# Patient Record
Sex: MALE | Race: WHITE | Employment: OTHER
[De-identification: names, ages, dates, MRNs, and addresses within clinical notes are randomized per-mention and may not be internally consistent; named-entity substitution may affect disease eponyms.]

---

## 2017-01-16 PROBLEM — N40.1 BPH (BENIGN PROSTATIC HYPERTROPHY) WITH URINARY OBSTRUCTION: Status: ACTIVE | Noted: 2017-01-16

## 2017-01-16 PROBLEM — N13.8 BPH (BENIGN PROSTATIC HYPERTROPHY) WITH URINARY OBSTRUCTION: Status: ACTIVE | Noted: 2017-01-16

## 2017-01-25 ENCOUNTER — OFFICE VISIT (OUTPATIENT)
Dept: FAMILY MEDICINE CLINIC | Facility: CLINIC | Age: 67
End: 2017-01-25

## 2017-01-25 VITALS
HEART RATE: 82 BPM | WEIGHT: 217.2 LBS | HEIGHT: 74 IN | SYSTOLIC BLOOD PRESSURE: 136 MMHG | TEMPERATURE: 97.7 F | RESPIRATION RATE: 18 BRPM | BODY MASS INDEX: 27.87 KG/M2 | DIASTOLIC BLOOD PRESSURE: 70 MMHG

## 2017-01-25 DIAGNOSIS — M15.9 OSTEOARTHRITIS OF MULTIPLE JOINTS, UNSPECIFIED OSTEOARTHRITIS TYPE: ICD-10-CM

## 2017-01-25 DIAGNOSIS — E55.9 VITAMIN D DEFICIENCY: Chronic | ICD-10-CM

## 2017-01-25 DIAGNOSIS — G47.9 DIFFICULTY SLEEPING: ICD-10-CM

## 2017-01-25 DIAGNOSIS — M25.551 BILATERAL HIP PAIN: ICD-10-CM

## 2017-01-25 DIAGNOSIS — I10 ESSENTIAL HYPERTENSION: Chronic | ICD-10-CM

## 2017-01-25 DIAGNOSIS — M53.3 SACRO-ILIAC PAIN: ICD-10-CM

## 2017-01-25 DIAGNOSIS — M25.552 BILATERAL HIP PAIN: ICD-10-CM

## 2017-01-25 PROCEDURE — G8484 FLU IMMUNIZE NO ADMIN: HCPCS | Performed by: NURSE PRACTITIONER

## 2017-01-25 PROCEDURE — 4040F PNEUMOC VAC/ADMIN/RCVD: CPT | Performed by: NURSE PRACTITIONER

## 2017-01-25 PROCEDURE — 1123F ACP DISCUSS/DSCN MKR DOCD: CPT | Performed by: NURSE PRACTITIONER

## 2017-01-25 PROCEDURE — 3045F PR MOST RECENT HEMOGLOBIN A1C LEVEL 7.0-9.0%: CPT | Performed by: NURSE PRACTITIONER

## 2017-01-25 PROCEDURE — 4004F PT TOBACCO SCREEN RCVD TLK: CPT | Performed by: NURSE PRACTITIONER

## 2017-01-25 PROCEDURE — G8420 CALC BMI NORM PARAMETERS: HCPCS | Performed by: NURSE PRACTITIONER

## 2017-01-25 PROCEDURE — G8427 DOCREV CUR MEDS BY ELIG CLIN: HCPCS | Performed by: NURSE PRACTITIONER

## 2017-01-25 PROCEDURE — 99214 OFFICE O/P EST MOD 30 MIN: CPT | Performed by: NURSE PRACTITIONER

## 2017-01-25 PROCEDURE — 3017F COLORECTAL CA SCREEN DOC REV: CPT | Performed by: NURSE PRACTITIONER

## 2017-01-25 RX ORDER — ECHINACEA 400 MG
1000 CAPSULE ORAL
COMMUNITY
End: 2017-08-23

## 2017-01-25 ASSESSMENT — ENCOUNTER SYMPTOMS
RHINORRHEA: 1
EYE DISCHARGE: 0
SHORTNESS OF BREATH: 0
WHEEZING: 0
EYE PAIN: 0
SORE THROAT: 0
BACK PAIN: 1
DIARRHEA: 0
ABDOMINAL PAIN: 0
CONSTIPATION: 0
NAUSEA: 0
VOMITING: 0
COUGH: 1
TROUBLE SWALLOWING: 0

## 2017-01-30 ENCOUNTER — TELEPHONE (OUTPATIENT)
Dept: FAMILY MEDICINE CLINIC | Facility: CLINIC | Age: 67
End: 2017-01-30

## 2017-02-24 ENCOUNTER — TELEPHONE (OUTPATIENT)
Dept: FAMILY MEDICINE CLINIC | Facility: CLINIC | Age: 67
End: 2017-02-24

## 2017-04-12 ENCOUNTER — HOSPITAL ENCOUNTER (OUTPATIENT)
Age: 67
Setting detail: SPECIMEN
Discharge: HOME | End: 2017-04-12
Payer: MEDICARE

## 2017-04-12 DIAGNOSIS — I10 ESSENTIAL HYPERTENSION: Chronic | ICD-10-CM

## 2017-04-12 LAB
ALBUMIN SERPL-MCNC: 4 G/DL (ref 3.5–5.2)
ALBUMIN/GLOBULIN RATIO: 1.8 (ref 1–2.5)
ALP BLD-CCNC: 44 U/L (ref 40–129)
ALT SERPL-CCNC: 45 U/L (ref 5–41)
ANION GAP SERPL CALCULATED.3IONS-SCNC: 12 MMOL/L (ref 9–17)
AST SERPL-CCNC: 30 U/L
BILIRUB SERPL-MCNC: 0.31 MG/DL (ref 0.3–1.2)
BUN BLDV-MCNC: 19 MG/DL (ref 8–23)
BUN/CREAT BLD: ABNORMAL (ref 9–20)
CALCIUM SERPL-MCNC: 8.6 MG/DL (ref 8.6–10.4)
CHLORIDE BLD-SCNC: 101 MMOL/L (ref 98–107)
CHOLESTEROL/HDL RATIO: 4.3
CHOLESTEROL: 165 MG/DL
CO2: 28 MMOL/L (ref 20–31)
CREAT SERPL-MCNC: 0.8 MG/DL (ref 0.7–1.2)
CREATININE URINE: 130.7 MG/DL (ref 39–259)
ESTIMATED AVERAGE GLUCOSE: 174 MG/DL
GFR AFRICAN AMERICAN: >60 ML/MIN
GFR NON-AFRICAN AMERICAN: >60 ML/MIN
GFR SERPL CREATININE-BSD FRML MDRD: ABNORMAL ML/MIN/{1.73_M2}
GFR SERPL CREATININE-BSD FRML MDRD: ABNORMAL ML/MIN/{1.73_M2}
GLUCOSE BLD-MCNC: 141 MG/DL (ref 70–99)
HBA1C MFR BLD: 7.7 % (ref 4–6)
HDLC SERPL-MCNC: 38 MG/DL
LDL CHOLESTEROL: 108 MG/DL (ref 0–130)
MICROALBUMIN/CREAT 24H UR: <12 MG/L
MICROALBUMIN/CREAT UR-RTO: 9 MCG/MG CREAT
POTASSIUM SERPL-SCNC: 3.9 MMOL/L (ref 3.7–5.3)
SODIUM BLD-SCNC: 141 MMOL/L (ref 135–144)
TOTAL PROTEIN: 6.2 G/DL (ref 6.4–8.3)
TRIGL SERPL-MCNC: 94 MG/DL
VLDLC SERPL CALC-MCNC: ABNORMAL MG/DL (ref 1–30)

## 2017-05-30 DIAGNOSIS — I10 ESSENTIAL HYPERTENSION: Chronic | ICD-10-CM

## 2017-05-31 RX ORDER — GLIPIZIDE 10 MG/1
10 TABLET ORAL
Qty: 180 TABLET | Refills: 1 | Status: SHIPPED | OUTPATIENT
Start: 2017-05-31 | End: 2017-10-27 | Stop reason: SDUPTHER

## 2017-05-31 RX ORDER — CHLORTHALIDONE 25 MG/1
25 TABLET ORAL DAILY
Qty: 90 TABLET | Refills: 1 | Status: SHIPPED | OUTPATIENT
Start: 2017-05-31 | End: 2018-03-25 | Stop reason: SDUPTHER

## 2017-05-31 RX ORDER — LISINOPRIL 10 MG/1
10 TABLET ORAL DAILY
Qty: 90 TABLET | Refills: 1 | Status: SHIPPED | OUTPATIENT
Start: 2017-05-31 | End: 2017-10-27 | Stop reason: SDUPTHER

## 2017-08-23 ENCOUNTER — HOSPITAL ENCOUNTER (OUTPATIENT)
Age: 67
Setting detail: SPECIMEN
Discharge: HOME | End: 2017-08-23
Payer: MEDICARE

## 2017-08-23 ENCOUNTER — OFFICE VISIT (OUTPATIENT)
Dept: FAMILY MEDICINE CLINIC | Age: 67
End: 2017-08-23
Payer: MEDICARE

## 2017-08-23 VITALS
OXYGEN SATURATION: 96 % | TEMPERATURE: 97.3 F | WEIGHT: 213 LBS | BODY MASS INDEX: 27.34 KG/M2 | SYSTOLIC BLOOD PRESSURE: 121 MMHG | DIASTOLIC BLOOD PRESSURE: 72 MMHG | RESPIRATION RATE: 20 BRPM | HEART RATE: 83 BPM | HEIGHT: 74 IN

## 2017-08-23 DIAGNOSIS — Z11.59 NEED FOR HEPATITIS C SCREENING TEST: ICD-10-CM

## 2017-08-23 DIAGNOSIS — I10 ESSENTIAL HYPERTENSION: Primary | Chronic | ICD-10-CM

## 2017-08-23 DIAGNOSIS — I10 ESSENTIAL HYPERTENSION: Chronic | ICD-10-CM

## 2017-08-23 DIAGNOSIS — G62.9 NEUROPATHY: ICD-10-CM

## 2017-08-23 DIAGNOSIS — M54.41 CHRONIC BILATERAL LOW BACK PAIN WITH BILATERAL SCIATICA: ICD-10-CM

## 2017-08-23 DIAGNOSIS — F17.210 CIGARETTE NICOTINE DEPENDENCE WITHOUT COMPLICATION: ICD-10-CM

## 2017-08-23 DIAGNOSIS — M54.42 CHRONIC BILATERAL LOW BACK PAIN WITH BILATERAL SCIATICA: ICD-10-CM

## 2017-08-23 DIAGNOSIS — E55.9 VITAMIN D DEFICIENCY: Chronic | ICD-10-CM

## 2017-08-23 DIAGNOSIS — G89.29 CHRONIC BILATERAL LOW BACK PAIN WITH BILATERAL SCIATICA: ICD-10-CM

## 2017-08-23 LAB
ALBUMIN SERPL-MCNC: 4.2 G/DL (ref 3.5–5.2)
ALBUMIN/GLOBULIN RATIO: 1.4 (ref 1–2.5)
ALP BLD-CCNC: 46 U/L (ref 40–129)
ALT SERPL-CCNC: 26 U/L (ref 5–41)
ANION GAP SERPL CALCULATED.3IONS-SCNC: 13 MMOL/L (ref 9–17)
AST SERPL-CCNC: 22 U/L
BILIRUB SERPL-MCNC: 0.3 MG/DL (ref 0.3–1.2)
BUN BLDV-MCNC: 20 MG/DL (ref 8–23)
BUN/CREAT BLD: ABNORMAL (ref 9–20)
CALCIUM SERPL-MCNC: 9.1 MG/DL (ref 8.6–10.4)
CHLORIDE BLD-SCNC: 100 MMOL/L (ref 98–107)
CO2: 27 MMOL/L (ref 20–31)
CREAT SERPL-MCNC: 0.83 MG/DL (ref 0.7–1.2)
CREATININE URINE: 106.9 MG/DL (ref 39–259)
GFR AFRICAN AMERICAN: >60 ML/MIN
GFR NON-AFRICAN AMERICAN: >60 ML/MIN
GFR SERPL CREATININE-BSD FRML MDRD: ABNORMAL ML/MIN/{1.73_M2}
GFR SERPL CREATININE-BSD FRML MDRD: ABNORMAL ML/MIN/{1.73_M2}
GLUCOSE FASTING: 140 MG/DL (ref 70–99)
HEPATITIS C ANTIBODY: NONREACTIVE
MICROALBUMIN/CREAT 24H UR: <12 MG/L
MICROALBUMIN/CREAT UR-RTO: 11 MCG/MG CREAT
POTASSIUM SERPL-SCNC: 3.7 MMOL/L (ref 3.7–5.3)
SODIUM BLD-SCNC: 140 MMOL/L (ref 135–144)
TOTAL PROTEIN: 7.1 G/DL (ref 6.4–8.3)

## 2017-08-23 PROCEDURE — 99214 OFFICE O/P EST MOD 30 MIN: CPT | Performed by: NURSE PRACTITIONER

## 2017-08-23 PROCEDURE — 4004F PT TOBACCO SCREEN RCVD TLK: CPT | Performed by: NURSE PRACTITIONER

## 2017-08-23 PROCEDURE — 1123F ACP DISCUSS/DSCN MKR DOCD: CPT | Performed by: NURSE PRACTITIONER

## 2017-08-23 PROCEDURE — 3046F HEMOGLOBIN A1C LEVEL >9.0%: CPT | Performed by: NURSE PRACTITIONER

## 2017-08-23 PROCEDURE — 4040F PNEUMOC VAC/ADMIN/RCVD: CPT | Performed by: NURSE PRACTITIONER

## 2017-08-23 PROCEDURE — G8419 CALC BMI OUT NRM PARAM NOF/U: HCPCS | Performed by: NURSE PRACTITIONER

## 2017-08-23 PROCEDURE — 3017F COLORECTAL CA SCREEN DOC REV: CPT | Performed by: NURSE PRACTITIONER

## 2017-08-23 PROCEDURE — G8427 DOCREV CUR MEDS BY ELIG CLIN: HCPCS | Performed by: NURSE PRACTITIONER

## 2017-08-23 RX ORDER — GABAPENTIN 600 MG/1
1 TABLET ORAL 3 TIMES DAILY
COMMUNITY
Start: 2017-06-21 | End: 2020-05-27

## 2017-08-23 ASSESSMENT — ENCOUNTER SYMPTOMS
CONSTIPATION: 0
EYE PAIN: 0
WHEEZING: 0
RHINORRHEA: 1
NAUSEA: 0
SORE THROAT: 0
VOMITING: 0
COUGH: 0
EYE ITCHING: 0
ABDOMINAL PAIN: 0
BACK PAIN: 1
TROUBLE SWALLOWING: 0
SHORTNESS OF BREATH: 0
EYE DISCHARGE: 0
BLURRED VISION: 1
DIARRHEA: 0

## 2017-08-23 ASSESSMENT — PATIENT HEALTH QUESTIONNAIRE - PHQ9
2. FEELING DOWN, DEPRESSED OR HOPELESS: 1
SUM OF ALL RESPONSES TO PHQ QUESTIONS 1-9: 2
SUM OF ALL RESPONSES TO PHQ9 QUESTIONS 1 & 2: 2
1. LITTLE INTEREST OR PLEASURE IN DOING THINGS: 1

## 2017-08-24 LAB
ESTIMATED AVERAGE GLUCOSE: 140 MG/DL
HBA1C MFR BLD: 6.5 % (ref 4–6)

## 2017-09-06 ENCOUNTER — HOSPITAL ENCOUNTER (OUTPATIENT)
Age: 67
Setting detail: SPECIMEN
Discharge: HOME | End: 2017-09-06
Payer: MEDICARE

## 2017-09-06 DIAGNOSIS — N40.1 BPH (BENIGN PROSTATIC HYPERTROPHY) WITH URINARY OBSTRUCTION: ICD-10-CM

## 2017-09-06 DIAGNOSIS — N13.8 BPH (BENIGN PROSTATIC HYPERTROPHY) WITH URINARY OBSTRUCTION: ICD-10-CM

## 2017-09-06 LAB — PROSTATE SPECIFIC ANTIGEN: 0.82 UG/L

## 2017-09-13 PROBLEM — N20.0 KIDNEY STONE ON LEFT SIDE: Status: ACTIVE | Noted: 2017-09-13

## 2017-09-21 ENCOUNTER — HOSPITAL ENCOUNTER (OUTPATIENT)
Age: 67
Discharge: HOME | End: 2017-09-21
Payer: MEDICARE

## 2017-10-23 NOTE — TELEPHONE ENCOUNTER
From: Amisha Holden  Sent: 10/23/2017 10:34 AM EDT  Subject: Medication Renewal Request    Miguel Barreto would like a refill of the following medications:  glucose blood VI test strips (ONETOUCH VERIO) strip [ALFREDO ROMEO CNP]  saxagliptin (ONGLYZA) 5 MG TABS tablet Dileep Castro CNP]    Preferred pharmacy: [address] [place], [place] [phone] -  [phone]    Comment:  One touch Verio strip: would you please write the r/x , test 3 or 4 times per day. ( Im having trouble regulating my sugar level and have been testing more often) All above requests: would you please write prescriptions with three (3) refills. It makes my life easier. Anette Ken. ISABELA Buckner.  R/X    Medication renewals requested in this message routed separately:  insulin glargine (BASAGLAR KWIKPEN) 100 UNIT/ML injection pen Scotty Pelaez CNP]

## 2017-10-27 ENCOUNTER — TELEPHONE (OUTPATIENT)
Dept: FAMILY MEDICINE CLINIC | Age: 67
End: 2017-10-27

## 2017-10-27 DIAGNOSIS — G62.9 NEUROPATHY: ICD-10-CM

## 2017-10-27 DIAGNOSIS — M15.9 OSTEOARTHRITIS OF MULTIPLE JOINTS, UNSPECIFIED OSTEOARTHRITIS TYPE: ICD-10-CM

## 2017-10-27 DIAGNOSIS — I10 ESSENTIAL HYPERTENSION: Chronic | ICD-10-CM

## 2017-10-27 NOTE — TELEPHONE ENCOUNTER
LOV 8-23-17  LR on meds 5-31-17    Health Maintenance   Topic Date Due    DTaP/Tdap/Td vaccine (1 - Tdap) 09/07/2016    Diabetic retinal exam  07/28/2017    Flu vaccine (1) 09/01/2017    Colon Cancer Screen FIT/FOBT  09/07/2017    AAA screen  08/03/2018 (Originally 1950)    Diabetic foot exam  01/25/2018    Lipid screen  04/12/2018    Diabetic hemoglobin A1C test  08/23/2018    Diabetic microalbuminuria test  08/23/2018    Zostavax vaccine  Completed    Pneumococcal low/med risk  Completed    Hepatitis C screen  Completed             (applicable per patient's age: Cancer Screenings, Depression Screening, Fall Risk Screening, Immunizations)    Hemoglobin A1C (%)   Date Value   08/23/2017 6.5 (H)   04/12/2017 7.7 (H)   10/07/2016 8.0 (H)     Microalb/Crt.  Ratio (mcg/mg creat)   Date Value   08/23/2017 11     LDL Cholesterol (mg/dL)   Date Value   04/12/2017 108     AST (U/L)   Date Value   08/23/2017 22     ALT (U/L)   Date Value   08/23/2017 26     BUN (mg/dL)   Date Value   08/23/2017 20      (goal A1C is < 7)   (goal LDL is <100) need 30-50% reduction from baseline     BP Readings from Last 3 Encounters:   08/23/17 121/72   01/25/17 136/70   01/16/17 136/77    (goal /80)      All Future Testing planned in [facility]:  Lab Frequency Next Occurrence   XR ABDOMEN (KUB) (SINGLE AP VIEW) Once 11/11/2017       Next Visit Date:  Future Appointments  Date Time Provider Bari Cervantes   11/15/2017 1:50 PM Jett Michaels MD [facility]   11/20/2017 10:40 AM Eran Ascencio[facility]            Patient Active Problem List:     Essential hypertension     Osteoarthritis     Vitamin D deficiency     Testosterone deficiency     Difficulty sleeping     Sacro-iliac pain     Neuropathy ([address]     Acute cystitis without hematuria     Nocturia     Benign prostatic hyperplasia with urinary obstruction     Uncontrolled type 2 diabetes mellitus with diabetic polyneuropathy, with long-term current use of insulin ([facility] [address]     Kidney stone on left side

## 2017-10-30 RX ORDER — LISINOPRIL 10 MG/1
10 TABLET ORAL DAILY
Qty: 90 TABLET | Refills: 1 | Status: SHIPPED | OUTPATIENT
Start: 2017-10-30 | End: 2018-03-25 | Stop reason: SDUPTHER

## 2017-10-30 RX ORDER — GLIPIZIDE 10 MG/1
10 TABLET ORAL
Qty: 180 TABLET | Refills: 1 | Status: SHIPPED | OUTPATIENT
Start: 2017-10-30 | End: 2017-11-21 | Stop reason: DRUGHIGH

## 2017-10-30 RX ORDER — CHLORTHALIDONE 25 MG/1
25 TABLET ORAL DAILY
Qty: 90 TABLET | Refills: 1 | Status: SHIPPED | OUTPATIENT
Start: 2017-10-30 | End: 2017-11-15 | Stop reason: SDUPTHER

## 2017-10-31 ENCOUNTER — HOSPITAL ENCOUNTER (OUTPATIENT)
Age: 67
Setting detail: SPECIMEN
Discharge: HOME | End: 2017-10-31
Payer: MEDICARE

## 2017-10-31 LAB — CALCIUM SERPL-MCNC: 9.3 MG/DL (ref 8.6–10.4)

## 2017-11-01 LAB — PTH INTACT: 21.84 PG/ML (ref 15–65)

## 2017-11-20 ENCOUNTER — OFFICE VISIT (OUTPATIENT)
Dept: FAMILY MEDICINE CLINIC | Age: 67
End: 2017-11-20
Payer: MEDICARE

## 2017-11-20 ENCOUNTER — HOSPITAL ENCOUNTER (OUTPATIENT)
Age: 67
Setting detail: SPECIMEN
Discharge: HOME | End: 2017-11-20
Payer: MEDICARE

## 2017-11-20 VITALS
RESPIRATION RATE: 20 BRPM | HEART RATE: 94 BPM | OXYGEN SATURATION: 97 % | TEMPERATURE: 97.3 F | BODY MASS INDEX: 27.63 KG/M2 | DIASTOLIC BLOOD PRESSURE: 79 MMHG | WEIGHT: 215.2 LBS | SYSTOLIC BLOOD PRESSURE: 133 MMHG

## 2017-11-20 DIAGNOSIS — I10 ESSENTIAL HYPERTENSION: Chronic | ICD-10-CM

## 2017-11-20 DIAGNOSIS — Z79.4 CONTROLLED TYPE 2 DIABETES MELLITUS WITH DIABETIC POLYNEUROPATHY, WITH LONG-TERM CURRENT USE OF INSULIN (HCC): ICD-10-CM

## 2017-11-20 DIAGNOSIS — E11.42 CONTROLLED TYPE 2 DIABETES MELLITUS WITH DIABETIC POLYNEUROPATHY, WITH LONG-TERM CURRENT USE OF INSULIN (HCC): ICD-10-CM

## 2017-11-20 DIAGNOSIS — M15.9 OSTEOARTHRITIS OF MULTIPLE JOINTS, UNSPECIFIED OSTEOARTHRITIS TYPE: ICD-10-CM

## 2017-11-20 DIAGNOSIS — E11.42 CONTROLLED TYPE 2 DIABETES MELLITUS WITH DIABETIC POLYNEUROPATHY, WITH LONG-TERM CURRENT USE OF INSULIN (HCC): Primary | ICD-10-CM

## 2017-11-20 DIAGNOSIS — N40.1 BENIGN PROSTATIC HYPERPLASIA WITH URINARY OBSTRUCTION: ICD-10-CM

## 2017-11-20 DIAGNOSIS — Z23 NEED FOR INFLUENZA VACCINATION: ICD-10-CM

## 2017-11-20 DIAGNOSIS — Z79.4 CONTROLLED TYPE 2 DIABETES MELLITUS WITH DIABETIC POLYNEUROPATHY, WITH LONG-TERM CURRENT USE OF INSULIN (HCC): Primary | ICD-10-CM

## 2017-11-20 DIAGNOSIS — N13.8 BENIGN PROSTATIC HYPERPLASIA WITH URINARY OBSTRUCTION: ICD-10-CM

## 2017-11-20 DIAGNOSIS — G62.9 NEUROPATHY: ICD-10-CM

## 2017-11-20 LAB
ALBUMIN SERPL-MCNC: 4.4 G/DL (ref 3.5–5.2)
ALBUMIN/GLOBULIN RATIO: 1.8 (ref 1–2.5)
ALP BLD-CCNC: 48 U/L (ref 40–129)
ALT SERPL-CCNC: 27 U/L (ref 5–41)
ANION GAP SERPL CALCULATED.3IONS-SCNC: 13 MMOL/L (ref 9–17)
AST SERPL-CCNC: 21 U/L
BILIRUB SERPL-MCNC: 0.4 MG/DL (ref 0.3–1.2)
BUN BLDV-MCNC: 25 MG/DL (ref 8–23)
BUN/CREAT BLD: ABNORMAL (ref 9–20)
CALCIUM SERPL-MCNC: 10 MG/DL (ref 8.6–10.4)
CHLORIDE BLD-SCNC: 100 MMOL/L (ref 98–107)
CO2: 29 MMOL/L (ref 20–31)
CREAT SERPL-MCNC: 1 MG/DL (ref 0.7–1.2)
ESTIMATED AVERAGE GLUCOSE: 128 MG/DL
GFR AFRICAN AMERICAN: >60 ML/MIN
GFR NON-AFRICAN AMERICAN: >60 ML/MIN
GFR SERPL CREATININE-BSD FRML MDRD: ABNORMAL ML/MIN/{1.73_M2}
GFR SERPL CREATININE-BSD FRML MDRD: ABNORMAL ML/MIN/{1.73_M2}
GLUCOSE BLD-MCNC: 148 MG/DL (ref 70–99)
HBA1C MFR BLD: 6.1 % (ref 4–6)
POTASSIUM SERPL-SCNC: 4.3 MMOL/L (ref 3.7–5.3)
SODIUM BLD-SCNC: 142 MMOL/L (ref 135–144)
TOTAL PROTEIN: 6.9 G/DL (ref 6.4–8.3)

## 2017-11-20 PROCEDURE — 90688 IIV4 VACCINE SPLT 0.5 ML IM: CPT | Performed by: NURSE PRACTITIONER

## 2017-11-20 PROCEDURE — G8484 FLU IMMUNIZE NO ADMIN: HCPCS | Performed by: NURSE PRACTITIONER

## 2017-11-20 PROCEDURE — 3017F COLORECTAL CA SCREEN DOC REV: CPT | Performed by: NURSE PRACTITIONER

## 2017-11-20 PROCEDURE — G8427 DOCREV CUR MEDS BY ELIG CLIN: HCPCS | Performed by: NURSE PRACTITIONER

## 2017-11-20 PROCEDURE — G0008 ADMIN INFLUENZA VIRUS VAC: HCPCS | Performed by: NURSE PRACTITIONER

## 2017-11-20 PROCEDURE — G8419 CALC BMI OUT NRM PARAM NOF/U: HCPCS | Performed by: NURSE PRACTITIONER

## 2017-11-20 PROCEDURE — 99214 OFFICE O/P EST MOD 30 MIN: CPT | Performed by: NURSE PRACTITIONER

## 2017-11-20 PROCEDURE — 4040F PNEUMOC VAC/ADMIN/RCVD: CPT | Performed by: NURSE PRACTITIONER

## 2017-11-20 PROCEDURE — 3044F HG A1C LEVEL LT 7.0%: CPT | Performed by: NURSE PRACTITIONER

## 2017-11-20 PROCEDURE — 4004F PT TOBACCO SCREEN RCVD TLK: CPT | Performed by: NURSE PRACTITIONER

## 2017-11-20 PROCEDURE — 1123F ACP DISCUSS/DSCN MKR DOCD: CPT | Performed by: NURSE PRACTITIONER

## 2017-11-20 ASSESSMENT — ENCOUNTER SYMPTOMS
COUGH: 0
EYE PAIN: 0
SHORTNESS OF BREATH: 0
EYE DISCHARGE: 0
VOMITING: 0
BACK PAIN: 1
SORE THROAT: 0
NAUSEA: 0
CONSTIPATION: 0
TROUBLE SWALLOWING: 0
RHINORRHEA: 0
ABDOMINAL PAIN: 0
DIARRHEA: 0
WHEEZING: 0

## 2017-11-20 NOTE — PROGRESS NOTES
Subjective:      Patient ID: Tutu Nunez is a 79 y.o. male. Visit Information    Have you changed or started any medications since your last visit including any over-the-counter medicines, vitamins, or herbal medicines? no   Have you stopped taking any of your medications? Is so, why? -  no  Are you having any side effects from any of your medications? - no    Have you seen any other physician or provider since your last visit?  no   Have you had any other diagnostic tests since your last visit?  no   Have you been seen in the emergency room and/or had an admission in a hospital since we last saw you?  no   Have you had your routine dental cleaning in the past 6 months?  yes - routine     Do you have an active MyChart account? If no, what is the barrier? Yes    Patient Care Team:  Christin Wolf CNP as PCP - [address] [place], [place] [address], CNP as PCP - [facility] Attributed Provider    Medical History Review  Past Medical, Family, and Social History reviewed and does contribute to the patient presenting condition    Health Maintenance   Topic Date Due    AAA screen  08/03/2018 (Originally 1950)    Colon Cancer Screen FIT/FOBT  11/14/2018 (Originally 9/7/2017)    Diabetic retinal exam  11/15/2018 (Originally 7/28/2017)    DTaP/Tdap/Td vaccine (1 - Tdap) 11/23/2018 (Originally 9/7/2016)    Diabetic foot exam  01/25/2018    Lipid screen  04/12/2018    Diabetic hemoglobin A1C test  08/23/2018    Diabetic microalbuminuria test  08/23/2018    Zostavax vaccine  Completed    Flu vaccine  Completed    Pneumococcal low/med risk  Completed    Hepatitis C screen  Completed         Patient presents today for follow up on chronic conditions including: Hypertension, Diabetes, Arthritis, Neuropathy and BPH. Blood pressure has been stable lately with a SBP around 135. Taking medications as prescribed. Blood glucose readying have been up and down between 45 - 160. Most readings between .  Taking all medications as ACE inhibitor/angiotensin II receptor blocker is being taken. He does not see a podiatrist.Eye exam is not current. See Health Maintenance for diabetic screening measure due dates. Weight has been stable. Wt Readings from Last 3 Encounters:   11/20/17 215 lb 3.2 oz (97.6 kg)   11/15/17 218 lb (98.9 kg)   09/13/17 218 lb (98.9 kg)       There are no preventive care reminders to display for this patient. Hemoglobin A1C (%)   Date Value   08/23/2017 6.5 (H)   04/12/2017 7.7 (H)   10/07/2016 8.0 (H)           Lab Results   Component Value Date    CREATININE 0.83 08/23/2017    CREATININE 0.80 04/12/2017    CREATININE 0.82 10/07/2016          Review of Systems   Constitutional: Negative for activity change, appetite change and fever. HENT: Negative for congestion, ear pain, rhinorrhea, sore throat and trouble swallowing. Eyes: Positive for visual disturbance (chronic, gray spot in left eye). Negative for pain and discharge. Respiratory: Negative for cough, shortness of breath and wheezing. Smoking 0.5 ppd. Also vaping with 6 mg- uses about 5 minutes per day   Cardiovascular: Negative for chest pain. Gastrointestinal: Negative for abdominal pain, constipation, diarrhea, nausea and vomiting. Genitourinary: Negative for dysuria. Musculoskeletal: Positive for arthralgias and back pain. Negative for gait problem and joint swelling. Pain management. Chronic back and hip pain. Constant 4-5 out of 10 on pain scale. Skin: Negative for rash. Neurological: Negative for dizziness, light-headedness and headaches. Psychiatric/Behavioral: Positive for dysphoric mood (tired of chronic pain. ). Negative for self-injury, sleep disturbance and suicidal ideas. Objective:   Physical Exam   Constitutional: He is oriented to person, place, and time. He appears well-developed and well-nourished. No distress. HENT:   Head: Normocephalic and atraumatic.    Right Ear: Tympanic membrane and Continue all medications as prescribed. Encouraged to start medication from Urology as soon as he can. Continue to follow up with pain management for chronic pain. Continue to follow up with Urology for recurrent kidney stones and BPH. Flu shot received in office today. Well tolerated. Encouraged healthy diet and exercise. Call office with any new or worsening symptoms or concerns. Akash Davis NP student at bedside for examination. 1.  Gene received counseling on the following healthy behaviors: nutrition, exercise and medication adherence, tobacco abuse. 2.  Reviewed prior labs and health maintenance  3. Continue current medications, diet and exercise. 4.  Discussed use, benefit, and side effects of prescribed medications. Barriers to medication compliance addressed. All patient questions answered. Pt voiced understanding. 5.  Patient given educational materials - see patient instructions  6. Was a self-tracking handout given in paper form or via [employer]? No  If yes, see orders or list here.     PHQ Scores 8/23/2017 5/31/2016 6/16/2015   PHQ2 Score 2 2 6   PHQ9 Score 2 2 6     Interpretation of Total Score Depression Severity: 1-4 = Minimal depression, 5-9 = Mild depression, 10-14 = Moderate depression, 15-19 = Moderately severe depression, 20-27 = Severe depression  Normal    Completed Refills   Requested Prescriptions     Signed Prescriptions Disp Refills    insulin glargine (BASAGLAR KWIKPEN) 100 UNIT/ML injection pen 60 mL 1     Sig: Inject 66 Units into the skin nightly

## 2017-11-21 RX ORDER — GLIPIZIDE 10 MG/1
10 TABLET ORAL DAILY
Qty: 180 TABLET | Refills: 1 | Status: SHIPPED
Start: 2017-11-21 | End: 2018-03-25 | Stop reason: SDUPTHER

## 2017-12-28 ENCOUNTER — HOSPITAL ENCOUNTER (OUTPATIENT)
Age: 67
Setting detail: SPECIMEN
Discharge: HOME | End: 2017-12-28
Payer: MEDICARE

## 2017-12-28 DIAGNOSIS — R82.994 HYPERCALCIURIA: ICD-10-CM

## 2017-12-28 DIAGNOSIS — Z87.442 PERSONAL HISTORY OF KIDNEY STONES: ICD-10-CM

## 2017-12-28 LAB — POTASSIUM SERPL-SCNC: 4.2 MMOL/L (ref 3.7–5.3)

## 2018-01-15 ENCOUNTER — TELEPHONE (OUTPATIENT)
Dept: FAMILY MEDICINE CLINIC | Age: 68
End: 2018-01-15

## 2018-01-15 DIAGNOSIS — R05.9 COUGH: Primary | ICD-10-CM

## 2018-01-15 RX ORDER — DEXTROMETHORPHAN POLISTIREX 30 MG/5ML
60 SUSPENSION ORAL 2 TIMES DAILY PRN
Qty: 1 BOTTLE | Refills: 0 | Status: SHIPPED | OUTPATIENT
Start: 2018-01-15 | End: 2018-01-25

## 2018-01-15 NOTE — TELEPHONE ENCOUNTER
Patients wife contacted office and states that the patient has a harsh cough and congestion. States she is not able to get out of the house as she is doing a bowel prep for a colonoscopy. She states the pharmacy will deliver a prescription but not OTC. She is requesting an Rx for Robitussin be sent to [facility] [place] in [place] so they can get delivered.

## 2018-01-16 ENCOUNTER — OFFICE VISIT (OUTPATIENT)
Dept: FAMILY MEDICINE CLINIC | Age: 68
End: 2018-01-16
Payer: MEDICARE

## 2018-01-16 VITALS
SYSTOLIC BLOOD PRESSURE: 127 MMHG | WEIGHT: 214 LBS | BODY MASS INDEX: 27.48 KG/M2 | DIASTOLIC BLOOD PRESSURE: 80 MMHG | RESPIRATION RATE: 19 BRPM | TEMPERATURE: 98 F | HEART RATE: 100 BPM

## 2018-01-16 DIAGNOSIS — J06.9 VIRAL URI: Primary | ICD-10-CM

## 2018-01-16 PROCEDURE — G8419 CALC BMI OUT NRM PARAM NOF/U: HCPCS | Performed by: NURSE PRACTITIONER

## 2018-01-16 PROCEDURE — 1123F ACP DISCUSS/DSCN MKR DOCD: CPT | Performed by: NURSE PRACTITIONER

## 2018-01-16 PROCEDURE — 4004F PT TOBACCO SCREEN RCVD TLK: CPT | Performed by: NURSE PRACTITIONER

## 2018-01-16 PROCEDURE — G8427 DOCREV CUR MEDS BY ELIG CLIN: HCPCS | Performed by: NURSE PRACTITIONER

## 2018-01-16 PROCEDURE — G8484 FLU IMMUNIZE NO ADMIN: HCPCS | Performed by: NURSE PRACTITIONER

## 2018-01-16 PROCEDURE — 99213 OFFICE O/P EST LOW 20 MIN: CPT | Performed by: NURSE PRACTITIONER

## 2018-01-16 PROCEDURE — 3017F COLORECTAL CA SCREEN DOC REV: CPT | Performed by: NURSE PRACTITIONER

## 2018-01-16 PROCEDURE — 4040F PNEUMOC VAC/ADMIN/RCVD: CPT | Performed by: NURSE PRACTITIONER

## 2018-01-16 ASSESSMENT — ENCOUNTER SYMPTOMS
DIARRHEA: 1
TROUBLE SWALLOWING: 0
SINUS PAIN: 1
SORE THROAT: 1
VOMITING: 0
WHEEZING: 0
BACK PAIN: 1
CHEST TIGHTNESS: 1
NAUSEA: 0
RHINORRHEA: 1
ABDOMINAL PAIN: 0
EYES NEGATIVE: 1
COUGH: 1

## 2018-03-25 DIAGNOSIS — I10 ESSENTIAL HYPERTENSION: Chronic | ICD-10-CM

## 2018-03-26 NOTE — TELEPHONE ENCOUNTER
LOV 11/20/17  LRF Metformin, Lisinopril 10/30/17; Hygrotin 5/31/17, Glipizide 11/21/17  RTO 4 months, My chart message sent in previous Refill for patient to schedule. Health Maintenance   Topic Date Due    Shingles Vaccine (1 of 2 - 2 Dose Series) 03/01/2000    Diabetic foot exam  01/25/2018    AAA screen  08/03/2018 (Originally 1950)    Colon Cancer Screen FIT/FOBT  11/14/2018 (Originally 9/7/2017)    Diabetic retinal exam  11/15/2018 (Originally 7/28/2017)    DTaP/Tdap/Td vaccine (1 - Tdap) 11/23/2018 (Originally 9/7/2016)    Lipid screen  04/12/2018    Diabetic microalbuminuria test  08/23/2018    A1C test (Diabetic or Prediabetic)  11/20/2018    Creatinine monitoring  11/20/2018    Potassium monitoring  12/28/2018    Flu vaccine  Completed    Pneumococcal low/med risk  Completed    Hepatitis C screen  Completed             (applicable per patient's age: Cancer Screenings, Depression Screening, Fall Risk Screening, Immunizations)    Hemoglobin A1C (%)   Date Value   11/20/2017 6.1 (H)   08/23/2017 6.5 (H)   04/12/2017 7.7 (H)     Microalb/Crt. Ratio (mcg/mg creat)   Date Value   08/23/2017 11     LDL Cholesterol (mg/dL)   Date Value   04/12/2017 108     AST (U/L)   Date Value   11/20/2017 21     ALT (U/L)   Date Value   11/20/2017 27     BUN (mg/dL)   Date Value   11/20/2017 25 (H)      (goal A1C is < 7)   (goal LDL is <100) need 30-50% reduction from baseline     BP Readings from Last 3 Encounters:   01/16/18 127/80   11/20/17 133/79   08/23/17 121/72    (goal /80)      All Future Testing planned in [facility]:  Lab Frequency Next Occurrence   XR Abdomen Kub 1 VW Once 05/14/2018       Next Visit Date:  No future appointments.          Patient Active Problem List:     Essential hypertension     Osteoarthritis     Vitamin D deficiency     Testosterone deficiency     Difficulty sleeping     Sacro-iliac pain     Neuropathy ([address]     Acute cystitis without hematuria     Nocturia     Benign

## 2018-03-28 RX ORDER — LISINOPRIL 10 MG/1
10 TABLET ORAL DAILY
Qty: 90 TABLET | Refills: 1 | Status: SHIPPED | OUTPATIENT
Start: 2018-03-28 | End: 2018-08-18 | Stop reason: SDUPTHER

## 2018-03-28 RX ORDER — CHLORTHALIDONE 25 MG/1
25 TABLET ORAL DAILY
Qty: 90 TABLET | Refills: 1 | Status: SHIPPED | OUTPATIENT
Start: 2018-03-28 | End: 2018-08-18 | Stop reason: SDUPTHER

## 2018-03-28 RX ORDER — GLIPIZIDE 10 MG/1
10 TABLET ORAL
Qty: 180 TABLET | Refills: 1 | Status: SHIPPED | OUTPATIENT
Start: 2018-03-28 | End: 2018-08-18 | Stop reason: SDUPTHER

## 2018-04-09 ENCOUNTER — HOSPITAL ENCOUNTER (OUTPATIENT)
Age: 68
Setting detail: SPECIMEN
Discharge: HOME | End: 2018-04-09
Payer: MEDICARE

## 2018-04-09 ENCOUNTER — OFFICE VISIT (OUTPATIENT)
Dept: FAMILY MEDICINE CLINIC | Age: 68
End: 2018-04-09
Payer: MEDICARE

## 2018-04-09 VITALS
BODY MASS INDEX: 26.83 KG/M2 | DIASTOLIC BLOOD PRESSURE: 74 MMHG | SYSTOLIC BLOOD PRESSURE: 104 MMHG | WEIGHT: 209 LBS | TEMPERATURE: 95.6 F | RESPIRATION RATE: 18 BRPM | HEART RATE: 96 BPM

## 2018-04-09 DIAGNOSIS — Z87.898 HISTORY OF ELEVATED PSA: ICD-10-CM

## 2018-04-09 DIAGNOSIS — Z12.11 ENCOUNTER FOR FIT (FECAL IMMUNOCHEMICAL TEST) SCREENING: ICD-10-CM

## 2018-04-09 DIAGNOSIS — E11.42 TYPE 2 DIABETES MELLITUS WITH DIABETIC POLYNEUROPATHY, WITH LONG-TERM CURRENT USE OF INSULIN (HCC): ICD-10-CM

## 2018-04-09 DIAGNOSIS — M53.3 SACRO-ILIAC PAIN: ICD-10-CM

## 2018-04-09 DIAGNOSIS — G62.9 NEUROPATHY: ICD-10-CM

## 2018-04-09 DIAGNOSIS — G62.9 ACQUIRED POLYNEUROPATHY: ICD-10-CM

## 2018-04-09 DIAGNOSIS — Z79.4 CONTROLLED TYPE 2 DIABETES MELLITUS WITH DIABETIC POLYNEUROPATHY, WITH LONG-TERM CURRENT USE OF INSULIN (HCC): ICD-10-CM

## 2018-04-09 DIAGNOSIS — I10 ESSENTIAL HYPERTENSION: Primary | Chronic | ICD-10-CM

## 2018-04-09 DIAGNOSIS — Z79.4 TYPE 2 DIABETES MELLITUS WITH DIABETIC POLYNEUROPATHY, WITH LONG-TERM CURRENT USE OF INSULIN (HCC): ICD-10-CM

## 2018-04-09 DIAGNOSIS — E11.42 CONTROLLED TYPE 2 DIABETES MELLITUS WITH DIABETIC POLYNEUROPATHY, WITH LONG-TERM CURRENT USE OF INSULIN (HCC): ICD-10-CM

## 2018-04-09 DIAGNOSIS — M15.9 OSTEOARTHRITIS OF MULTIPLE JOINTS, UNSPECIFIED OSTEOARTHRITIS TYPE: ICD-10-CM

## 2018-04-09 DIAGNOSIS — I10 ESSENTIAL HYPERTENSION: Chronic | ICD-10-CM

## 2018-04-09 LAB
ALBUMIN SERPL-MCNC: 4.1 G/DL (ref 3.5–5.2)
ALBUMIN/GLOBULIN RATIO: 1.3 (ref 1–2.5)
ALP BLD-CCNC: 45 U/L (ref 40–129)
ALT SERPL-CCNC: 32 U/L (ref 5–41)
ANION GAP SERPL CALCULATED.3IONS-SCNC: 14 MMOL/L (ref 9–17)
AST SERPL-CCNC: 21 U/L
BILIRUB SERPL-MCNC: 0.37 MG/DL (ref 0.3–1.2)
BUN BLDV-MCNC: 15 MG/DL (ref 8–23)
BUN/CREAT BLD: ABNORMAL (ref 9–20)
CALCIUM SERPL-MCNC: 9.3 MG/DL (ref 8.6–10.4)
CHLORIDE BLD-SCNC: 98 MMOL/L (ref 98–107)
CHOLESTEROL/HDL RATIO: 3.5
CHOLESTEROL: 162 MG/DL
CO2: 28 MMOL/L (ref 20–31)
CREAT SERPL-MCNC: 0.84 MG/DL (ref 0.7–1.2)
GFR AFRICAN AMERICAN: >60 ML/MIN
GFR NON-AFRICAN AMERICAN: >60 ML/MIN
GFR SERPL CREATININE-BSD FRML MDRD: ABNORMAL ML/MIN/{1.73_M2}
GFR SERPL CREATININE-BSD FRML MDRD: ABNORMAL ML/MIN/{1.73_M2}
GLUCOSE BLD-MCNC: 136 MG/DL (ref 70–99)
HCT VFR BLD CALC: 51.4 % (ref 40.7–50.3)
HDLC SERPL-MCNC: 46 MG/DL
HEMOGLOBIN: 16.1 G/DL (ref 13–17)
LDL CHOLESTEROL: 95 MG/DL (ref 0–130)
MCH RBC QN AUTO: 30.1 PG (ref 25.2–33.5)
MCHC RBC AUTO-ENTMCNC: 31.3 G/DL (ref 28.4–34.8)
MCV RBC AUTO: 96.1 FL (ref 82.6–102.9)
NRBC AUTOMATED: 0 PER 100 WBC
PDW BLD-RTO: 13.4 % (ref 11.8–14.4)
PLATELET # BLD: 193 K/UL (ref 138–453)
PMV BLD AUTO: 11.5 FL (ref 8.1–13.5)
POTASSIUM SERPL-SCNC: 4.3 MMOL/L (ref 3.7–5.3)
PROSTATE SPECIFIC ANTIGEN: 0.61 UG/L
RBC # BLD: 5.35 M/UL (ref 4.21–5.77)
SODIUM BLD-SCNC: 140 MMOL/L (ref 135–144)
TOTAL PROTEIN: 7.3 G/DL (ref 6.4–8.3)
TRIGL SERPL-MCNC: 103 MG/DL
VLDLC SERPL CALC-MCNC: NORMAL MG/DL (ref 1–30)
WBC # BLD: 10.5 K/UL (ref 3.5–11.3)

## 2018-04-09 PROCEDURE — 99214 OFFICE O/P EST MOD 30 MIN: CPT | Performed by: NURSE PRACTITIONER

## 2018-04-09 PROCEDURE — 3017F COLORECTAL CA SCREEN DOC REV: CPT | Performed by: NURSE PRACTITIONER

## 2018-04-09 PROCEDURE — 4004F PT TOBACCO SCREEN RCVD TLK: CPT | Performed by: NURSE PRACTITIONER

## 2018-04-09 PROCEDURE — G8427 DOCREV CUR MEDS BY ELIG CLIN: HCPCS | Performed by: NURSE PRACTITIONER

## 2018-04-09 PROCEDURE — 3046F HEMOGLOBIN A1C LEVEL >9.0%: CPT | Performed by: NURSE PRACTITIONER

## 2018-04-09 PROCEDURE — 4040F PNEUMOC VAC/ADMIN/RCVD: CPT | Performed by: NURSE PRACTITIONER

## 2018-04-09 PROCEDURE — G8419 CALC BMI OUT NRM PARAM NOF/U: HCPCS | Performed by: NURSE PRACTITIONER

## 2018-04-09 PROCEDURE — 1123F ACP DISCUSS/DSCN MKR DOCD: CPT | Performed by: NURSE PRACTITIONER

## 2018-04-09 ASSESSMENT — ENCOUNTER SYMPTOMS
EYE PAIN: 0
WHEEZING: 0
SORE THROAT: 0
VOMITING: 0
ABDOMINAL PAIN: 0
TROUBLE SWALLOWING: 0
BACK PAIN: 1
NAUSEA: 1
CONSTIPATION: 1
EYE ITCHING: 1
SHORTNESS OF BREATH: 1
COUGH: 1
SINUS PRESSURE: 1
RHINORRHEA: 1
EYE DISCHARGE: 0
DIARRHEA: 1

## 2018-04-10 ENCOUNTER — TELEPHONE (OUTPATIENT)
Dept: FAMILY MEDICINE CLINIC | Age: 68
End: 2018-04-10

## 2018-04-10 LAB
ESTIMATED AVERAGE GLUCOSE: 143 MG/DL
HBA1C MFR BLD: 6.6 % (ref 4–6)

## 2018-05-30 PROBLEM — R82.994 HYPERCALCIURIA: Status: ACTIVE | Noted: 2018-05-30

## 2018-05-30 PROBLEM — Z87.442 PERSONAL HISTORY OF KIDNEY STONES: Status: ACTIVE | Noted: 2018-05-30

## 2018-05-31 DIAGNOSIS — Z87.442 PERSONAL HISTORY OF KIDNEY STONES: ICD-10-CM

## 2018-05-31 DIAGNOSIS — R82.994 HYPERCALCIURIA: ICD-10-CM

## 2018-07-13 ENCOUNTER — TELEPHONE (OUTPATIENT)
Dept: FAMILY MEDICINE CLINIC | Age: 68
End: 2018-07-13

## 2018-07-13 DIAGNOSIS — H91.93 HEARING DECREASED, BILATERAL: Primary | ICD-10-CM

## 2018-08-18 DIAGNOSIS — I10 ESSENTIAL HYPERTENSION: Chronic | ICD-10-CM

## 2018-08-20 RX ORDER — LISINOPRIL 10 MG/1
10 TABLET ORAL DAILY
Qty: 90 TABLET | Refills: 1 | Status: SHIPPED | OUTPATIENT
Start: 2018-08-20 | End: 2019-05-15 | Stop reason: SDUPTHER

## 2018-08-20 RX ORDER — CHLORTHALIDONE 25 MG/1
25 TABLET ORAL DAILY
Qty: 90 TABLET | Refills: 1 | Status: SHIPPED | OUTPATIENT
Start: 2018-08-20 | End: 2019-05-15 | Stop reason: SDUPTHER

## 2018-08-20 RX ORDER — GLIPIZIDE 10 MG/1
10 TABLET ORAL
Qty: 180 TABLET | Refills: 1 | Status: SHIPPED | OUTPATIENT
Start: 2018-08-20 | End: 2019-05-15 | Stop reason: SDUPTHER

## 2018-08-20 NOTE — TELEPHONE ENCOUNTER
LOV:4-9-18  ROV:6 months  LRF:3-28-18  Health Maintenance   Topic Date Due    AAA screen  1950    Shingles Vaccine (1 of 2 - 2 Dose Series) 03/01/2000    Diabetic microalbuminuria test  08/23/2018    Low dose CT lung screening  09/21/2018 (Originally 3/1/2005)    Colon Cancer Screen FIT/FOBT  11/14/2018 (Originally 9/7/2017)    DTaP/Tdap/Td vaccine (1 - Tdap) 11/23/2018 (Originally 9/7/2016)    Flu vaccine (1) 09/01/2018    Diabetic retinal exam  03/22/2019    Diabetic foot exam  04/09/2019    A1C test (Diabetic or Prediabetic)  04/09/2019    Lipid screen  04/09/2019    Potassium monitoring  04/09/2019    Creatinine monitoring  04/09/2019    Pneumococcal low/med risk  Completed    Hepatitis C screen  Completed             (applicable per patient's age: Cancer Screenings, Depression Screening, Fall Risk Screening, Immunizations)    Hemoglobin A1C (%)   Date Value   04/09/2018 6.6 (H)   11/20/2017 6.1 (H)   08/23/2017 6.5 (H)     Microalb/Crt. Ratio (mcg/mg creat)   Date Value   08/23/2017 11     LDL Cholesterol (mg/dL)   Date Value   04/09/2018 95     AST (U/L)   Date Value   04/09/2018 21     ALT (U/L)   Date Value   04/09/2018 32     BUN (mg/dL)   Date Value   04/09/2018 15      (goal A1C is < 7)   (goal LDL is <100) need 30-50% reduction from baseline     BP Readings from Last 3 Encounters:   05/30/18 109/67   04/09/18 104/74   01/16/18 127/80    (goal /80)      All Future Testing planned in [facility]:  Lab Frequency Next Occurrence   XR Abdomen Kub 1 VW Once 11/15/2018   POCT Fecal Immunochemical Test (FIT) Once 04/30/2018       Next Visit Date:  No future appointments.          Patient Active Problem List:     Essential hypertension     Osteoarthritis     Vitamin D deficiency     Testosterone deficiency     Difficulty sleeping     Sacro-iliac pain     Neuropathy     Acute cystitis without hematuria     Nocturia     Benign prostatic hyperplasia with urinary obstruction     Type 2 diabetes mellitus with diabetic polyneuropathy, with long-term current use of insulin (HCC)     Kidney stone on left side     Hypercalciuria     Personal history of kidney stones

## 2018-10-29 ENCOUNTER — NURSE ONLY (OUTPATIENT)
Dept: FAMILY MEDICINE CLINIC | Age: 68
End: 2018-10-29
Payer: MEDICARE

## 2018-10-29 DIAGNOSIS — Z23 NEEDS FLU SHOT: Primary | ICD-10-CM

## 2018-10-29 PROCEDURE — G0008 ADMIN INFLUENZA VIRUS VAC: HCPCS | Performed by: NURSE PRACTITIONER

## 2018-10-29 PROCEDURE — 90688 IIV4 VACCINE SPLT 0.5 ML IM: CPT | Performed by: NURSE PRACTITIONER

## 2018-11-05 ENCOUNTER — PATIENT MESSAGE (OUTPATIENT)
Dept: FAMILY MEDICINE CLINIC | Age: 68
End: 2018-11-05

## 2018-11-06 DIAGNOSIS — Z79.4 TYPE 2 DIABETES MELLITUS WITH DIABETIC POLYNEUROPATHY, WITH LONG-TERM CURRENT USE OF INSULIN (HCC): Primary | ICD-10-CM

## 2018-11-06 DIAGNOSIS — E11.42 TYPE 2 DIABETES MELLITUS WITH DIABETIC POLYNEUROPATHY, WITH LONG-TERM CURRENT USE OF INSULIN (HCC): Primary | ICD-10-CM

## 2018-11-20 ENCOUNTER — OFFICE VISIT (OUTPATIENT)
Dept: FAMILY MEDICINE CLINIC | Age: 68
End: 2018-11-20
Payer: MEDICARE

## 2018-11-20 ENCOUNTER — HOSPITAL ENCOUNTER (OUTPATIENT)
Age: 68
Setting detail: SPECIMEN
Discharge: HOME | End: 2018-11-20
Payer: MEDICARE

## 2018-11-20 VITALS
TEMPERATURE: 97.6 F | HEART RATE: 96 BPM | DIASTOLIC BLOOD PRESSURE: 76 MMHG | BODY MASS INDEX: 27.12 KG/M2 | WEIGHT: 211.2 LBS | SYSTOLIC BLOOD PRESSURE: 116 MMHG

## 2018-11-20 DIAGNOSIS — Z79.4 TYPE 2 DIABETES MELLITUS WITH DIABETIC POLYNEUROPATHY, WITH LONG-TERM CURRENT USE OF INSULIN (HCC): Primary | ICD-10-CM

## 2018-11-20 DIAGNOSIS — M53.3 SACRO-ILIAC PAIN: ICD-10-CM

## 2018-11-20 DIAGNOSIS — E11.42 TYPE 2 DIABETES MELLITUS WITH DIABETIC POLYNEUROPATHY, WITH LONG-TERM CURRENT USE OF INSULIN (HCC): ICD-10-CM

## 2018-11-20 DIAGNOSIS — I10 ESSENTIAL HYPERTENSION: Chronic | ICD-10-CM

## 2018-11-20 DIAGNOSIS — Z91.81 AT HIGH RISK FOR FALLS: ICD-10-CM

## 2018-11-20 DIAGNOSIS — M15.9 OSTEOARTHRITIS OF MULTIPLE JOINTS, UNSPECIFIED OSTEOARTHRITIS TYPE: ICD-10-CM

## 2018-11-20 DIAGNOSIS — E11.42 TYPE 2 DIABETES MELLITUS WITH DIABETIC POLYNEUROPATHY, WITH LONG-TERM CURRENT USE OF INSULIN (HCC): Primary | ICD-10-CM

## 2018-11-20 DIAGNOSIS — Z79.4 TYPE 2 DIABETES MELLITUS WITH DIABETIC POLYNEUROPATHY, WITH LONG-TERM CURRENT USE OF INSULIN (HCC): ICD-10-CM

## 2018-11-20 DIAGNOSIS — Z12.11 SCREENING FOR COLORECTAL CANCER: ICD-10-CM

## 2018-11-20 DIAGNOSIS — Z12.12 SCREENING FOR COLORECTAL CANCER: ICD-10-CM

## 2018-11-20 PROCEDURE — 1100F PTFALLS ASSESS-DOCD GE2>/YR: CPT | Performed by: NURSE PRACTITIONER

## 2018-11-20 PROCEDURE — G8419 CALC BMI OUT NRM PARAM NOF/U: HCPCS | Performed by: NURSE PRACTITIONER

## 2018-11-20 PROCEDURE — G8482 FLU IMMUNIZE ORDER/ADMIN: HCPCS | Performed by: NURSE PRACTITIONER

## 2018-11-20 PROCEDURE — 1123F ACP DISCUSS/DSCN MKR DOCD: CPT | Performed by: NURSE PRACTITIONER

## 2018-11-20 PROCEDURE — 99214 OFFICE O/P EST MOD 30 MIN: CPT | Performed by: NURSE PRACTITIONER

## 2018-11-20 PROCEDURE — G8427 DOCREV CUR MEDS BY ELIG CLIN: HCPCS | Performed by: NURSE PRACTITIONER

## 2018-11-20 PROCEDURE — 4040F PNEUMOC VAC/ADMIN/RCVD: CPT | Performed by: NURSE PRACTITIONER

## 2018-11-20 PROCEDURE — 4004F PT TOBACCO SCREEN RCVD TLK: CPT | Performed by: NURSE PRACTITIONER

## 2018-11-20 PROCEDURE — 3288F FALL RISK ASSESSMENT DOCD: CPT | Performed by: NURSE PRACTITIONER

## 2018-11-20 PROCEDURE — 83036 HEMOGLOBIN GLYCOSYLATED A1C: CPT | Performed by: NURSE PRACTITIONER

## 2018-11-20 PROCEDURE — 2022F DILAT RTA XM EVC RTNOPTHY: CPT | Performed by: NURSE PRACTITIONER

## 2018-11-20 PROCEDURE — 3044F HG A1C LEVEL LT 7.0%: CPT | Performed by: NURSE PRACTITIONER

## 2018-11-20 PROCEDURE — 3017F COLORECTAL CA SCREEN DOC REV: CPT | Performed by: NURSE PRACTITIONER

## 2018-11-20 ASSESSMENT — ENCOUNTER SYMPTOMS
DIARRHEA: 1
VOMITING: 0
CONSTIPATION: 0
RHINORRHEA: 1
EYE DISCHARGE: 0
COUGH: 1
ABDOMINAL PAIN: 0
BACK PAIN: 1
EYE REDNESS: 0
EYE ITCHING: 0
WHEEZING: 1
SHORTNESS OF BREATH: 1
TROUBLE SWALLOWING: 0
SORE THROAT: 0
EYE PAIN: 0
NAUSEA: 0

## 2018-11-20 ASSESSMENT — PATIENT HEALTH QUESTIONNAIRE - PHQ9
SUM OF ALL RESPONSES TO PHQ QUESTIONS 1-9: 2
SUM OF ALL RESPONSES TO PHQ9 QUESTIONS 1 & 2: 2
SUM OF ALL RESPONSES TO PHQ QUESTIONS 1-9: 2
1. LITTLE INTEREST OR PLEASURE IN DOING THINGS: 1
2. FEELING DOWN, DEPRESSED OR HOPELESS: 1

## 2018-11-20 NOTE — PROGRESS NOTES
oropharyngeal erythema. Eyes: Right eye exhibits no discharge. Left eye exhibits no discharge. Neck: Neck supple. Cardiovascular: Normal rate, regular rhythm and normal heart sounds. Pulses:       Radial pulses are 2+ on the right side, and 2+ on the left side. Posterior tibial pulses are 2+ on the right side, and 2+ on the left side. Pulmonary/Chest: Effort normal and breath sounds normal. No respiratory distress. He has no wheezes. He has no rales. Abdominal: Soft. Bowel sounds are normal. He exhibits no distension. There is no tenderness. Musculoskeletal: He exhibits no edema. No red or swollen joints. Gait steady with cane. Neurological: He is alert and oriented to person, place, and time. Skin: Skin is warm and dry. No rash noted. Psychiatric: He has a normal mood and affect. His behavior is normal.   Nursing note and vitals reviewed. Assessment:      Diagnosis Orders   1. Type 2 diabetes mellitus with diabetic polyneuropathy, with long-term current use of insulin (HCC)  POCT glycosylated hemoglobin (Hb A1C)    Microalbumin, Ur   2. Essential hypertension     3. Osteoarthritis of multiple joints, unspecified osteoarthritis type     4. Sacro-iliac pain     5. Screening for colorectal cancer  POCT Fecal Immunochemical Test (FIT)   6. At high risk for falls         Plan:     Continue medications as prescribed. Wrote down alternatives to ask insurance about regarding his [address] as both medications not covered after January 1.    Tried to obtain HgbA1C POCT but machine malfunction so will complete at lab  Obtain urine for microalbumin  Recommend healthy diet and daily exercise   Good sleep hygiene recommended  Subspecialty follow up as scheduled  Complete screening Fit test for colon cancer screening  Discussed AAA screening and CT lung screening and he prefers to wait until next year to complete  Call with concerns          Gene received counseling on the following

## 2018-11-21 LAB
ESTIMATED AVERAGE GLUCOSE: 134 MG/DL
HBA1C MFR BLD: 6.3 % (ref 4–6)

## 2018-12-04 ENCOUNTER — HOSPITAL ENCOUNTER (OUTPATIENT)
Age: 68
Setting detail: SPECIMEN
Discharge: HOME | End: 2018-12-04
Payer: MEDICARE

## 2018-12-04 DIAGNOSIS — E11.42 TYPE 2 DIABETES MELLITUS WITH DIABETIC POLYNEUROPATHY, WITH LONG-TERM CURRENT USE OF INSULIN (HCC): ICD-10-CM

## 2018-12-04 DIAGNOSIS — Z79.4 TYPE 2 DIABETES MELLITUS WITH DIABETIC POLYNEUROPATHY, WITH LONG-TERM CURRENT USE OF INSULIN (HCC): ICD-10-CM

## 2018-12-04 LAB
CREATININE URINE: 412.4 MG/DL (ref 39–259)
MICROALBUMIN/CREAT 24H UR: 28 MG/L
MICROALBUMIN/CREAT UR-RTO: 7 MCG/MG CREAT

## 2018-12-05 DIAGNOSIS — Z12.12 SCREENING FOR COLORECTAL CANCER: ICD-10-CM

## 2018-12-05 DIAGNOSIS — Z12.11 SCREENING FOR COLORECTAL CANCER: Primary | ICD-10-CM

## 2018-12-05 DIAGNOSIS — Z12.12 SCREENING FOR COLORECTAL CANCER: Primary | ICD-10-CM

## 2018-12-05 DIAGNOSIS — Z12.11 SCREENING FOR COLORECTAL CANCER: ICD-10-CM

## 2018-12-05 LAB
CONTROL: PRESENT
HEMOCCULT STL QL: NEGATIVE

## 2018-12-05 PROCEDURE — 82274 ASSAY TEST FOR BLOOD FECAL: CPT | Performed by: NURSE PRACTITIONER

## 2018-12-12 DIAGNOSIS — E11.42 TYPE 2 DIABETES MELLITUS WITH DIABETIC POLYNEUROPATHY, WITH LONG-TERM CURRENT USE OF INSULIN (HCC): Primary | ICD-10-CM

## 2018-12-12 DIAGNOSIS — Z79.4 TYPE 2 DIABETES MELLITUS WITH DIABETIC POLYNEUROPATHY, WITH LONG-TERM CURRENT USE OF INSULIN (HCC): Primary | ICD-10-CM

## 2018-12-12 NOTE — TELEPHONE ENCOUNTER
LRF 3/27/2018 #10 RF5  LOV 11/20/2018  RTO 3 Months    Health Maintenance   Topic Date Due    Low dose CT lung screening  03/01/2005    Shingles Vaccine (1 of 2 - 2 Dose Series) 04/10/2014    DTaP/Tdap/Td vaccine (1 - Tdap) 09/07/2016    AAA screen  01/12/2019 (Originally 1950)    Diabetic retinal exam  03/22/2019    Diabetic foot exam  04/09/2019    Lipid screen  04/09/2019    Potassium monitoring  04/09/2019    Creatinine monitoring  04/09/2019    A1C test (Diabetic or Prediabetic)  11/20/2019    Diabetic microalbuminuria test  12/04/2019    Colon Cancer Screen FIT/FOBT  12/04/2019    Flu vaccine  Completed    Pneumococcal low/med risk  Completed    Hepatitis C screen  Completed             (applicable per patient's age: Cancer Screenings, Depression Screening, Fall Risk Screening, Immunizations)    Hemoglobin A1C (%)   Date Value   11/20/2018 6.3 (H)   04/09/2018 6.6 (H)   11/20/2017 6.1 (H)     Microalb/Crt. Ratio (mcg/mg creat)   Date Value   12/04/2018 7     LDL Cholesterol (mg/dL)   Date Value   04/09/2018 95     AST (U/L)   Date Value   04/09/2018 21     ALT (U/L)   Date Value   04/09/2018 32     BUN (mg/dL)   Date Value   04/09/2018 15      (goal A1C is < 7)   (goal LDL is <100) need 30-50% reduction from baseline     BP Readings from Last 3 Encounters:   11/20/18 116/76   05/30/18 109/67   04/09/18 104/74    (goal /80)      All Future Testing planned in [facility]:      Next Visit Date:  No future appointments.          Patient Active Problem List:     Essential hypertension     Osteoarthritis     Vitamin D deficiency     Testosterone deficiency     Difficulty sleeping     Sacro-iliac pain     Neuropathy     Acute cystitis without hematuria     Nocturia     Benign prostatic hyperplasia with urinary obstruction     Type 2 diabetes mellitus with diabetic polyneuropathy, with long-term current use of insulin (HCC)     Kidney stone on left side     Hypercalciuria     Personal history of

## 2019-01-25 ENCOUNTER — TELEPHONE (OUTPATIENT)
Dept: FAMILY MEDICINE CLINIC | Age: 69
End: 2019-01-25

## 2019-02-14 ENCOUNTER — TELEPHONE (OUTPATIENT)
Dept: FAMILY MEDICINE CLINIC | Age: 69
End: 2019-02-14

## 2019-02-14 ENCOUNTER — OFFICE VISIT (OUTPATIENT)
Dept: FAMILY MEDICINE CLINIC | Age: 69
End: 2019-02-14
Payer: MEDICARE

## 2019-02-14 VITALS
TEMPERATURE: 98 F | DIASTOLIC BLOOD PRESSURE: 84 MMHG | BODY MASS INDEX: 26.73 KG/M2 | HEART RATE: 96 BPM | SYSTOLIC BLOOD PRESSURE: 120 MMHG | WEIGHT: 208.2 LBS | RESPIRATION RATE: 20 BRPM

## 2019-02-14 DIAGNOSIS — R35.1 NOCTURIA: ICD-10-CM

## 2019-02-14 DIAGNOSIS — G47.9 DIFFICULTY SLEEPING: ICD-10-CM

## 2019-02-14 DIAGNOSIS — Z12.5 SCREENING FOR PROSTATE CANCER: ICD-10-CM

## 2019-02-14 DIAGNOSIS — Z79.4 TYPE 2 DIABETES MELLITUS WITH DIABETIC POLYNEUROPATHY, WITH LONG-TERM CURRENT USE OF INSULIN (HCC): Primary | ICD-10-CM

## 2019-02-14 DIAGNOSIS — Z87.442 PERSONAL HISTORY OF KIDNEY STONES: ICD-10-CM

## 2019-02-14 DIAGNOSIS — E34.9 TESTOSTERONE DEFICIENCY: Chronic | ICD-10-CM

## 2019-02-14 DIAGNOSIS — R82.994 HYPERCALCIURIA: ICD-10-CM

## 2019-02-14 DIAGNOSIS — G62.9 NEUROPATHY: ICD-10-CM

## 2019-02-14 DIAGNOSIS — I10 ESSENTIAL HYPERTENSION: Primary | Chronic | ICD-10-CM

## 2019-02-14 DIAGNOSIS — E11.42 TYPE 2 DIABETES MELLITUS WITH DIABETIC POLYNEUROPATHY, WITH LONG-TERM CURRENT USE OF INSULIN (HCC): Primary | ICD-10-CM

## 2019-02-14 DIAGNOSIS — E11.42 TYPE 2 DIABETES MELLITUS WITH DIABETIC POLYNEUROPATHY, WITH LONG-TERM CURRENT USE OF INSULIN (HCC): ICD-10-CM

## 2019-02-14 DIAGNOSIS — E55.9 VITAMIN D DEFICIENCY: Chronic | ICD-10-CM

## 2019-02-14 DIAGNOSIS — Z87.891 PERSONAL HISTORY OF TOBACCO USE: ICD-10-CM

## 2019-02-14 DIAGNOSIS — Z79.4 TYPE 2 DIABETES MELLITUS WITH DIABETIC POLYNEUROPATHY, WITH LONG-TERM CURRENT USE OF INSULIN (HCC): ICD-10-CM

## 2019-02-14 DIAGNOSIS — F17.210 CIGARETTE NICOTINE DEPENDENCE WITHOUT COMPLICATION: ICD-10-CM

## 2019-02-14 DIAGNOSIS — M15.9 OSTEOARTHRITIS OF MULTIPLE JOINTS, UNSPECIFIED OSTEOARTHRITIS TYPE: ICD-10-CM

## 2019-02-14 DIAGNOSIS — Z13.31 POSITIVE DEPRESSION SCREENING: ICD-10-CM

## 2019-02-14 DIAGNOSIS — M53.3 SACRO-ILIAC PAIN: ICD-10-CM

## 2019-02-14 PROBLEM — N20.0 KIDNEY STONE ON LEFT SIDE: Status: RESOLVED | Noted: 2017-09-13 | Resolved: 2019-02-14

## 2019-02-14 PROCEDURE — 2022F DILAT RTA XM EVC RTNOPTHY: CPT | Performed by: NURSE PRACTITIONER

## 2019-02-14 PROCEDURE — G8427 DOCREV CUR MEDS BY ELIG CLIN: HCPCS | Performed by: NURSE PRACTITIONER

## 2019-02-14 PROCEDURE — 4040F PNEUMOC VAC/ADMIN/RCVD: CPT | Performed by: NURSE PRACTITIONER

## 2019-02-14 PROCEDURE — G8482 FLU IMMUNIZE ORDER/ADMIN: HCPCS | Performed by: NURSE PRACTITIONER

## 2019-02-14 PROCEDURE — 4004F PT TOBACCO SCREEN RCVD TLK: CPT | Performed by: NURSE PRACTITIONER

## 2019-02-14 PROCEDURE — 1123F ACP DISCUSS/DSCN MKR DOCD: CPT | Performed by: NURSE PRACTITIONER

## 2019-02-14 PROCEDURE — 1101F PT FALLS ASSESS-DOCD LE1/YR: CPT | Performed by: NURSE PRACTITIONER

## 2019-02-14 PROCEDURE — G0296 VISIT TO DETERM LDCT ELIG: HCPCS | Performed by: NURSE PRACTITIONER

## 2019-02-14 PROCEDURE — 3046F HEMOGLOBIN A1C LEVEL >9.0%: CPT | Performed by: NURSE PRACTITIONER

## 2019-02-14 PROCEDURE — 3017F COLORECTAL CA SCREEN DOC REV: CPT | Performed by: NURSE PRACTITIONER

## 2019-02-14 PROCEDURE — G8431 POS CLIN DEPRES SCRN F/U DOC: HCPCS | Performed by: NURSE PRACTITIONER

## 2019-02-14 PROCEDURE — 99406 BEHAV CHNG SMOKING 3-10 MIN: CPT | Performed by: NURSE PRACTITIONER

## 2019-02-14 PROCEDURE — G8419 CALC BMI OUT NRM PARAM NOF/U: HCPCS | Performed by: NURSE PRACTITIONER

## 2019-02-14 PROCEDURE — 99214 OFFICE O/P EST MOD 30 MIN: CPT | Performed by: NURSE PRACTITIONER

## 2019-02-14 RX ORDER — CELECOXIB 100 MG/1
100 CAPSULE ORAL 2 TIMES DAILY
Qty: 60 CAPSULE | Refills: 0 | Status: SHIPPED | OUTPATIENT
Start: 2019-02-14 | End: 2019-03-16 | Stop reason: SDUPTHER

## 2019-02-14 ASSESSMENT — ENCOUNTER SYMPTOMS
EYE DISCHARGE: 0
RHINORRHEA: 0
SORE THROAT: 0
CONSTIPATION: 0
EYE ITCHING: 0
DIARRHEA: 0
EYE REDNESS: 0
ABDOMINAL PAIN: 0
SHORTNESS OF BREATH: 0
NAUSEA: 0
COUGH: 0

## 2019-02-14 ASSESSMENT — PATIENT HEALTH QUESTIONNAIRE - PHQ9
4. FEELING TIRED OR HAVING LITTLE ENERGY: 3
6. FEELING BAD ABOUT YOURSELF - OR THAT YOU ARE A FAILURE OR HAVE LET YOURSELF OR YOUR FAMILY DOWN: 1
5. POOR APPETITE OR OVEREATING: 3
1. LITTLE INTEREST OR PLEASURE IN DOING THINGS: 1
SUM OF ALL RESPONSES TO PHQ9 QUESTIONS 1 & 2: 3
8. MOVING OR SPEAKING SO SLOWLY THAT OTHER PEOPLE COULD HAVE NOTICED. OR THE OPPOSITE, BEING SO FIGETY OR RESTLESS THAT YOU HAVE BEEN MOVING AROUND A LOT MORE THAN USUAL: 0
7. TROUBLE CONCENTRATING ON THINGS, SUCH AS READING THE NEWSPAPER OR WATCHING TELEVISION: 1
SUM OF ALL RESPONSES TO PHQ QUESTIONS 1-9: 14
10. IF YOU CHECKED OFF ANY PROBLEMS, HOW DIFFICULT HAVE THESE PROBLEMS MADE IT FOR YOU TO DO YOUR WORK, TAKE CARE OF THINGS AT HOME, OR GET ALONG WITH OTHER PEOPLE: 0
3. TROUBLE FALLING OR STAYING ASLEEP: 3
SUM OF ALL RESPONSES TO PHQ QUESTIONS 1-9: 14
2. FEELING DOWN, DEPRESSED OR HOPELESS: 2

## 2019-02-19 ENCOUNTER — HOSPITAL ENCOUNTER (OUTPATIENT)
Age: 69
Setting detail: SPECIMEN
Discharge: HOME | End: 2019-02-19
Payer: MEDICARE

## 2019-02-19 DIAGNOSIS — Z79.4 TYPE 2 DIABETES MELLITUS WITH DIABETIC POLYNEUROPATHY, WITH LONG-TERM CURRENT USE OF INSULIN (HCC): ICD-10-CM

## 2019-02-19 DIAGNOSIS — Z12.5 SCREENING FOR PROSTATE CANCER: ICD-10-CM

## 2019-02-19 DIAGNOSIS — E11.42 TYPE 2 DIABETES MELLITUS WITH DIABETIC POLYNEUROPATHY, WITH LONG-TERM CURRENT USE OF INSULIN (HCC): ICD-10-CM

## 2019-02-19 DIAGNOSIS — I10 ESSENTIAL HYPERTENSION: Chronic | ICD-10-CM

## 2019-02-19 LAB
ALBUMIN SERPL-MCNC: 4.3 G/DL (ref 3.5–5.2)
ALBUMIN/GLOBULIN RATIO: 1.7 (ref 1–2.5)
ALP BLD-CCNC: 47 U/L (ref 40–129)
ALT SERPL-CCNC: 27 U/L (ref 5–41)
ANION GAP SERPL CALCULATED.3IONS-SCNC: 14 MMOL/L (ref 9–17)
AST SERPL-CCNC: 20 U/L
BILIRUB SERPL-MCNC: 0.55 MG/DL (ref 0.3–1.2)
BUN BLDV-MCNC: 19 MG/DL (ref 8–23)
BUN/CREAT BLD: ABNORMAL (ref 9–20)
CALCIUM SERPL-MCNC: 9.2 MG/DL (ref 8.6–10.4)
CHLORIDE BLD-SCNC: 102 MMOL/L (ref 98–107)
CHOLESTEROL/HDL RATIO: 4.4
CHOLESTEROL: 170 MG/DL
CO2: 29 MMOL/L (ref 20–31)
CREAT SERPL-MCNC: 0.86 MG/DL (ref 0.7–1.2)
CREATININE URINE: 172.2 MG/DL (ref 39–259)
ESTIMATED AVERAGE GLUCOSE: 146 MG/DL
GFR AFRICAN AMERICAN: >60 ML/MIN
GFR NON-AFRICAN AMERICAN: >60 ML/MIN
GFR SERPL CREATININE-BSD FRML MDRD: ABNORMAL ML/MIN/{1.73_M2}
GFR SERPL CREATININE-BSD FRML MDRD: ABNORMAL ML/MIN/{1.73_M2}
GLUCOSE BLD-MCNC: 73 MG/DL (ref 70–99)
HBA1C MFR BLD: 6.7 % (ref 4–6)
HCT VFR BLD CALC: 50.1 % (ref 40.7–50.3)
HDLC SERPL-MCNC: 39 MG/DL
HEMOGLOBIN: 16.8 G/DL (ref 13–17)
LDL CHOLESTEROL: 106 MG/DL (ref 0–130)
MCH RBC QN AUTO: 32.3 PG (ref 25.2–33.5)
MCHC RBC AUTO-ENTMCNC: 33.5 G/DL (ref 28.4–34.8)
MCV RBC AUTO: 96.3 FL (ref 82.6–102.9)
MICROALBUMIN/CREAT 24H UR: <12 MG/L
MICROALBUMIN/CREAT UR-RTO: NORMAL MCG/MG CREAT
NRBC AUTOMATED: 0 PER 100 WBC
PDW BLD-RTO: 13.2 % (ref 11.8–14.4)
PLATELET # BLD: 199 K/UL (ref 138–453)
PMV BLD AUTO: 10.7 FL (ref 8.1–13.5)
POTASSIUM SERPL-SCNC: 4.1 MMOL/L (ref 3.7–5.3)
PROSTATE SPECIFIC ANTIGEN: 0.5 UG/L
RBC # BLD: 5.2 M/UL (ref 4.21–5.77)
SODIUM BLD-SCNC: 145 MMOL/L (ref 135–144)
TOTAL PROTEIN: 6.9 G/DL (ref 6.4–8.3)
TRIGL SERPL-MCNC: 123 MG/DL
TSH SERPL DL<=0.05 MIU/L-ACNC: 1.66 MIU/L (ref 0.3–5)
VLDLC SERPL CALC-MCNC: ABNORMAL MG/DL (ref 1–30)
WBC # BLD: 10.9 K/UL (ref 3.5–11.3)

## 2019-02-26 DIAGNOSIS — E11.42 TYPE 2 DIABETES MELLITUS WITH DIABETIC POLYNEUROPATHY, WITH LONG-TERM CURRENT USE OF INSULIN (HCC): Primary | ICD-10-CM

## 2019-02-26 DIAGNOSIS — I10 ESSENTIAL HYPERTENSION: Chronic | ICD-10-CM

## 2019-02-26 DIAGNOSIS — Z79.4 TYPE 2 DIABETES MELLITUS WITH DIABETIC POLYNEUROPATHY, WITH LONG-TERM CURRENT USE OF INSULIN (HCC): Primary | ICD-10-CM

## 2019-02-26 RX ORDER — PIOGLITAZONEHYDROCHLORIDE 30 MG/1
30 TABLET ORAL DAILY
Qty: 30 TABLET | Refills: 3 | Status: SHIPPED | OUTPATIENT
Start: 2019-02-26 | End: 2019-04-01 | Stop reason: SDUPTHER

## 2019-02-26 RX ORDER — PIOGLITAZONEHYDROCHLORIDE 30 MG/1
30 TABLET ORAL DAILY
Qty: 90 TABLET | Refills: 0 | Status: SHIPPED | OUTPATIENT
Start: 2019-02-26 | End: 2019-05-15 | Stop reason: SDUPTHER

## 2019-03-14 ENCOUNTER — OFFICE VISIT (OUTPATIENT)
Dept: FAMILY MEDICINE CLINIC | Age: 69
End: 2019-03-14
Payer: MEDICARE

## 2019-03-14 VITALS
SYSTOLIC BLOOD PRESSURE: 120 MMHG | DIASTOLIC BLOOD PRESSURE: 68 MMHG | RESPIRATION RATE: 16 BRPM | TEMPERATURE: 98.1 F | BODY MASS INDEX: 26.45 KG/M2 | WEIGHT: 206 LBS | HEART RATE: 66 BPM

## 2019-03-14 DIAGNOSIS — R52 BODY ACHES: ICD-10-CM

## 2019-03-14 DIAGNOSIS — J10.1 INFLUENZA A: Primary | ICD-10-CM

## 2019-03-14 DIAGNOSIS — J18.9 PNEUMONIA OF LEFT LUNG DUE TO INFECTIOUS ORGANISM, UNSPECIFIED PART OF LUNG: ICD-10-CM

## 2019-03-14 DIAGNOSIS — J02.9 SORE THROAT: ICD-10-CM

## 2019-03-14 DIAGNOSIS — R05.9 COUGH: ICD-10-CM

## 2019-03-14 LAB
INFLUENZA A ANTIBODY: NORMAL
INFLUENZA B ANTIBODY: NEGATIVE
S PYO AG THROAT QL: NORMAL

## 2019-03-14 PROCEDURE — 99214 OFFICE O/P EST MOD 30 MIN: CPT | Performed by: NURSE PRACTITIONER

## 2019-03-14 PROCEDURE — 3017F COLORECTAL CA SCREEN DOC REV: CPT | Performed by: NURSE PRACTITIONER

## 2019-03-14 PROCEDURE — 94640 AIRWAY INHALATION TREATMENT: CPT | Performed by: NURSE PRACTITIONER

## 2019-03-14 PROCEDURE — 4040F PNEUMOC VAC/ADMIN/RCVD: CPT | Performed by: NURSE PRACTITIONER

## 2019-03-14 PROCEDURE — G8419 CALC BMI OUT NRM PARAM NOF/U: HCPCS | Performed by: NURSE PRACTITIONER

## 2019-03-14 PROCEDURE — G8427 DOCREV CUR MEDS BY ELIG CLIN: HCPCS | Performed by: NURSE PRACTITIONER

## 2019-03-14 PROCEDURE — 1123F ACP DISCUSS/DSCN MKR DOCD: CPT | Performed by: NURSE PRACTITIONER

## 2019-03-14 PROCEDURE — 1101F PT FALLS ASSESS-DOCD LE1/YR: CPT | Performed by: NURSE PRACTITIONER

## 2019-03-14 PROCEDURE — 87804 INFLUENZA ASSAY W/OPTIC: CPT | Performed by: NURSE PRACTITIONER

## 2019-03-14 PROCEDURE — G8482 FLU IMMUNIZE ORDER/ADMIN: HCPCS | Performed by: NURSE PRACTITIONER

## 2019-03-14 PROCEDURE — 4004F PT TOBACCO SCREEN RCVD TLK: CPT | Performed by: NURSE PRACTITIONER

## 2019-03-14 PROCEDURE — 87880 STREP A ASSAY W/OPTIC: CPT | Performed by: NURSE PRACTITIONER

## 2019-03-14 RX ORDER — ALBUTEROL SULFATE 2.5 MG/3ML
2.5 SOLUTION RESPIRATORY (INHALATION) ONCE
Status: COMPLETED | OUTPATIENT
Start: 2019-03-14 | End: 2019-03-14

## 2019-03-14 RX ORDER — AMOXICILLIN AND CLAVULANATE POTASSIUM 875; 125 MG/1; MG/1
1 TABLET, FILM COATED ORAL 2 TIMES DAILY
Qty: 20 TABLET | Refills: 0 | Status: SHIPPED | OUTPATIENT
Start: 2019-03-14 | End: 2019-03-24

## 2019-03-14 RX ADMIN — Medication 0.5 MG: at 15:53

## 2019-03-14 RX ADMIN — ALBUTEROL SULFATE 2.5 MG: 2.5 SOLUTION RESPIRATORY (INHALATION) at 15:52

## 2019-03-14 ASSESSMENT — ENCOUNTER SYMPTOMS
EYES NEGATIVE: 1
ABDOMINAL PAIN: 0
TROUBLE SWALLOWING: 0
NAUSEA: 0
DIARRHEA: 0
RHINORRHEA: 1
COUGH: 1
SHORTNESS OF BREATH: 0
SORE THROAT: 1
WHEEZING: 0
SINUS PAIN: 0
VOMITING: 0
CHEST TIGHTNESS: 1

## 2019-03-15 DIAGNOSIS — J18.9 PNEUMONIA OF LEFT UPPER LOBE DUE TO INFECTIOUS ORGANISM: ICD-10-CM

## 2019-03-15 DIAGNOSIS — J10.1 INFLUENZA A: ICD-10-CM

## 2019-04-01 ENCOUNTER — OFFICE VISIT (OUTPATIENT)
Dept: FAMILY MEDICINE CLINIC | Age: 69
End: 2019-04-01
Payer: MEDICARE

## 2019-04-01 ENCOUNTER — HOSPITAL ENCOUNTER (OUTPATIENT)
Dept: GENERAL RADIOLOGY | Age: 69
Discharge: HOME | End: 2019-04-03
Payer: MEDICARE

## 2019-04-01 ENCOUNTER — HOSPITAL ENCOUNTER (OUTPATIENT)
Age: 69
Discharge: HOME | End: 2019-04-03
Payer: MEDICARE

## 2019-04-01 VITALS
RESPIRATION RATE: 20 BRPM | HEART RATE: 76 BPM | WEIGHT: 203 LBS | TEMPERATURE: 97.8 F | BODY MASS INDEX: 26.06 KG/M2 | DIASTOLIC BLOOD PRESSURE: 70 MMHG | SYSTOLIC BLOOD PRESSURE: 120 MMHG

## 2019-04-01 DIAGNOSIS — G89.29 CHRONIC BILATERAL LOW BACK PAIN WITHOUT SCIATICA: ICD-10-CM

## 2019-04-01 DIAGNOSIS — Z87.442 HISTORY OF KIDNEY STONES: ICD-10-CM

## 2019-04-01 DIAGNOSIS — R82.994 HYPERCALCIURIA: ICD-10-CM

## 2019-04-01 DIAGNOSIS — G89.29 CHRONIC RIGHT SHOULDER PAIN: ICD-10-CM

## 2019-04-01 DIAGNOSIS — M25.511 CHRONIC RIGHT SHOULDER PAIN: ICD-10-CM

## 2019-04-01 DIAGNOSIS — Z79.4 TYPE 2 DIABETES MELLITUS WITH DIABETIC POLYNEUROPATHY, WITH LONG-TERM CURRENT USE OF INSULIN (HCC): Primary | ICD-10-CM

## 2019-04-01 DIAGNOSIS — I10 ESSENTIAL HYPERTENSION: ICD-10-CM

## 2019-04-01 DIAGNOSIS — E11.42 TYPE 2 DIABETES MELLITUS WITH DIABETIC POLYNEUROPATHY, WITH LONG-TERM CURRENT USE OF INSULIN (HCC): Primary | ICD-10-CM

## 2019-04-01 DIAGNOSIS — M54.50 CHRONIC BILATERAL LOW BACK PAIN WITHOUT SCIATICA: ICD-10-CM

## 2019-04-01 PROCEDURE — G8427 DOCREV CUR MEDS BY ELIG CLIN: HCPCS | Performed by: PEDIATRICS

## 2019-04-01 PROCEDURE — 4004F PT TOBACCO SCREEN RCVD TLK: CPT | Performed by: PEDIATRICS

## 2019-04-01 PROCEDURE — 73030 X-RAY EXAM OF SHOULDER: CPT

## 2019-04-01 PROCEDURE — 3017F COLORECTAL CA SCREEN DOC REV: CPT | Performed by: PEDIATRICS

## 2019-04-01 PROCEDURE — 3288F FALL RISK ASSESSMENT DOCD: CPT | Performed by: PEDIATRICS

## 2019-04-01 PROCEDURE — 1123F ACP DISCUSS/DSCN MKR DOCD: CPT | Performed by: PEDIATRICS

## 2019-04-01 PROCEDURE — 0518F FALL PLAN OF CARE DOCD: CPT | Performed by: PEDIATRICS

## 2019-04-01 PROCEDURE — G8419 CALC BMI OUT NRM PARAM NOF/U: HCPCS | Performed by: PEDIATRICS

## 2019-04-01 PROCEDURE — 2022F DILAT RTA XM EVC RTNOPTHY: CPT | Performed by: PEDIATRICS

## 2019-04-01 PROCEDURE — 99214 OFFICE O/P EST MOD 30 MIN: CPT | Performed by: PEDIATRICS

## 2019-04-01 PROCEDURE — 4040F PNEUMOC VAC/ADMIN/RCVD: CPT | Performed by: PEDIATRICS

## 2019-04-01 PROCEDURE — 3044F HG A1C LEVEL LT 7.0%: CPT | Performed by: PEDIATRICS

## 2019-04-01 RX ORDER — INSULIN GLARGINE 100 [IU]/ML
66 INJECTION, SOLUTION SUBCUTANEOUS NIGHTLY
COMMUNITY
End: 2019-04-01

## 2019-04-01 ASSESSMENT — ENCOUNTER SYMPTOMS
BLURRED VISION: 0
ORTHOPNEA: 0
SHORTNESS OF BREATH: 0

## 2019-04-01 NOTE — PROGRESS NOTES
momSubjective:      Patient ID: Candelaria France is a 71 y.o. male. Visit Information    Have you changed or started any medications since your last visit including any over-the-counter medicines, vitamins, or herbal medicines? no   Are you having any side effects from any of your medications? -  no  Have you stopped taking any of your medications? Is so, why? -  no    Have you seen any other physician or provider since your last visit? No  Have you had any other diagnostic tests since your last visit? No  Have you been seen in the emergency room and/or had an admission to a hospital since we last saw you? No  Have you had your routine dental cleaning in the past 6 months? Have you activated your [employer] account? If not, what are your barriers?  Yes     Patient Care Team:  BREANNA Yeung CNP as PCP - [facility] [place], APRN - CNP as PCP - S Attributed Provider    Medical History Review  Past Medical, Family, and Social History reviewed and does contribute to the patient presenting condition    Health Maintenance   Topic Date Due    AAA screen  1950    Low dose CT lung screening  03/01/2005    Shingles Vaccine (2 of 3) 04/07/2014    DTaP/Tdap/Td vaccine (1 - Tdap) 04/01/2020 (Originally 9/7/2016)    Colon Cancer Screen FIT/FOBT  12/04/2019    Diabetic retinal exam  01/10/2020    A1C test (Diabetic or Prediabetic)  02/19/2020    Diabetic microalbuminuria test  02/19/2020    Lipid screen  02/19/2020    Potassium monitoring  02/19/2020    Creatinine monitoring  02/19/2020    Diabetic foot exam  02/27/2020    Flu vaccine  Completed    Pneumococcal 65+ years Vaccine  Completed    Hepatitis C screen  Completed       PHQ Scores 2/14/2019 11/20/2018 8/23/2017 5/31/2016 6/16/2015   PHQ2 Score 3 2 2 2 6   PHQ9 Score 14 2 2 2 6     Interpretation of Total Score DepressionSeverity: 1-4 = Minimal depression, 5-9 = Mild depression, 10-14 = Moderate depression, 15-19 = Moderately severe diabetes mellitus. No MedicAlert identification noted. There are no hypoglycemic associated symptoms. Pertinent negatives for hypoglycemia include no dizziness, headaches, nervousness/anxiousness or sweats. There are no diabetic associated symptoms. Pertinent negatives for diabetes include no blurred vision, no chest pain, no fatigue, no polydipsia, no polyphagia and no polyuria. There are no hypoglycemic complications. There are no diabetic complications. Risk factors for coronary artery disease include diabetes mellitus and hypertension. Current diabetic treatment includes diet, insulin injections and oral agent (monotherapy). He is compliant with treatment most of the time. His weight is stable. He is following a diabetic and generally healthy diet. When asked about meal planning, he reported none. He has not had a previous visit with a dietitian. He participates in exercise intermittently. His home blood glucose trend is decreasing steadily. He does not see a podiatrist.Eye exam is current. //    Review of Systems   Constitutional: Negative for appetite change, fatigue, fever and malaise/fatigue. HENT: Negative for congestion, mouth sores, nosebleeds, sinus pressure and sinus pain. Eyes: Negative for blurred vision, pain, discharge and redness. Respiratory: Positive for cough. Negative for shortness of breath, wheezing and stridor. Cardiovascular: Negative for chest pain, palpitations, orthopnea, leg swelling and PND. Gastrointestinal: Negative for abdominal pain, constipation and diarrhea. Endocrine: Negative for polydipsia, polyphagia and polyuria. Musculoskeletal: Positive for arthralgias and back pain. Negative for neck pain. Skin: Negative for color change and rash. Allergic/Immunologic: Negative for immunocompromised state. Neurological: Negative for dizziness, light-headedness and headaches. Hematological: Does not bruise/bleed easily.    Psychiatric/Behavioral: Negative for dysphoric mood and sleep disturbance. The patient is not nervous/anxious. Objective:     /70 (Site: Right Upper Arm, Position: Sitting, Cuff Size: Medium Adult)   Pulse 76   Temp 97.8 °F (36.6 °C) (Tympanic)   Resp 20   Wt 203 lb (92.1 kg)   BMI 26.06 kg/m²        Physical Exam   Constitutional: He is oriented to person, place, and time. He appears well-developed and well-nourished. No distress. HENT:   Head: Normocephalic. Right Ear: External ear normal.   Left Ear: External ear normal.   Nose: Nose normal.   Mouth/Throat: Oropharynx is clear and moist. No oropharyngeal exudate. Eyes: Pupils are equal, round, and reactive to light. Conjunctivae and EOM are normal. Right eye exhibits no discharge. Left eye exhibits no discharge. Neck: Normal range of motion. Neck supple. Cardiovascular: Normal rate, regular rhythm, normal heart sounds and intact distal pulses. Exam reveals no friction rub. No murmur heard. Pulmonary/Chest: Effort normal and breath sounds normal. No respiratory distress. He has no wheezes. He has no rales. Abdominal: Soft. Bowel sounds are normal. There is no tenderness. There is no rebound and no guarding. Musculoskeletal: He exhibits no edema. Right shoulder: He exhibits decreased range of motion, tenderness, crepitus, pain and decreased strength. He exhibits no spasm and normal pulse. Lumbar back: He exhibits decreased range of motion and tenderness. Back:    Lymphadenopathy:     He has no cervical adenopathy. Neurological: He is alert and oriented to person, place, and time. Coordination normal.   Skin: Skin is warm. No rash noted. He is not diaphoretic. No pallor. Psychiatric: He has a normal mood and affect. Nursing note and vitals reviewed. Assessment:      Diagnosis Orders   1.  Type 2 diabetes mellitus with diabetic polyneuropathy, with long-term current use of insulin (HCC)  insulin glargine (LANTUS SOLOSTAR) 100 UNIT/ML 2/14/2019 11/20/2018 8/23/2017 5/31/2016 6/16/2015   PHQ2 Score 3 2 2 2 6   PHQ9 Score 14 2 2 2 6     Interpretation of Total Score Depression Severity: 1-4 = Minimal depression, 5-9 = Mild depression, 10-14 = Moderate depression, 15-19 = Moderately severe depression, 20-27 = Severe depression        Electronically signed by João Keith MD on 4/6/2019 at 10:30 PM

## 2019-04-06 ASSESSMENT — ENCOUNTER SYMPTOMS
EYE DISCHARGE: 0
COUGH: 1
CONSTIPATION: 0
STRIDOR: 0
EYE PAIN: 0
DIARRHEA: 0
BACK PAIN: 1
SINUS PAIN: 0
SINUS PRESSURE: 0
EYE REDNESS: 0
WHEEZING: 0
ABDOMINAL PAIN: 0
COLOR CHANGE: 0

## 2019-04-08 ENCOUNTER — OFFICE VISIT (OUTPATIENT)
Dept: ORTHOPEDIC SURGERY | Age: 69
End: 2019-04-08
Payer: MEDICARE

## 2019-04-08 VITALS — BODY MASS INDEX: 28.23 KG/M2 | HEIGHT: 74 IN | WEIGHT: 220 LBS

## 2019-04-08 DIAGNOSIS — M75.121 COMPLETE TEAR OF RIGHT ROTATOR CUFF: Primary | ICD-10-CM

## 2019-04-08 PROCEDURE — 4040F PNEUMOC VAC/ADMIN/RCVD: CPT | Performed by: ORTHOPAEDIC SURGERY

## 2019-04-08 PROCEDURE — 99203 OFFICE O/P NEW LOW 30 MIN: CPT | Performed by: ORTHOPAEDIC SURGERY

## 2019-04-08 PROCEDURE — 0518F FALL PLAN OF CARE DOCD: CPT | Performed by: ORTHOPAEDIC SURGERY

## 2019-04-08 PROCEDURE — 3017F COLORECTAL CA SCREEN DOC REV: CPT | Performed by: ORTHOPAEDIC SURGERY

## 2019-04-08 PROCEDURE — 1123F ACP DISCUSS/DSCN MKR DOCD: CPT | Performed by: ORTHOPAEDIC SURGERY

## 2019-04-08 PROCEDURE — G8419 CALC BMI OUT NRM PARAM NOF/U: HCPCS | Performed by: ORTHOPAEDIC SURGERY

## 2019-04-08 PROCEDURE — G8427 DOCREV CUR MEDS BY ELIG CLIN: HCPCS | Performed by: ORTHOPAEDIC SURGERY

## 2019-04-08 PROCEDURE — 4004F PT TOBACCO SCREEN RCVD TLK: CPT | Performed by: ORTHOPAEDIC SURGERY

## 2019-04-08 PROCEDURE — 3288F FALL RISK ASSESSMENT DOCD: CPT | Performed by: ORTHOPAEDIC SURGERY

## 2019-04-08 NOTE — LETTER
4/8/2019    Lizett Sifuentes MD  [address] [place] [address], [address] [place] [address]    RE: [facility]    Dear Dr. Carola Hummel,    Thank you for allowing me to participate in the care of Mr. Abbie Ponce. I had the opportunity to evaluate the patient on 4/8/2019. Attached you will find my evaluation and recommendations. Thanks again for the confidence you have expressed in me by allowing my participation in the care of your patient. I will keep you apprised of further developments in the patients treatment course as it progresses. If I can be of further assistance in any fashion, please feel free to contact me at your convenience.     Sincerely,        Kathy Sahni  Shoulder and Elbow Surgery

## 2019-04-08 NOTE — PROGRESS NOTES
Orthopedic Shoulder Encounter Note     Chief complaint: Right shoulder pain    HPI: Manuel Arguelles is a 71 y.o.  right-hand dominant male who presents for evaluation of his right shoulder. He states that 4 years ago he slipped coming out of his shower and jammed his right elbow. Ever since he has developed pain in his right shoulder across the lateral and anterior aspect of the shoulder. He has pain lifting his arm up and away from his body as well as overhead and reports that he also has weakness associated with this. He is unable to lift anything of weight away from his body. He also has nighttime pain as well. Previous treatment:    NSAIDs: Celebrex    Physical Therapy:  No    Injections: None    Surgeries: None    Review of Systems:     Constitution: no fever or chills   Pain level: 8/10  Musculoskeletal: As noted in the HPI   Neurologic: no neurologic symptoms    Past Medical History  Luis A  has a past medical history of Bladder stone, Caffeine use, Cataracts, bilateral, Chronic back pain, COPD (chronic obstructive pulmonary disease) ([address] Hypertension, Irritable bowel syndrome, Osteoarthritis, Pancreatitis, and Type II or unspecified type diabetes mellitus without mention of complication, not stated as uncontrolled. Past Surgical History  Luis A  has a past surgical history that includes back surgery; Cholecystectomy; Vasectomy; Spine surgery (05/18/2010); Eye surgery (2015); other surgical history; eye surgery; Bladder surgery; Lithotripsy; and Ureteroscopy.     Current Medications  Current Outpatient Medications   Medication Sig Dispense Refill    insulin glargine (LANTUS SOLOSTAR) 100 UNIT/ML injection Inject 66 Units into the skin nightly      celecoxib (CELEBREX) 100 MG capsule Take 2 capsules by mouth daily 90 capsule 7    pioglitazone (ACTOS) 30 MG tablet Take 1 tablet by mouth daily 90 tablet 0    Insulin Pen Needle 32G X 4 MM MISC 1 each by Does not apply route daily 300 each 1    lisinopril (PRINIVIL;ZESTRIL) 10 MG tablet Take 1 tablet by mouth daily 90 tablet 1    chlorthalidone (HYGROTON) 25 MG tablet Take 1 tablet by mouth daily 90 tablet 1    metFORMIN (GLUCOPHAGE) 1000 MG tablet Take 1 tablet by mouth 2 times daily (with meals) 180 tablet 1    glipiZIDE (GLUCOTROL) 10 MG tablet Take 1 tablet by mouth 2 times daily (before meals) 180 tablet 1    glucose blood VI test strips (ONETOUCH VERIO) strip Test three times daily and as needed 300 strip 3    gabapentin (NEURONTIN) 600 MG tablet Take 1 tablet by mouth 3 times daily      Lancets MISC Test twice a day and as needed, Please send lancets for Verio brand meter 180 each 2    multivitamin (THERAGRAN) per tablet Take 1 tablet by mouth daily. No current facility-administered medications for this visit. Allergies  Allergies have been reviewed. Luis A has No Known Allergies. Social History  Luis A  reports that he has been smoking. He has a 40.00 pack-year smoking history. He has quit using smokeless tobacco. He reports that he drinks alcohol. He reports that he does not use drugs. Family History  Luis A's family history includes Arthritis in his mother; Cancer in his father.      Physical Exam:     Ht 6' 2.02\" (1.88 m)   Wt 220 lb (99.8 kg)   BMI 28.23 kg/m²    General Appearance: alert, well appearing, and in no distress  Mental Status: alert, oriented to person, place, and time  Gait: normal    Shoulder:    Skin: warm and dry, no rash or erythema; no swelling or obvious muscular atrophy  Vasculature: 2+ radial pulses bilaterally  Neuro: Sensation grossly intact to light touch diffusely  Tenderness: Nontender to palpation    ROM: (Degrees)    Right   A P   Left   A P    Elevation  130    Elevation  155 160  Abduction  150    Abduction  155  155  ER   40    ER   60 70  IR   T12    IR   T12   90 abd/ER      90 abd/ER     90 abd/IR      90 abd/IR     Crepitation  No    Crepitation No  Dyskenesia  No    Dyskenesia No      Muscle strength:    Right       Left    Deltoid   5    Deltoid   5  Supraspinatus  3    Supraspinatus  5  ER   3    ER   5  IR   5    IR   5    Special tests    Right   Rotator Cuff    Left    y   Painful arc    n   n   Pain with ER    n    y   Neer's     n    y   Hawkin's    n    n   Drop Arm    n  n   Lift off/Belly Press   n  n   ER Lag    n          TR[facility]AR [facility]  n   AC tenderness   n  n   Cross-chest adduction  n       Labrum/biceps    n   [place]'s    n   n   Biceps sheer    n      n   Speed's/Yergason's   n    n   Tenderness Biceps Groove  n    n   Umair's    n         Instability  n   Ant Apprehension   n    n   Post Apprehension   n    n   Ant Load shift    n    n   Post Load shift   n   n   Sulcus     n  n   Generalized Laxity   n  n   Relocation test   n  n   Crank test     n  n   Ander-superior escape  n       Imaging:  Xrays: 3 views of the right shoulder obtained on 4/1/19 were independently reviewed  Indications: Right shoulder pain  Findings: Clinical humeral joint space well preserved. Mild acromioclavicular joint space narrowing associated with some osteophytic change. No obvious fracture, dislocation, or subluxation. Impression: Right shoulder radiograph with mild acromioclavicular joint osteoarthritis    Impression/Plan:     Luis A Edwards is a 71 y.o. old male with chronic right shoulder pain and weakness. I had a discussion with the patient with regards to the fact that I believe he likely has a full-thickness rotator cuff tear. Again this has been ongoing for 4 years. We discussed treatment options at this time including nonoperative and operative intervention. At this point I would recommend attempting treating this conservatively with physical therapy. We also discussed the possibility of a cortisone injection which she has declined at this time. He is currently on Celebrex.   He was encouraged to continue on with this. He was provided a prescription for physical therapy. I'll see him back for reevaluation in 3 months but he was encouraged to return or call earlier with questions and/or concerns.         NA = Not assessed  RTC = Rotator cuff  RCT = Rotator cuff tear  ER = External rotation  IR = Internal rotation  AC = Acromioclavicular  GH = Glenohumeral  n = No  y = Yes

## 2019-04-09 ENCOUNTER — PATIENT MESSAGE (OUTPATIENT)
Dept: FAMILY MEDICINE CLINIC | Age: 69
End: 2019-04-09

## 2019-04-10 NOTE — TELEPHONE ENCOUNTER
It does appear as thought the nurse forgot to put up a scrip for approval.    This does not need to be routed to me as this is just a request for a medication. Please review the medication list to see if this was done and if not send the medication request to me as you would any other medication refill with the appropriate dosage that the patient stated.   Calculate the 90 day supply and send the med to me for approval.

## 2019-04-10 NOTE — TELEPHONE ENCOUNTER
From: Luis A Montero  To: Jayson Perez MD  Sent: 4/9/2019 6:00 PM EDT  Subject: Prescription Question    On 1April I had a follow up for the flu and some severe old injury shoulder pain. Also we talked about a need to change my RX for insulin from basaglar to Levemir (66 units per day for a 90day RX with 3 refills)  I have not heard anything from [facility] (mail order)   I called. They have nothing on file. I believe you or your nurse forgot to submit a mail order request.  My insulin is running a little bit low.   Would you send in a prescription request?  ThanksGene (call me [place]) Carolynn Varner

## 2019-04-11 NOTE — TELEPHONE ENCOUNTER
Order pended for prescription request change. Please review to ensure this is the correct dosing as the medication is different than what the patient has been using.

## 2019-04-12 ENCOUNTER — PATIENT MESSAGE (OUTPATIENT)
Dept: FAMILY MEDICINE CLINIC | Age: 69
End: 2019-04-12

## 2019-04-12 NOTE — TELEPHONE ENCOUNTER
From: Luis A Jackson  To: Christina Bone MD  Sent: 4/12/2019 5:04 PM EDT  Subject: Prescription Question    Thank you & make sure you and your staff have a relaxing and happy Easter. ----- Message -----  From: Christina Bone MD  Sent: 4/11/2019 6:12 PM EDT  To: Elpidio Kahn  Subject: RE: Prescription Question  Your prescription was faxed to the pharmacy.     ----- Message -----    From: Elpidio Kahn   Sent: 4/9/2019 6:00 PM EDT   To: Christina Bone MD  Subject: Prescription Question    On 1April I had a follow up for the flu and some severe old injury shoulder pain. Also we talked about a need to change my RX for insulin from basaglar to Levemir (66 units per day for a 90day RX with 3 refills)  I have not heard anything from [facility] (mail order)   I called. They have nothing on file. I believe you or your nurse forgot to submit a mail order request.  My insulin is running a little bit low.   Would you send in a prescription request?  ThanksGene (call me [place]) Yesenia Baker

## 2019-04-17 NOTE — TELEPHONE ENCOUNTER
Corrected supply. cm
Neuropathy     Nocturia     Benign prostatic hyperplasia with urinary obstruction     Type 2 diabetes mellitus with diabetic polyneuropathy, with long-term current use of insulin (HCC)     Hypercalciuria     Personal history of kidney stones

## 2019-05-15 ENCOUNTER — PATIENT MESSAGE (OUTPATIENT)
Dept: FAMILY MEDICINE CLINIC | Age: 69
End: 2019-05-15

## 2019-05-15 DIAGNOSIS — M15.9 OSTEOARTHRITIS OF MULTIPLE JOINTS, UNSPECIFIED OSTEOARTHRITIS TYPE: ICD-10-CM

## 2019-05-15 DIAGNOSIS — I10 ESSENTIAL HYPERTENSION: Chronic | ICD-10-CM

## 2019-05-15 NOTE — TELEPHONE ENCOUNTER
Verified with   patient he is okay with taking 200 mg daily since he is taking 100 mg twice d ay anyway. cm
deficiency     Testosterone deficiency     Difficulty sleeping     Sacro-iliac pain     Neuropathy     Nocturia     Benign prostatic hyperplasia with urinary obstruction     Type 2 diabetes mellitus with diabetic polyneuropathy, with long-term current use of insulin (HCC)     Hypercalciuria     Personal history of kidney stones

## 2019-05-15 NOTE — TELEPHONE ENCOUNTER
From: Stephanie Martínez  To: Dee Juan, APRN - CNP  Sent: 5/15/2019 9:33 AM EDT  Subject: Prescription Question    I requested a prescription refill through mail order at [facility]. When you submit those requests would you please make sure they have three refills on them. Im trying to get all my prescriptions straightened out so that I can refill them all at the same time when theyre due. I appreciate your time and consideration.     Yours truly, Gene ( call me Rosie Mckee ) Franc Batista

## 2019-05-16 RX ORDER — CELECOXIB 200 MG/1
200 CAPSULE ORAL DAILY
Qty: 90 CAPSULE | Refills: 3 | Status: SHIPPED | OUTPATIENT
Start: 2019-05-16 | End: 2020-03-21 | Stop reason: SDUPTHER

## 2019-05-16 RX ORDER — LISINOPRIL 10 MG/1
10 TABLET ORAL DAILY
Qty: 90 TABLET | Refills: 3 | Status: SHIPPED | OUTPATIENT
Start: 2019-05-16 | End: 2020-03-21 | Stop reason: SDUPTHER

## 2019-05-16 RX ORDER — GLIPIZIDE 10 MG/1
10 TABLET ORAL
Qty: 180 TABLET | Refills: 3 | Status: SHIPPED | OUTPATIENT
Start: 2019-05-16 | End: 2020-03-21 | Stop reason: SDUPTHER

## 2019-05-16 RX ORDER — CHLORTHALIDONE 25 MG/1
25 TABLET ORAL DAILY
Qty: 90 TABLET | Refills: 3 | Status: SHIPPED | OUTPATIENT
Start: 2019-05-16 | End: 2020-03-21 | Stop reason: SDUPTHER

## 2019-05-16 RX ORDER — PIOGLITAZONEHYDROCHLORIDE 30 MG/1
30 TABLET ORAL DAILY
Qty: 90 TABLET | Refills: 3 | Status: SHIPPED | OUTPATIENT
Start: 2019-05-16 | End: 2020-03-21 | Stop reason: SDUPTHER

## 2019-07-18 NOTE — TELEPHONE ENCOUNTER
LRF 10-24-17 # 300 RF 3  LOV 2-14-19  RTO 6 mo  Left message for patient to call for appt    Health Maintenance   Topic Date Due    AAA screen  1950    Low dose CT lung screening  03/01/2005    Annual Wellness Visit (AWV)  03/01/2013    Shingles Vaccine (2 of 3) 04/07/2014    DTaP/Tdap/Td vaccine (1 - Tdap) 04/01/2020 (Originally 9/7/2016)    Flu vaccine (1) 09/01/2019    Colon Cancer Screen FIT/FOBT  12/04/2019    Diabetic retinal exam  01/10/2020    A1C test (Diabetic or Prediabetic)  02/19/2020    Diabetic microalbuminuria test  02/19/2020    Lipid screen  02/19/2020    Potassium monitoring  02/19/2020    Creatinine monitoring  02/19/2020    Diabetic foot exam  02/27/2020    Pneumococcal 65+ years Vaccine  Completed    Hepatitis C screen  Completed             (applicable per patient's age: Cancer Screenings, Depression Screening, Fall Risk Screening, Immunizations)    Hemoglobin A1C (%)   Date Value   02/19/2019 6.7 (H)   11/20/2018 6.3 (H)   04/09/2018 6.6 (H)     Microalb/Crt. Ratio (mcg/mg creat)   Date Value   02/19/2019 CANNOT BE CALCULATED     LDL Cholesterol (mg/dL)   Date Value   02/19/2019 106     AST (U/L)   Date Value   02/19/2019 20     ALT (U/L)   Date Value   02/19/2019 27     BUN (mg/dL)   Date Value   02/19/2019 19      (goal A1C is < 7)   (goal LDL is <100) need 30-50% reduction from baseline     BP Readings from Last 3 Encounters:   05/08/19 121/75   04/01/19 120/70   03/14/19 120/68    (goal /80)      All Future Testing planned in [facility]:  Lab Frequency Next Occurrence   CT Lung Screening (Annual) Once 03/14/2019   Hemoglobin A1C Once 05/26/2019   AST Once 05/26/2019   ALT Once 05/26/2019       Next Visit Date:  No future appointments.          Patient Active Problem List:     Essential hypertension     Osteoarthritis     Vitamin D deficiency     Testosterone deficiency     Difficulty sleeping     Sacro-iliac pain     Neuropathy     Nocturia     Benign prostatic

## 2019-07-19 ENCOUNTER — TELEPHONE (OUTPATIENT)
Dept: FAMILY MEDICINE CLINIC | Age: 69
End: 2019-07-19

## 2019-08-19 ENCOUNTER — TELEPHONE (OUTPATIENT)
Dept: FAMILY MEDICINE CLINIC | Age: 69
End: 2019-08-19

## 2019-08-26 ENCOUNTER — TELEPHONE (OUTPATIENT)
Dept: FAMILY MEDICINE CLINIC | Age: 69
End: 2019-08-26

## 2019-09-04 ENCOUNTER — OFFICE VISIT (OUTPATIENT)
Dept: FAMILY MEDICINE CLINIC | Age: 69
End: 2019-09-04
Payer: MEDICARE

## 2019-09-04 VITALS
OXYGEN SATURATION: 97 % | RESPIRATION RATE: 18 BRPM | DIASTOLIC BLOOD PRESSURE: 74 MMHG | HEART RATE: 80 BPM | BODY MASS INDEX: 24.96 KG/M2 | SYSTOLIC BLOOD PRESSURE: 104 MMHG | WEIGHT: 194.4 LBS

## 2019-09-04 DIAGNOSIS — G62.9 NEUROPATHY: ICD-10-CM

## 2019-09-04 DIAGNOSIS — R82.994 HYPERCALCIURIA: ICD-10-CM

## 2019-09-04 DIAGNOSIS — I10 ESSENTIAL HYPERTENSION: Chronic | ICD-10-CM

## 2019-09-04 DIAGNOSIS — G47.9 DIFFICULTY SLEEPING: ICD-10-CM

## 2019-09-04 DIAGNOSIS — Z23 NEED FOR INFLUENZA VACCINATION: ICD-10-CM

## 2019-09-04 DIAGNOSIS — F33.1 MODERATE EPISODE OF RECURRENT MAJOR DEPRESSIVE DISORDER (HCC): ICD-10-CM

## 2019-09-04 DIAGNOSIS — N13.8 BENIGN PROSTATIC HYPERPLASIA WITH URINARY OBSTRUCTION: ICD-10-CM

## 2019-09-04 DIAGNOSIS — M15.9 OSTEOARTHRITIS OF MULTIPLE JOINTS, UNSPECIFIED OSTEOARTHRITIS TYPE: ICD-10-CM

## 2019-09-04 DIAGNOSIS — Z79.4 TYPE 2 DIABETES MELLITUS WITH DIABETIC POLYNEUROPATHY, WITH LONG-TERM CURRENT USE OF INSULIN (HCC): Primary | ICD-10-CM

## 2019-09-04 DIAGNOSIS — F17.210 CIGARETTE NICOTINE DEPENDENCE WITHOUT COMPLICATION: ICD-10-CM

## 2019-09-04 DIAGNOSIS — N40.1 BENIGN PROSTATIC HYPERPLASIA WITH URINARY OBSTRUCTION: ICD-10-CM

## 2019-09-04 DIAGNOSIS — E11.42 TYPE 2 DIABETES MELLITUS WITH DIABETIC POLYNEUROPATHY, WITH LONG-TERM CURRENT USE OF INSULIN (HCC): Primary | ICD-10-CM

## 2019-09-04 LAB — HBA1C MFR BLD: 5.4 %

## 2019-09-04 PROCEDURE — 1123F ACP DISCUSS/DSCN MKR DOCD: CPT | Performed by: NURSE PRACTITIONER

## 2019-09-04 PROCEDURE — 90686 IIV4 VACC NO PRSV 0.5 ML IM: CPT | Performed by: NURSE PRACTITIONER

## 2019-09-04 PROCEDURE — 4004F PT TOBACCO SCREEN RCVD TLK: CPT | Performed by: NURSE PRACTITIONER

## 2019-09-04 PROCEDURE — 3044F HG A1C LEVEL LT 7.0%: CPT | Performed by: NURSE PRACTITIONER

## 2019-09-04 PROCEDURE — 2022F DILAT RTA XM EVC RTNOPTHY: CPT | Performed by: NURSE PRACTITIONER

## 2019-09-04 PROCEDURE — 99406 BEHAV CHNG SMOKING 3-10 MIN: CPT | Performed by: NURSE PRACTITIONER

## 2019-09-04 PROCEDURE — 4040F PNEUMOC VAC/ADMIN/RCVD: CPT | Performed by: NURSE PRACTITIONER

## 2019-09-04 PROCEDURE — G0008 ADMIN INFLUENZA VIRUS VAC: HCPCS | Performed by: NURSE PRACTITIONER

## 2019-09-04 PROCEDURE — G8427 DOCREV CUR MEDS BY ELIG CLIN: HCPCS | Performed by: NURSE PRACTITIONER

## 2019-09-04 PROCEDURE — G8420 CALC BMI NORM PARAMETERS: HCPCS | Performed by: NURSE PRACTITIONER

## 2019-09-04 PROCEDURE — 83036 HEMOGLOBIN GLYCOSYLATED A1C: CPT | Performed by: NURSE PRACTITIONER

## 2019-09-04 PROCEDURE — 99214 OFFICE O/P EST MOD 30 MIN: CPT | Performed by: NURSE PRACTITIONER

## 2019-09-04 PROCEDURE — 3017F COLORECTAL CA SCREEN DOC REV: CPT | Performed by: NURSE PRACTITIONER

## 2019-09-04 ASSESSMENT — ENCOUNTER SYMPTOMS
ABDOMINAL PAIN: 0
SORE THROAT: 0
COUGH: 0
EYE ITCHING: 0
RHINORRHEA: 0
SHORTNESS OF BREATH: 0
CONSTIPATION: 0
NAUSEA: 0
DIARRHEA: 0
EYE DISCHARGE: 0
EYE REDNESS: 0

## 2019-09-04 NOTE — PROGRESS NOTES
time. Coordination normal.   Skin: Skin is warm. No rash noted. He is not diaphoretic. No pallor. Psychiatric: He exhibits a depressed mood. He expresses no suicidal ideation. He expresses no suicidal plans. Nursing note and vitals reviewed. Assessment:      Diagnosis Orders   1. Type 2 diabetes mellitus with diabetic polyneuropathy, with long-term current use of insulin (HCC)  POCT glycosylated hemoglobin (Hb A1C)   2. Essential hypertension  AR TOBACCO USE CESSATION INTERMEDIATE 3-10 MINUTES   3. Osteoarthritis of multiple joints, unspecified osteoarthritis type     4. Neuropathy     5. Benign prostatic hyperplasia with urinary obstruction     6. Hypercalciuria     7. Difficulty sleeping     8. Moderate episode of recurrent major depressive disorder [facility])  External Referral To Psychology   9. Need for influenza vaccination  INFLUENZA, QUADV, 3 YRS AND OLDER, IM PF, PREFILL SYR OR SDV, 0.5ML (AFLURIA QUADV, PF)   10. Cigarette nicotine dependence without complication  AR TOBACCO USE CESSATION INTERMEDIATE 3-10 MINUTES     Plan:     Return in about 6 months (around 3/4/2020), or if symptoms worsen or fail to improve. POCT Hgb A1C. 5.4  Continue current medication. Referral to psychologist. Potential medication in future. Flu today. Discussed side effects and dangers of tobacco on the human body. Discussed impact using tobacco has on loved ones. Discussed options for quitting tobacco and readiness to quit. Discussed plan for managing tobacco dependence. Patient verbalized understanding of what was discussed today in regards to tobacco dependence. Continue with regular follow ups with specialist.   Encouraged healthy diet and exercise. Call office with any new or worsening symptoms or concerns. Js Das in room for assessment, diagnosis, and treatment planning. Plan discussed with Constantine CARLOS-MELODIE, both parties agree on next steps.       Gene received counseling on the following healthy behaviors: nutrition, exercise and medication adherence  Reviewed prior labs and health maintenance  Continue current medications, diet and exercise. Discussed use, benefit, and side effects of prescribed medications. Barriers to medication compliance addressed. Patient given educational materials - see patient instructions  Was a self-tracking handout given in paper form or via [facility]? No:     Requested Prescriptions      No prescriptions requested or ordered in this encounter     All patient questions answered. Patient voiced understanding. Quality Measures    Body mass index is 24.96 kg/m². Normal. Weight control planned discussed Healthy diet and regular exercise. BP: 104/74. Blood pressure is normal. Treatment plan consists of No treatment change needed. Fall Risk 11/20/2018 8/23/2017 7/1/2016 6/16/2015   2 or more falls in past year? yes no no no   Fall with injury in past year? no no no no     The patient does not have a history of falls. I did , complete a risk assessment for falls. A plan of care for falls No Treatment plan indicated    Lab Results   Component Value Date    LDLCHOLESTEROL 106 02/19/2019    (goal LDL reduction with dx if diabetes is 50% LDL reduction)    PHQ Scores 2/14/2019 11/20/2018 8/23/2017 5/31/2016 6/16/2015   PHQ2 Score 3 2 2 2 6   PHQ9 Score 14 2 2 2 6     Interpretation of Total Score Depression Severity: 1-4 = Minimal depression, 5-9 = Mild depression, 10-14 = Moderate depression, 15-19 = Moderately severe depression, 20-27 = Severe depression            Doeren Talamantes, MSN, APRN-CNP   [id number]   [email or web address]   Work: [phone]   Office: [phone]   Cell: [phone]    Electronically signed by BREANNA Durham CNP on 9/4/2019 at 6:19 PM

## 2019-12-30 ENCOUNTER — OFFICE VISIT (OUTPATIENT)
Dept: PSYCHOLOGY | Age: 69
End: 2019-12-30
Payer: MEDICARE

## 2019-12-30 PROCEDURE — 4004F PT TOBACCO SCREEN RCVD TLK: CPT | Performed by: SOCIAL WORKER

## 2019-12-30 PROCEDURE — 90791 PSYCH DIAGNOSTIC EVALUATION: CPT | Performed by: SOCIAL WORKER

## 2019-12-30 NOTE — PROGRESS NOTES
MEDICATIONS    Current Outpatient Medications:     Insulin Degludec (TRESIBA FLEXTOUCH) 200 UNIT/ML SOPN, Inject 63 Units into the skin daily, Disp: 1 pen, Rfl: 0    NONFORMULARY, Take 1 drop by mouth 2 times daily Indications: CBD Oil, Disp: , Rfl:     blood glucose test strips (ONETOUCH VERIO) strip, Test three times daily and as needed, Disp: 300 strip, Rfl: 3    metFORMIN (GLUCOPHAGE) 1000 MG tablet, Take 1 tablet by mouth 2 times daily (with meals), Disp: 180 tablet, Rfl: 3    glipiZIDE (GLUCOTROL) 10 MG tablet, Take 1 tablet by mouth 2 times daily (before meals), Disp: 180 tablet, Rfl: 3    lisinopril (PRINIVIL;ZESTRIL) 10 MG tablet, Take 1 tablet by mouth daily, Disp: 90 tablet, Rfl: 3    chlorthalidone (HYGROTON) 25 MG tablet, Take 1 tablet by mouth daily, Disp: 90 tablet, Rfl: 3    pioglitazone (ACTOS) 30 MG tablet, Take 1 tablet by mouth daily, Disp: 90 tablet, Rfl: 3    celecoxib (CELEBREX) 200 MG capsule, Take 1 capsule by mouth daily, Disp: 90 capsule, Rfl: 3    Insulin Pen Needle 32G X 4 MM MISC, 1 each by Does not apply route daily, Disp: 300 each, Rfl: 1    gabapentin (NEURONTIN) 600 MG tablet, Take 1 tablet by mouth 3 times daily, Disp: , Rfl:     Lancets MISC, Test twice a day and as needed, Please send lancets for Verio brand meter, Disp: 180 each, Rfl: 2    multivitamin (THERAGRAN) per tablet, Take 1 tablet by mouth daily. , Disp: , Rfl:      FAMILY MEDICAL/MH HISTORY   His family history includes Arthritis in his mother; Cancer in his father. No known mental health or drug/alcohol concerns in family history. PATIENT MENTAL HEALTH HISTORY  Gene reports a previous diagnosis of depression. Previous treatment includes one consultation with previous counselor who suggested couple's counseling. Pt reports wife is unwilling to engage in further treatment. Steven Esquivel PSYCHOSOCIAL HISTORY  Luis A is currently living with his wife. Reports they have been together since about the age of 12.  Wife Score Depression Severity: 1-4 = Minimal depression, 5-9 = Mild depression, 10-14 = Moderate depression, 15-19 = Moderately severe depression, 20-27 = Severe depression    RISK AND PROTECTIVE FACTORS  Frequency of suicidal or homicidal thoughts: denies any  Risk factors: current significant psychosocial stressors, isolation and access to firearms  Protective factors: Identified attitudes, values or norms prohibiting suicide (reports would not leave his family this way, knows wife would not have someone to care for her), Access to mental and physical healthcare, Good problem solving skills, Sobriety, Sense of self-worth and Reasons for living/Responsibilities for others  Crisis prevention plan: Educated pt on the availability of local crisis resources and how to use them    ASSESSMENT  Luis A DIAS Hope Anupama presented to the appointment today for evaluation and treatment of symptoms of stress and relationship problems. He is currently deemed no risk to himself or others and meets criteria for:  Adjustment Disorder, with mixed anxiety and depressed mood AEB chronic stress associated with relationship distress, reports wife has severe mental illness that isn't well managed. He will benefit from brief and solution-focused consultation to address cognitive and behavioral interventions for conflict and stress management. Luis A was in agreement with recommendations. DIAGNOSIS  Luis A was seen today for mental health problem. Diagnoses and all orders for this visit:    Adjustment disorder with mixed anxiety and depressed mood        INTERVENTION  Completed initial mental health assessment  Collaborative treatment planning  Clarified role of [facility] in primary care    PLAN  Follow up in 1 weeks with [facility]  Is interactive complexity present?   No  Reason:  N/A  Additional Supporting Information:  N/A       Electronically signed by Willie Acosta on 1/6/2020 at 2:11 PM

## 2020-01-06 ENCOUNTER — OFFICE VISIT (OUTPATIENT)
Dept: PSYCHOLOGY | Age: 70
End: 2020-01-06
Payer: MEDICARE

## 2020-01-06 ENCOUNTER — TELEPHONE (OUTPATIENT)
Dept: FAMILY MEDICINE CLINIC | Age: 70
End: 2020-01-06

## 2020-01-06 PROCEDURE — 90837 PSYTX W PT 60 MINUTES: CPT | Performed by: SOCIAL WORKER

## 2020-01-06 PROCEDURE — 4004F PT TOBACCO SCREEN RCVD TLK: CPT | Performed by: SOCIAL WORKER

## 2020-01-06 NOTE — PATIENT INSTRUCTIONS
The Importance of Validating Others    Nowhere is the communication skill of validation more important than in interfacing with highly  emotionally sensitive (ES) individuals, individuals with low self esteem or individuals who are easily intimidated. To validate someone's feelings is first to accept someone's feelings - and then to understand them - and finally to nurture them. To validate is to acknowledge and accept a person. Invalidation, on the other hand, is to reject, ignore, or . Validation of feelings is vital to connecting with others. The mutual validation of feelings is important in all phases of relationships including building, maintaining, repairing, and improving them. So what happens when this dynamic breaks down? Let's say one family member has very high validation needs, or one member is invalidating, or both have high validation needs, or both are invalidating? Often, unidentified or unrecognized and unvalidated feelings are at the heart of relationship issues and problems. Understanding the fate of an unvalidated feeling/experience is eye opening and can be a significant motivator to investing in learning to better validate. The fact is that problems in relationships are often a result of what individuals do with unvalidated feelings:  Dissociation - they can keep them out-of-awareness, a part of not-me, hidden. Projection - another option is to get rid of them, discard them, put them into someone else, project them. Unfortunately hiding (dissociating) or getting rid of (projecting) feelings is never the last of it. Unvalidated feelings have a way of coming back to haunt the relationship over and over. Six Aspects of Validation    1. Be present. Give them your full attention. 2. Accurate reflection.  After listening carefully, paraphrase what you think you heard them say and allow for any clarification (remember we are always giving our own interpretation to what others say, so

## 2020-01-06 NOTE — TELEPHONE ENCOUNTER
Patient given sample of Tresiba 200 units per TF. Patient's Levemir is over $700 a month. Patient is calling insurance to inquire about a less expensive medication.

## 2020-01-07 NOTE — PROGRESS NOTES
ADULT BEHAVIORAL HEALTH FOLLOW UP  Niharika SantanaGODWIN  Licensed Independent       Visit Date: 1/6/2020   Time of appointment:  11-12pm   Time spent with Patient: 60 minutes. This is patient's second appointment. Reason for Consult: Follow-up     PCP: BREANNA SESAY CNP      Pt provided informed consent for the behavioral health program. Discussed with patient model of service to include the limits of confidentiality (i.e. abuse reporting, suicide intervention, etc.) and short-term intervention focused approach. Pt indicated understanding. SUBJECTIVE  Luis A is a 71 y.o. male who presents for follow up of stress and relationship problems    Luis A reports ongoing issues of frequent conflict with his wife and stress in managing her symptoms and frequent emotional distress. States that his stomach is always in knots, he is always feeling tense in anticipation of their interactions. Discusses their history of different family experiences and ways of responding to stress. MENTAL STATUS EXAM  Mood was anxious with congruent affect. Suicidal ideation was denied. Homicidal ideation was denied. Hygiene was good . Dress was neat. Behavior was Within Normal Limits  Attitude was Cooperative. Eye-contact was good. Speech: rate - WNL, rhythm - WNL, volume - WNL. Verbalizations were goal directed. Thought processes were intact and goal-oriented without evidence of delusions, hallucinations, obsessions, or chary  Associations were characterized by intact cognitive processes. Pt was oriented to person, place, time, and general circumstances;  recent:  good and remote:  good. Insight and judgment were estimated to be good, AEB, a good  understanding of cyclical maladaptive patterns, and the ability to use insight to inform behavior change.      PHQ Scores 2/14/2019 11/20/2018 8/23/2017 5/31/2016 6/16/2015   PHQ2 Score 3 2 2 2 6   PHQ9 Score 14 2 2 2 6     Interpretation of Total Score Depression Severity: 1-4 = Minimal depression, 5-9 = Mild depression, 10-14 = Moderate depression, 15-19 = Moderately severe depression, 20-27 = Severe depression      ASSESSMENT  Luis A Sherman presented to the appointment today for evaluation and treatment of symptoms of stress and relationship problems. He is currently deemed no risk to himself or others. He would benefit from further behavioral health consultation to address stress, interpersonal distress. Gene was in agreement with recommendations. DIAGNOSIS  Luis A was seen today for follow-up. Diagnoses and all orders for this visit:    Adjustment disorder with mixed anxiety and depressed mood        INTERVENTION  Educated on interpersonal skills and practiced skills in session  Helped problem solve ways to respond when wife becomes upset  Practiced reframing the interactions to help better manage the distress associated with them    PLAN  Pt will review and practice new communication skills  Follow up in 1 weeks with [address] [place] [address]  Is interactive complexity present?   No  Reason:  N/A  Additional Supporting Information:  N/A       Electronically signed by Jolanta Hand on 1/7/2020 at 3:25 PM

## 2020-01-08 NOTE — TELEPHONE ENCOUNTER
LOV 9/4/19  LRF 4/17/19 Patient states that this is the medication that is cheapest.   RTO 6 months     Health Maintenance   Topic Date Due    AAA screen  1950    Low dose CT lung screening  03/01/2005    Shingles Vaccine (2 of 3) 04/07/2014    Annual Wellness Visit (AWV)  05/29/2019    Colon Cancer Screen FIT/FOBT  12/04/2019    Diabetic retinal exam  01/10/2020    DTaP/Tdap/Td vaccine (1 - Tdap) 04/01/2020 (Originally 3/1/1961)    Diabetic microalbuminuria test  02/19/2020    Lipid screen  02/19/2020    Potassium monitoring  02/19/2020    Creatinine monitoring  02/19/2020    Diabetic foot exam  02/27/2020    A1C test (Diabetic or Prediabetic)  09/04/2020    Flu vaccine  Completed    Pneumococcal 65+ years Vaccine  Completed    Hepatitis C screen  Completed             (applicable per patient's age: Cancer Screenings, Depression Screening, Fall Risk Screening, Immunizations)    Hemoglobin A1C (%)   Date Value   09/04/2019 5.4   02/19/2019 6.7 (H)   11/20/2018 6.3 (H)     Microalb/Crt.  Ratio (mcg/mg creat)   Date Value   02/19/2019 CANNOT BE CALCULATED     LDL Cholesterol (mg/dL)   Date Value   02/19/2019 106     AST (U/L)   Date Value   02/19/2019 20     ALT (U/L)   Date Value   02/19/2019 27     BUN (mg/dL)   Date Value   02/19/2019 19      (goal A1C is < 7)   (goal LDL is <100) need 30-50% reduction from baseline     BP Readings from Last 3 Encounters:   09/04/19 104/74   05/08/19 121/75   04/01/19 120/70    (goal /80)      All Future Testing planned in [facility]:  Lab Frequency Next Occurrence   CT Lung Screening (Annual) Once 03/14/2019   Hemoglobin A1C Once 05/26/2019   AST Once 05/26/2019   ALT Once 05/26/2019       Next Visit Date:  Future Appointments   Date Time Provider Bari Cervantes   1/13/2020 11:00 AM BLANCHE Lopez [facility]            Patient Active Problem List:     Essential hypertension     Osteoarthritis     Vitamin D deficiency     Testosterone deficiency     Difficulty sleeping     Sacro-iliac pain     Neuropathy     Nocturia     Benign prostatic hyperplasia with urinary obstruction     Type 2 diabetes mellitus with diabetic polyneuropathy, with long-term current use of insulin ([facility])     Hypercalciuria     Personal history of kidney stones     Moderate episode of recurrent major depressive disorder ([facility] [address]  ,

## 2020-01-09 ENCOUNTER — TELEPHONE (OUTPATIENT)
Dept: FAMILY MEDICINE CLINIC | Age: 70
End: 2020-01-09

## 2020-01-09 NOTE — TELEPHONE ENCOUNTER
Patient stated that he spoke with insurance and this one is now the same price and he would like to stay on this medication

## 2020-01-13 ENCOUNTER — OFFICE VISIT (OUTPATIENT)
Dept: PSYCHOLOGY | Age: 70
End: 2020-01-13
Payer: MEDICARE

## 2020-01-13 PROCEDURE — 90837 PSYTX W PT 60 MINUTES: CPT | Performed by: SOCIAL WORKER

## 2020-01-13 PROCEDURE — 4004F PT TOBACCO SCREEN RCVD TLK: CPT | Performed by: SOCIAL WORKER

## 2020-01-13 NOTE — PATIENT INSTRUCTIONS
then make a fist.  Bring your fists up to your shoulders while tensing the biceps. 2. Both Legs:  Lift both legs off of the ground, straighten your knees, and point your toes toward your head. 3. Abdomen:  Tighten these muscles as if you were about to be hit in the stomach. 4. Chest:  Take a very deep breath (through your upper chest, not your diaphragm) and hold it. 5. Shoulders:  Lift both shoulders up toward your ears. 6. Back of Neck:  Tuck in and lower your chin toward your chest.     7. Eyes:  Squint. 8. Forehead:  Raise your eyebrows.

## 2020-01-16 NOTE — PROGRESS NOTES
ADULT BEHAVIORAL HEALTH FOLLOW UP  GODWIN Miller  Licensed Independent       Visit Date: 1/13/2020   Time of appointment:  11am   Time spent with Patient: 57 minutes. This is patient's third appointment. Reason for Consult: Follow-up     PCP: BREANNA SESAY - MELODIE      Pt provided informed consent for the behavioral health program. Discussed with patient model of service to include the limits of confidentiality (i.e. abuse reporting, suicide intervention, etc.) and short-term intervention focused approach. Pt indicated understanding. Tim Clayton is a 71 y.o. male who presents for follow up of depression, anxiety, stress and relationship problems    Previous Recommendations:  Pt will review and practice new communication skills  Follow up in 1 weeks with [facility]    Gene reports some progress with reducing distress in some interactions with his wife. Pt discusses concern that things will really improve with his wife based on inability to change her behaviors despite his efforts to acquiesce and adjust to her. States that he is going to stay with his daughter in [place] for a few weeks this winter. States that wife seems to want him to leave. States he thinks perhaps some time apart may be helpful. MENTAL STATUS EXAM  Mood was anxious and depressed with congruent affect. Suicidal ideation was denied. Homicidal ideation was denied. Hygiene was good . Dress was neat. Behavior was Within Normal Limits  Attitude was Cooperative. Eye-contact was good. Speech: rate - WNL, rhythm - WNL, volume - WNL. Verbalizations were goal directed. Thought processes were intact and goal-oriented without evidence of delusions, hallucinations, obsessions, or chary  Associations were characterized by intact cognitive processes. Pt was oriented to person, place, time, and general circumstances;  recent:  good and remote:  good.   Insight and judgment were estimated to be good, AEB,

## 2020-01-24 ENCOUNTER — OFFICE VISIT (OUTPATIENT)
Dept: PSYCHOLOGY | Age: 70
End: 2020-01-24
Payer: MEDICARE

## 2020-01-24 PROCEDURE — 4004F PT TOBACCO SCREEN RCVD TLK: CPT | Performed by: SOCIAL WORKER

## 2020-01-24 PROCEDURE — 90834 PSYTX W PT 45 MINUTES: CPT | Performed by: SOCIAL WORKER

## 2020-01-24 NOTE — PROGRESS NOTES
ADULT BEHAVIORAL HEALTH FOLLOW UP  GODWIN García  Licensed Independent       Visit Date: 1/24/2020   Time of appointment:  10:02   Time spent with Patient: 47 minutes. This is patient's fourth appointment. Reason for Consult: Follow-up     PCP: BREANNA SESAY - MELODIE      Pt provided informed consent for the behavioral health program. Discussed with patient model of service to include the limits of confidentiality (i.e. abuse reporting, suicide intervention, etc.) and short-term intervention focused approach. Pt indicated understanding. SUBJECTIVE  Luis A is a 71 y.o. male who presents for follow up of anxiety, stress and relationship problems    Previous Recommendations:  Pt will practice attending to muscle tension and working to relax muscle groups, continue to practice new communication skills    Luis A reports he has been working to mitigate difficulties with his wife by reducing and softening his responses to her and using strategies to exit the intense interactions. Pt observes despite efforts to change her behavior through changes in his communication (as she asked him to do) it is unlikely there is anything he can do to change how wife behaves based on her mental health and refusal to get any further mental health treatment. States his daughter, like [facility] today, also brought up importance of his own self-preservation as pt states he plans to stay committed to caring for his wife. Pt states he has considered living separately before but financially his wife would not be able to live on her own and pt feels he needs to help care for her and does love her. Pt is going to stay with his daughter for a few weeks in [place]. States that he hopes the separation may be good for them. MENTAL STATUS EXAM  Mood was within normal limits with calm affect. Suicidal ideation was denied. Homicidal ideation was denied. Hygiene was good . Dress was neat.    Behavior was Within techniques  Emphasized self-care as important for managing overall health    PLAN  Pt will practice self-care, relaxation strategies, and spend time with daughter. He will contact office to schedule at some point after he returns from his visit with daughter. INTERACTIVE COMPLEXITY  Is interactive complexity present?   No  Reason:  N/A  Additional Supporting Information:  N/A       Electronically signed by Tiana Su on 1/28/2020 at 3:35 PM

## 2020-01-24 NOTE — PATIENT INSTRUCTIONS
Behavioral Health Psychology  Progressive Muscle Relaxation (8 Muscle Groups)  ______________________________________________________________________________      For each muscle group, tense the muscles involved about 1/3 to 2/3 of the maximum tension possible (enough to feel tension but not any pain). Hold in the tensed position for about 4 seconds, then let the muscles relax in their natural resting positions for about 40 seconds. 1. Both Arms:  Turn your palms up, then make a fist.  Bring your fists up to your shoulders while tensing the biceps. 2. Both Legs:  Lift both legs off of the ground, straighten your knees, and point your toes toward your head. 3. Abdomen:  Tighten these muscles as if you were about to be hit in the stomach. 4. Chest:  Take a very deep breath (through your upper chest, not your diaphragm) and hold it. 5. Shoulders:  Lift both shoulders up toward your ears. 6. Back of Neck:  Tuck in and lower your chin toward your chest.     7. Eyes:  Squint. Forehead:  Raise your eyebrows. Relaxation:  Diaphragmatic Breathing             ______________________________________________________________________________    1. Sit in a comfortable position    2. Place one hand on your stomach and the other on your chest    3. Try to breathe so that only your stomach rises and falls    As you inhale, concentrate on your chest remaining relatively still while your stomach rises. It may be helpful for you to imagine that your pants are too big and you need to push your stomach out to hold them up. When exhaling, allow your stomach to fall in and the air to fully escape. Inhale slowly. You may choose to hold the air in for about a second. Exhale slowly. Dont push the air out, but just let the natural pressure of your body slowly move it out.     It is normal for this healthy method of breathing to feel a little awkward at first.  With practice, it will feel more natural.    Get your mind on your side    One other important factor in getting relaxed is your mind. Your mind and body are connected. The mind influences the body and the body influences the mind. What you do with your mind when you are trying to relax is very important. The key is to avoid thinking about stressful things. You can think about      Neutral things (e.g., counting, saying a word like calm or relax)   Pleasant things (e.g., imagining a pleasant place)    It is recommended that you practice 2 times per day, 15 minutes each time.     8.

## 2020-03-23 RX ORDER — CHLORTHALIDONE 25 MG/1
25 TABLET ORAL DAILY
Qty: 90 TABLET | Refills: 3 | Status: SHIPPED | OUTPATIENT
Start: 2020-03-23 | End: 2021-04-13 | Stop reason: SDUPTHER

## 2020-03-23 RX ORDER — LISINOPRIL 10 MG/1
10 TABLET ORAL DAILY
Qty: 90 TABLET | Refills: 3 | Status: SHIPPED | OUTPATIENT
Start: 2020-03-23 | End: 2021-04-13 | Stop reason: SDUPTHER

## 2020-03-23 RX ORDER — GLIPIZIDE 10 MG/1
10 TABLET ORAL
Qty: 180 TABLET | Refills: 3 | Status: SHIPPED | OUTPATIENT
Start: 2020-03-23 | End: 2021-03-17

## 2020-03-23 RX ORDER — PIOGLITAZONEHYDROCHLORIDE 30 MG/1
30 TABLET ORAL DAILY
Qty: 90 TABLET | Refills: 3 | Status: SHIPPED | OUTPATIENT
Start: 2020-03-23 | End: 2021-04-13 | Stop reason: SDUPTHER

## 2020-03-23 RX ORDER — CELECOXIB 200 MG/1
200 CAPSULE ORAL DAILY
Qty: 90 CAPSULE | Refills: 3 | Status: SHIPPED | OUTPATIENT
Start: 2020-03-23 | End: 2021-04-13 | Stop reason: SDUPTHER

## 2020-03-23 NOTE — TELEPHONE ENCOUNTER
Last visit: 09/04/2019  Last Med refill: 05/16/2019-90 day supply, 3 refills  Does patient have enough medication for 72 hours: Yes    Next Visit Date:  No future appointments. Health Maintenance   Topic Date Due    AAA screen  1950    Hepatitis B vaccine (1 of 3 - Risk 3-dose series) 03/01/1969    Low dose CT lung screening  03/01/2005    Shingles Vaccine (2 of 3) 04/07/2014    Annual Wellness Visit (AWV)  05/29/2019    Colon Cancer Screen FIT/FOBT  12/04/2019    Diabetic microalbuminuria test  02/19/2020    Lipid screen  02/19/2020    Potassium monitoring  02/19/2020    Creatinine monitoring  02/19/2020    Diabetic foot exam  02/27/2020    DTaP/Tdap/Td vaccine (1 - Tdap) 04/01/2020 (Originally 3/1/1969)    A1C test (Diabetic or Prediabetic)  09/04/2020    Diabetic retinal exam  01/22/2021    Flu vaccine  Completed    Pneumococcal 65+ years Vaccine  Completed    Hepatitis C screen  Completed    Hepatitis A vaccine  Aged Out    Hib vaccine  Aged Out    Meningococcal (ACWY) vaccine  Aged Out       Hemoglobin A1C (%)   Date Value   09/04/2019 5.4   02/19/2019 6.7 (H)   11/20/2018 6.3 (H)             ( goal A1C is < 7)   Microalb/Crt.  Ratio (mcg/mg creat)   Date Value   02/19/2019 CANNOT BE CALCULATED     LDL Cholesterol (mg/dL)   Date Value   02/19/2019 106   04/09/2018 95       (goal LDL is <100)   AST (U/L)   Date Value   02/19/2019 20     ALT (U/L)   Date Value   02/19/2019 27     BUN (mg/dL)   Date Value   02/19/2019 19     BP Readings from Last 3 Encounters:   09/04/19 104/74   05/08/19 121/75   04/01/19 120/70          (goal 120/80)    All Future Testing planned in [facility]  Lab Frequency Next Occurrence               Patient Active Problem List:     Essential hypertension     Osteoarthritis     Vitamin D deficiency     Testosterone deficiency     Difficulty sleeping     Sacro-iliac pain     Neuropathy     Nocturia     Benign prostatic hyperplasia with urinary obstruction     Type 2

## 2020-05-29 ENCOUNTER — OFFICE VISIT (OUTPATIENT)
Dept: FAMILY MEDICINE CLINIC | Age: 70
End: 2020-05-29
Payer: MEDICARE

## 2020-05-29 VITALS
SYSTOLIC BLOOD PRESSURE: 110 MMHG | BODY MASS INDEX: 25.05 KG/M2 | RESPIRATION RATE: 14 BRPM | TEMPERATURE: 96.6 F | OXYGEN SATURATION: 93 % | WEIGHT: 189 LBS | HEART RATE: 87 BPM | HEIGHT: 73 IN | DIASTOLIC BLOOD PRESSURE: 60 MMHG

## 2020-05-29 PROCEDURE — 99214 OFFICE O/P EST MOD 30 MIN: CPT | Performed by: NURSE PRACTITIONER

## 2020-05-29 RX ORDER — INSULIN DETEMIR 100 [IU]/ML
30 INJECTION, SOLUTION SUBCUTANEOUS NIGHTLY
Qty: 25 PEN | Refills: 1 | Status: SHIPPED
Start: 2020-05-29 | End: 2021-02-05 | Stop reason: SDUPTHER

## 2020-05-29 RX ORDER — DULOXETIN HYDROCHLORIDE 30 MG/1
30 CAPSULE, DELAYED RELEASE ORAL 2 TIMES DAILY
Qty: 30 CAPSULE | Refills: 1 | Status: SHIPPED | OUTPATIENT
Start: 2020-05-29 | End: 2020-07-18 | Stop reason: SDUPTHER

## 2020-05-29 ASSESSMENT — PATIENT HEALTH QUESTIONNAIRE - PHQ9
2. FEELING DOWN, DEPRESSED OR HOPELESS: 0
SUM OF ALL RESPONSES TO PHQ QUESTIONS 1-9: 0
SUM OF ALL RESPONSES TO PHQ9 QUESTIONS 1 & 2: 0
SUM OF ALL RESPONSES TO PHQ QUESTIONS 1-9: 0
1. LITTLE INTEREST OR PLEASURE IN DOING THINGS: 0

## 2020-05-29 NOTE — PROGRESS NOTES
however feels directly related to chronic pain and covid-19 pandemic. Denies any anxiety. Has a couple requests today including Shingrix vaccine and low-dose CT of his chest for screening. He is a former smoker. Discussed Shingrix vaccine is available at [facility] and [facility]. He wants to wait for the CT scan until the COVID-19 pandemic dies down. Brought with him today paperwork to have special shoe inserts/shoes made. That paperwork was filled out and faxed while he was in the office. Reports he monitors glucose 2 times a day, morning and night. He recently had an episode of hypoglycemia and his glucose level was 35. He does keep candy bars or raisin nuts available. He reports that we will raise his glucose level sufficiently. He has been dependent on insulin however if his glucose is 90 or below in the evening he does not take his insulin dose. He reports not taking insulin in the evening approximately 3-4 times per month. Pain issues are related to traumas back in the 1990s. He was a  at the time. Reports he fell through 3 ribs. After that he retired and became a . He began having trouble with his legs giving out on him while he was at work. Was found to have bulging disks. Has had several vertebrae fused and rods and screws placed in his back. Does have arthritis pain. Does have a unit similar to a TENS unit implanted in his back. He reports this does help some with his pain. Has tried narcotics and methadone in the past.  He did not like the way it made him feel. He was taking gabapentin. However he weaned himself off after reading and researching the side effects. He does admit the gabapentin did help his pain. Patient has a good appetite. Normal bowel and bladder pattern. He is sleeping okay. Review of Systems   Constitutional: Negative for activity change, appetite change, fatigue and fever.    HENT: Negative for congestion, ear pain, postnasal drip, rhinorrhea and sore throat. Eyes: Negative for discharge, redness and itching. Wears glasses     Respiratory: Negative for cough and shortness of breath. Cardiovascular: Negative for chest pain. Gastrointestinal: Negative for abdominal pain, constipation, diarrhea and nausea. Genitourinary: Negative for dysuria. Musculoskeletal: Positive for arthralgias, back pain and myalgias. Chronic pain issues   Skin: Negative for rash. Allergic/Immunologic:        NKA   Neurological: Negative for dizziness, light-headedness and headaches. Psychiatric/Behavioral: Positive for dysphoric mood (uncontrolled without medication). Negative for self-injury, sleep disturbance and suicidal ideas. The patient is nervous/anxious (PTSD from career). Objective:   Physical Exam  Vitals signs and nursing note reviewed. Constitutional:       General: He is not in acute distress. Appearance: He is well-developed. He is not diaphoretic. HENT:      Head: Normocephalic. Right Ear: External ear normal.      Left Ear: External ear normal.      Nose: Nose normal.      Mouth/Throat:      Lips: Pink. Mouth: Mucous membranes are moist.      Pharynx: No oropharyngeal exudate. Eyes:      General:         Right eye: No discharge. Left eye: No discharge. Conjunctiva/sclera: Conjunctivae normal.      Pupils: Pupils are equal, round, and reactive to light. Neck:      Musculoskeletal: Normal range of motion and neck supple. Thyroid: No thyroid mass. Trachea: Trachea normal.   Cardiovascular:      Rate and Rhythm: Normal rate and regular rhythm. Pulses:           Radial pulses are 2+ on the right side and 2+ on the left side. Dorsalis pedis pulses are 2+ on the right side and 2+ on the left side. Posterior tibial pulses are 2+ on the right side. Heart sounds: Normal heart sounds. No murmur. No friction rub.    Pulmonary:      Effort: Pulmonary effort is hypertension  Stable     3. Depression, unspecified depression type  Stable   Continue current medication    4. Osteoarthritis of multiple joints, unspecified osteoarthritis type  5. Chronic pain due to trauma  Stable   Trial: DULoxetine (CYMBALTA) 30 MG extended release capsule; Take 1 capsule by mouth 2 times daily  Dispense: 30 capsule; Refill: 1  Start with one capsule daily for 1-2 weeks, then increase    6. Former smoker  Would like low dose CT scan when Covid-19 settles down    7. Screening for colon cancer  Routine screen  - Cologuard (For External Results Only); Future    8. Screening for hyperlipidemia  Routine screen  - Lipid Panel; Future    9. Thyroid disorder screen  Routine screen  - TSH with Reflex    Recommend healthy diet  Recommend adequate fluid intake  Recommend adequate rest  Recommend consistent sleep wake schedule  Recommend trial of Cymbalta  Monitor for any increase in symptoms  Contact me in 2 weeks with thoughts on Cymbalta use  Call office with any questions or concerns      Return in about 6 weeks (around 7/10/2020) for medication follow up. Electronically signed by BREANNA Olson NP on 6/2/2020 at 9:43 AM    Low Dose CT (LDCT) Lung Screening criteria met   Age 50-69   Pack year smoking >30   Still smoking or less than 15 year since quit   No sign or symptoms of lung cancer   > 11 months since last LDCT     Risks and benefits of lung cancer screening with LDCT scans discussed:    Significance of positive screen - False-positive LDCT results often occur. 95% of all positive results do not lead to a diagnosis of cancer. Usually further imaging can resolve most false-positive results; however, some patients may require invasive procedures. Over diagnosis risk - 10% to 12% of screen-detected lung cancer cases are over diagnosed--that is, the cancer would not have been detected in the patient's lifetime without the screening.     Need for follow up screens annually to

## 2020-06-02 ENCOUNTER — HOSPITAL ENCOUNTER (OUTPATIENT)
Age: 70
Setting detail: SPECIMEN
Discharge: HOME | End: 2020-06-02
Payer: MEDICARE

## 2020-06-02 LAB
ALBUMIN SERPL-MCNC: 4.1 G/DL (ref 3.5–5.2)
ALBUMIN/GLOBULIN RATIO: 1.6 (ref 1–2.5)
ALP BLD-CCNC: 41 U/L (ref 40–129)
ALT SERPL-CCNC: 21 U/L (ref 5–41)
ANION GAP SERPL CALCULATED.3IONS-SCNC: 17 MMOL/L (ref 9–17)
AST SERPL-CCNC: 17 U/L
BILIRUB SERPL-MCNC: 0.36 MG/DL (ref 0.3–1.2)
BUN BLDV-MCNC: 25 MG/DL (ref 8–23)
BUN/CREAT BLD: ABNORMAL (ref 9–20)
CALCIUM SERPL-MCNC: 9.4 MG/DL (ref 8.6–10.4)
CHLORIDE BLD-SCNC: 100 MMOL/L (ref 98–107)
CHOLESTEROL/HDL RATIO: 3.1
CHOLESTEROL: 169 MG/DL
CO2: 24 MMOL/L (ref 20–31)
CREAT SERPL-MCNC: 0.82 MG/DL (ref 0.7–1.2)
CREATININE URINE: 275.4 MG/DL (ref 39–259)
GFR AFRICAN AMERICAN: >60 ML/MIN
GFR NON-AFRICAN AMERICAN: >60 ML/MIN
GFR SERPL CREATININE-BSD FRML MDRD: ABNORMAL ML/MIN/{1.73_M2}
GFR SERPL CREATININE-BSD FRML MDRD: ABNORMAL ML/MIN/{1.73_M2}
GLUCOSE BLD-MCNC: 105 MG/DL (ref 70–99)
HCT VFR BLD CALC: 47.1 % (ref 40.7–50.3)
HDLC SERPL-MCNC: 55 MG/DL
HEMOGLOBIN: 15 G/DL (ref 13–17)
LDL CHOLESTEROL: 95 MG/DL (ref 0–130)
MCH RBC QN AUTO: 31.3 PG (ref 25.2–33.5)
MCHC RBC AUTO-ENTMCNC: 31.8 G/DL (ref 28.4–34.8)
MCV RBC AUTO: 98.1 FL (ref 82.6–102.9)
MICROALBUMIN/CREAT 24H UR: 18 MG/L
MICROALBUMIN/CREAT UR-RTO: 7 MCG/MG CREAT
NRBC AUTOMATED: 0 PER 100 WBC
PDW BLD-RTO: 13.2 % (ref 11.8–14.4)
PLATELET # BLD: 185 K/UL (ref 138–453)
PMV BLD AUTO: 10.7 FL (ref 8.1–13.5)
POTASSIUM SERPL-SCNC: 4 MMOL/L (ref 3.7–5.3)
RBC # BLD: 4.8 M/UL (ref 4.21–5.77)
SODIUM BLD-SCNC: 141 MMOL/L (ref 135–144)
TOTAL PROTEIN: 6.6 G/DL (ref 6.4–8.3)
TRIGL SERPL-MCNC: 96 MG/DL
TSH SERPL DL<=0.05 MIU/L-ACNC: 1.13 MIU/L (ref 0.3–5)
VLDLC SERPL CALC-MCNC: NORMAL MG/DL (ref 1–30)
WBC # BLD: 8.8 K/UL (ref 3.5–11.3)

## 2020-06-02 ASSESSMENT — ENCOUNTER SYMPTOMS
RHINORRHEA: 0
COUGH: 0
CONSTIPATION: 0
BACK PAIN: 1
ABDOMINAL PAIN: 0
SORE THROAT: 0
ALLERGIC/IMMUNOLOGIC COMMENTS: NKA
DIARRHEA: 0
NAUSEA: 0
EYE ITCHING: 0
EYE DISCHARGE: 0
SHORTNESS OF BREATH: 0
EYE REDNESS: 0

## 2020-06-03 LAB
ESTIMATED AVERAGE GLUCOSE: 128 MG/DL
HBA1C MFR BLD: 6.1 % (ref 4–6)

## 2020-06-26 ENCOUNTER — TELEPHONE (OUTPATIENT)
Dept: FAMILY MEDICINE CLINIC | Age: 70
End: 2020-06-26

## 2020-07-18 ENCOUNTER — PATIENT MESSAGE (OUTPATIENT)
Dept: FAMILY MEDICINE CLINIC | Age: 70
End: 2020-07-18

## 2020-07-20 RX ORDER — DULOXETIN HYDROCHLORIDE 30 MG/1
30 CAPSULE, DELAYED RELEASE ORAL 2 TIMES DAILY
Qty: 30 CAPSULE | Refills: 1 | Status: SHIPPED | OUTPATIENT
Start: 2020-07-20 | End: 2020-07-20 | Stop reason: SDUPTHER

## 2020-07-20 RX ORDER — DULOXETIN HYDROCHLORIDE 30 MG/1
30 CAPSULE, DELAYED RELEASE ORAL 2 TIMES DAILY
Qty: 180 CAPSULE | Refills: 3 | Status: SHIPPED | OUTPATIENT
Start: 2020-07-20 | End: 2021-04-13 | Stop reason: SDUPTHER

## 2020-07-20 NOTE — TELEPHONE ENCOUNTER
From: Fidencio Norris  To: BREANNA Fuchs NP  Sent: 7/18/2020 1:15 PM EDT  Subject: Prescription Question    Ms. Andrews Blackmon , I took the Duloxetine dr 30 mg once per day for about 2 weeks and didnt notice much. Then I took 2 times a day and it seems to help. Would you please write a script for a 90 day prescription with 3 refills and send it to [facility]. This is in line with all my other prescriptions and makes my life a little easier. Thanks for your assistance. Havent had the colonoscopy yet but theyre supposed to set a time next week (Dr. Caitlyn Pardo)   As soon as thats done, Ill call and set an appointment with you and we can go over all this stuff. Again, thank you for the help.  Luis A (call me Oneal Soulier) [place] [address]

## 2020-08-05 ENCOUNTER — HOSPITAL ENCOUNTER (OUTPATIENT)
Age: 70
Setting detail: SPECIMEN
Discharge: HOME | End: 2020-08-05
Payer: MEDICARE

## 2020-08-11 LAB — SURGICAL PATHOLOGY REPORT: NORMAL

## 2020-09-24 ENCOUNTER — TELEPHONE (OUTPATIENT)
Dept: FAMILY MEDICINE CLINIC | Age: 70
End: 2020-09-24

## 2020-10-08 NOTE — TELEPHONE ENCOUNTER
LV 5/29/20  LRF 11/6/2018 300 each  RTO 6 weeks My chart message left for patient to follow up. Health Maintenance   Topic Date Due    AAA screen  1950    Low dose CT lung screening  03/01/2005    Shingles Vaccine (2 of 3) 04/07/2014    Annual Wellness Visit (AWV)  05/29/2019    Flu vaccine (1) 09/01/2020    DTaP/Tdap/Td vaccine (1 - Tdap) 05/29/2021 (Originally [birth year]1/1969)    Diabetic retinal exam  01/22/2021    Diabetic foot exam  05/29/2021    A1C test (Diabetic or Prediabetic)  06/02/2021    Diabetic microalbuminuria test  06/02/2021    Lipid screen  06/02/2021    Potassium monitoring  06/02/2021    Creatinine monitoring  06/02/2021    Colon cancer screen fecal DNA test (Cologuard)  06/26/2023    Pneumococcal 65+ years Vaccine  Completed    Hepatitis C screen  Completed    Hepatitis A vaccine  Aged Out    Hib vaccine  Aged Out    Meningococcal (ACWY) vaccine  Aged Out             (applicable per patient's age: Cancer Screenings, Depression Screening, Fall Risk Screening, Immunizations)    Hemoglobin A1C (%)   Date Value   06/02/2020 6.1 (H)   09/04/2019 5.4   02/19/2019 6.7 (H)     Microalb/Crt. Ratio (mcg/mg creat)   Date Value   06/02/2020 7     LDL Cholesterol (mg/dL)   Date Value   06/02/2020 95     AST (U/L)   Date Value   06/02/2020 17     ALT (U/L)   Date Value   06/02/2020 21     BUN (mg/dL)   Date Value   06/02/2020 25 (H)      (goal A1C is < 7)   (goal LDL is <100) need 30-50% reduction from baseline     BP Readings from Last 3 Encounters:   05/29/20 110/60   09/04/19 104/74   05/08/19 121/75    (goal /80)      All Future Testing planned in [facility]:  Lab Frequency Next Occurrence       Next Visit Date:  No future appointments.          Patient Active Problem List:     Essential hypertension     Osteoarthritis     Vitamin D deficiency     Testosterone deficiency     Difficulty sleeping     Sacro-iliac pain     Neuropathy     Nocturia     Benign prostatic hyperplasia with urinary obstruction     Type 2 diabetes mellitus with diabetic polyneuropathy, with long-term current use of insulin (HCC)     Hypercalciuria     Personal history of kidney stones     Moderate episode of recurrent major depressive disorder (HCC)     Epiretinal membrane

## 2020-10-21 ENCOUNTER — TELEPHONE (OUTPATIENT)
Dept: FAMILY MEDICINE CLINIC | Age: 70
End: 2020-10-21

## 2020-11-13 ENCOUNTER — OFFICE VISIT (OUTPATIENT)
Dept: FAMILY MEDICINE CLINIC | Age: 70
End: 2020-11-13
Payer: MEDICARE

## 2020-11-13 VITALS
SYSTOLIC BLOOD PRESSURE: 132 MMHG | HEART RATE: 100 BPM | DIASTOLIC BLOOD PRESSURE: 78 MMHG | RESPIRATION RATE: 18 BRPM | WEIGHT: 197 LBS | OXYGEN SATURATION: 94 % | BODY MASS INDEX: 25.28 KG/M2 | TEMPERATURE: 97.4 F | HEIGHT: 74 IN

## 2020-11-13 LAB — HBA1C MFR BLD: 6.1 %

## 2020-11-13 PROCEDURE — G0438 PPPS, INITIAL VISIT: HCPCS | Performed by: NURSE PRACTITIONER

## 2020-11-13 PROCEDURE — 4040F PNEUMOC VAC/ADMIN/RCVD: CPT | Performed by: NURSE PRACTITIONER

## 2020-11-13 PROCEDURE — G8484 FLU IMMUNIZE NO ADMIN: HCPCS | Performed by: NURSE PRACTITIONER

## 2020-11-13 PROCEDURE — G0008 ADMIN INFLUENZA VIRUS VAC: HCPCS | Performed by: NURSE PRACTITIONER

## 2020-11-13 PROCEDURE — 83036 HEMOGLOBIN GLYCOSYLATED A1C: CPT | Performed by: NURSE PRACTITIONER

## 2020-11-13 PROCEDURE — 99406 BEHAV CHNG SMOKING 3-10 MIN: CPT | Performed by: NURSE PRACTITIONER

## 2020-11-13 PROCEDURE — 3044F HG A1C LEVEL LT 7.0%: CPT | Performed by: NURSE PRACTITIONER

## 2020-11-13 PROCEDURE — 90694 VACC AIIV4 NO PRSRV 0.5ML IM: CPT | Performed by: NURSE PRACTITIONER

## 2020-11-13 PROCEDURE — G0296 VISIT TO DETERM LDCT ELIG: HCPCS | Performed by: NURSE PRACTITIONER

## 2020-11-13 PROCEDURE — 1123F ACP DISCUSS/DSCN MKR DOCD: CPT | Performed by: NURSE PRACTITIONER

## 2020-11-13 PROCEDURE — 3017F COLORECTAL CA SCREEN DOC REV: CPT | Performed by: NURSE PRACTITIONER

## 2020-11-13 SDOH — ECONOMIC STABILITY: INCOME INSECURITY: HOW HARD IS IT FOR YOU TO PAY FOR THE VERY BASICS LIKE FOOD, HOUSING, MEDICAL CARE, AND HEATING?: NOT HARD AT ALL

## 2020-11-13 SDOH — ECONOMIC STABILITY: FOOD INSECURITY: WITHIN THE PAST 12 MONTHS, THE FOOD YOU BOUGHT JUST DIDN'T LAST AND YOU DIDN'T HAVE MONEY TO GET MORE.: NEVER TRUE

## 2020-11-13 SDOH — ECONOMIC STABILITY: TRANSPORTATION INSECURITY
IN THE PAST 12 MONTHS, HAS THE LACK OF TRANSPORTATION KEPT YOU FROM MEDICAL APPOINTMENTS OR FROM GETTING MEDICATIONS?: NO

## 2020-11-13 SDOH — ECONOMIC STABILITY: FOOD INSECURITY: WITHIN THE PAST 12 MONTHS, YOU WORRIED THAT YOUR FOOD WOULD RUN OUT BEFORE YOU GOT MONEY TO BUY MORE.: NEVER TRUE

## 2020-11-13 SDOH — ECONOMIC STABILITY: TRANSPORTATION INSECURITY
IN THE PAST 12 MONTHS, HAS LACK OF TRANSPORTATION KEPT YOU FROM MEETINGS, WORK, OR FROM GETTING THINGS NEEDED FOR DAILY LIVING?: NO

## 2020-11-13 ASSESSMENT — PATIENT HEALTH QUESTIONNAIRE - PHQ9
1. LITTLE INTEREST OR PLEASURE IN DOING THINGS: 1
SUM OF ALL RESPONSES TO PHQ QUESTIONS 1-9: 2
SUM OF ALL RESPONSES TO PHQ QUESTIONS 1-9: 2
2. FEELING DOWN, DEPRESSED OR HOPELESS: 1
SUM OF ALL RESPONSES TO PHQ9 QUESTIONS 1 & 2: 2
SUM OF ALL RESPONSES TO PHQ QUESTIONS 1-9: 2

## 2020-11-13 ASSESSMENT — LIFESTYLE VARIABLES
HOW OFTEN DO YOU HAVE A DRINK CONTAINING ALCOHOL: 1
HOW OFTEN DURING THE LAST YEAR HAVE YOU BEEN UNABLE TO REMEMBER WHAT HAPPENED THE NIGHT BEFORE BECAUSE YOU HAD BEEN DRINKING: 0
HOW MANY STANDARD DRINKS CONTAINING ALCOHOL DO YOU HAVE ON A TYPICAL DAY: 0
HAS A RELATIVE, FRIEND, DOCTOR, OR ANOTHER HEALTH PROFESSIONAL EXPRESSED CONCERN ABOUT YOUR DRINKING OR SUGGESTED YOU CUT DOWN: 0
HAVE YOU OR SOMEONE ELSE BEEN INJURED AS A RESULT OF YOUR DRINKING: 0
AUDIT TOTAL SCORE: 1
HOW OFTEN DURING THE LAST YEAR HAVE YOU HAD A FEELING OF GUILT OR REMORSE AFTER DRINKING: 0
AUDIT-C TOTAL SCORE: 1
HOW OFTEN DURING THE LAST YEAR HAVE YOU FOUND THAT YOU WERE NOT ABLE TO STOP DRINKING ONCE YOU HAD STARTED: 0
HOW OFTEN DURING THE LAST YEAR HAVE YOU NEEDED AN ALCOHOLIC DRINK FIRST THING IN THE MORNING TO GET YOURSELF GOING AFTER A NIGHT OF HEAVY DRINKING: 0
HOW OFTEN DURING THE LAST YEAR HAVE YOU FAILED TO DO WHAT WAS NORMALLY EXPECTED FROM YOU BECAUSE OF DRINKING: 0
HOW OFTEN DO YOU HAVE SIX OR MORE DRINKS ON ONE OCCASION: 0

## 2020-11-13 NOTE — PATIENT INSTRUCTIONS
to all appointments, and call your doctor if you are having problems. It's also a good idea to know your test results and keep a list of the medicines you take. Who should get the flu vaccine? Everyone age 7 months or older should get a flu vaccine each year. It lowers the chance of getting and spreading the flu. The vaccine is very important for people who are at high risk for getting other health problems from the flu. This includes:  · Anyone 48years of age or older. · People who live in a long-term care center, such as a nursing home. · All children 6 months through 25years of age. · Adults and children 6 months and older who have long-term heart or lung problems, such as asthma. · Adults and children 6 months and older who needed medical care or were in a hospital during the past year because of diabetes, chronic kidney disease, or a weak immune system (including HIV or AIDS). · Women who will be pregnant during the flu season. · People who have any condition that can make it hard to breathe or swallow (such as a brain injury or muscle disorders). · People who can give the flu to others who are at high risk for problems from the flu. This includes all health care workers and close contacts of people age 72 or older. Who should not get the flu vaccine? The person who gives the vaccine may tell you not to get it if you:  · Have a severe allergy to eggs or any part of the vaccine. · Have had a severe reaction to a flu vaccine in the past.  · Have had Guillain-Barré syndrome (GBS). · Are sick with a fever. (Get the vaccine when symptoms are gone.)  How can you care for yourself at home? · If you or your child has a sore arm or a slight fever after the shot, take an over-the-counter pain medicine, such as acetaminophen (Tylenol) or ibuprofen (Advil, Motrin). Read and follow all instructions on the label. Do not give aspirin to anyone younger than 20.  It has been linked to Reye syndrome, a serious illness. · Do not take two or more pain medicines at the same time unless the doctor told you to. Many pain medicines have acetaminophen, which is Tylenol. Too much acetaminophen (Tylenol) can be harmful. When should you call for help? Call 911 anytime you think you may need emergency care. For example, call if after getting the flu vaccine:    · You have symptoms of a severe reaction to the flu vaccine. Symptoms of a severe reaction may include:  ? Severe difficulty breathing. ? Sudden raised, red areas (hives) all over your body. ? Severe lightheadedness. Call your doctor now or seek immediate medical care if after getting the flu vaccine:    · You think you are having a reaction to the flu vaccine, such as a new fever. Watch closely for changes in your health, and be sure to contact your doctor if you have any problems. Where can you learn more? Go to https://PlanHQpeSeeFuture.[employer]. org and sign in to your [employer] account. Enter R286 in the Xiam box to learn more about \"Influenza (Flu) Vaccine: Care Instructions. \"     If you do not have an account, please click on the \"Sign Up Now\" link. Current as of: December 9, 2019               Content Version: 12.6  © 2265-1607 Healthwise, Incorporated. Care instructions adapted under license by [facility] ([facility]). If you have questions about a medical condition or this instruction, always ask your healthcare professional. [place] [address] any warranty or liability for your use of this information. What is lung cancer screening? Lung cancer screening is a way in which doctors check the lungs for early signs of cancer in people who have no symptoms of lung cancer. A low-dose CT scan uses much less radiation than a normal CT scan and shows a more detailed image of the lungs than a standard X-ray. The goal of lung cancer screening is to find cancer early, before it has a chance to grow, spread, or cause problems. One large study found that smokers who were screened with low-dose CT scans were less likely to die of lung cancer than those who were screened with standard X-ray. Below is a summary of the things you need to know regarding screening for lung cancer with low-dose computed tomography (LDCT). This is a screening program that involves routine annual screening with LDCT studies of the lung. The LDCTs are done using low-dose radiation that is not thought to increase your cancer risk. If you have other serious medical conditions (other cancers, congestive heart failure) that limit your life expectancy to less than 10 years, you should not undergo lung cancer screening with LDCT. The chance is 20%-60% that the LDCT result will show abnormalities. This would require additional testing which could include repeat imaging or even invasive procedures. Most (about 95%) of \"abnormal\" LDCT results are false in the sense that no lung cancer is ultimately found. Additionally, some (about 10%) of the cancers found would not affect your life expectancy, even if undetected and untreated. If you are still smoking, the single most important thing that you can do to reduce your risk of dying of lung cancer is to quit. For this screening to be covered by Medicare and most other insurers, strict criteria must be met. If you do not meet these criteria, but still wish to undergo LDCT testing, you will be required to sign a waiver indicating your willingness to pay for the scan. Personalized Preventive Plan for Luis A Hopper - 11/13/2020  Medicare offers a range of preventive health benefits. Some of the tests and screenings are paid in full while other may be subject to a deductible, co-insurance, and/or copay. Some of these benefits include a comprehensive review of your medical history including lifestyle, illnesses that may run in your family, and various assessments and screenings as appropriate.     After reviewing your medical record and screening and assessments performed today your provider may have ordered immunizations, labs, imaging, and/or referrals for you. A list of these orders (if applicable) as well as your Preventive Care list are included within your After Visit Summary for your review. Other Preventive Recommendations:    · A preventive eye exam performed by an eye specialist is recommended every 1-2 years to screen for glaucoma; cataracts, macular degeneration, and other eye disorders. · A preventive dental visit is recommended every 6 months. · Try to get at least 150 minutes of exercise per week or 10,000 steps per day on a pedometer . · Order or download the FREE \"Exercise & Physical Activity: Your Everyday Guide\" from [employer]. Call 3-619.995.4946 or search [employer] online. · You need 0196-3039 mg of calcium and 1773-5655 IU of vitamin D per day. It is possible to meet your calcium requirement with diet alone, but a vitamin D supplement is usually necessary to meet this goal.  · When exposed to the sun, use a sunscreen that protects against both UVA and UVB radiation with an SPF of 30 or greater. Reapply every 2 to 3 hours or after sweating, drying off with a towel, or swimming. · Always wear a seat belt when traveling in a car. Always wear a helmet when riding a bicycle or motorcycle. Personalized Preventive Plan for Luis A Cosby - 11/13/2020  Medicare offers a range of preventive health benefits. Some of the tests and screenings are paid in full while other may be subject to a deductible, co-insurance, and/or copay. Some of these benefits include a comprehensive review of your medical history including lifestyle, illnesses that may run in your family, and various assessments and screenings as appropriate.     After reviewing your medical record and screening and assessments performed today your provider may have ordered immunizations, labs, imaging, and/or referrals for you. A list of these orders (if applicable) as well as your Preventive Care list are included within your After Visit Summary for your review. Other Preventive Recommendations:    A preventive eye exam performed by an eye specialist is recommended every 1-2 years to screen for glaucoma; cataracts, macular degeneration, and other eye disorders. A preventive dental visit is recommended every 6 months. Try to get at least 150 minutes of exercise per week or 10,000 steps per day on a pedometer . Order or download the FREE \"Exercise & Physical Activity: Your Everyday Guide\" from [employer]. Call 9-182.933.9899 or search The [employer] online. You need 6025-7087 mg of calcium and 1340-8635 IU of vitamin D per day. It is possible to meet your calcium requirement with diet alone, but a vitamin D supplement is usually necessary to meet this goal.  When exposed to the sun, use a sunscreen that protects against both UVA and UVB radiation with an SPF of 30 or greater. Reapply every 2 to 3 hours or after sweating, drying off with a towel, or swimming. Always wear a seat belt when traveling in a car. Always wear a helmet when riding a bicycle or motorcycle.

## 2020-11-13 NOTE — PROGRESS NOTES
Subjective:      Patient ID: [id number] Ervin is a 79 y.o. male. Visit Information    Have you changed or started any medications since your last visit including any over-the-counter medicines, vitamins, or herbal medicines? no   Are you having any side effects from any of your medications? -  no  Have you stopped taking any of your medications? Is so, why? -  no    Have you seen any other physician or provider since your last visit? No  Have you had any other diagnostic tests since your last visit? No  Have you been seen in the emergency room and/or had an admission to a hospital since we last saw you? No  Have you had your routine dental cleaning in the past 6 months? yes -     Have you activated your [employer] account? If not, what are your barriers?  Yes     Patient Care Team:  BREANNA Lewis NP as PCP - General (Nurse Practitioner)  BREANNA Lewis NP as PCP - [facility] Empaneled Provider  Ekaterina Alvares MD as Consulting Physician (Urology)    Medical History Review  Past Medical, Family, and Social History reviewed and does contribute to the patient presenting condition    Health Maintenance   Topic Date Due    AAA screen  1950    Low dose CT lung screening  03/01/2005    Shingles Vaccine (2 of 3) 04/07/2014    Annual Wellness Visit (AWV)  05/29/2019    Flu vaccine (1) 09/01/2020    DTaP/Tdap/Td vaccine (1 - Tdap) 05/29/2021 (Originally 3/1/1969)    Diabetic retinal exam  01/22/2021    Diabetic foot exam  05/29/2021    A1C test (Diabetic or Prediabetic)  06/02/2021    Diabetic microalbuminuria test  06/02/2021    Lipid screen  06/02/2021    Potassium monitoring  06/02/2021    Creatinine monitoring  06/02/2021    Colon cancer screen fecal DNA test (Cologuard)  06/26/2023    Pneumococcal 65+ years Vaccine  Completed    Hepatitis C screen  Completed    Hepatitis A vaccine  Aged Out    Hib vaccine  Aged Out    Meningococcal (ACWY) vaccine  Aged Out     There were no vitals taken for this visit. PHQ Scores 11/13/2020 5/29/2020 2/14/2019 11/20/2018 8/23/2017 5/31/2016 6/16/2015   PHQ2 Score 2 0 3 2 2 2 6   PHQ9 Score 2 0 14 2 2 2 6     Interpretation of Total Score DepressionSeverity: 1-4 = Minimal depression, 5-9 = Mild depression, 10-14 = Moderate depression, 15-19 = Moderately severe depression, 20-27 = Severe depression    Current Outpatient Medications   Medication Sig Dispense Refill    Insulin Pen Needle 32G X 4 MM MISC 1 each by Does not apply route daily 300 each 1    DULoxetine (CYMBALTA) 30 MG extended release capsule Take 1 capsule by mouth 2 times daily 180 capsule 3    insulin detemir (LEVEMIR FLEXTOUCH) 100 UNIT/ML injection pen Inject 30 Units into the skin nightly (Patient taking differently: Inject 60 Units into the skin nightly ) 25 pen 1    metFORMIN (GLUCOPHAGE) 1000 MG tablet Take 1 tablet by mouth 2 times daily (with meals) 180 tablet 3    glipiZIDE (GLUCOTROL) 10 MG tablet Take 1 tablet by mouth 2 times daily (before meals) 180 tablet 3    lisinopril (PRINIVIL;ZESTRIL) 10 MG tablet Take 1 tablet by mouth daily 90 tablet 3    chlorthalidone (HYGROTON) 25 MG tablet Take 1 tablet by mouth daily 90 tablet 3    pioglitazone (ACTOS) 30 MG tablet Take 1 tablet by mouth daily 90 tablet 3    celecoxib (CELEBREX) 200 MG capsule Take 1 capsule by mouth daily 90 capsule 3    blood glucose test strips (ONETOUCH VERIO) strip Test three times daily and as needed 300 strip 3    multivitamin (THERAGRAN) per tablet Take 1 tablet by mouth daily.  Lancets MISC Test twice a day and as needed, Please send lancets for Verio brand meter 180 each 2     No current facility-administered medications for this visit. HPI    Review of Systems    Objective:   Physical Exam    Assessment / Plan:     There are no diagnoses linked to this encounter. No follow-ups on file.           Electronically signed by Mateo Pierre LPN on 25/91/5704 at 10:48 AM    Low Dose CT (LDCT) Lung Screening criteria met   Age 50-69   Pack year smoking >30   Still smoking or less than 15 year since quit   No sign or symptoms of lung cancer   > 11 months since last LDCT     Risks and benefits of lung cancer screening with LDCT scans discussed:    Significance of positive screen - False-positive LDCT results often occur. 95% of all positive results do not lead to a diagnosis of cancer. Usually further imaging can resolve most false-positive results; however, some patients may require invasive procedures. Over diagnosis risk - 10% to 12% of screen-detected lung cancer cases are over diagnosed--that is, the cancer would not have been detected in the patient's lifetime without the screening. Need for follow up screens annually to continue lung cancer screening effectiveness     Risks associated with radiation from annual LDCT- Radiation exposure is about the same as for a mammogram, which is about 1/3 of the annual background radiation exposure from everyday life. Starting screening at age 54 is not likely to increase cancer risk from radiation exposure. Patients with comorbidities resulting in life expectancy of < 10 years, or that would preclude treatment of an abnormality identified on CT, should not be screened due to lack of benefit.     To obtain maximal benefit from this screening, smoking cessation and long-term abstinence from smoking is critical

## 2020-11-13 NOTE — PROGRESS NOTES
On the basis of positive falls risk screening, assessment and plan is as follows: home safety tips provided. Medicare Annual Wellness Visit  Name: Luis A Kuhn ASJVAP Date: 2020   MRN: [id number] Sex: Male   Age: 79 y.o. Ethnicity: Non-/Non    : 1950 Race: White      Luis A Villalpando is here for Medicare AWV    Screenings for behavioral, psychosocial and functional/safety risks, and cognitive dysfunction are all negative except as indicated below. These results, as well as other patient data from the [address] form, are documented in Flowsheets linked to this Encounter. No Known Allergies      Prior to Visit Medications    Medication Sig Taking?  Authorizing Provider   Insulin Pen Needle 32G X 4 MM MISC 1 each by Does not apply route daily Yes BREANNA Pineda NP   DULoxetine (CYMBALTA) 30 MG extended release capsule Take 1 capsule by mouth 2 times daily Yes BREANNA Pineda NP   insulin detemir (LEVEMIR FLEXTOUCH) 100 UNIT/ML injection pen Inject 30 Units into the skin nightly  Patient taking differently: Inject 60 Units into the skin nightly  Yes BREANNA Pineda NP   metFORMIN (GLUCOPHAGE) 1000 MG tablet Take 1 tablet by mouth 2 times daily (with meals) Yes BREANNA Nichols CNP   glipiZIDE (GLUCOTROL) 10 MG tablet Take 1 tablet by mouth 2 times daily (before meals) Yes BREANNA Nichols CNP   lisinopril (PRINIVIL;ZESTRIL) 10 MG tablet Take 1 tablet by mouth daily Yes BREANNA Nichols CNP   chlorthalidone (HYGROTON) 25 MG tablet Take 1 tablet by mouth daily Yes BREANNA Nichols CNP   pioglitazone (ACTOS) 30 MG tablet Take 1 tablet by mouth daily Yes BREANNA Nichols CNP   celecoxib (CELEBREX) 200 MG capsule Take 1 capsule by mouth daily Yes BREANNA Nichols CNP   blood glucose test strips (ONETOUCH VERIO) strip Test three times daily and as needed Yes BREANNA Landin CNP   multivitamin [facility]) per tablet Take 1 tablet by mouth daily. Yes Historical Provider, MD   Lancets MISC Test twice a day and as needed, Please send lancets for Verio brand meter  BREANNA Landrmu CNP         Past Medical History:   Diagnosis Date    Bladder stone     BPH with obstruction/lower urinary tract symptoms     Caffeine use     4 coffee/2 soda per day    Cataracts, bilateral     Chronic back pain     COPD (chronic obstructive pulmonary disease) ([address]     Hypertension     Irritable bowel syndrome     Kidney stone     Osteoarthritis     Pancreatitis     Type II or unspecified type diabetes mellitus without mention of complication, not stated as uncontrolled        Past Surgical History:   Procedure Laterality Date    BACK SURGERY      BLADDER SURGERY      CHOLECYSTECTOMY      EYE SURGERY  2015    macular scrape    EYE SURGERY      LITHOTRIPSY      9-21-17    OTHER SURGICAL HISTORY      implanted nerve stimulater in lumbar back    SPINE SURGERY  05/18/2010    DR. Gilman Lobe Buttram ([place])    URETEROSCOPY      with string stent 9-21-17    VASECTOMY           Family History   Problem Relation Age of Onset    Arthritis Mother     Cancer Father        CareTeam (Including outside providers/suppliers regularly involved in providing care):   Patient Care Team:  BREANNA Lewis NP as PCP - General (Nurse Practitioner)  BREANNA Lewis NP as PCP - [facility] Empaneled Provider  Ekaterina Alvares MD as Consulting Physician (Urology)    Wt Readings from Last 3 Encounters:   11/13/20 197 lb (89.4 kg)   05/29/20 189 lb (85.7 kg)   05/27/20 185 lb (83.9 kg)     Vitals:    11/13/20 1048   BP: 132/78   Site: Left Upper Arm   Position: Sitting   Cuff Size: Medium Adult   Pulse: 100   Resp: 18   Temp: 97.4 °F (36.3 °C)   TempSrc: Temporal   SpO2: 94%   Weight: 197 lb (89.4 kg)   Height: 6' 2\" (1.88 m)     Body mass index is 25.29 kg/m².     Based upon direct observation of the patient, evaluation of cognition reveals recent and remote memory intact. Patient's complete Health Risk Assessment and screening values have been reviewed and are found in Flowsheets. The following problems were reviewed today and where indicated follow up appointments were made and/or referrals ordered. Positive Risk Factor Screenings with Interventions:     Fall Risk:  Timed Up and Go Test > 12 seconds? (Complete if either Fall Risk answers are Yes): no  2 or more falls in past year?: (!) yes  Fall with injury in past year?: no  Fall Risk Interventions:    · Home safety tips provided    Substance History:  Social History     Tobacco History     Smoking Status  Current Every Day Smoker Last attempt to quit  6/12/2008 Smoking Frequency  1 pack/day for 40 years (36 pk yrs)    Smokeless Tobacco Use  Former User          Alcohol History     Alcohol Use Status  Yes Drinks/Week  0 Standard drinks or equivalent per week Amount  0.0 standard drinks of alcohol/wk          Drug Use     Drug Use Status  No          Sexual Activity     Sexually Active  Not Currently               Alcohol Screening: Audit-C Score: 1  Total Score: 1    A score of 8 or more is associated with harmful or hazardous drinking. A score of 13 or more in women, and 15 or more in men, is likely to indicate alcohol dependence.   Substance Abuse Interventions:  · Tobacco abuse:  patient is not ready to work toward tobacco cessation at this time    [employer] and ACP:     [address] [place] ACP-Advance Directive ACP-Power of     Not on File Not on [address] Interventions:  · Pain issues: patient declines any further evaluation/treatment for this issue  · reports chronic issues no one can fix    Personalized Preventive Plan   Current Health Maintenance Status  Immunization History   Administered Date(s) Administered    Influenza 11/28/2012, 10/09/2013    Influenza Vaccine, unspecified formulation 11/28/2012, 10/09/2013    Influenza Virus Vaccine 10/09/2014, 09/16/2015, 10/07/2016, 11/20/2017, 10/29/2018    Influenza, Nellene Benedict, IM, (6 mo and older Fluzone, Flulaval, Fluarix and 3 yrs and older Afluria) 10/07/2016, 11/20/2017, 10/29/2018    Influenza, Quadv, IM, PF (6 mo and older Fluzone, Flulaval, Fluarix, and 3 yrs and older Afluria) 09/04/2019    Influenza, Quadv, adjuvanted, 65 yrs +, IM, PF (Fluad) 11/13/2020    Pneumococcal Conjugate 13-valent ([id number]) 10/07/2016    Pneumococcal Polysaccharide ([id number]) 09/16/2015    Td, unspecified formulation 09/06/2016    Zoster Live (Zostavax) 02/10/2014        Health Maintenance   Topic Date Due    AAA screen  1950    Low dose CT lung screening  03/01/2005    DTaP/Tdap/Td vaccine (1 - Tdap) 05/29/2021 (Originally 3/1/1969)    Annual Wellness Visit (AWV)  11/13/2021 (Originally 5/29/2019)    Shingles Vaccine (2 of 3) 11/13/2021 (Originally 4/7/2014)    Diabetic retinal exam  01/22/2021    Diabetic foot exam  05/29/2021    Diabetic microalbuminuria test  06/02/2021    Lipid screen  06/02/2021    Potassium monitoring  06/02/2021    Creatinine monitoring  06/02/2021    A1C test (Diabetic or Prediabetic)  11/13/2021    Colon cancer screen fecal DNA test (Cologuard)  06/26/2023    Flu vaccine  Completed    Pneumococcal 65+ years Vaccine  Completed    Hepatitis C screen  Completed    Hepatitis A vaccine  Aged Out    Hib vaccine  Aged Out    Meningococcal (ACWY) vaccine  Aged Out     Recommendations for Adhysteria Due: see orders and patient instructions/AVS.  . Recommended screening schedule for the next 5-10 years is provided to the patient in written form: see Patient Instructions/AVS.    Luis A was seen today for medicare awv. Presents to office for Medicare wellness visit and routine follow up. Reports a fall a couple weeks ago. Missed the last step going into basement. Cut to arm. No other injuries. Uses with cane for ambulation. Lives with wife. Monitors glucose levels. Taking Levemir nightly. Fair appetite. Drinking fluids. Follows with Dr. Gayle Rosas yearly as well    Did go see GI after positive Cologuard test in June. Had several polyps removed with Dr. Darlin Ram with [facility]. Margaret Garcia to call and get record. Diagnoses and all orders for this visit:     Diagnosis Orders   1. Routine general medical examination at a health care facility     2. Screening for AAA (abdominal aortic aneurysm)  US Abdominal Aorta Limited   3. Need for influenza vaccination  INFLUENZA, QUADV, ADJUVANTED, 65 YRS =, IM, PF, PREFILL SYR, 0.5ML (FLUAD)   4. Uncontrolled type 2 diabetes mellitus with diabetic polyneuropathy, with long-term current use of insulin (HCC)  POCT glycosylated hemoglobin (Hb A1C)    POCT glycosylated hemoglobin (Hb A1C)   5. Personal history of tobacco use  IA VISIT TO DISCUSS LUNG CA SCREEN W LDCT []    CT Lung Screening   6. Cigarette nicotine dependence, uncomplicated     7. At high risk for falls       Discussed side effects and dangers of tobacco on the human body. Discussed impact using tobacco has on loved ones. Discussed options for quitting tobacco and readiness to quit. Discussed plan for managing tobacco dependence. Patient verbalized understanding of what was discussed today in regards to tobacco dependence. Discussion and education took greater than 3 minutes.

## 2021-03-17 ENCOUNTER — OFFICE VISIT (OUTPATIENT)
Dept: FAMILY MEDICINE CLINIC | Age: 71
End: 2021-03-17
Payer: MEDICARE

## 2021-03-17 ENCOUNTER — HOSPITAL ENCOUNTER (OUTPATIENT)
Age: 71
Setting detail: SPECIMEN
Discharge: HOME | End: 2021-03-17
Payer: MEDICARE

## 2021-03-17 ENCOUNTER — NURSE TRIAGE (OUTPATIENT)
Dept: OTHER | Facility: CLINIC | Age: 71
End: 2021-03-17

## 2021-03-17 VITALS
RESPIRATION RATE: 13 BRPM | OXYGEN SATURATION: 98 % | SYSTOLIC BLOOD PRESSURE: 134 MMHG | BODY MASS INDEX: 25.49 KG/M2 | DIASTOLIC BLOOD PRESSURE: 64 MMHG | TEMPERATURE: 97.9 F | HEIGHT: 74 IN | WEIGHT: 198.6 LBS | HEART RATE: 98 BPM

## 2021-03-17 DIAGNOSIS — E11.42 TYPE 2 DIABETES MELLITUS WITH DIABETIC POLYNEUROPATHY, WITH LONG-TERM CURRENT USE OF INSULIN (HCC): ICD-10-CM

## 2021-03-17 DIAGNOSIS — Z79.4 TYPE 2 DIABETES MELLITUS WITH DIABETIC POLYNEUROPATHY, WITH LONG-TERM CURRENT USE OF INSULIN (HCC): ICD-10-CM

## 2021-03-17 DIAGNOSIS — S40.011A: Primary | ICD-10-CM

## 2021-03-17 DIAGNOSIS — L82.1 SEBORRHEIC KERATOSES: ICD-10-CM

## 2021-03-17 LAB
ANION GAP SERPL CALCULATED.3IONS-SCNC: 15 MMOL/L (ref 9–17)
BUN BLDV-MCNC: 26 MG/DL (ref 8–23)
BUN/CREAT BLD: ABNORMAL (ref 9–20)
CALCIUM SERPL-MCNC: 9.9 MG/DL (ref 8.6–10.4)
CHLORIDE BLD-SCNC: 101 MMOL/L (ref 98–107)
CO2: 25 MMOL/L (ref 20–31)
CREAT SERPL-MCNC: 0.93 MG/DL (ref 0.7–1.2)
GFR AFRICAN AMERICAN: >60 ML/MIN
GFR NON-AFRICAN AMERICAN: >60 ML/MIN
GFR SERPL CREATININE-BSD FRML MDRD: ABNORMAL ML/MIN/{1.73_M2}
GFR SERPL CREATININE-BSD FRML MDRD: ABNORMAL ML/MIN/{1.73_M2}
GLUCOSE BLD-MCNC: 132 MG/DL (ref 70–99)
POTASSIUM SERPL-SCNC: 4.3 MMOL/L (ref 3.7–5.3)
SODIUM BLD-SCNC: 141 MMOL/L (ref 135–144)

## 2021-03-17 PROCEDURE — 3288F FALL RISK ASSESSMENT DOCD: CPT | Performed by: STUDENT IN AN ORGANIZED HEALTH CARE EDUCATION/TRAINING PROGRAM

## 2021-03-17 PROCEDURE — G8417 CALC BMI ABV UP PARAM F/U: HCPCS | Performed by: STUDENT IN AN ORGANIZED HEALTH CARE EDUCATION/TRAINING PROGRAM

## 2021-03-17 PROCEDURE — 4040F PNEUMOC VAC/ADMIN/RCVD: CPT | Performed by: STUDENT IN AN ORGANIZED HEALTH CARE EDUCATION/TRAINING PROGRAM

## 2021-03-17 PROCEDURE — G8427 DOCREV CUR MEDS BY ELIG CLIN: HCPCS | Performed by: STUDENT IN AN ORGANIZED HEALTH CARE EDUCATION/TRAINING PROGRAM

## 2021-03-17 PROCEDURE — G8484 FLU IMMUNIZE NO ADMIN: HCPCS | Performed by: STUDENT IN AN ORGANIZED HEALTH CARE EDUCATION/TRAINING PROGRAM

## 2021-03-17 PROCEDURE — 3046F HEMOGLOBIN A1C LEVEL >9.0%: CPT | Performed by: STUDENT IN AN ORGANIZED HEALTH CARE EDUCATION/TRAINING PROGRAM

## 2021-03-17 PROCEDURE — 99213 OFFICE O/P EST LOW 20 MIN: CPT | Performed by: STUDENT IN AN ORGANIZED HEALTH CARE EDUCATION/TRAINING PROGRAM

## 2021-03-17 PROCEDURE — 3017F COLORECTAL CA SCREEN DOC REV: CPT | Performed by: STUDENT IN AN ORGANIZED HEALTH CARE EDUCATION/TRAINING PROGRAM

## 2021-03-17 PROCEDURE — 2022F DILAT RTA XM EVC RTNOPTHY: CPT | Performed by: STUDENT IN AN ORGANIZED HEALTH CARE EDUCATION/TRAINING PROGRAM

## 2021-03-17 PROCEDURE — 1123F ACP DISCUSS/DSCN MKR DOCD: CPT | Performed by: STUDENT IN AN ORGANIZED HEALTH CARE EDUCATION/TRAINING PROGRAM

## 2021-03-17 PROCEDURE — 4004F PT TOBACCO SCREEN RCVD TLK: CPT | Performed by: STUDENT IN AN ORGANIZED HEALTH CARE EDUCATION/TRAINING PROGRAM

## 2021-03-17 SDOH — SOCIAL STABILITY: SOCIAL NETWORK
DO YOU BELONG TO ANY CLUBS OR ORGANIZATIONS SUCH AS CHURCH GROUPS UNIONS, FRATERNAL OR ATHLETIC GROUPS, OR SCHOOL GROUPS?: NO

## 2021-03-17 SDOH — HEALTH STABILITY: MENTAL HEALTH: HOW OFTEN DO YOU HAVE A DRINK CONTAINING ALCOHOL?: MONTHLY OR LESS

## 2021-03-17 SDOH — SOCIAL STABILITY: SOCIAL NETWORK: HOW OFTEN DO YOU ATTEND CHURCH OR RELIGIOUS SERVICES?: NEVER

## 2021-03-17 SDOH — SOCIAL STABILITY: SOCIAL NETWORK: HOW OFTEN DO YOU GET TOGETHER WITH FRIENDS OR RELATIVES?: ONCE A WEEK

## 2021-03-17 SDOH — SOCIAL STABILITY: SOCIAL NETWORK: HOW OFTEN DO YOU ATTENT MEETINGS OF THE CLUB OR ORGANIZATION YOU BELONG TO?: NEVER

## 2021-03-17 SDOH — SOCIAL STABILITY: SOCIAL INSECURITY
WITHIN THE LAST YEAR, HAVE YOU BEEN KICKED, HIT, SLAPPED, OR OTHERWISE PHYSICALLY HURT BY YOUR PARTNER OR EX-PARTNER?: NO

## 2021-03-17 SDOH — SOCIAL STABILITY: SOCIAL INSECURITY: WITHIN THE LAST YEAR, HAVE YOU BEEN HUMILIATED OR EMOTIONALLY ABUSED IN OTHER WAYS BY YOUR PARTNER OR EX-PARTNER?: NO

## 2021-03-17 ASSESSMENT — ENCOUNTER SYMPTOMS
BACK PAIN: 0
SHORTNESS OF BREATH: 0
ABDOMINAL PAIN: 0
ABDOMINAL DISTENTION: 0
CONSTIPATION: 0
CHEST TIGHTNESS: 0
WHEEZING: 0
SORE THROAT: 0
DIARRHEA: 0
COUGH: 0

## 2021-03-17 ASSESSMENT — PATIENT HEALTH QUESTIONNAIRE - PHQ9: 1. LITTLE INTEREST OR PLEASURE IN DOING THINGS: 1

## 2021-03-17 NOTE — PROGRESS NOTES
Luis A Mccabe (:  1950) is a 70 y.o. male,Established patient, here for evaluation of the following chief complaint(s):  Shoulder Injury (Right Shoulder )      ASSESSMENT/PLAN:  1. Traumatic ecchymosis of shoulder, right, initial encounter  2. Seborrheic keratoses  -     Rj Sharp MD, Dermatology, [facility]  3. Uncontrolled type 2 diabetes mellitus with diabetic polyneuropathy, with long-term current use of insulin ([address]  -     Basic Metabolic Panel; Future  4. Type 2 diabetes mellitus with diabetic polyneuropathy, with long-term current use of insulin ([facility])  -     Hemoglobin A1C; Future  -     Basic Metabolic Panel; Future    Does have ecchymosis bruising on the right arm  I suspect possible fall but patient denies this  The mechanism of injury is suspicious for labral tear, however he has full range of motion  If he has worsening of symptoms we will have him see orthopedic surgery  He is already been evaluated in the past for torn rotator cuff that has since atrophied and there is no surgery that can be performed at this time  His multiple seborrheic keratoses can be managed by dermatology hopefully in a single outpatient removal session  Diabetes mellitus is exceedingly well controlled I do not think he needs to be on glipizide  Would continue patient on Metformin and Actos at this time  His HbA1c goal is between 7 and 8    No follow-ups on file.     SUBJECTIVE/OBJECTIVE:  HPI: 70-year-old male history of well-controlled type 2 diabetes mellitus not on insulin  On 3 agent therapy  Last HbA1c was 6.1    He presents today at the request of his wife  It is in relation to a right shoulder injury  He pulled his shoulder back while holding the wall and heard a pop  He does have a history of rotator cuff tear with an atrophic    He also has glenohumeral AC joint arthritis  Dr. Fiona Moran evaluated him did not feel that he would benefit from surgery    His range of motion is fully intact    Review of Systems Constitutional: Negative for chills, fatigue and fever. HENT: Negative for congestion, postnasal drip and sore throat. Eyes: Negative for visual disturbance. Respiratory: Negative for cough, chest tightness, shortness of breath and wheezing. Cardiovascular: Negative. Gastrointestinal: Negative for abdominal distention, abdominal pain, constipation and diarrhea. Genitourinary: Negative for difficulty urinating, dysuria, frequency and urgency. Musculoskeletal: Negative for arthralgias, back pain and joint swelling. Skin: Negative for rash. Neurological: Negative for dizziness, weakness and light-headedness. Psychiatric/Behavioral: Negative for agitation, decreased concentration and sleep disturbance. Physical Exam  Vitals signs and nursing note reviewed. Constitutional:       Appearance: Normal appearance. HENT:      Head: Normocephalic and atraumatic. Eyes:      Extraocular Movements: Extraocular movements intact. Neck:      Musculoskeletal: Normal range of motion and neck supple. Cardiovascular:      Rate and Rhythm: Normal rate and regular rhythm. Pulses: Normal pulses. Heart sounds: Normal heart sounds. Pulmonary:      Effort: Pulmonary effort is normal.      Breath sounds: Normal breath sounds. Abdominal:      General: Abdomen is flat. Palpations: Abdomen is soft. Musculoskeletal: Normal range of motion. General: Swelling, tenderness, deformity and signs of injury present. Comments: Right arm ecchymosis/purpura    Shoulder complete range of motion  Reverse bucket handle Barbara test negative   Skin:     General: Skin is warm. Neurological:      General: No focal deficit present. Mental Status: He is alert and oriented to person, place, and time. An electronic signature was used to authenticate this note.     --Tressa Hernandez MD

## 2021-03-17 NOTE — TELEPHONE ENCOUNTER
Reason for Disposition   Followed an injury to shoulder   Can't move injured shoulder normally (e.g., full range of motion, able to touch top of head)    Answer Assessment - Initial Assessment Questions  1. ONSET: \"When did the pain start? \"      5-6 years ago had injury to elbow/shoulder. Dx with rotator cuff tear 3 years later that was inoperable. 3 days ago felt \"pop\" after pulling back arm. 2. LOCATION: \"Where is the pain located? \"      Right shoulder     3. PAIN: \"How bad is the pain? \" (Scale 1-10; or mild, moderate, severe)    - MILD (1-3): doesn't interfere with normal activities    - MODERATE (4-7): interferes with normal activities (e.g., work or school) or awakens from sleep    - SEVERE (8-10): excruciating pain, unable to do any normal activities, unable to move arm at all due to pain      At worst 6-8, now constant 3, bruised up now    4. WORK OR EXERCISE: \"Has there been any recent work or exercise that involved this part of the body? \"      No, but stretched arm back 3 days ago and felt \"pop\"    5. CAUSE: \"What do you think is causing the shoulder pain? \"      Old rotator cuff injury and stretching 3 days ago. 6. OTHER SYMPTOMS: \"Do you have any other symptoms? \" (e.g., neck pain, swelling, rash, fever, numbness, weakness)      Swelling initially 3 days ago, weakness, stiff from base of neck/shoulder    7. PREGNANCY: \"Is there any chance you are pregnant? \" \"When was your last menstrual period? \"      N/a    Protocols used: SHOULDER PAIN-ADULT-OH, SHOULDER INJURY-ADULT-OH    [facility]  with red flag complaint. Brief description of triage: right shoulder injury. Triage indicates for patient to see today in office. Advised to go to [facility] or walk in clinic if no appts available, however pt states that he will not do that. Educated on possibility of possible strain or rotator cuff injury.      Care advice provided, patient verbalizes understanding; denies any other questions or concerns; instructed to call back for any new or worsening symptoms. Writer provided warm transfer to [facility], [facility]MIN[facility] MOJICA for appointment scheduling. Attention Provider: Thank you for allowing me to participate in the care of your patient. The patient was connected to triage in response to information provided to the [facility]. Please do not respond through this encounter as the response is not directed to a shared pool.

## 2021-03-18 LAB
ESTIMATED AVERAGE GLUCOSE: 146 MG/DL
HBA1C MFR BLD: 6.7 % (ref 4–6)

## 2021-04-13 DIAGNOSIS — G89.21 CHRONIC PAIN DUE TO TRAUMA: ICD-10-CM

## 2021-04-13 DIAGNOSIS — F32.A DEPRESSION, UNSPECIFIED DEPRESSION TYPE: ICD-10-CM

## 2021-04-14 RX ORDER — DULOXETIN HYDROCHLORIDE 30 MG/1
30 CAPSULE, DELAYED RELEASE ORAL 2 TIMES DAILY
Qty: 180 CAPSULE | Refills: 3 | Status: SHIPPED | OUTPATIENT
Start: 2021-04-14 | End: 2021-09-16 | Stop reason: SDUPTHER

## 2021-04-20 RX ORDER — BLOOD SUGAR DIAGNOSTIC
STRIP MISCELLANEOUS
Qty: 300 STRIP | Refills: 3 | Status: SHIPPED | OUTPATIENT
Start: 2021-04-20 | End: 2022-10-04 | Stop reason: SDUPTHER

## 2021-04-20 NOTE — TELEPHONE ENCOUNTER
Lov: 3/17/21  Lrf: 7/24/19  Na: 0  Health Maintenance   Topic Date Due    AAA screen  Never done    COVID-19 Vaccine (1) Never done    Low dose CT lung screening  Never done    Diabetic retinal exam  01/22/2021    DTaP/Tdap/Td vaccine (1 - Tdap) 05/29/2021 (Originally 3/1/1969)    Shingles Vaccine (2 of 3) 11/13/2021 (Originally 4/7/2014)    Diabetic foot exam  05/29/2021    Diabetic microalbuminuria test  06/02/2021    Lipid screen  06/02/2021    Annual Wellness Visit (AWV)  11/14/2021    A1C test (Diabetic or Prediabetic)  03/17/2022    Potassium monitoring  03/17/2022    Creatinine monitoring  03/17/2022    Colon cancer screen fecal DNA test (Cologuard)  06/26/2023    Flu vaccine  Completed    Pneumococcal 65+ years Vaccine  Completed    Hepatitis C screen  Completed    Hepatitis A vaccine  Aged Out    Hib vaccine  Aged Out    Meningococcal (ACWY) vaccine  Aged Out             (applicable per patient's age: Cancer Screenings, Depression Screening, Fall Risk Screening, Immunizations)    Hemoglobin A1C (%)   Date Value   03/17/2021 6.7 (H)   11/13/2020 6.1   06/02/2020 6.1 (H)     Microalb/Crt. Ratio (mcg/mg creat)   Date Value   06/02/2020 7     LDL Cholesterol (mg/dL)   Date Value   06/02/2020 95     AST (U/L)   Date Value   06/02/2020 17     ALT (U/L)   Date Value   06/02/2020 21     BUN (mg/dL)   Date Value   03/17/2021 26 (H)      (goal A1C is < 7)   (goal LDL is <100) need 30-50% reduction from baseline     BP Readings from Last 3 Encounters:   03/17/21 134/64   11/13/20 132/78   05/29/20 110/60    (goal /80)      All Future Testing planned in [facility]:  Lab Frequency Next Occurrence   US Abdominal Aorta Limited Once 11/13/2020   CT Lung Screening Once 11/13/2020       Next Visit Date:  No future appointments.          Patient Active Problem List:     Essential hypertension     Osteoarthritis     Vitamin D deficiency     Testosterone deficiency     Difficulty sleeping     Sacro-iliac pain     Neuropathy     Nocturia     Benign prostatic hyperplasia with urinary obstruction     Type 2 diabetes mellitus with diabetic polyneuropathy, with long-term current use of insulin (HCC)     Hypercalciuria     Personal history of kidney stones     Moderate episode of recurrent major depressive disorder (HCC)     Epiretinal membrane

## 2021-06-30 RX ORDER — INSULIN DETEMIR 100 [IU]/ML
63 INJECTION, SOLUTION SUBCUTANEOUS NIGHTLY
Qty: 5 PEN | Refills: 3 | Status: SHIPPED | OUTPATIENT
Start: 2021-06-30 | End: 2021-09-16 | Stop reason: SDUPTHER

## 2021-06-30 NOTE — TELEPHONE ENCOUNTER
Last visit: 03/17/2021  Last Med refill: 02/05/2021  Does patient have enough medication for 72 hours: Yes    Next Visit Date:  No future appointments. Health Maintenance   Topic Date Due    AAA screen  Never done    Low dose CT lung screening  Never done    DTaP/Tdap/Td vaccine (1 - Tdap) 09/07/2016    COVID-19 Vaccine (2 - Moderna 2-dose series) 05/07/2021    Diabetic foot exam  05/29/2021    Diabetic microalbuminuria test  06/02/2021    Lipid screen  06/02/2021    Shingles Vaccine (2 of 3) 11/13/2021 (Originally 4/7/2014)    Annual Wellness Visit (AWV)  11/14/2021    A1C test (Diabetic or Prediabetic)  03/17/2022    Potassium monitoring  03/17/2022    Creatinine monitoring  03/17/2022    Diabetic retinal exam  05/05/2022    Colon cancer screen fecal DNA test (Cologuard)  06/26/2023    Flu vaccine  Completed    Pneumococcal 65+ years Vaccine  Completed    Hepatitis C screen  Completed    Hepatitis A vaccine  Aged Out    Hib vaccine  Aged Out    Meningococcal (ACWY) vaccine  Aged Out       Hemoglobin A1C (%)   Date Value   03/17/2021 6.7 (H)   11/13/2020 6.1   06/02/2020 6.1 (H)             ( goal A1C is < 7)   Microalb/Crt.  Ratio (mcg/mg creat)   Date Value   06/02/2020 7     LDL Cholesterol (mg/dL)   Date Value   06/02/2020 95   02/19/2019 106       (goal LDL is <100)   AST (U/L)   Date Value   06/02/2020 17     ALT (U/L)   Date Value   06/02/2020 21     BUN (mg/dL)   Date Value   03/17/2021 26 (H)     BP Readings from Last 3 Encounters:   05/05/21 131/72   03/17/21 134/64   11/13/20 132/78          (goal 120/80)    All Future Testing planned in [facility]  Lab Frequency Next Occurrence   US Abdominal Aorta Limited Once 11/13/2020   CT Lung Screening Once 11/13/2020   XR Abdomen Kub 1 VW Once 05/05/2022               Patient Active Problem List:     Essential hypertension     Osteoarthritis     Vitamin D deficiency     Testosterone deficiency     Difficulty sleeping     Sacro-iliac pain Neuropathy     Nocturia     Benign prostatic hyperplasia with urinary obstruction     Type 2 diabetes mellitus with diabetic polyneuropathy, with long-term current use of insulin (HCC)     Hypercalciuria     Personal history of kidney stones     Moderate episode of recurrent major depressive disorder (HCC)     Epiretinal membrane

## 2021-09-16 DIAGNOSIS — G89.21 CHRONIC PAIN DUE TO TRAUMA: ICD-10-CM

## 2021-09-16 DIAGNOSIS — I10 ESSENTIAL HYPERTENSION: Chronic | ICD-10-CM

## 2021-09-16 DIAGNOSIS — M15.9 OSTEOARTHRITIS OF MULTIPLE JOINTS, UNSPECIFIED OSTEOARTHRITIS TYPE: ICD-10-CM

## 2021-09-16 DIAGNOSIS — F32.A DEPRESSION, UNSPECIFIED DEPRESSION TYPE: ICD-10-CM

## 2021-09-17 NOTE — TELEPHONE ENCOUNTER
Lov: 11/13/20  Lrf: 4/14/21, 4/13/21  Na: 0  Health Maintenance   Topic Date Due    AAA screen  Never done    Low dose CT lung screening  Never done    DTaP/Tdap/Td vaccine (1 - Tdap) 09/07/2016    COVID-19 Vaccine (2 - Moderna 2-dose series) 05/07/2021    Diabetic foot exam  05/29/2021    Diabetic microalbuminuria test  06/02/2021    Lipid screen  06/02/2021    Flu vaccine (1) 09/01/2021    Shingles Vaccine (2 of 3) 11/13/2021 (Originally 4/7/2014)    Annual Wellness Visit (AWV)  11/14/2021    A1C test (Diabetic or Prediabetic)  03/17/2022    Potassium monitoring  03/17/2022    Creatinine monitoring  03/17/2022    Diabetic retinal exam  05/05/2022    Colon cancer screen fecal DNA test (Cologuard)  06/26/2023    Pneumococcal 65+ years Vaccine  Completed    Hepatitis C screen  Completed    Hepatitis A vaccine  Aged Out    Hib vaccine  Aged Out    Meningococcal (ACWY) vaccine  Aged Out             (applicable per patient's age: Cancer Screenings, Depression Screening, Fall Risk Screening, Immunizations)    Hemoglobin A1C (%)   Date Value   03/17/2021 6.7 (H)   11/13/2020 6.1   06/02/2020 6.1 (H)     Microalb/Crt. Ratio (mcg/mg creat)   Date Value   06/02/2020 7     LDL Cholesterol (mg/dL)   Date Value   06/02/2020 95     AST (U/L)   Date Value   06/02/2020 17     ALT (U/L)   Date Value   06/02/2020 21     BUN (mg/dL)   Date Value   03/17/2021 26 (H)      (goal A1C is < 7)   (goal LDL is <100) need 30-50% reduction from baseline     BP Readings from Last 3 Encounters:   05/05/21 131/72   03/17/21 134/64   11/13/20 132/78    (goal /80)      All Future Testing planned in [facility]:  Lab Frequency Next Occurrence   US Abdominal Aorta Limited Once 11/13/2020   CT Lung Screening Once 11/13/2020   XR Abdomen Kub 1 VW Once 05/05/2022       Next Visit Date:  No future appointments.          Patient Active Problem List:     Essential hypertension     Osteoarthritis     Vitamin D deficiency Testosterone deficiency     Difficulty sleeping     Sacro-iliac pain     Neuropathy     Nocturia     Benign prostatic hyperplasia with urinary obstruction     Type 2 diabetes mellitus with diabetic polyneuropathy, with long-term current use of insulin (HCC)     Hypercalciuria     Personal history of kidney stones     Moderate episode of recurrent major depressive disorder (HCC)     Epiretinal membrane

## 2021-09-19 RX ORDER — LISINOPRIL 10 MG/1
10 TABLET ORAL DAILY
Qty: 90 TABLET | Refills: 1 | Status: SHIPPED | OUTPATIENT
Start: 2021-09-19 | End: 2022-04-27 | Stop reason: SDUPTHER

## 2021-09-19 RX ORDER — CHLORTHALIDONE 25 MG/1
25 TABLET ORAL DAILY
Qty: 90 TABLET | Refills: 1 | Status: SHIPPED | OUTPATIENT
Start: 2021-09-19 | End: 2022-04-27 | Stop reason: SDUPTHER

## 2021-09-19 RX ORDER — DULOXETIN HYDROCHLORIDE 30 MG/1
30 CAPSULE, DELAYED RELEASE ORAL 2 TIMES DAILY
Qty: 180 CAPSULE | Refills: 3 | Status: SHIPPED | OUTPATIENT
Start: 2021-09-19 | End: 2022-04-27 | Stop reason: SDUPTHER

## 2021-09-19 RX ORDER — CELECOXIB 200 MG/1
200 CAPSULE ORAL DAILY
Qty: 90 CAPSULE | Refills: 1 | Status: SHIPPED | OUTPATIENT
Start: 2021-09-19 | End: 2022-04-27 | Stop reason: SDUPTHER

## 2021-09-19 RX ORDER — INSULIN DETEMIR 100 [IU]/ML
63 INJECTION, SOLUTION SUBCUTANEOUS NIGHTLY
Qty: 5 PEN | Refills: 3 | Status: SHIPPED | OUTPATIENT
Start: 2021-09-19 | End: 2022-01-04

## 2021-09-19 RX ORDER — PIOGLITAZONEHYDROCHLORIDE 30 MG/1
30 TABLET ORAL DAILY
Qty: 90 TABLET | Refills: 1 | Status: SHIPPED | OUTPATIENT
Start: 2021-09-19 | End: 2022-04-27 | Stop reason: SDUPTHER

## 2022-01-05 RX ORDER — INSULIN DETEMIR 100 [IU]/ML
63 INJECTION, SOLUTION SUBCUTANEOUS NIGHTLY
Qty: 60 ML | Refills: 0 | Status: SHIPPED | OUTPATIENT
Start: 2022-01-05 | End: 2022-04-27 | Stop reason: SDUPTHER

## 2022-01-05 NOTE — TELEPHONE ENCOUNTER
Last visit: 11-13-20  Last Med refill: 9-19-21  Does patient have enough medication for 72 hours: Yes    Next Visit Date:  Future Appointments   Date Time Provider Bari Cervantes   1/6/2022  7:45 AM BREANNA Rolon NP [address] Maintenance   Topic Date Due    AAA screen  Never done    Low dose CT lung screening  Never done    Shingles Vaccine (2 of 3) 04/07/2014    DTaP/Tdap/Td vaccine (1 - Tdap) 09/07/2016    COVID-19 Vaccine (2 - Moderna 3-dose series) 05/07/2021    Diabetic foot exam  05/29/2021    Diabetic microalbuminuria test  06/02/2021    Lipid screen  06/02/2021    Flu vaccine (1) 09/01/2021    Annual Wellness Visit (AWV)  11/14/2021    A1C test (Diabetic or Prediabetic)  03/17/2022    Depression Monitoring  03/17/2022    Potassium monitoring  03/17/2022    Creatinine monitoring  03/17/2022    Diabetic retinal exam  05/05/2022    Colon cancer screen fecal DNA test (Cologuard)  06/26/2023    Pneumococcal 65+ years Vaccine  Completed    Hepatitis C screen  Completed    Hepatitis A vaccine  Aged Out    Hib vaccine  Aged Out    Meningococcal (ACWY) vaccine  Aged Out       Hemoglobin A1C (%)   Date Value   03/17/2021 6.7 (H)   11/13/2020 6.1   06/02/2020 6.1 (H)             ( goal A1C is < 7)   Microalb/Crt.  Ratio (mcg/mg creat)   Date Value   06/02/2020 7     LDL Cholesterol (mg/dL)   Date Value   06/02/2020 95   02/19/2019 106       (goal LDL is <100)   AST (U/L)   Date Value   06/02/2020 17     ALT (U/L)   Date Value   06/02/2020 21     BUN (mg/dL)   Date Value   03/17/2021 26 (H)     BP Readings from Last 3 Encounters:   05/05/21 131/72   03/17/21 134/64   11/13/20 132/78          (goal 120/80)    All Future Testing planned in [facility]  Lab Frequency Next Occurrence   XR Abdomen Kub 1 VW Once 05/05/2022               Patient Active Problem List:     Essential hypertension     Osteoarthritis     Vitamin D deficiency     Testosterone deficiency Difficulty sleeping     Sacro-iliac pain     Neuropathy     Nocturia     Benign prostatic hyperplasia with urinary obstruction     Type 2 diabetes mellitus with diabetic polyneuropathy, with long-term current use of insulin (HCC)     Hypercalciuria     Personal history of kidney stones     Moderate episode of recurrent major depressive disorder (HCC)     Epiretinal membrane

## 2022-01-06 ENCOUNTER — PATIENT MESSAGE (OUTPATIENT)
Dept: FAMILY MEDICINE CLINIC | Age: 72
End: 2022-01-06

## 2022-01-06 ENCOUNTER — OFFICE VISIT (OUTPATIENT)
Dept: FAMILY MEDICINE CLINIC | Age: 72
End: 2022-01-06
Payer: MEDICARE

## 2022-01-06 ENCOUNTER — HOSPITAL ENCOUNTER (OUTPATIENT)
Age: 72
Setting detail: SPECIMEN
Discharge: HOME | End: 2022-01-06

## 2022-01-06 VITALS
SYSTOLIC BLOOD PRESSURE: 110 MMHG | BODY MASS INDEX: 26.31 KG/M2 | RESPIRATION RATE: 12 BRPM | HEIGHT: 74 IN | TEMPERATURE: 93.4 F | HEART RATE: 92 BPM | OXYGEN SATURATION: 99 % | WEIGHT: 205 LBS | DIASTOLIC BLOOD PRESSURE: 60 MMHG

## 2022-01-06 DIAGNOSIS — Z00.00 ROUTINE GENERAL MEDICAL EXAMINATION AT A HEALTH CARE FACILITY: Primary | ICD-10-CM

## 2022-01-06 DIAGNOSIS — E11.42 TYPE 2 DIABETES MELLITUS WITH DIABETIC POLYNEUROPATHY, WITH LONG-TERM CURRENT USE OF INSULIN (HCC): Primary | ICD-10-CM

## 2022-01-06 DIAGNOSIS — F17.200 SMOKER: ICD-10-CM

## 2022-01-06 DIAGNOSIS — M15.9 OSTEOARTHRITIS OF MULTIPLE JOINTS, UNSPECIFIED OSTEOARTHRITIS TYPE: ICD-10-CM

## 2022-01-06 DIAGNOSIS — Z23 NEED FOR INFLUENZA VACCINATION: ICD-10-CM

## 2022-01-06 DIAGNOSIS — E11.42 TYPE 2 DIABETES MELLITUS WITH DIABETIC POLYNEUROPATHY, WITH LONG-TERM CURRENT USE OF INSULIN (HCC): ICD-10-CM

## 2022-01-06 DIAGNOSIS — Z87.891 PERSONAL HISTORY OF TOBACCO USE: ICD-10-CM

## 2022-01-06 DIAGNOSIS — Z79.4 TYPE 2 DIABETES MELLITUS WITH DIABETIC POLYNEUROPATHY, WITH LONG-TERM CURRENT USE OF INSULIN (HCC): ICD-10-CM

## 2022-01-06 DIAGNOSIS — Z79.4 TYPE 2 DIABETES MELLITUS WITH DIABETIC POLYNEUROPATHY, WITH LONG-TERM CURRENT USE OF INSULIN (HCC): Primary | ICD-10-CM

## 2022-01-06 DIAGNOSIS — R82.994 HYPERCALCIURIA: ICD-10-CM

## 2022-01-06 DIAGNOSIS — F33.1 MODERATE EPISODE OF RECURRENT MAJOR DEPRESSIVE DISORDER (HCC): ICD-10-CM

## 2022-01-06 DIAGNOSIS — Z13.6 SCREENING FOR AAA (ABDOMINAL AORTIC ANEURYSM): ICD-10-CM

## 2022-01-06 DIAGNOSIS — G62.9 NEUROPATHY: ICD-10-CM

## 2022-01-06 DIAGNOSIS — I10 ESSENTIAL HYPERTENSION: ICD-10-CM

## 2022-01-06 LAB
ALBUMIN SERPL-MCNC: 4 G/DL (ref 3.5–5.2)
ALBUMIN/GLOBULIN RATIO: 1.6 (ref 1–2.5)
ALP BLD-CCNC: 57 U/L (ref 40–129)
ALT SERPL-CCNC: 19 U/L (ref 5–41)
ANION GAP SERPL CALCULATED.3IONS-SCNC: 13 MMOL/L (ref 9–17)
AST SERPL-CCNC: 17 U/L
BILIRUB SERPL-MCNC: 0.21 MG/DL (ref 0.3–1.2)
BUN BLDV-MCNC: 24 MG/DL (ref 8–23)
BUN/CREAT BLD: ABNORMAL (ref 9–20)
CALCIUM SERPL-MCNC: 9 MG/DL (ref 8.6–10.4)
CHLORIDE BLD-SCNC: 101 MMOL/L (ref 98–107)
CHOLESTEROL, FASTING: 167 MG/DL
CHOLESTEROL/HDL RATIO: 3.3
CO2: 24 MMOL/L (ref 20–31)
CREAT SERPL-MCNC: 0.92 MG/DL (ref 0.7–1.2)
CREATININE URINE: 168.3 MG/DL (ref 39–259)
GFR AFRICAN AMERICAN: >60 ML/MIN
GFR NON-AFRICAN AMERICAN: >60 ML/MIN
GFR SERPL CREATININE-BSD FRML MDRD: ABNORMAL ML/MIN/{1.73_M2}
GFR SERPL CREATININE-BSD FRML MDRD: ABNORMAL ML/MIN/{1.73_M2}
GLUCOSE BLD-MCNC: 158 MG/DL (ref 70–99)
HCT VFR BLD CALC: 42 % (ref 40.7–50.3)
HDLC SERPL-MCNC: 51 MG/DL
HEMOGLOBIN: 14 G/DL (ref 13–17)
LDL CHOLESTEROL: 85 MG/DL (ref 0–130)
MCH RBC QN AUTO: 31.5 PG (ref 25.2–33.5)
MCHC RBC AUTO-ENTMCNC: 33.3 G/DL (ref 28.4–34.8)
MCV RBC AUTO: 94.4 FL (ref 82.6–102.9)
MICROALBUMIN/CREAT 24H UR: <12 MG/L
MICROALBUMIN/CREAT UR-RTO: NORMAL MCG/MG CREAT
NRBC AUTOMATED: 0 PER 100 WBC
PDW BLD-RTO: 12.8 % (ref 11.8–14.4)
PLATELET # BLD: 214 K/UL (ref 138–453)
PMV BLD AUTO: 10.6 FL (ref 8.1–13.5)
POTASSIUM SERPL-SCNC: 4.4 MMOL/L (ref 3.7–5.3)
RBC # BLD: 4.45 M/UL (ref 4.21–5.77)
SODIUM BLD-SCNC: 138 MMOL/L (ref 135–144)
TOTAL PROTEIN: 6.5 G/DL (ref 6.4–8.3)
TRIGLYCERIDE, FASTING: 153 MG/DL
VLDLC SERPL CALC-MCNC: ABNORMAL MG/DL (ref 1–30)
WBC # BLD: 10.3 K/UL (ref 3.5–11.3)

## 2022-01-06 PROCEDURE — G0008 ADMIN INFLUENZA VIRUS VAC: HCPCS | Performed by: NURSE PRACTITIONER

## 2022-01-06 PROCEDURE — G0439 PPPS, SUBSEQ VISIT: HCPCS | Performed by: NURSE PRACTITIONER

## 2022-01-06 PROCEDURE — 3017F COLORECTAL CA SCREEN DOC REV: CPT | Performed by: NURSE PRACTITIONER

## 2022-01-06 PROCEDURE — 83036 HEMOGLOBIN GLYCOSYLATED A1C: CPT | Performed by: NURSE PRACTITIONER

## 2022-01-06 PROCEDURE — 90694 VACC AIIV4 NO PRSRV 0.5ML IM: CPT | Performed by: NURSE PRACTITIONER

## 2022-01-06 PROCEDURE — G8484 FLU IMMUNIZE NO ADMIN: HCPCS | Performed by: NURSE PRACTITIONER

## 2022-01-06 PROCEDURE — 3046F HEMOGLOBIN A1C LEVEL >9.0%: CPT | Performed by: NURSE PRACTITIONER

## 2022-01-06 PROCEDURE — 1123F ACP DISCUSS/DSCN MKR DOCD: CPT | Performed by: NURSE PRACTITIONER

## 2022-01-06 PROCEDURE — 4040F PNEUMOC VAC/ADMIN/RCVD: CPT | Performed by: NURSE PRACTITIONER

## 2022-01-06 PROCEDURE — G0296 VISIT TO DETERM LDCT ELIG: HCPCS | Performed by: NURSE PRACTITIONER

## 2022-01-06 RX ORDER — GABAPENTIN 300 MG/1
300 CAPSULE ORAL 2 TIMES DAILY
Qty: 180 CAPSULE | Refills: 1 | Status: ON HOLD | OUTPATIENT
Start: 2022-01-06 | End: 2022-04-22

## 2022-01-06 RX ORDER — CANAGLIFLOZIN 300 MG/1
300 TABLET, FILM COATED ORAL
Qty: 30 TABLET | Refills: 1 | Status: SHIPPED | OUTPATIENT
Start: 2022-01-06 | End: 2022-04-27 | Stop reason: SDUPTHER

## 2022-01-06 SDOH — ECONOMIC STABILITY: FOOD INSECURITY: WITHIN THE PAST 12 MONTHS, THE FOOD YOU BOUGHT JUST DIDN'T LAST AND YOU DIDN'T HAVE MONEY TO GET MORE.: NEVER TRUE

## 2022-01-06 SDOH — ECONOMIC STABILITY: FOOD INSECURITY: WITHIN THE PAST 12 MONTHS, YOU WORRIED THAT YOUR FOOD WOULD RUN OUT BEFORE YOU GOT MONEY TO BUY MORE.: NEVER TRUE

## 2022-01-06 ASSESSMENT — PATIENT HEALTH QUESTIONNAIRE - PHQ9
2. FEELING DOWN, DEPRESSED OR HOPELESS: 0
SUM OF ALL RESPONSES TO PHQ QUESTIONS 1-9: 0
SUM OF ALL RESPONSES TO PHQ9 QUESTIONS 1 & 2: 0
1. LITTLE INTEREST OR PLEASURE IN DOING THINGS: 0
1. LITTLE INTEREST OR PLEASURE IN DOING THINGS: 0
SUM OF ALL RESPONSES TO PHQ9 QUESTIONS 1 & 2: 0
SUM OF ALL RESPONSES TO PHQ QUESTIONS 1-9: 0
2. FEELING DOWN, DEPRESSED OR HOPELESS: 0
SUM OF ALL RESPONSES TO PHQ QUESTIONS 1-9: 0
SUM OF ALL RESPONSES TO PHQ QUESTIONS 1-9: 0

## 2022-01-06 ASSESSMENT — SOCIAL DETERMINANTS OF HEALTH (SDOH): HOW HARD IS IT FOR YOU TO PAY FOR THE VERY BASICS LIKE FOOD, HOUSING, MEDICAL CARE, AND HEATING?: NOT HARD AT ALL

## 2022-01-06 ASSESSMENT — LIFESTYLE VARIABLES: HOW OFTEN DO YOU HAVE A DRINK CONTAINING ALCOHOL: 0

## 2022-01-06 NOTE — PROGRESS NOTES
Low Dose CT (LDCT) Lung Screening criteria met:     Age 55-77(Medicare) or 50-80 ([facility])   Pack year smoking >30 (Medicare) or >20 ([facility])   Still smoking or less than 15 year since quit   No sign or symptoms of lung cancer   > 11 months since last LDCT     Risks and benefits of lung cancer screening with LDCT scans discussed:    Significance of positive screen - False-positive LDCT results often occur. 95% of all positive results do not lead to a diagnosis of cancer. Usually further imaging can resolve most false-positive results; however, some patients may require invasive procedures. Over diagnosis risk - 10% to 12% of screen-detected lung cancer cases are over diagnosed--that is, the cancer would not have been detected in the patient's lifetime without the screening. Need for follow up screens annually to continue lung cancer screening effectiveness     Risks associated with radiation from annual LDCT- Radiation exposure is about the same as for a mammogram, which is about 1/3 of the annual background radiation exposure from everyday life. Starting screening at age 54 is not likely to increase cancer risk from radiation exposure. Patients with comorbidities resulting in life expectancy of < 10 years, or that would preclude treatment of an abnormality identified on CT, should not be screened due to lack of benefit.     To obtain maximal benefit from this screening, smoking cessation and long-term abstinence from smoking is critical

## 2022-01-06 NOTE — TELEPHONE ENCOUNTER
From: Maria Victoria Cervantes  To: Esdras Brown  Sent: 1/6/2022 2:56 PM EST  Subject: Rx for Invokana    Kierraabbey Jaquez, I called [facility] and checked up on that prescription of Invokana. The co-pay was $550 or through some other plan it was $525. At any rate I told him to forget it and throw it away. So if you have another idea for replacing that [facility] let me know.   Thanks for the help, Luis A ( call me JEANNA ) Charisma Giron

## 2022-01-06 NOTE — PROGRESS NOTES
Medicare Annual Wellness Visit  Name: Luis A PIPER Date: 2022   MRN: [id number] Sex: Male   Age: 70 y.o. Ethnicity: Non- / Non    : 1950 Race: White (non-)      Kristina Garcia is here for Medicare AWV and Diabetes    Screenings for behavioral, psychosocial and functional/safety risks, and cognitive dysfunction are all negative except as indicated below. These results, as well as other patient data from the [address] [facility] [address] form, are documented in Flowsheets linked to this Encounter. No Known Allergies      Prior to Visit Medications    Medication Sig Taking? Authorizing Provider   canagliflozin (INVOKANA) 300 MG TABS tablet Take 1 tablet by mouth every morning (before breakfast) Yes BREANNA Abel NP   gabapentin (NEURONTIN) 300 MG capsule Take 1 capsule by mouth 2 times daily for 180 days.  Intended supply: 90 days Yes BREANNA Abel NP   insulin detemir (LEVEMIR FLEXTOUCH) 100 UNIT/ML injection pen Inject 63 Units into the skin nightly Yes BREANNA Abel NP   metFORMIN (GLUCOPHAGE) 1000 MG tablet Take 1 tablet by mouth 2 times daily (with meals) Yes BREANNA Abel NP   lisinopril (PRINIVIL;ZESTRIL) 10 MG tablet Take 1 tablet by mouth daily Yes BREANNA Abel NP   chlorthalidone (HYGROTON) 25 MG tablet Take 1 tablet by mouth daily Yes BREANNA Abel NP   pioglitazone (ACTOS) 30 MG tablet Take 1 tablet by mouth daily Yes BREANNA Abel NP   celecoxib (CELEBREX) 200 MG capsule Take 1 capsule by mouth daily Yes BREANNA Abel NP   DULoxetine (CYMBALTA) 30 MG extended release capsule Take 1 capsule by mouth 2 times daily Yes BREANNA Abel NP   blood glucose test strips (ONETOUCH VERIO) strip Test three times daily and as needed Yes BREANNA Abel NP   Insulin Pen Needle 31G X 5 MM MISC 1 each by Does not apply route daily Yes BREANNA Abel NP   Lancets MISC Test twice a day and as reveals recent and remote memory intact. Patient's complete Health Risk Assessment and screening values have been reviewed and are found in Flowsheets. The following problems were reviewed today and where indicated follow up appointments were made and/or referrals ordered. Positive Risk Factor Screenings with Interventions:     Fall Risk:  Timed Up and Go Test > 12 seconds? (Complete if either Fall Risk answers are Yes): no  2 or more falls in past year?: (!) yes  Fall with injury in past year?: no  Fall Risk Interventions:    · Home safety tips provided  · Patient declines any further evaluation/treatment for this issue       Substance History:  Social History     Tobacco History     Smoking Status  Current Every Day Smoker Smoking Start Date  3/3/1980 Smoking Frequency  1 pack/day for 40 years (36 pk yrs) Smoking Tobacco Type  Cigarettes    Smokeless Tobacco Use  Former User          Alcohol History     Alcohol Use Status  Yes Drinks/Week  0 Standard drinks or equivalent per week Comment  occasional          Drug Use     Drug Use Status  No          Sexual Activity     Sexually Active  Not Currently               Alcohol Screening:       A score of 8 or more is associated with harmful or hazardous drinking. A score of 13 or more in women, and 15 or more in men, is likely to indicate alcohol dependence. Substance Abuse Interventions:  · Tobacco abuse:  patient is not ready to work toward tobacco cessation at this time        [facility] and ACP:  General  In general, how would you say your health is?: Fair  In the past 7 days, have you experienced any of the following?  New or Increased Pain, New or Increased Fatigue, Loneliness, Social Isolation, Stress or Anger?: (!) New or Increased Pain,New or Increased Fatigue,Loneliness,Social Isolation,Stress,Anger  Do you get the social and emotional support that you need?: Yes  Do you have a Living Will?: Yes  Advance Directives     [place] Living yrs and older Afluria) 09/04/2019    Influenza, Quadv, adjuvanted, 65 yrs +, IM, PF (Fluad) 11/13/2020, 01/06/2022    Pneumococcal Conjugate 13-valent ([id number]) 10/07/2016    Pneumococcal Polysaccharide ([id number]) 09/16/2015    Td, unspecified formulation 09/06/2016    Zoster Live (Zostavax) 02/10/2014        Health Maintenance   Topic Date Due    AAA screen  Never done    Low dose CT lung screening  Never done    Annual Wellness Visit (AWV)  11/14/2021    DTaP/Tdap/Td vaccine (1 - Tdap) 01/27/2022 (Originally 9/7/2016)    COVID-19 Vaccine (3 - Booster for M[place]hele [place] series) 02/06/2022 (Originally 11/7/2021)    Shingles Vaccine (2 of 3) 01/06/2023 (Originally 4/7/2014)    A1C test (Diabetic or Prediabetic)  03/17/2022    Diabetic retinal exam  05/05/2022    Diabetic foot exam  01/06/2023    Diabetic microalbuminuria test  01/06/2023    Lipid screen  01/06/2023    Depression Monitoring  01/06/2023    Potassium monitoring  01/06/2023    Creatinine monitoring  01/06/2023    Colon cancer screen fecal DNA test (Cologuard)  06/26/2023    Flu vaccine  Completed    Pneumococcal 65+ years Vaccine  Completed    Hepatitis C screen  Completed    Hepatitis A vaccine  Aged Out    Hib vaccine  Aged Out    Meningococcal (ACWY) vaccine  Aged Out     Recommendations for [employer] Due: see orders and patient instructions/AVS.  . Recommended screening schedule for the next 5-10 years is provided to the patient in written form: see Patient Instructions/AVS.    Gene was seen today for medicare awv and diabetes. Diagnoses and all orders for this visit:    Routine general medical examination at a health care facility    Type 2 diabetes mellitus with diabetic polyneuropathy, with long-term current use of insulin (HCC)  -     POCT glycosylated hemoglobin (Hb A1C)  -     Cancel: VL AAA SCREENING; Future  -     Lipid, Fasting;  Future  -     Microalbumin, Ur; Future  -     canagliflozin (INVOKANA) 300 MG TABS tablet; Take 1 tablet by mouth every morning (before breakfast)  -     CBC; Future  -     Comprehensive Metabolic Panel; Future  -      DIABETES FOOT EXAM    Neuropathy  -     gabapentin (NEURONTIN) 300 MG capsule; Take 1 capsule by mouth 2 times daily for 180 days. Intended supply: 90 days    Essential hypertension  -     Cancel: VL AAA SCREENING; Future  -     Lipid, Fasting; Future  -     CBC; Future  -     Comprehensive Metabolic Panel; Future    Moderate episode of recurrent major depressive disorder (HCC)    Osteoarthritis of multiple joints, unspecified osteoarthritis type  -     gabapentin (NEURONTIN) 300 MG capsule; Take 1 capsule by mouth 2 times daily for 180 days. Intended supply: 90 days    Personal history of tobacco use  -     MN VISIT TO DISCUSS LUNG CA SCREEN W LDCT  -     CT lung screen [Initial/Annual]; Future    Hypercalciuria  -     Cancel: VL AAA SCREENING; Future  -     Lipid, Fasting; Future    Need for influenza vaccination  -     Cancel: VL AAA SCREENING; Future  -     Lipid, Fasting; Future  -     INFLUENZA, QUADV, ADJUVANTED, 72 YRS =, IM, PF, PREFILL SYR, 0.5ML (FLUAD)    Screening for AAA (abdominal aortic aneurysm)  -     VL AAA SCREENING; Future    Other orders  -     Cancel: CT lung screen [Initial/Annual]; Future              check glucose 2x day. Sometimes does not take 63 units at night. Goes off of what his number is  Recent reading of 400. Took extra insulin. Then in am level 56. Reporting back and hip pain, neuropathy is unchanged. Will trial neurotin  a1c today 7.1% will trial invokana  Reports vertigo at times. Also has some balance issues. carries a cane with him. Does not feel PT would be beneficial    DM foot exam completed during visit. Sensed 5 out of 8 sites tested bilaterally. Some dry skin noted. Toenails are thicker on some toes but not significant.

## 2022-01-06 NOTE — PATIENT INSTRUCTIONS
What is lung cancer screening? Lung cancer screening is a way in which doctors check the lungs for early signs of cancer in people who have no symptoms of lung cancer. A low-dose CT scan uses much less radiation than a normal CT scan and shows a more detailed image of the lungs than a standard X-ray. The goal of lung cancer screening is to find cancer early, before it has a chance to grow, spread, or cause problems. One large study found that smokers who were screened with low-dose CT scans were less likely to die of lung cancer than those who were screened with standard X-ray. Below is a summary of the things you need to know regarding screening for lung cancer with low-dose computed tomography (LDCT). This is a screening program that involves routine annual screening with LDCT studies of the lung. The LDCTs are done using low-dose radiation that is not thought to increase your cancer risk. If you have other serious medical conditions (other cancers, congestive heart failure) that limit your life expectancy to less than 10 years, you should not undergo lung cancer screening with LDCT. The chance is 20%-60% that the LDCT result will show abnormalities. This would require additional testing which could include repeat imaging or even invasive procedures. Most (about 95%) of \"abnormal\" LDCT results are false in the sense that no lung cancer is ultimately found. Additionally, some (about 10%) of the cancers found would not affect your life expectancy, even if undetected and untreated. If you are still smoking, the single most important thing that you can do to reduce your risk of dying of lung cancer is to quit. For this screening to be covered by Medicare and most other insurers, strict criteria must be met. If you do not meet these criteria, but still wish to undergo LDCT testing, you will be required to sign a waiver indicating your willingness to pay for the scan.   Personalized Preventive Plan for Luis A Marroquin - 1/6/2022  Medicare offers a range of preventive health benefits. Some of the tests and screenings are paid in full while other may be subject to a deductible, co-insurance, and/or copay. Some of these benefits include a comprehensive review of your medical history including lifestyle, illnesses that may run in your family, and various assessments and screenings as appropriate. After reviewing your medical record and screening and assessments performed today your provider may have ordered immunizations, labs, imaging, and/or referrals for you. A list of these orders (if applicable) as well as your Preventive Care list are included within your After Visit Summary for your review. Other Preventive Recommendations:    · A preventive eye exam performed by an eye specialist is recommended every 1-2 years to screen for glaucoma; cataracts, macular degeneration, and other eye disorders. · A preventive dental visit is recommended every 6 months. · Try to get at least 150 minutes of exercise per week or 10,000 steps per day on a pedometer . · Order or download the FREE \"Exercise & Physical Activity: Your Everyday Guide\" from [employer]. Call 1-421.814.8114 or search [employer] online. · You need 9302-6003 mg of calcium and 7979-8427 IU of vitamin D per day. It is possible to meet your calcium requirement with diet alone, but a vitamin D supplement is usually necessary to meet this goal.  · When exposed to the sun, use a sunscreen that protects against both UVA and UVB radiation with an SPF of 30 or greater. Reapply every 2 to 3 hours or after sweating, drying off with a towel, or swimming. · Always wear a seat belt when traveling in a car. Always wear a helmet when riding a bicycle or motorcycle.

## 2022-01-12 ENCOUNTER — TELEPHONE (OUTPATIENT)
Dept: FAMILY MEDICINE CLINIC | Age: 72
End: 2022-01-12

## 2022-01-12 NOTE — TELEPHONE ENCOUNTER
Patients wife called and stated that the [place] that she went to  from the pharmacy was going to be over $500 so they would not be able to able to pay for this. She would like to know what options that they have for a different medication. Please advise.

## 2022-01-13 NOTE — TELEPHONE ENCOUNTER
The cost is likely related to their deductible that isn't met yet. Its the first of the year so im not surprised. Have Miguel really tighten up his diet and watch the carbs and sugar. We can revisit a new med in a few months when its more likely the deductible is met.

## 2022-01-20 ENCOUNTER — TELEPHONE (OUTPATIENT)
Dept: ONCOLOGY | Age: 72
End: 2022-01-20

## 2022-01-27 ENCOUNTER — HOSPITAL ENCOUNTER (OUTPATIENT)
Dept: CT IMAGING | Age: 72
Discharge: HOME | End: 2022-01-29
Payer: MEDICARE

## 2022-01-27 ENCOUNTER — HOSPITAL ENCOUNTER (OUTPATIENT)
Dept: VASCULAR LAB | Age: 72
Discharge: HOME | End: 2022-01-27
Payer: MEDICARE

## 2022-01-27 DIAGNOSIS — Z87.891 PERSONAL HISTORY OF TOBACCO USE: ICD-10-CM

## 2022-01-27 DIAGNOSIS — Z13.6 SCREENING FOR AAA (ABDOMINAL AORTIC ANEURYSM): ICD-10-CM

## 2022-01-27 PROCEDURE — 76706 US ABDL AORTA SCREEN AAA: CPT

## 2022-01-27 PROCEDURE — 71271 CT THORAX LUNG CANCER SCR C-: CPT

## 2022-01-28 DIAGNOSIS — Z87.891 PERSONAL HISTORY OF TOBACCO USE: ICD-10-CM

## 2022-01-28 DIAGNOSIS — N28.1 RENAL CYST, LEFT: ICD-10-CM

## 2022-01-28 DIAGNOSIS — R91.8 PULMONARY NODULES: ICD-10-CM

## 2022-01-28 DIAGNOSIS — J43.9 PULMONARY EMPHYSEMA, UNSPECIFIED EMPHYSEMA TYPE (HCC): Primary | ICD-10-CM

## 2022-02-14 ENCOUNTER — HOSPITAL ENCOUNTER (OUTPATIENT)
Dept: ULTRASOUND IMAGING | Age: 72
Discharge: HOME | End: 2022-02-16
Payer: MEDICARE

## 2022-02-14 DIAGNOSIS — N28.1 RENAL CYST, LEFT: ICD-10-CM

## 2022-02-14 PROCEDURE — 76770 US EXAM ABDO BACK WALL COMP: CPT

## 2022-03-15 ENCOUNTER — HOSPITAL ENCOUNTER (OUTPATIENT)
Age: 72
Setting detail: SPECIMEN
Discharge: HOME | End: 2022-03-15

## 2022-03-15 DIAGNOSIS — R31.0 GROSS HEMATURIA: ICD-10-CM

## 2022-03-16 LAB
CULTURE: NO GROWTH
SPECIMEN DESCRIPTION: NORMAL

## 2022-03-17 ENCOUNTER — HOSPITAL ENCOUNTER (OUTPATIENT)
Age: 72
Setting detail: SPECIMEN
Discharge: HOME | End: 2022-03-17

## 2022-03-17 DIAGNOSIS — R31.0 GROSS HEMATURIA: ICD-10-CM

## 2022-03-17 LAB
BUN BLDV-MCNC: 21 MG/DL (ref 8–23)
CREAT SERPL-MCNC: 0.9 MG/DL (ref 0.7–1.2)
GFR AFRICAN AMERICAN: >60 ML/MIN
GFR NON-AFRICAN AMERICAN: >60 ML/MIN
GFR SERPL CREATININE-BSD FRML MDRD: NORMAL ML/MIN/{1.73_M2}

## 2022-03-31 ENCOUNTER — HOSPITAL ENCOUNTER (OUTPATIENT)
Age: 72
Discharge: HOME | End: 2022-03-31
Payer: MEDICARE

## 2022-03-31 DIAGNOSIS — Z01.818 PRE-OP TESTING: ICD-10-CM

## 2022-03-31 PROBLEM — N28.1 RENAL CYST, LEFT: Status: ACTIVE | Noted: 2022-03-31

## 2022-03-31 LAB
ANION GAP SERPL CALCULATED.3IONS-SCNC: 14 MMOL/L (ref 9–17)
BUN BLDV-MCNC: 22 MG/DL (ref 8–23)
CALCIUM SERPL-MCNC: 9.1 MG/DL (ref 8.6–10.4)
CHLORIDE BLD-SCNC: 102 MMOL/L (ref 98–107)
CO2: 25 MMOL/L (ref 20–31)
CREAT SERPL-MCNC: 0.89 MG/DL (ref 0.7–1.2)
GFR AFRICAN AMERICAN: >60 ML/MIN
GFR NON-AFRICAN AMERICAN: >60 ML/MIN
GFR SERPL CREATININE-BSD FRML MDRD: ABNORMAL ML/MIN/{1.73_M2}
GLUCOSE BLD-MCNC: 174 MG/DL (ref 70–99)
HCT VFR BLD CALC: 42.4 % (ref 40.7–50.3)
HEMOGLOBIN: 14.1 G/DL (ref 13–17)
MCH RBC QN AUTO: 30.7 PG (ref 25.2–33.5)
MCHC RBC AUTO-ENTMCNC: 33.3 G/DL (ref 28.4–34.8)
MCV RBC AUTO: 92.2 FL (ref 82.6–102.9)
NRBC AUTOMATED: 0 PER 100 WBC
PDW BLD-RTO: 13.1 % (ref 11.8–14.4)
PLATELET # BLD: 192 K/UL (ref 138–453)
PMV BLD AUTO: 10.7 FL (ref 8.1–13.5)
POTASSIUM SERPL-SCNC: 3.6 MMOL/L (ref 3.7–5.3)
RBC # BLD: 4.6 M/UL (ref 4.21–5.77)
SODIUM BLD-SCNC: 141 MMOL/L (ref 135–144)
WBC # BLD: 9.4 K/UL (ref 3.5–11.3)

## 2022-03-31 PROCEDURE — 80048 BASIC METABOLIC PNL TOTAL CA: CPT

## 2022-03-31 PROCEDURE — 85027 COMPLETE CBC AUTOMATED: CPT

## 2022-03-31 PROCEDURE — 36415 COLL VENOUS BLD VENIPUNCTURE: CPT

## 2022-03-31 PROCEDURE — 93005 ELECTROCARDIOGRAM TRACING: CPT | Performed by: ANESTHESIOLOGY

## 2022-03-31 NOTE — PROGRESS NOTES
PAT phone call completed with pt. Date/time/location of surgery/procedure verified with pt. NPO after MN status verified with pt. Need for  (wife or dtr)  verified with pt. Instructed pt to take BP meds with a small sip of water prior to procedure/surgery. Instructed to hold diuretic and diabetic medications am of surgery. Instructed pt to take 1/2 of nightly insulin dose the night prior to procedure. Pt notified of EKG and labs required prior to surgery, pt to complete at [facility].

## 2022-04-03 LAB
EKG ATRIAL RATE: 87 BPM
EKG P AXIS: 43 DEGREES
EKG P-R INTERVAL: 168 MS
EKG Q-T INTERVAL: 376 MS
EKG QRS DURATION: 100 MS
EKG QTC CALCULATION (BAZETT): 452 MS
EKG R AXIS: 45 DEGREES
EKG T AXIS: 36 DEGREES
EKG VENTRICULAR RATE: 87 BPM

## 2022-04-03 PROCEDURE — 93010 ELECTROCARDIOGRAM REPORT: CPT | Performed by: INTERNAL MEDICINE

## 2022-04-06 ENCOUNTER — ANESTHESIA (OUTPATIENT)
Dept: OPERATING ROOM | Age: 72
End: 2022-04-06
Payer: MEDICARE

## 2022-04-06 ENCOUNTER — ANESTHESIA EVENT (OUTPATIENT)
Dept: OPERATING ROOM | Age: 72
End: 2022-04-06
Payer: MEDICARE

## 2022-04-06 ENCOUNTER — HOSPITAL ENCOUNTER (OUTPATIENT)
Age: 72
Setting detail: OUTPATIENT SURGERY
Discharge: HOME | End: 2022-04-06
Attending: SPECIALIST | Admitting: SPECIALIST
Payer: MEDICARE

## 2022-04-06 ENCOUNTER — APPOINTMENT (OUTPATIENT)
Dept: GENERAL RADIOLOGY | Age: 72
End: 2022-04-06
Attending: SPECIALIST
Payer: MEDICARE

## 2022-04-06 VITALS — TEMPERATURE: 91.4 F | DIASTOLIC BLOOD PRESSURE: 70 MMHG | SYSTOLIC BLOOD PRESSURE: 113 MMHG | OXYGEN SATURATION: 100 %

## 2022-04-06 VITALS
SYSTOLIC BLOOD PRESSURE: 116 MMHG | TEMPERATURE: 96.9 F | WEIGHT: 196 LBS | HEIGHT: 74 IN | RESPIRATION RATE: 14 BRPM | OXYGEN SATURATION: 96 % | BODY MASS INDEX: 25.15 KG/M2 | HEART RATE: 74 BPM | DIASTOLIC BLOOD PRESSURE: 64 MMHG

## 2022-04-06 DIAGNOSIS — Z01.818 PRE-OP TESTING: Primary | ICD-10-CM

## 2022-04-06 DIAGNOSIS — N20.0 KIDNEY STONE ON LEFT SIDE: ICD-10-CM

## 2022-04-06 LAB — GLUCOSE BLD-MCNC: 165 MG/DL (ref 75–110)

## 2022-04-06 PROCEDURE — 6370000000 HC RX 637 (ALT 250 FOR IP): Performed by: SPECIALIST

## 2022-04-06 PROCEDURE — 3600000013 HC SURGERY LEVEL 3 ADDTL 15MIN: Performed by: SPECIALIST

## 2022-04-06 PROCEDURE — 2580000003 HC RX 258: Performed by: ANESTHESIOLOGY

## 2022-04-06 PROCEDURE — 6360000002 HC RX W HCPCS: Performed by: NURSE ANESTHETIST, CERTIFIED REGISTERED

## 2022-04-06 PROCEDURE — 2720000010 HC SURG SUPPLY STERILE: Performed by: SPECIALIST

## 2022-04-06 PROCEDURE — C2617 STENT, NON-COR, TEM W/O DEL: HCPCS | Performed by: SPECIALIST

## 2022-04-06 PROCEDURE — 7100000011 HC PHASE II RECOVERY - ADDTL 15 MIN: Performed by: SPECIALIST

## 2022-04-06 PROCEDURE — 6360000002 HC RX W HCPCS: Performed by: SPECIALIST

## 2022-04-06 PROCEDURE — 2709999900 HC NON-CHARGEABLE SUPPLY: Performed by: SPECIALIST

## 2022-04-06 PROCEDURE — 82947 ASSAY GLUCOSE BLOOD QUANT: CPT

## 2022-04-06 PROCEDURE — 3700000000 HC ANESTHESIA ATTENDED CARE: Performed by: SPECIALIST

## 2022-04-06 PROCEDURE — C1758 CATHETER, URETERAL: HCPCS | Performed by: SPECIALIST

## 2022-04-06 PROCEDURE — 7100000010 HC PHASE II RECOVERY - FIRST 15 MIN: Performed by: SPECIALIST

## 2022-04-06 PROCEDURE — 3700000001 HC ADD 15 MINUTES (ANESTHESIA): Performed by: SPECIALIST

## 2022-04-06 PROCEDURE — 3600000003 HC SURGERY LEVEL 3 BASE: Performed by: SPECIALIST

## 2022-04-06 PROCEDURE — 2500000003 HC RX 250 WO HCPCS: Performed by: NURSE ANESTHETIST, CERTIFIED REGISTERED

## 2022-04-06 PROCEDURE — 6370000000 HC RX 637 (ALT 250 FOR IP)

## 2022-04-06 PROCEDURE — C1769 GUIDE WIRE: HCPCS | Performed by: SPECIALIST

## 2022-04-06 PROCEDURE — 3209999900 FLUORO FOR SURGICAL PROCEDURES

## 2022-04-06 DEVICE — URETERAL STENT WITH SIDE HOLES 6FX28CM
Type: IMPLANTABLE DEVICE | Status: FUNCTIONAL
Brand: TRIA™ FIRM

## 2022-04-06 RX ORDER — TAMSULOSIN HYDROCHLORIDE 0.4 MG/1
0.4 CAPSULE ORAL DAILY
Qty: 30 CAPSULE | Refills: 0 | Status: SHIPPED | OUTPATIENT
Start: 2022-04-06 | End: 2022-06-07 | Stop reason: ALTCHOICE

## 2022-04-06 RX ORDER — SODIUM CHLORIDE 9 MG/ML
25 INJECTION, SOLUTION INTRAVENOUS PRN
Status: DISCONTINUED | OUTPATIENT
Start: 2022-04-06 | End: 2022-04-06 | Stop reason: HOSPADM

## 2022-04-06 RX ORDER — TRAMADOL HYDROCHLORIDE 50 MG/1
50 TABLET ORAL ONCE
Status: COMPLETED | OUTPATIENT
Start: 2022-04-06 | End: 2022-04-06

## 2022-04-06 RX ORDER — ONDANSETRON 2 MG/ML
4 INJECTION INTRAMUSCULAR; INTRAVENOUS
Status: DISCONTINUED | OUTPATIENT
Start: 2022-04-06 | End: 2022-04-06 | Stop reason: HOSPADM

## 2022-04-06 RX ORDER — LIDOCAINE HYDROCHLORIDE 20 MG/ML
JELLY TOPICAL PRN
Status: DISCONTINUED | OUTPATIENT
Start: 2022-04-06 | End: 2022-04-06 | Stop reason: ALTCHOICE

## 2022-04-06 RX ORDER — DIPHENHYDRAMINE HYDROCHLORIDE 50 MG/ML
12.5 INJECTION INTRAMUSCULAR; INTRAVENOUS
Status: DISCONTINUED | OUTPATIENT
Start: 2022-04-06 | End: 2022-04-06 | Stop reason: HOSPADM

## 2022-04-06 RX ORDER — LIDOCAINE HYDROCHLORIDE 20 MG/ML
JELLY TOPICAL
Status: DISCONTINUED
Start: 2022-04-06 | End: 2022-04-06 | Stop reason: HOSPADM

## 2022-04-06 RX ORDER — TRAMADOL HYDROCHLORIDE 50 MG/1
TABLET ORAL
Status: COMPLETED
Start: 2022-04-06 | End: 2022-04-06

## 2022-04-06 RX ORDER — OXYBUTYNIN CHLORIDE 10 MG/1
10 TABLET, EXTENDED RELEASE ORAL DAILY
Qty: 21 TABLET | Refills: 1 | Status: SHIPPED | OUTPATIENT
Start: 2022-04-06 | End: 2022-06-07 | Stop reason: ALTCHOICE

## 2022-04-06 RX ORDER — PROPOFOL 10 MG/ML
INJECTION, EMULSION INTRAVENOUS PRN
Status: DISCONTINUED | OUTPATIENT
Start: 2022-04-06 | End: 2022-04-06 | Stop reason: SDUPTHER

## 2022-04-06 RX ORDER — SODIUM CHLORIDE 0.9 % (FLUSH) 0.9 %
5-40 SYRINGE (ML) INJECTION PRN
Status: DISCONTINUED | OUTPATIENT
Start: 2022-04-06 | End: 2022-04-06 | Stop reason: HOSPADM

## 2022-04-06 RX ORDER — MEPERIDINE HYDROCHLORIDE 50 MG/ML
12.5 INJECTION INTRAMUSCULAR; INTRAVENOUS; SUBCUTANEOUS EVERY 5 MIN PRN
Status: DISCONTINUED | OUTPATIENT
Start: 2022-04-06 | End: 2022-04-06 | Stop reason: HOSPADM

## 2022-04-06 RX ORDER — FENTANYL CITRATE 50 UG/ML
INJECTION, SOLUTION INTRAMUSCULAR; INTRAVENOUS PRN
Status: DISCONTINUED | OUTPATIENT
Start: 2022-04-06 | End: 2022-04-06 | Stop reason: SDUPTHER

## 2022-04-06 RX ORDER — LIDOCAINE HYDROCHLORIDE 10 MG/ML
INJECTION, SOLUTION EPIDURAL; INFILTRATION; INTRACAUDAL; PERINEURAL PRN
Status: DISCONTINUED | OUTPATIENT
Start: 2022-04-06 | End: 2022-04-06 | Stop reason: SDUPTHER

## 2022-04-06 RX ORDER — SODIUM CHLORIDE 0.9 % (FLUSH) 0.9 %
5-40 SYRINGE (ML) INJECTION EVERY 12 HOURS SCHEDULED
Status: DISCONTINUED | OUTPATIENT
Start: 2022-04-06 | End: 2022-04-06 | Stop reason: HOSPADM

## 2022-04-06 RX ORDER — TRAMADOL HYDROCHLORIDE 50 MG/1
50 TABLET ORAL EVERY 6 HOURS PRN
Qty: 28 TABLET | Refills: 0 | Status: SHIPPED | OUTPATIENT
Start: 2022-04-06 | End: 2022-04-13

## 2022-04-06 RX ORDER — ONDANSETRON 2 MG/ML
INJECTION INTRAMUSCULAR; INTRAVENOUS PRN
Status: DISCONTINUED | OUTPATIENT
Start: 2022-04-06 | End: 2022-04-06 | Stop reason: SDUPTHER

## 2022-04-06 RX ORDER — MORPHINE SULFATE 1 MG/ML
1 INJECTION, SOLUTION EPIDURAL; INTRATHECAL; INTRAVENOUS EVERY 5 MIN PRN
Status: DISCONTINUED | OUTPATIENT
Start: 2022-04-06 | End: 2022-04-06 | Stop reason: HOSPADM

## 2022-04-06 RX ADMIN — PROPOFOL 200 MG: 10 INJECTION, EMULSION INTRAVENOUS at 11:13

## 2022-04-06 RX ADMIN — FENTANYL CITRATE 25 MCG: 50 INJECTION, SOLUTION INTRAMUSCULAR; INTRAVENOUS at 11:36

## 2022-04-06 RX ADMIN — SODIUM CHLORIDE 25 ML: 0.9 INJECTION, SOLUTION INTRAVENOUS at 09:37

## 2022-04-06 RX ADMIN — FENTANYL CITRATE 25 MCG: 50 INJECTION, SOLUTION INTRAMUSCULAR; INTRAVENOUS at 11:35

## 2022-04-06 RX ADMIN — LIDOCAINE HYDROCHLORIDE 50 MG: 10 INJECTION, SOLUTION EPIDURAL; INFILTRATION; INTRACAUDAL; PERINEURAL at 11:13

## 2022-04-06 RX ADMIN — PROPOFOL 30 MG: 10 INJECTION, EMULSION INTRAVENOUS at 11:15

## 2022-04-06 RX ADMIN — CEFAZOLIN SODIUM 2000 MG: 10 INJECTION, POWDER, FOR SOLUTION INTRAVENOUS at 11:10

## 2022-04-06 RX ADMIN — FENTANYL CITRATE 50 MCG: 50 INJECTION, SOLUTION INTRAMUSCULAR; INTRAVENOUS at 11:13

## 2022-04-06 RX ADMIN — PROPOFOL 50 MG: 10 INJECTION, EMULSION INTRAVENOUS at 11:48

## 2022-04-06 RX ADMIN — TRAMADOL HYDROCHLORIDE 50 MG: 50 TABLET ORAL at 13:04

## 2022-04-06 RX ADMIN — ONDANSETRON 4 MG: 2 INJECTION INTRAMUSCULAR; INTRAVENOUS at 12:14

## 2022-04-06 RX ADMIN — TRAMADOL HYDROCHLORIDE 50 MG: 50 TABLET, FILM COATED ORAL at 13:04

## 2022-04-06 ASSESSMENT — PULMONARY FUNCTION TESTS
PIF_VALUE: 5
PIF_VALUE: 5
PIF_VALUE: 2
PIF_VALUE: 4
PIF_VALUE: 2
PIF_VALUE: 18
PIF_VALUE: 4
PIF_VALUE: 5
PIF_VALUE: 3
PIF_VALUE: 5
PIF_VALUE: 4
PIF_VALUE: 1
PIF_VALUE: 4
PIF_VALUE: 5
PIF_VALUE: 2
PIF_VALUE: 5
PIF_VALUE: 6
PIF_VALUE: 10
PIF_VALUE: 1
PIF_VALUE: 1
PIF_VALUE: 6
PIF_VALUE: 2
PIF_VALUE: 5
PIF_VALUE: 1
PIF_VALUE: 5
PIF_VALUE: 22
PIF_VALUE: 1
PIF_VALUE: 1
PIF_VALUE: 3
PIF_VALUE: 1
PIF_VALUE: 4
PIF_VALUE: 5
PIF_VALUE: 2
PIF_VALUE: 6
PIF_VALUE: 4
PIF_VALUE: 3
PIF_VALUE: 26
PIF_VALUE: 8
PIF_VALUE: 6
PIF_VALUE: 5
PIF_VALUE: 3
PIF_VALUE: 5
PIF_VALUE: 14
PIF_VALUE: 5
PIF_VALUE: 4
PIF_VALUE: 4
PIF_VALUE: 5
PIF_VALUE: 6
PIF_VALUE: 5
PIF_VALUE: 4
PIF_VALUE: 3
PIF_VALUE: 12
PIF_VALUE: 2
PIF_VALUE: 5
PIF_VALUE: 3
PIF_VALUE: 5
PIF_VALUE: 3
PIF_VALUE: 5
PIF_VALUE: 5
PIF_VALUE: 4
PIF_VALUE: 4
PIF_VALUE: 5
PIF_VALUE: 1
PIF_VALUE: 12
PIF_VALUE: 1
PIF_VALUE: 8
PIF_VALUE: 4
PIF_VALUE: 5
PIF_VALUE: 3
PIF_VALUE: 1
PIF_VALUE: 5
PIF_VALUE: 0
PIF_VALUE: 5
PIF_VALUE: 5

## 2022-04-06 ASSESSMENT — PAIN SCALES - GENERAL
PAINLEVEL_OUTOF10: 5
PAINLEVEL_OUTOF10: 0

## 2022-04-06 ASSESSMENT — PAIN - FUNCTIONAL ASSESSMENT: PAIN_FUNCTIONAL_ASSESSMENT: 0-10

## 2022-04-06 ASSESSMENT — LIFESTYLE VARIABLES: SMOKING_STATUS: 1

## 2022-04-06 ASSESSMENT — COPD QUESTIONNAIRES: CAT_SEVERITY: NO INTERVAL CHANGE

## 2022-04-06 ASSESSMENT — PAIN DESCRIPTION - DESCRIPTORS: DESCRIPTORS: ACHING;CONSTANT

## 2022-04-06 NOTE — H&P
Bam Mccarty MD [facility]    History and Physical    Patient:  Mary Terry  MRN: [id number]  YOB: 1950    CHIEF COMPLAINT:  Left kidney stone     HISTORY OF PRESENT ILLNESS:   The patient is a 67 y.o. male who presents with a 1.2 Left kidney stone and gross hematuria. Patient here for Cystoscopy, Left ureteroscopy and Holmium laser lithotripsy with ureteral stent insertion under GA. Goes by \"Miguel\"  Proteus UTI 9/6/16 and 9/26/16 Proteus mirabilis; 9/26/16 PVR = 10 mL  BPH with terminal dribbling; 10/5/16 cysto-lateral BPH; uroflow: 193 mL, 17.9/9.5 mL/sec  Left renal calculi on 9/30/16 CT and 10/3/16 KUB; Left ureteroscopy and HLL 10/14/16; 1/16/17 KUB 7 mm left lower pole KS; 9/6/17 KUB: 9 mm, 9/21/17 HLL; 10/23/17 Litholink: vol=1.95, Vm=836, Zx=250; 5/30/18, 5/8/19, 5/27/20 KUB negative  Hypercalciuria with normal serum Ca, PTH; 11/15/17 chlorthalidone 25 mg qd  Gross hematuria on 3/14/22, 3/21/22 CT urogram, 1.2 cm L lower pole KS, L renal cyst; 3/31/22 cysto neg  Patient's old records, notes and chart reviewed and summarized above. Past Medical History:    Past Medical History:   Diagnosis Date    Bladder stone     BPH with obstruction/lower urinary tract symptoms     Caffeine use     4 coffee/2 soda per day    Cataracts, bilateral     Chronic back pain     COPD (chronic obstructive pulmonary disease) ([address]     Hypertension     Irritable bowel syndrome     Kidney stone     Osteoarthritis     Pancreatitis     Type II or unspecified type diabetes mellitus without mention of complication, not stated as uncontrolled        Past Surgical History:    Past Surgical History:   Procedure Laterality Date    BACK SURGERY      BLADDER SURGERY      CHOLECYSTECTOMY      EYE SURGERY  2015    macular scrape    EYE SURGERY      LITHOTRIPSY      9-21-17    OTHER SURGICAL HISTORY      implanted nerve stimulater in lumbar back    SPINE SURGERY  05/18/2010    DR. Hailey Carrasquillo ([place])  URETEROSCOPY      with string stent 9-21-17    VASECTOMY       Previous Urologic Surgery: see above  Medications Prior to Admission:    Prior to Admission medications    Medication Sig Start Date End Date Taking? Authorizing Provider   dapagliflozin (FARXIGA) 5 MG tablet Take 1 tablet by mouth every morning  Patient not taking: Reported on 3/31/2022 1/11/22 1/11/23  BREANNA Rand NP   cephALEXin (KEFLEX) 500 MG capsule Take 1 capsule by mouth 2 times daily Take 2 doses after recent cystoscopy to prevent infection  Patient not taking: Reported on 4/6/2022 3/31/22   Armida Hui MD   canagliflozin [facility]) 300 MG TABS tablet Take 1 tablet by mouth every morning (before breakfast)  Patient not taking: Reported on 3/31/2022 1/6/22 1/6/23  BREANNA Rand NP   gabapentin (NEURONTIN) 300 MG capsule Take 1 capsule by mouth 2 times daily for 180 days.  Intended supply: 90 days  Patient not taking: Reported on 3/31/2022 1/6/22 7/5/22  BREANNA Rand NP   insulin detemir (LEVEMIR FLEXTOUCH) 100 UNIT/ML injection pen Inject 63 Units into the skin nightly 1/5/22 1/5/23  BREANNA Rand NP   metFORMIN (GLUCOPHAGE) 1000 MG tablet Take 1 tablet by mouth 2 times daily (with meals) 9/19/21 9/19/22  BREANNA Rand NP   lisinopril (PRINIVIL;ZESTRIL) 10 MG tablet Take 1 tablet by mouth daily 9/19/21 9/19/22  BREANNA Rand NP   chlorthalidone (HYGROTON) 25 MG tablet Take 1 tablet by mouth daily 9/19/21 9/19/22  BREANNA Rand NP   pioglitazone (ACTOS) 30 MG tablet Take 1 tablet by mouth daily 9/19/21 9/14/22  BREANNA Rand NP   celecoxib (CELEBREX) 200 MG capsule Take 1 capsule by mouth daily 9/19/21 9/14/22  BREANNA Rand NP   DULoxetine (CYMBALTA) 30 MG extended release capsule Take 1 capsule by mouth 2 times daily 9/19/21 9/19/22  BREANNA Rand NP   blood glucose test strips (ONETOUCH VERIO) strip Test three times daily and as needed 4/20/21 8/10/23  BREANNA Dang NP   Insulin Pen Needle 31G X 5 MM MISC 1 each by Does not apply route daily 4/14/21 4/14/22  BREANNA Dang NP   Lancets MISC Test twice a day and as needed, Please send lancets for Verio brand meter 1/4/16 3/17/22  BREANNA Duran CNP   multivitamin [facility]) per tablet Take 1 tablet by mouth daily. Historical Provider, MD       Allergies:  Patient has no known allergies. Social History:    Social History     Socioeconomic History    Marital status:      Spouse name: Not on file    Number of children: Not on file    Years of education: Not on file    Highest education level: Not on file   Occupational History    Not on file   Tobacco Use    Smoking status: Current Every Day Smoker     Packs/day: 1.00     Years: 40.00     Pack years: 40.00     Types: Cigarettes     Start date: 3/3/1980    Smokeless tobacco: Former User   Vaping Use    Vaping Use: Former    Quit date: 3/17/2019   Substance and Sexual Activity    Alcohol use: Not Currently     Comment: occasional    Drug use: No    Sexual activity: Not Currently   Other Topics Concern    Not on file   Social History Narrative    Not on file     Social Determinants of Health     Financial Resource Strain: Low Risk     Difficulty of Paying Living Expenses: Not hard at all   Food Insecurity: No Food Insecurity    Worried About [address] in the Last Year: Never true    [facility] in the Last Year: Never true   Transportation Needs:     Lack of Transportation (Medical): Not on file    Lack of Transportation (Non-Medical):  Not on file   Physical Activity:     Days of Exercise per Week: Not on file    Minutes of Exercise per Session: Not on file   Stress:     Feeling of Stress : Not on file   Social Connections:     Frequency of Communication with Friends and Family: Not on file    Frequency of Social Gatherings with Friends and Family: Not on file    Attends Yazidi Services: Not on file    Active Member of Clubs or Organizations: Not on file    Attends Club or Organization Meetings: Not on file    Marital Status: Not on file   Intimate Partner Violence:     Fear of Current or Ex-Partner: Not on file    Emotionally Abused: Not on file    Physically Abused: Not on file    Sexually Abused: Not on file   Housing Stability:     Unable to Pay for Housing in the Last Year: Not on file    Number of [place] in the Last Year: Not on file    Unstable Housing in the Last Year: Not on file     Family History:    Family History   Problem Relation Age of Onset    Arthritis Mother     Cancer Father      Previous Urologic Family history: none  REVIEW OF SYSTEMS:  Constitutional: negative  Eyes: negative  Respiratory: negative  Cardiovascular: negative  Gastrointestinal: negative  Genitourinary: see HPI  Musculoskeletal: negative  Skin: negative   Neurological: negative  Hematological/Lymphatic: negative  Psychological: negative    Physical Exam:      Patient Vitals for the past 24 hrs:   BP Temp Temp src Pulse Resp SpO2 Height Weight   04/06/22 0912 124/83 96.9 °F (36.1 °C) Infrared 81 18 96 % -- --   04/06/22 0905 -- -- -- -- -- -- 6' 2\" (1.88 m) 196 lb (88.9 kg)     Constitutional: Patient in no acute distress; Neuro: alert and oriented to person place and time. Psych: Mood and affect normal.  Skin: Normal  Lungs: Respiratory effort normal, CTA  Cardiovascular:  Normal peripheral pulses; R3 wo murmur  Abdomen: Soft, non-tender, non-distended with no CVA, flank pain, hepatosplenomegaly or hernia. Kidneys normal.  Bladder non-tender and not distended. Lymphatics: no palpable lymphadenopathy  Penis normal and circumcised  Urethral meatus normal  Scrotal exam normal  Testicles normal bilaterally  Epididymis normal bilaterally  No evidence of inguinal hernia  Anus and perineum normal  Normal rectal tone with no masses  Prostate soft, non-tender to palpation.   No

## 2022-04-06 NOTE — OP NOTE
Operative Note      Patient: Luis A Jon  YOB: 1950  MRN: [id number]    Date of Procedure: 4/6/2022    Pre-Op Diagnosis: LEFT KIDNEY STONE    Post-Op Diagnosis: Same       Procedure(s):  CYSTOSCOPY URETEROSCOPY HOLMIUM LASER LEFT URETERAL STENT INSERTION (stage 1)    Surgeon(s):  Marian Hess MD    Assistant:   * No surgical staff found *    Anesthesia: General    Estimated Blood Loss (mL): Minimal    Complications: None    Specimens:   * No specimens in log *    Implants:  Implant Name Type Inv. Item Serial No.  Lot No. LRB No. Used Action   STENT URETERAL 6 FRX28 CM FIRM [employer] TRIA - [id number]  STENT URETERAL 6 FRX28 CM FIRM [employer] TRIA  [employer] [facility] UROLOG[facility]- [id number] Left 1 Implanted         Drains: * No LDAs found *    Findings: multiple (>12) hard lower pole kidney stones    Detailed Description of Procedure:      INDICATIONS:  This is a 67year old male who presents today for cystoscopy with left ureteroscopy and holmium laser lithotripsy. After risks, benefits and alternatives of the procedure were discussed with the patient, informed consent was obtained and the patient elected to proceed. The patient was given Ancef 2 gm IV on call to OR for antibiotic prophylaxis. Patient had EPC cuffs placed for VTE prophylaxis. DETAILS OF PROCEDURE:  After general anesthesia induction, the patient was placed in dorsolithotomy position and then prepped and draped in the usual sterile fashion . A time-out was performed to confirm patient identity and procedure. We began by inserting a cystoscope with a 22 French sheath and 30 degree lens into the patient's urethral meatus and advancing into the bladder without complication. A pan cystoscopy was preformed and the bladder appeared unremarkable. We then focused our attention on the Left ureteral orifice. A glidewire was placed through the stent and advanced up to renal pelvis under fluoroscopic guidance.  A second wire was placed using a dual lumen catheter. Once in good position, we advanced our flexible ureteroscope along side the glidewire to the renal calcului under direct visualization. There were at least a dozen hard lower pole kidney stones. Using the 200 micron holmium laser fiber we fragmented the calculi. Any remaining fragments appeared to be 2-4  millimeters. We withdrew the ureteroscope. No damage to the ureter was identified. At this time, we placed a 6Fx28 double J ureteral stent over the glidewire and up to the kidney in the usual fashion, and we noted appropriate placement in the upper collecting system using fluoroscopy. There was a good curl noted in the bladder. We removed the string at the end of the stent prior to insertion. The patient's bladder was drained and removed the scope and the procedure was subsequently terminated. The patient was taken to recovery in good condition. Plan:  Discharge home from recovery  Patient will follow up in 2 weeks to remove the stent and take a second look given the large stone burden.         Electronically signed by Meagan Strauss MD on 4/6/2022 at 12:21 PM

## 2022-04-06 NOTE — ANESTHESIA PRE PROCEDURE
Department of Anesthesiology  Preprocedure Note       Name:  Andriy Payer   Age:  67 y.o.  :  1950                                          MRN:  [id number]         Date:  2022      Surgeon: Yvonne Burgos):  Kirk De Anda MD    Procedure: Procedure(s):  CYSTOSCOPY URETEROSCOPY HOLMIUM LASER LEFT URETERAL STENT INSERTION    Medications prior to admission:   Prior to Admission medications    Medication Sig Start Date End Date Taking? Authorizing Provider   dapagliflozin (FARXIGA) 5 MG tablet Take 1 tablet by mouth every morning  Patient not taking: Reported on 3/31/2022 1/11/22 1/11/23  BREANNA Keller NP   cephALEXin (KEFLEX) 500 MG capsule Take 1 capsule by mouth 2 times daily Take 2 doses after recent cystoscopy to prevent infection 3/31/22   Kirk De Anda MD   canagliflozin [facility]) 300 MG TABS tablet Take 1 tablet by mouth every morning (before breakfast)  Patient not taking: Reported on 3/31/2022 1/6/22 1/6/23  BREANNA Keller NP   gabapentin (NEURONTIN) 300 MG capsule Take 1 capsule by mouth 2 times daily for 180 days.  Intended supply: 90 days  Patient not taking: Reported on 3/31/2022 1/6/22 7/5/22  BREANNA Keller NP   insulin detemir (LEVEMIR FLEXTOUCH) 100 UNIT/ML injection pen Inject 63 Units into the skin nightly 22  BREANNA Keller NP   metFORMIN (GLUCOPHAGE) 1000 MG tablet Take 1 tablet by mouth 2 times daily (with meals) 21  BREANNA Keller NP   lisinopril (PRINIVIL;ZESTRIL) 10 MG tablet Take 1 tablet by mouth daily 21  BREANNA Keller NP   chlorthalidone (HYGROTON) 25 MG tablet Take 1 tablet by mouth daily 21  BREANNA Keller NP   pioglitazone (ACTOS) 30 MG tablet Take 1 tablet by mouth daily 21  BREANNA Keller NP   celecoxib (CELEBREX) 200 MG capsule Take 1 capsule by mouth daily 21  Bre Donnelly, APRN - NP   DULoxetine (CYMBALTA) 30 MG extended release capsule Take 1 capsule by mouth 2 times daily 9/19/21 9/19/22  BREANNA Johnson - NP   blood glucose test strips [facility] VERIO) strip Test three times daily and as needed 4/20/21 8/10/23  BREANNA Johnson NP   Insulin Pen Needle 31G X 5 MM MISC 1 each by Does not apply route daily 4/14/21 4/14/22  BREANNA Johnson NP   Lancets MISC Test twice a day and as needed, Please send lancets for Verio brand meter 1/4/16 3/17/22  Oval Seats, APRN - CNP   multivitamin [facility]) per tablet Take 1 tablet by mouth daily.     Historical Provider, MD       Current medications:    Current Facility-Administered Medications   Medication Dose Route Frequency Provider Last Rate Last Admin    ceFAZolin (ANCEF) 2000 mg in dextrose 5 % 50 mL IVPB  2,000 mg IntraVENous Once Ezequiel Hidalgo MD           Allergies:  No Known Allergies    Problem List:    Patient Active Problem List   Diagnosis Code    Essential hypertension I10    Osteoarthritis M19.90    Vitamin D deficiency E55.9    Testosterone deficiency E34.9    Difficulty sleeping G47.9    Sacro-iliac pain M53.3    Neuropathy G62.9    Nocturia R35.1    Benign prostatic hyperplasia with urinary obstruction N40.1, N13.8    Type 2 diabetes mellitus with diabetic polyneuropathy, with long-term current use of insulin (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    Personal history of kidney stones Z87.442    Moderate episode of recurrent major depressive disorder ([address] F33.1    Epiretinal membrane H35.379    Renal cyst, left N28.1       Past Medical History:        Diagnosis Date    Bladder stone     BPH with obstruction/lower urinary tract symptoms     Caffeine use     4 coffee/2 soda per day    Cataracts, bilateral     Chronic back pain     COPD (chronic obstructive pulmonary disease) ([address]     Hypertension     Irritable bowel syndrome     Kidney stone     Osteoarthritis     Pancreatitis  Type II or unspecified type diabetes mellitus without mention of complication, not stated as uncontrolled        Past Surgical History:        Procedure Laterality Date    BACK SURGERY      BLADDER SURGERY      CHOLECYSTECTOMY      EYE SURGERY  2015    macular scrape    EYE SURGERY      LITHOTRIPSY      9-21-17    OTHER SURGICAL HISTORY      implanted nerve stimulater in lumbar back    SPINE SURGERY  05/18/2010    DR. Anupam Carrasquillo ([place])    URETEROSCOPY      with string stent 9-21-17    VASECTOMY         Social History:    Social History     Tobacco Use    Smoking status: Current Every Day Smoker     Packs/day: 1.00     Years: 40.00     Pack years: 40.00     Types: Cigarettes     Start date: 3/3/1980    Smokeless tobacco: Former User   Substance Use Topics    Alcohol use: Not Currently     Comment: occasional                                Ready to quit: Not Answered  Counseling given: Not Answered      Vital Signs (Current): There were no vitals filed for this visit.                                            BP Readings from Last 3 Encounters:   01/06/22 110/60   05/05/21 131/72   03/17/21 134/64       NPO Status: Time of last liquid consumption: 0815 (sip water)                        Time of last solid consumption: 1800                        Date of last liquid consumption: 04/06/22                        Date of last solid food consumption: 04/05/22    BMI:   Wt Readings from Last 3 Encounters:   03/31/22 205 lb (93 kg)   01/06/22 205 lb (93 kg)   05/05/21 198 lb (89.8 kg)     There is no height or weight on file to calculate BMI.    CBC:   Lab Results   Component Value Date    WBC 9.4 03/31/2022    RBC 4.60 03/31/2022    HGB 14.1 03/31/2022    HCT 42.4 03/31/2022    MCV 92.2 03/31/2022    RDW 13.1 03/31/2022     03/31/2022       CMP:   Lab Results   Component Value Date     03/31/2022    K 3.6 03/31/2022     03/31/2022    CO2 25 03/31/2022    BUN 22 03/31/2022 CREATININE 0.89 03/31/2022    GFRAA >60 03/31/2022    LABGLOM >60 03/31/2022    GLUCOSE 174 03/31/2022    PROT 6.5 01/06/2022    CALCIUM 9.1 03/31/2022    BILITOT 0.21 01/06/2022    ALKPHOS 57 01/06/2022    AST 17 01/06/2022    ALT 19 01/06/2022       POC Tests: No results for input(s): POCGLU, POCNA, POCK, POCCL, POCBUN, POCHEMO, POCHCT in the last 72 hours. Coags: No results found for: PROTIME, INR, APTT    HCG (If Applicable): No results found for: PREGTESTUR, PREGSERUM, HCG, HCGQUANT     ABGs: No results found for: PHART, PO2ART, MDL3AWJ, MMQ8PCF, BEART, Y2ULXEOX     Type & Screen (If Applicable):  No results found for: LABABO, LABRH    Drug/Infectious Status (If Applicable):  Lab Results   Component Value Date    HEPCAB NONREACTIVE 08/23/2017       COVID-19 Screening (If Applicable): No results found for: COVID19        Anesthesia Evaluation  Patient summary reviewed and Nursing notes reviewed no history of anesthetic complications:   Airway: Mallampati: II  TM distance: >3 FB   Neck ROM: full  Mouth opening: > = 3 FB Dental: normal exam         Pulmonary:normal exam  breath sounds clear to auscultation  (+) COPD: no interval change,  current smoker                           Cardiovascular:    (+) hypertension: no interval change,         Rhythm: regular  Rate: normal                    Neuro/Psych:   (+) neuromuscular disease:, psychiatric history: stable with treatmentdepression/anxiety              ROS comment: Sacro-iliac pain  Neuropathy     GI/Hepatic/Renal:   (+) renal disease: kidney stones,           Endo/Other:    (+) DiabetesType II DM, no interval change, using insulin, : arthritis: OA and no interval change. , . Abdominal:       Abdomen: soft. Vascular: negative vascular ROS. Other Findings:             Anesthesia Plan      general     ASA 3       Induction: intravenous. Anesthetic plan and risks discussed with patient.       Plan discussed with Melquiades Conde MD   4/6/2022

## 2022-04-07 NOTE — PROGRESS NOTES
CLINICAL PHARMACY NOTE: MEDS TO BEDS    Total # of Prescriptions Filled: 3   The following medications were delivered to the patient:  · Oxybutynin er 10mg  · Tamsulosin 0.4mg  · Tramadol 50mg    Additional Documentation:

## 2022-04-08 NOTE — PROGRESS NOTES
DAY OF SURGERY/PROCEDURE  GUIDELINES    As a patient at the [facility], you can expect quality medical and nursing care that is centered on your individual needs. It is our goal to make your surgical experience as comfortable and excellent as possible.  ________________________________________________________________________    The following instructions are general guidelines, if any information on this sheet is different from what your doctor has instructed you to do, please follow your doctor's instructions. · Please arrive on 4/20 @ 1000am      · Enter through entrance C. Check in at registration     · Upon arrival you will be taken to the pre-operative area to get ready for surgery, your family will stay in the waiting room and visit with you once you are ready for surgery. Due to special limitations please limit visitation to 1-2 members of your family at a time. When it is time for surgery your family will return to the waiting room. · Nothing to eat, drink, smoke, suck or chew after midnight (no water, gum, mints, cigarettes, cigars, pipes, snuff, chewing tobacco, etc.) or your surgery may be canceled. · Take a shower or bath on the morning of your surgery/procedure (Hibiclens if directed)    · Brush your teeth, but do not swallow any water    · IN CASE OF ILLNESS - If you have a cold or flu symptoms (high fever, runny nose, sore throat, cough, etc.) rash, nausea, vomiting, loose stools, and/or recent contact with someone who has a contagious disease (chick pox, measles, etc.) please call your doctor before coming to the surgery center    · Take a small sip of water with heart, blood pressure, and/or seizure medication the morning of surgery.      · If applicable bring your:  · Inhaler (s)  · Hearing aid(s)  · Eyeglasses and Case (If you wear contacts they have to be removed before surgery, bring case and solution)  · CPAP     · DO NOT take anticoagulants (blood thinners, aspirin or aspirin-containing products) as instructed by your physician. · DO NOT take any diabetic pills or insulin morning of your surgery. · Wear loose, comfortable clothing that is easy to put on and take off. They will remain in post-op with the nurse. · If you will be returning home the same day as your surgery, you will need to have a responsible adult (25years of age or older) present to drive you home. You will need someone stay with you at home for the first 24 hours following your surgery. This is due to the anesthesia and the medication given to you during surgery and recovery.

## 2022-04-18 ENCOUNTER — ANESTHESIA EVENT (OUTPATIENT)
Dept: OPERATING ROOM | Age: 72
DRG: 854 | End: 2022-04-18
Payer: MEDICARE

## 2022-04-20 ENCOUNTER — HOSPITAL ENCOUNTER (OUTPATIENT)
Age: 72
Setting detail: OUTPATIENT SURGERY
Discharge: HOME | DRG: 854 | End: 2022-04-20
Attending: SPECIALIST | Admitting: SPECIALIST
Payer: MEDICARE

## 2022-04-20 ENCOUNTER — ANESTHESIA (OUTPATIENT)
Dept: OPERATING ROOM | Age: 72
DRG: 854 | End: 2022-04-20
Payer: MEDICARE

## 2022-04-20 ENCOUNTER — APPOINTMENT (OUTPATIENT)
Dept: GENERAL RADIOLOGY | Age: 72
DRG: 854 | End: 2022-04-20
Attending: SPECIALIST
Payer: MEDICARE

## 2022-04-20 VITALS — TEMPERATURE: 98.8 F | SYSTOLIC BLOOD PRESSURE: 120 MMHG | OXYGEN SATURATION: 100 % | DIASTOLIC BLOOD PRESSURE: 69 MMHG

## 2022-04-20 VITALS
SYSTOLIC BLOOD PRESSURE: 120 MMHG | BODY MASS INDEX: 25.28 KG/M2 | HEIGHT: 74 IN | TEMPERATURE: 97.9 F | RESPIRATION RATE: 14 BRPM | HEART RATE: 88 BPM | OXYGEN SATURATION: 98 % | WEIGHT: 197 LBS | DIASTOLIC BLOOD PRESSURE: 70 MMHG

## 2022-04-20 LAB — GLUCOSE BLD-MCNC: 184 MG/DL (ref 75–110)

## 2022-04-20 PROCEDURE — 2580000003 HC RX 258: Performed by: ANESTHESIOLOGY

## 2022-04-20 PROCEDURE — 7100000010 HC PHASE II RECOVERY - FIRST 15 MIN: Performed by: SPECIALIST

## 2022-04-20 PROCEDURE — 7100000011 HC PHASE II RECOVERY - ADDTL 15 MIN: Performed by: SPECIALIST

## 2022-04-20 PROCEDURE — 7100000001 HC PACU RECOVERY - ADDTL 15 MIN: Performed by: SPECIALIST

## 2022-04-20 PROCEDURE — 6360000002 HC RX W HCPCS: Performed by: NURSE ANESTHETIST, CERTIFIED REGISTERED

## 2022-04-20 PROCEDURE — 2709999900 HC NON-CHARGEABLE SUPPLY: Performed by: SPECIALIST

## 2022-04-20 PROCEDURE — 0T778DZ DILATION OF LEFT URETER WITH INTRALUMINAL DEVICE, VIA NATURAL OR ARTIFICIAL OPENING ENDOSCOPIC: ICD-10-PCS | Performed by: SPECIALIST

## 2022-04-20 PROCEDURE — 3700000001 HC ADD 15 MINUTES (ANESTHESIA): Performed by: SPECIALIST

## 2022-04-20 PROCEDURE — 3700000000 HC ANESTHESIA ATTENDED CARE: Performed by: SPECIALIST

## 2022-04-20 PROCEDURE — 6370000000 HC RX 637 (ALT 250 FOR IP)

## 2022-04-20 PROCEDURE — 7100000000 HC PACU RECOVERY - FIRST 15 MIN: Performed by: SPECIALIST

## 2022-04-20 PROCEDURE — 0TP98DZ REMOVAL OF INTRALUMINAL DEVICE FROM URETER, VIA NATURAL OR ARTIFICIAL OPENING ENDOSCOPIC: ICD-10-PCS | Performed by: SPECIALIST

## 2022-04-20 PROCEDURE — C1769 GUIDE WIRE: HCPCS | Performed by: SPECIALIST

## 2022-04-20 PROCEDURE — C2617 STENT, NON-COR, TEM W/O DEL: HCPCS | Performed by: SPECIALIST

## 2022-04-20 PROCEDURE — 3600000003 HC SURGERY LEVEL 3 BASE: Performed by: SPECIALIST

## 2022-04-20 PROCEDURE — 3209999900 FLUORO FOR SURGICAL PROCEDURES

## 2022-04-20 PROCEDURE — C1758 CATHETER, URETERAL: HCPCS | Performed by: SPECIALIST

## 2022-04-20 PROCEDURE — 3600000013 HC SURGERY LEVEL 3 ADDTL 15MIN: Performed by: SPECIALIST

## 2022-04-20 PROCEDURE — 2720000010 HC SURG SUPPLY STERILE: Performed by: SPECIALIST

## 2022-04-20 PROCEDURE — 2500000003 HC RX 250 WO HCPCS: Performed by: NURSE ANESTHETIST, CERTIFIED REGISTERED

## 2022-04-20 PROCEDURE — 82947 ASSAY GLUCOSE BLOOD QUANT: CPT

## 2022-04-20 PROCEDURE — 0TC18ZZ EXTIRPATION OF MATTER FROM LEFT KIDNEY, VIA NATURAL OR ARTIFICIAL OPENING ENDOSCOPIC: ICD-10-PCS | Performed by: SPECIALIST

## 2022-04-20 DEVICE — STENT URET 6FR L28CM PERCFLX HYDR+ DBL PGTL THRD 2: Type: IMPLANTABLE DEVICE | Site: URETER | Status: FUNCTIONAL

## 2022-04-20 RX ORDER — SODIUM CHLORIDE, SODIUM LACTATE, POTASSIUM CHLORIDE, CALCIUM CHLORIDE 600; 310; 30; 20 MG/100ML; MG/100ML; MG/100ML; MG/100ML
INJECTION, SOLUTION INTRAVENOUS CONTINUOUS
Status: DISCONTINUED | OUTPATIENT
Start: 2022-04-20 | End: 2022-04-20 | Stop reason: HOSPADM

## 2022-04-20 RX ORDER — OXYCODONE HYDROCHLORIDE AND ACETAMINOPHEN 5; 325 MG/1; MG/1
TABLET ORAL
Status: COMPLETED
Start: 2022-04-20 | End: 2022-04-20

## 2022-04-20 RX ORDER — DEXAMETHASONE SODIUM PHOSPHATE 10 MG/ML
INJECTION, SOLUTION INTRAMUSCULAR; INTRAVENOUS PRN
Status: DISCONTINUED | OUTPATIENT
Start: 2022-04-20 | End: 2022-04-20 | Stop reason: SDUPTHER

## 2022-04-20 RX ORDER — SODIUM CHLORIDE, SODIUM LACTATE, POTASSIUM CHLORIDE, CALCIUM CHLORIDE 600; 310; 30; 20 MG/100ML; MG/100ML; MG/100ML; MG/100ML
INJECTION, SOLUTION INTRAVENOUS CONTINUOUS PRN
Status: DISCONTINUED | OUTPATIENT
Start: 2022-04-20 | End: 2022-04-20

## 2022-04-20 RX ORDER — FENTANYL CITRATE 50 UG/ML
INJECTION, SOLUTION INTRAMUSCULAR; INTRAVENOUS PRN
Status: DISCONTINUED | OUTPATIENT
Start: 2022-04-20 | End: 2022-04-20 | Stop reason: SDUPTHER

## 2022-04-20 RX ORDER — OXYCODONE HYDROCHLORIDE AND ACETAMINOPHEN 5; 325 MG/1; MG/1
1 TABLET ORAL
Status: COMPLETED | OUTPATIENT
Start: 2022-04-20 | End: 2022-04-20

## 2022-04-20 RX ORDER — SODIUM CHLORIDE 0.9 % (FLUSH) 0.9 %
10 SYRINGE (ML) INJECTION PRN
Status: DISCONTINUED | OUTPATIENT
Start: 2022-04-20 | End: 2022-04-20 | Stop reason: HOSPADM

## 2022-04-20 RX ORDER — SODIUM CHLORIDE 9 MG/ML
25 INJECTION, SOLUTION INTRAVENOUS PRN
Status: DISCONTINUED | OUTPATIENT
Start: 2022-04-20 | End: 2022-04-20 | Stop reason: HOSPADM

## 2022-04-20 RX ORDER — ONDANSETRON 2 MG/ML
INJECTION INTRAMUSCULAR; INTRAVENOUS PRN
Status: DISCONTINUED | OUTPATIENT
Start: 2022-04-20 | End: 2022-04-20 | Stop reason: SDUPTHER

## 2022-04-20 RX ORDER — CEFAZOLIN SODIUM 1 G/3ML
INJECTION, POWDER, FOR SOLUTION INTRAMUSCULAR; INTRAVENOUS PRN
Status: DISCONTINUED | OUTPATIENT
Start: 2022-04-20 | End: 2022-04-20 | Stop reason: SDUPTHER

## 2022-04-20 RX ORDER — SODIUM CHLORIDE 9 MG/ML
INJECTION, SOLUTION INTRAVENOUS CONTINUOUS
Status: DISCONTINUED | OUTPATIENT
Start: 2022-04-20 | End: 2022-04-20 | Stop reason: HOSPADM

## 2022-04-20 RX ORDER — PROPOFOL 10 MG/ML
INJECTION, EMULSION INTRAVENOUS PRN
Status: DISCONTINUED | OUTPATIENT
Start: 2022-04-20 | End: 2022-04-20 | Stop reason: SDUPTHER

## 2022-04-20 RX ORDER — GLYCOPYRROLATE 1 MG/5 ML
SYRINGE (ML) INTRAVENOUS PRN
Status: DISCONTINUED | OUTPATIENT
Start: 2022-04-20 | End: 2022-04-20 | Stop reason: SDUPTHER

## 2022-04-20 RX ORDER — KETOROLAC TROMETHAMINE 30 MG/ML
INJECTION, SOLUTION INTRAMUSCULAR; INTRAVENOUS PRN
Status: DISCONTINUED | OUTPATIENT
Start: 2022-04-20 | End: 2022-04-20 | Stop reason: SDUPTHER

## 2022-04-20 RX ORDER — SODIUM CHLORIDE 0.9 % (FLUSH) 0.9 %
10 SYRINGE (ML) INJECTION EVERY 12 HOURS SCHEDULED
Status: DISCONTINUED | OUTPATIENT
Start: 2022-04-20 | End: 2022-04-20 | Stop reason: HOSPADM

## 2022-04-20 RX ADMIN — KETOROLAC TROMETHAMINE 15 MG: 30 INJECTION, SOLUTION INTRAMUSCULAR at 11:18

## 2022-04-20 RX ADMIN — PHENYLEPHRINE HYDROCHLORIDE 100 MCG: 10 INJECTION INTRAVENOUS at 11:14

## 2022-04-20 RX ADMIN — Medication 0.2 MG: at 10:45

## 2022-04-20 RX ADMIN — CEFAZOLIN 2000 MG: 1 INJECTION, POWDER, FOR SOLUTION INTRAMUSCULAR; INTRAVENOUS at 10:51

## 2022-04-20 RX ADMIN — FENTANYL CITRATE 50 MCG: 50 INJECTION, SOLUTION INTRAMUSCULAR; INTRAVENOUS at 11:01

## 2022-04-20 RX ADMIN — ONDANSETRON 4 MG: 2 INJECTION INTRAMUSCULAR; INTRAVENOUS at 11:15

## 2022-04-20 RX ADMIN — OXYCODONE HYDROCHLORIDE AND ACETAMINOPHEN 1 TABLET: 5; 325 TABLET ORAL at 12:34

## 2022-04-20 RX ADMIN — SODIUM CHLORIDE: 9 INJECTION, SOLUTION INTRAVENOUS at 10:41

## 2022-04-20 RX ADMIN — PHENYLEPHRINE HYDROCHLORIDE 100 MCG: 10 INJECTION INTRAVENOUS at 10:57

## 2022-04-20 RX ADMIN — PROPOFOL 200 MG: 10 INJECTION, EMULSION INTRAVENOUS at 10:45

## 2022-04-20 RX ADMIN — DEXAMETHASONE SODIUM PHOSPHATE 10 MG: 10 INJECTION INTRAMUSCULAR; INTRAVENOUS at 10:45

## 2022-04-20 RX ADMIN — FENTANYL CITRATE 50 MCG: 50 INJECTION, SOLUTION INTRAMUSCULAR; INTRAVENOUS at 10:45

## 2022-04-20 ASSESSMENT — PAIN - FUNCTIONAL ASSESSMENT: PAIN_FUNCTIONAL_ASSESSMENT: 0-10

## 2022-04-20 ASSESSMENT — PULMONARY FUNCTION TESTS
PIF_VALUE: 0
PIF_VALUE: 1
PIF_VALUE: 12
PIF_VALUE: 7
PIF_VALUE: 13
PIF_VALUE: 0
PIF_VALUE: 0
PIF_VALUE: 11
PIF_VALUE: 14
PIF_VALUE: 5
PIF_VALUE: 0
PIF_VALUE: 13
PIF_VALUE: 0
PIF_VALUE: 12
PIF_VALUE: 13
PIF_VALUE: 11
PIF_VALUE: 11
PIF_VALUE: 0
PIF_VALUE: 11
PIF_VALUE: 14
PIF_VALUE: 13
PIF_VALUE: 13
PIF_VALUE: 15
PIF_VALUE: 0
PIF_VALUE: 14
PIF_VALUE: 10
PIF_VALUE: 13
PIF_VALUE: 13
PIF_VALUE: 14
PIF_VALUE: 0
PIF_VALUE: 14
PIF_VALUE: 9
PIF_VALUE: 13
PIF_VALUE: 13
PIF_VALUE: 24
PIF_VALUE: 12
PIF_VALUE: 0
PIF_VALUE: 4
PIF_VALUE: 11
PIF_VALUE: 11
PIF_VALUE: 12
PIF_VALUE: 13
PIF_VALUE: 13
PIF_VALUE: 0
PIF_VALUE: 4
PIF_VALUE: 13
PIF_VALUE: 9
PIF_VALUE: 0
PIF_VALUE: 3
PIF_VALUE: 13
PIF_VALUE: 12
PIF_VALUE: 12
PIF_VALUE: 13
PIF_VALUE: 0
PIF_VALUE: 14
PIF_VALUE: 13

## 2022-04-20 ASSESSMENT — LIFESTYLE VARIABLES: SMOKING_STATUS: 1

## 2022-04-20 ASSESSMENT — PAIN SCALES - GENERAL
PAINLEVEL_OUTOF10: 3

## 2022-04-20 ASSESSMENT — PAIN DESCRIPTION - LOCATION: LOCATION: ABDOMEN

## 2022-04-20 ASSESSMENT — PAIN DESCRIPTION - DESCRIPTORS: DESCRIPTORS: ACHING;DISCOMFORT

## 2022-04-20 ASSESSMENT — COPD QUESTIONNAIRES: CAT_SEVERITY: NO INTERVAL CHANGE

## 2022-04-20 NOTE — ANESTHESIA PRE PROCEDURE
Department of Anesthesiology  Preprocedure Note       Name:  Suzie Brower   Age:  67 y.o.  :  1950                                          MRN:  [id number]         Date:  2022      Surgeon: Milly Zuniga):  Meron Lloyd MD    Procedure: Procedure(s):  CYSTOSCOPY URETEROSCOPY LEFT STENT EXCHANGE WITH STONE BASKETING AND possible HOLMIUM LASER    Medications prior to admission:   Prior to Admission medications    Medication Sig Start Date End Date Taking? Authorizing Provider   dapagliflozin (FARXIGA) 5 MG tablet Take 1 tablet by mouth every morning  Patient not taking: Reported on 3/31/2022 1/11/22 1/11/23  BREANNA Maya NP   tamsulosin [facility]) 0.4 MG capsule Take 1 capsule by mouth daily Take one capsule daily to facilitate passage of ureteral stone 22   Meron Lloyd MD   oxybutynin (DITROPAN-XL) 10 MG extended release tablet Take 1 tablet by mouth daily 22   Meron Lloyd MD   canagliflozin [facility]) 300 MG TABS tablet Take 1 tablet by mouth every morning (before breakfast)  Patient not taking: Reported on 3/31/2022 1/6/22 1/6/23  BREANNA Maya NP   gabapentin (NEURONTIN) 300 MG capsule Take 1 capsule by mouth 2 times daily for 180 days.  Intended supply: 90 days  Patient not taking: Reported on 3/31/2022 1/6/22 7/5/22  BREANNA Maya NP   insulin detemir (LEVEMIR FLEXTOUCH) 100 UNIT/ML injection pen Inject 63 Units into the skin nightly 22  BREANNA Maya NP   metFORMIN (GLUCOPHAGE) 1000 MG tablet Take 1 tablet by mouth 2 times daily (with meals) 21  BREANNA Maya NP   lisinopril (PRINIVIL;ZESTRIL) 10 MG tablet Take 1 tablet by mouth daily 21  BREANNA Maya NP   chlorthalidone (HYGROTON) 25 MG tablet Take 1 tablet by mouth daily 21  BREANNA Maya NP   pioglitazone (ACTOS) 30 MG tablet Take 1 tablet by mouth daily 21  BREANNA Maya NP   celecoxib (CELEBREX) 200 MG capsule Take 1 capsule by mouth daily 9/19/21 9/14/22  BREANNA Rand NP   DULoxetine (CYMBALTA) 30 MG extended release capsule Take 1 capsule by mouth 2 times daily 9/19/21 9/19/22  BREANNA Rand NP   blood glucose test strips [facility] VERIO) strip Test three times daily and as needed 4/20/21 8/10/23  BREANNA Rand NP   Insulin Pen Needle 31G X 5 MM MISC 1 each by Does not apply route daily 4/14/21 4/14/22  BREANNA Rand NP   Lancets MISC Test twice a day and as needed, Please send lancets for Verio brand meter 1/4/16 3/17/22  BREANNA Menendez CNP   multivitamin [facility]) per tablet Take 1 tablet by mouth daily.     Historical Provider, MD       Current medications:    Current Facility-Administered Medications   Medication Dose Route Frequency Provider Last Rate Last Admin    0.9 % sodium chloride infusion   IntraVENous Continuous Juwan Estrada MD        lactated ringers infusion   IntraVENous Continuous Juwan Estrada MD        sodium chloride flush 0.9 % injection 10 mL  10 mL IntraVENous 2 times per day Juwan Estrada MD        sodium chloride flush 0.9 % injection 10 mL  10 mL IntraVENous PRN Juwan Estrada MD        0.9 % sodium chloride infusion  25 mL IntraVENous PRN Juwan Estrada MD           Allergies:  No Known Allergies    Problem List:    Patient Active Problem List   Diagnosis Code    Essential hypertension I10    Osteoarthritis M19.90    Vitamin D deficiency E55.9    Testosterone deficiency E34.9    Difficulty sleeping G47.9    Sacro-iliac pain M53.3    Neuropathy G62.9    Nocturia R35.1    Benign prostatic hyperplasia with urinary obstruction N40.1, N13.8    Type 2 diabetes mellitus with diabetic polyneuropathy, with long-term current use of insulin (HCC) E11.42, Z79.4    Kidney stone on left side N20.0    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    Personal history of kidney stones Z87.442    Moderate episode of recurrent major depressive disorder ([facility] [address] F33.1    Epiretinal membrane H35.379    Renal cyst, left N28.1       Past Medical History:        Diagnosis Date    Bladder stone     BPH with obstruction/lower urinary tract symptoms     Caffeine use     4 coffee/2 soda per day    Cataracts, bilateral     Chronic back pain     COPD (chronic obstructive pulmonary disease) ([address]     Hypertension     Irritable bowel syndrome     Kidney stone     Osteoarthritis     Pancreatitis     Type II or unspecified type diabetes mellitus without mention of complication, not stated as uncontrolled        Past Surgical History:        Procedure Laterality Date    BACK SURGERY      BLADDER SURGERY      CHOLECYSTECTOMY      CYSTOSCOPY  04/06/2022    CYSTOSCOPY URETEROSCOPY HOLMIUM LASER LEFT URETERAL STENT INSERTION - Left    CYSTOSCOPY Left 4/6/2022    CYSTOSCOPY URETEROSCOPY HOLMIUM LASER LEFT URETERAL STENT INSERTION performed by Cookie Spatz, MD at [address] [place] [address]. [address]  2015    macular scrape    EYE SURGERY      LITHOTRIPSY      9-21-17    OTHER SURGICAL HISTORY      implanted nerve stimulater in lumbar back    SPINE SURGERY  05/18/2010    DR. Billy Carrasquillo ([place])    URETEROSCOPY      with string stent 9-21-17    VASECTOMY         Social History:    Social History     Tobacco Use    Smoking status: Current Every Day Smoker     Packs/day: 1.00     Years: 40.00     Pack years: 40.00     Types: Cigarettes     Start date: 3/3/1980    Smokeless tobacco: Former User   Substance Use Topics    Alcohol use: Not Currently     Comment: occasional                                Ready to quit: Not Answered  Counseling given: Not Answered      Vital Signs (Current): There were no vitals filed for this visit.                                            BP Readings from Last 3 Encounters:   04/06/22 116/64   04/06/22 113/70   01/06/22 110/60       NPO Status:                                                                                 BMI:   Wt Readings from Last 3 Encounters:   04/06/22 196 lb (88.9 kg)   03/31/22 205 lb (93 kg)   01/06/22 205 lb (93 kg)     There is no height or weight on file to calculate BMI.    CBC:   Lab Results   Component Value Date    WBC 9.4 03/31/2022    RBC 4.60 03/31/2022    HGB 14.1 03/31/2022    HCT 42.4 03/31/2022    MCV 92.2 03/31/2022    RDW 13.1 03/31/2022     03/31/2022       CMP:   Lab Results   Component Value Date     03/31/2022    K 3.6 03/31/2022     03/31/2022    CO2 25 03/31/2022    BUN 22 03/31/2022    CREATININE 0.89 03/31/2022    GFRAA >60 03/31/2022    LABGLOM >60 03/31/2022    GLUCOSE 174 03/31/2022    PROT 6.5 01/06/2022    CALCIUM 9.1 03/31/2022    BILITOT 0.21 01/06/2022    ALKPHOS 57 01/06/2022    AST 17 01/06/2022    ALT 19 01/06/2022       POC Tests: No results for input(s): POCGLU, POCNA, POCK, POCCL, POCBUN, POCHEMO, POCHCT in the last 72 hours.     Coags: No results found for: PROTIME, INR, APTT    HCG (If Applicable): No results found for: PREGTESTUR, PREGSERUM, HCG, HCGQUANT     ABGs: No results found for: PHART, PO2ART, PLR7SHH, VFA8UOE, BEART, C5SELOOD     Type & Screen (If Applicable):  No results found for: LABABO, LABRH    Drug/Infectious Status (If Applicable):  Lab Results   Component Value Date    HEPCAB NONREACTIVE 08/23/2017       COVID-19 Screening (If Applicable): No results found for: COVID19        Anesthesia Evaluation  Patient summary reviewed and Nursing notes reviewed no history of anesthetic complications:   Airway: Mallampati: II  TM distance: >3 FB   Neck ROM: full  Mouth opening: > = 3 FB Dental: normal exam         Pulmonary:normal exam  breath sounds clear to auscultation  (+) COPD: no interval change,  current smoker                           Cardiovascular:    (+) hypertension: no interval change,       ECG reviewed  Rhythm: regular  Rate: normal                    Neuro/Psych:   (+) neuromuscular disease:, psychiatric history: stable with treatmentdepression/anxiety              ROS comment: Neuropathy GI/Hepatic/Renal:   (+) renal disease: kidney stones,           Endo/Other:    (+) DiabetesType II DM, no interval change, , : arthritis: OA and no interval change. , . Abdominal:       Abdomen: soft. Vascular: negative vascular ROS. Other Findings:           Anesthesia Plan      general     ASA 3     (LMA)  Induction: intravenous. Anesthetic plan and risks discussed with patient. Plan discussed with CRNA.                   Alfredito Jansen MD   4/20/2022

## 2022-04-20 NOTE — OP NOTE
Operative Note      Patient: Luis A Han  YOB: 1950  MRN: [id number]    Date of Procedure: 4/20/2022    Pre-Op Diagnosis: LEFT KIDNEY STONE    Post-Op Diagnosis: Same       Procedure(s):  CYSTOSCOPY URETEROSCOPY LEFT STENT EXCHANGE WITH STONE BASKETING AND possible HOLMIUM LASER    Surgeon(s):  Jarvis Beverly MD    Assistant:   * No surgical staff found *    Anesthesia: General    Estimated Blood Loss (mL): Minimal    Complications: None    Specimens:   * No specimens in log *    Implants:  * No implants in log *      Drains: * No LDAs found *    Findings: Patient's lower pole kidney stones broke up nicely and flushed out of the lower pole to facilitate passage. Detailed Description of Procedure:   INDICATIONS:  This is a 67year old male who presents today for cystoscopy with left ureteroscopy and holmium laser lithotripsy and stent exchange for a large left lower pole kidney stone. This is the second stage of a planned staged procedure. After risks, benefits and alternatives of the procedure were discussed with the patient, informed consent was obtained and the patient elected to proceed. The patient was given Ancef 2 gm IV on call to OR for antibiotic prophylaxis. Patient had EPC cuffs placed for VTE prophylaxis. DETAILS OF PROCEDURE:  After general anesthesia induction, the patient was placed in dorsolithotomy position and then prepped and draped in the usual sterile fashion . A time-out was performed to confirm patient identity and procedure. We began by inserting a cystoscope with a 22 French sheath and 30 degree lens into the patient's urethral meatus and advancing into the bladder without complication. A pan cystoscopy was preformed and the bladder appeared unremarkable. We then focused our attention on the Left ureteral orifice and removed the left ureteral stent with a grasper.   A glidewire was passed through the stent and advanced up to the renal pelvis under fluoroscopic guidance. A second glidewire was placed using a dual lumen catheter. Once in good position, we advanced our flexible ureteroscope along side the glidewire to the ureteral calculus under direct visualization. Using the 200 micron holmium laser fiber we fragmented the calculus. The lower pole fragments were flushed out of the lower pole with irrigation to facilitate passage. Any remaining fragments appeared to be under 1 millimeter and could pass easily. We withdrew the ureteroscope. No damage to the ureter was identified. At this time, we placed a [id number] double J ureteral stent with string over the glidewire and up to the kidney in the usual fashion, and we noted appropriate placement in the upper collecting system using fluoroscopy. There was a good curl noted in the bladder. We decided to leave a string at the end of the stent, which was attached with benzoin and steri-strips. The patient's bladder was drained and removed the scope and the procedure was subsequently terminated. The patient was taken to recovery in good condition.      Plan:  Discharge home from recovery  The patient instructed to remove the stent with string in 5 days        Electronically signed by Armida Hui MD on 4/20/2022 at 10:23 AM

## 2022-04-20 NOTE — ANESTHESIA POSTPROCEDURE EVALUATION
POST- ANESTHESIA EVALUATION       Pt Name: Suzan Frias  MRN: [id number]  YOB: 1950  Date of evaluation: 4/20/2022  Time:  1:31 PM      /70   Pulse 88   Temp 97.9 °F (36.6 °C)   Resp 14   Ht 6' 2\" (1.88 m)   Wt 197 lb (89.4 kg)   SpO2 98%   BMI 25.29 kg/m²      Consciousness Level  Awake  Cardiopulmonary Status  Stable  Pain Adequately Treated YES  Nausea / Vomiting  NO  Adequate Hydration  YES  Anesthesia Related Complications NONE      Electronically signed by Park Orona MD on 4/20/2022 at 1:31 PM       Department of Anesthesiology  Postprocedure Note    Patient: Suzan Frias  MRN: [id number]  YOB: 1950  Date of evaluation: 4/20/2022  Time:  1:31 PM     Procedure Summary     Date: 04/20/22 Room / Location: [facility] OR [address] [place] [address]    Anesthesia Start: 1041 Anesthesia Stop: 1137    Procedure: CYSTOSCOPY URETEROSCOPY LEFT STENT PLACEMENT WITH STONE BASKETING AND HOLMIUM LASER (Left Ureter) Diagnosis: (LEFT KIDNEY STONE)    Surgeons: Sue Santos MD Responsible Provider: Park Orona MD    Anesthesia Type: general ASA Status: 3          Anesthesia Type: general    Hina Phase I: Hina Score: 10    Hina Phase II: Hina Score: 10    Last vitals: Reviewed and per EMR flowsheets.        Anesthesia Post Evaluation

## 2022-04-20 NOTE — H&P
Julio Buerger Bernardino Ream, MD [facility]    History and Physical    Patient:  Paula Seaman  MRN: [id number]  YOB: 1950    CHIEF COMPLAINT:  Left kidney stone     HISTORY OF PRESENT ILLNESS:   The patient is a 67 y.o. male who presents with a recent episoded of gross hematuria. His 3/15/22 urine C&S was negative. His 3/22/22 CT urogram shows a Left renal cyst of no clinical significance. Patient has a 1.2 cm Left lower pole kidney stone. He will require a Cystoscopy, Left ureteroscopy and Holmium laser lithotripsy with ureteral stent insertion under GA. Summary of old records:   Goes by \"Miguel\"  Proteus UTI 9/6/16 and 9/26/16 Proteus mirabilis; 9/26/16 PVR = 10 mL  BPH with terminal dribbling; 10/5/16 cysto-lateral BPH; uroflow: 193 mL, 17.9/9.5 mL/sec  Left renal calculi on 9/30/16 CT and 10/3/16 KUB; Left ureteroscopy and HLL 10/14/16; 1/16/17 KUB 7 mm left lower pole KS; 9/6/17 KUB: 9 mm, 9/21/17 HLL; 10/23/17 Litholink: vol=1.95, Gj=441, It=475; 5/30/18, 5/8/19, 5/27/20 KUB negative  Hypercalciuria with normal serum Ca, PTH; 11/15/17 chlorthalidone 25 mg qd  Gross hematuria on 3/14/22, 3/21/22 CT urogram, 1.2 cm L lower pole KS, L renal cyst; 3/31/22 cysto neg  Patient's old records, notes and chart reviewed and summarized above.      Past Medical History:    Past Medical History:   Diagnosis Date    Bladder stone     BPH with obstruction/lower urinary tract symptoms     Caffeine use     4 coffee/2 soda per day    Cataracts, bilateral     Chronic back pain     COPD (chronic obstructive pulmonary disease) ([address]     Hypertension     Irritable bowel syndrome     Kidney stone     Osteoarthritis     Pancreatitis     Type II or unspecified type diabetes mellitus without mention of complication, not stated as uncontrolled        Past Surgical History:    Past Surgical History:   Procedure Laterality Date    BACK SURGERY      BLADDER SURGERY      CHOLECYSTECTOMY      CYSTOSCOPY  04/06/2022    CYSTOSCOPY URETEROSCOPY HOLMIUM LASER LEFT URETERAL STENT INSERTION - Left    CYSTOSCOPY Left 4/6/2022    CYSTOSCOPY URETEROSCOPY HOLMIUM LASER LEFT URETERAL STENT INSERTION performed by Leia Ingram MD at [address]  2015    macular scrape    EYE SURGERY      LITHOTRIPSY      9-21-17    OTHER SURGICAL HISTORY      implanted nerve stimulater in lumbar back    SPINE SURGERY  05/18/2010    DR. Eddie Carrasquillo ([place])    URETEROSCOPY      with string stent 9-21-17    VASECTOMY       Previous Urologic Surgery:  see above  Medications Prior to Admission:    Prior to Admission medications    Medication Sig Start Date End Date Taking? Authorizing Provider   dapagliflozin (FARXIGA) 5 MG tablet Take 1 tablet by mouth every morning  Patient not taking: Reported on 3/31/2022 1/11/22 1/11/23  BREANNA Nagel NP   tamsulosin [facility]) 0.4 MG capsule Take 1 capsule by mouth daily Take one capsule daily to facilitate passage of ureteral stone 4/6/22   Leia Ingram MD   oxybutynin (DITROPAN-XL) 10 MG extended release tablet Take 1 tablet by mouth daily 4/6/22   Leia Ingram MD   canagliflozin [facility]) 300 MG TABS tablet Take 1 tablet by mouth every morning (before breakfast)  Patient not taking: Reported on 3/31/2022 1/6/22 1/6/23  BREANNA Nagel NP   gabapentin (NEURONTIN) 300 MG capsule Take 1 capsule by mouth 2 times daily for 180 days.  Intended supply: 90 days  Patient not taking: Reported on 3/31/2022 1/6/22 7/5/22  BREANNA Nagel NP   insulin detemir (LEVEMIR FLEXTOUCH) 100 UNIT/ML injection pen Inject 63 Units into the skin nightly 1/5/22 1/5/23  BREANNA Nagel NP   metFORMIN (GLUCOPHAGE) 1000 MG tablet Take 1 tablet by mouth 2 times daily (with meals) 9/19/21 9/19/22  BREANNA Nagel NP   lisinopril (PRINIVIL;ZESTRIL) 10 MG tablet Take 1 tablet by mouth daily 9/19/21 9/19/22  BREANNA Nagel NP   chlorthalidone (HYGROTON) 25 MG tablet Take 1 tablet by mouth daily 9/19/21 9/19/22  BREANNA Rand NP   pioglitazone (ACTOS) 30 MG tablet Take 1 tablet by mouth daily 9/19/21 9/14/22  BREANNA Rand NP   celecoxib (CELEBREX) 200 MG capsule Take 1 capsule by mouth daily 9/19/21 9/14/22  BREANNA Rand NP   DULoxetine (CYMBALTA) 30 MG extended release capsule Take 1 capsule by mouth 2 times daily 9/19/21 9/19/22  BREANNA Rand NP   blood glucose test strips [facility] VERIO) strip Test three times daily and as needed 4/20/21 8/10/23  BREANNA Rand NP   Insulin Pen Needle 31G X 5 MM MISC 1 each by Does not apply route daily 4/14/21 4/14/22  BREANNA Rand NP   Lancets MISC Test twice a day and as needed, Please send lancets for Verio brand meter 1/4/16 3/17/22  BREANNA Menendez CNP   multivitamin [facility]) per tablet Take 1 tablet by mouth daily. Historical Provider, MD       Allergies:  Patient has no known allergies.     Social History:    Social History     Socioeconomic History    Marital status:      Spouse name: Not on file    Number of children: Not on file    Years of education: Not on file    Highest education level: Not on file   Occupational History    Not on file   Tobacco Use    Smoking status: Current Every Day Smoker     Packs/day: 1.00     Years: 40.00     Pack years: 40.00     Types: Cigarettes     Start date: 3/3/1980    Smokeless tobacco: Former User   Vaping Use    Vaping Use: Former    Quit date: 3/17/2019   Substance and Sexual Activity    Alcohol use: Not Currently     Comment: occasional    Drug use: No    Sexual activity: Not Currently   Other Topics Concern    Not on file   Social History Narrative    Not on file     Social Determinants of Health     Financial Resource Strain: Low Risk     Difficulty of Paying Living Expenses: Not hard at all   Food Insecurity: No Food Insecurity    Worried About [address] [employer] in the Last Year: Never true    [address] in the Last Year: Never true   Transportation Needs:     Lack of Transportation (Medical): Not on file    Lack of Transportation (Non-Medical): Not on file   Physical Activity:     Days of Exercise per Week: Not on file    Minutes of Exercise per Session: Not on file   Stress:     Feeling of Stress : Not on file   Social Connections:     Frequency of Communication with Friends and Family: Not on file    Frequency of Social Gatherings with Friends and Family: Not on file    Attends Yarsanism Services: Not on file    Active Member of Clubs or Organizations: Not on file    Attends Club or Organization Meetings: Not on file    Marital Status: Not on file   Intimate Partner Violence:     Fear of Current or Ex-Partner: Not on file    Emotionally Abused: Not on file    Physically Abused: Not on file    Sexually Abused: Not on file   Housing Stability:     Unable to Pay for Housing in the Last Year: Not on file    Number of [place] in the Last Year: Not on file    Unstable Housing in the Last Year: Not on file     Family History:    Family History   Problem Relation Age of Onset    Arthritis Mother     Cancer Father      Previous Urologic Family history: none  REVIEW OF SYSTEMS:  Constitutional: negative  Eyes: negative  Respiratory: negative  Cardiovascular: negative  Gastrointestinal: negative  Genitourinary: see HPI  Musculoskeletal: negative  Skin: negative   Neurological: negative  Hematological/Lymphatic: negative  Psychological: negative    Physical Exam:      No data found. Constitutional: Patient in no acute distress; Neuro: alert and oriented to person place and time. Psych: Mood and affect normal.  Skin: Normal  Lungs: Respiratory effort normal, CTA  Cardiovascular:  Normal peripheral pulses; R3 wo murmur  Abdomen: Soft, non-tender, non-distended with no CVA, flank pain, hepatosplenomegaly or hernia. LABS:   No results for input(s): WBC, HGB, HCT, MCV, PLT in the last 72 hours.   No results for input(s): NA, K, CL, CO2, PHOS, BUN, CREATININE, CA in the last 72 hours. Lab Results   Component Value Date    PSA 0.50 02/19/2019    PSA 0.61 04/09/2018    PSA 0.82 09/06/2017       Additional Lab/culture results:    Urinalysis: No results for input(s): COLORU, PHUR, LABCAST, WBCUA, RBCUA, MUCUS, TRICHOMONAS, YEAST, BACTERIA, CLARITYU, SPECGRAV, LEUKOCYTESUR, UROBILINOGEN, Daphine McLeod in the last 72 hours. Invalid input(s): NITRATE, GLUCOSEUKETONESUAMORPHOUS     -----------------------------------------------------------------  Imaging Results:    Assessment and Plan   Impression:    Patient Active Problem List   Diagnosis    Essential hypertension    Osteoarthritis    Vitamin D deficiency    Testosterone deficiency    Difficulty sleeping    Sacro-iliac pain    Neuropathy    Nocturia    Benign prostatic hyperplasia with urinary obstruction    Type 2 diabetes mellitus with diabetic polyneuropathy, with long-term current use of insulin (HCC)    Kidney stone on left side    Hypercalciuria    Personal history of kidney stones    Moderate episode of recurrent major depressive disorder ([facility] [address]    Epiretinal membrane    Renal cyst, left       Plan: Cystoscopy, Left ureteroscopy and Holmium laser lithotripsy with ureteral stent insertion under GA.      Rusty Gallardo MD  7:21 AM 4/20/2022

## 2022-04-21 ENCOUNTER — HOSPITAL ENCOUNTER (EMERGENCY)
Age: 72
Discharge: HOME | DRG: 854 | End: 2022-04-22
Attending: EMERGENCY MEDICINE
Payer: MEDICARE

## 2022-04-21 ENCOUNTER — APPOINTMENT (OUTPATIENT)
Dept: CT IMAGING | Age: 72
DRG: 854 | End: 2022-04-21
Payer: MEDICARE

## 2022-04-21 VITALS
WEIGHT: 195 LBS | RESPIRATION RATE: 18 BRPM | OXYGEN SATURATION: 95 % | TEMPERATURE: 100.2 F | DIASTOLIC BLOOD PRESSURE: 65 MMHG | SYSTOLIC BLOOD PRESSURE: 90 MMHG | HEIGHT: 74 IN | HEART RATE: 103 BPM | BODY MASS INDEX: 25.03 KG/M2

## 2022-04-21 DIAGNOSIS — T83.593A: ICD-10-CM

## 2022-04-21 DIAGNOSIS — N30.00 ACUTE CYSTITIS WITHOUT HEMATURIA: Primary | ICD-10-CM

## 2022-04-21 LAB
ABSOLUTE EOS #: 0 K/UL (ref 0–0.4)
ABSOLUTE LYMPH #: 1.1 K/UL (ref 1–4.8)
ABSOLUTE MONO #: 0.5 K/UL (ref 0.1–1.2)
ANION GAP SERPL CALCULATED.3IONS-SCNC: 15 MMOL/L (ref 9–17)
BASOPHILS # BLD: 0 % (ref 0–2)
BASOPHILS ABSOLUTE: 0 K/UL (ref 0–0.2)
BUN BLDV-MCNC: 21 MG/DL (ref 8–23)
CALCIUM SERPL-MCNC: 9 MG/DL (ref 8.6–10.4)
CHLORIDE BLD-SCNC: 91 MMOL/L (ref 98–107)
CO2: 25 MMOL/L (ref 20–31)
CREAT SERPL-MCNC: 1.07 MG/DL (ref 0.7–1.2)
EOSINOPHILS RELATIVE PERCENT: 0 % (ref 1–4)
GFR AFRICAN AMERICAN: >60 ML/MIN
GFR NON-AFRICAN AMERICAN: >60 ML/MIN
GFR SERPL CREATININE-BSD FRML MDRD: ABNORMAL ML/MIN/{1.73_M2}
GLUCOSE BLD-MCNC: 209 MG/DL (ref 70–99)
HCT VFR BLD CALC: 38.1 % (ref 41–53)
HEMOGLOBIN: 12.8 G/DL (ref 13.5–17.5)
LACTIC ACID: 3 MMOL/L (ref 0.5–2.2)
LYMPHOCYTES # BLD: 9 % (ref 24–44)
MCH RBC QN AUTO: 30.4 PG (ref 26–34)
MCHC RBC AUTO-ENTMCNC: 33.7 G/DL (ref 31–37)
MCV RBC AUTO: 90.3 FL (ref 80–100)
MONOCYTES # BLD: 4 % (ref 2–11)
PDW BLD-RTO: 14 % (ref 12.5–15.4)
PLATELET # BLD: 156 K/UL (ref 140–450)
PMV BLD AUTO: 8.3 FL (ref 6–12)
POTASSIUM SERPL-SCNC: 3.2 MMOL/L (ref 3.7–5.3)
RBC # BLD: 4.22 M/UL (ref 4.5–5.9)
SEG NEUTROPHILS: 87 % (ref 36–66)
SEGMENTED NEUTROPHILS ABSOLUTE COUNT: 10.3 K/UL (ref 1.8–7.7)
SODIUM BLD-SCNC: 131 MMOL/L (ref 135–144)
WBC # BLD: 12 K/UL (ref 3.5–11)

## 2022-04-21 PROCEDURE — 80048 BASIC METABOLIC PNL TOTAL CA: CPT

## 2022-04-21 PROCEDURE — 70450 CT HEAD/BRAIN W/O DYE: CPT

## 2022-04-21 PROCEDURE — 83605 ASSAY OF LACTIC ACID: CPT

## 2022-04-21 PROCEDURE — 85025 COMPLETE CBC W/AUTO DIFF WBC: CPT

## 2022-04-21 PROCEDURE — 87186 SC STD MICRODIL/AGAR DIL: CPT

## 2022-04-21 PROCEDURE — 6360000002 HC RX W HCPCS: Performed by: EMERGENCY MEDICINE

## 2022-04-21 PROCEDURE — 36415 COLL VENOUS BLD VENIPUNCTURE: CPT

## 2022-04-21 PROCEDURE — 87040 BLOOD CULTURE FOR BACTERIA: CPT

## 2022-04-21 PROCEDURE — 2580000003 HC RX 258: Performed by: EMERGENCY MEDICINE

## 2022-04-21 PROCEDURE — 87205 SMEAR GRAM STAIN: CPT

## 2022-04-21 PROCEDURE — 87150 DNA/RNA AMPLIFIED PROBE: CPT

## 2022-04-21 PROCEDURE — 87077 CULTURE AEROBIC IDENTIFY: CPT

## 2022-04-21 RX ORDER — KETOROLAC TROMETHAMINE 15 MG/ML
15 INJECTION, SOLUTION INTRAMUSCULAR; INTRAVENOUS ONCE
Status: COMPLETED | OUTPATIENT
Start: 2022-04-21 | End: 2022-04-21

## 2022-04-21 RX ORDER — 0.9 % SODIUM CHLORIDE 0.9 %
1000 INTRAVENOUS SOLUTION INTRAVENOUS ONCE
Status: COMPLETED | OUTPATIENT
Start: 2022-04-21 | End: 2022-04-22

## 2022-04-21 RX ADMIN — SODIUM CHLORIDE 1000 ML: 9 INJECTION, SOLUTION INTRAVENOUS at 23:36

## 2022-04-21 RX ADMIN — KETOROLAC TROMETHAMINE 15 MG: 15 INJECTION, SOLUTION INTRAMUSCULAR; INTRAVENOUS at 23:38

## 2022-04-21 ASSESSMENT — ENCOUNTER SYMPTOMS
ALLERGIC/IMMUNOLOGIC NEGATIVE: 1
EYES NEGATIVE: 1
RESPIRATORY NEGATIVE: 1
GASTROINTESTINAL NEGATIVE: 1

## 2022-04-21 ASSESSMENT — PAIN DESCRIPTION - LOCATION: LOCATION: HEAD

## 2022-04-21 ASSESSMENT — PAIN SCALES - GENERAL
PAINLEVEL_OUTOF10: 5
PAINLEVEL_OUTOF10: 5

## 2022-04-21 ASSESSMENT — PAIN - FUNCTIONAL ASSESSMENT: PAIN_FUNCTIONAL_ASSESSMENT: 0-10

## 2022-04-22 ENCOUNTER — APPOINTMENT (OUTPATIENT)
Dept: GENERAL RADIOLOGY | Age: 72
DRG: 854 | End: 2022-04-22
Payer: MEDICARE

## 2022-04-22 ENCOUNTER — TELEPHONE (OUTPATIENT)
Dept: EMERGENCY DEPT | Age: 72
End: 2022-04-22

## 2022-04-22 ENCOUNTER — HOSPITAL ENCOUNTER (INPATIENT)
Age: 72
LOS: 2 days | Discharge: HOME | DRG: 854 | End: 2022-04-24
Attending: EMERGENCY MEDICINE | Admitting: STUDENT IN AN ORGANIZED HEALTH CARE EDUCATION/TRAINING PROGRAM
Payer: MEDICARE

## 2022-04-22 DIAGNOSIS — N39.0 SEPSIS DUE TO GRAM-NEGATIVE URINARY TRACT INFECTION (HCC): Primary | ICD-10-CM

## 2022-04-22 DIAGNOSIS — A41.50 SEPSIS DUE TO GRAM-NEGATIVE URINARY TRACT INFECTION (HCC): Primary | ICD-10-CM

## 2022-04-22 LAB
-: ABNORMAL
ABSOLUTE EOS #: 0 K/UL (ref 0–0.4)
ABSOLUTE LYMPH #: 0.5 K/UL (ref 1–4.8)
ABSOLUTE MONO #: 1.1 K/UL (ref 0.1–1.2)
ALBUMIN SERPL-MCNC: 3.7 G/DL (ref 3.5–5.2)
ALBUMIN/GLOBULIN RATIO: 1.2 (ref 1–2.5)
ALP BLD-CCNC: 56 U/L (ref 40–129)
ALT SERPL-CCNC: 15 U/L (ref 5–41)
ANION GAP SERPL CALCULATED.3IONS-SCNC: 10 MMOL/L (ref 9–17)
AST SERPL-CCNC: 14 U/L
BACTERIA: ABNORMAL
BASOPHILS # BLD: 0 % (ref 0–2)
BASOPHILS ABSOLUTE: 0 K/UL (ref 0–0.2)
BILIRUB SERPL-MCNC: 0.52 MG/DL (ref 0.3–1.2)
BILIRUBIN URINE: NEGATIVE
BUN BLDV-MCNC: 24 MG/DL (ref 8–23)
CALCIUM SERPL-MCNC: 9 MG/DL (ref 8.6–10.4)
CHLORIDE BLD-SCNC: 94 MMOL/L (ref 98–107)
CO2: 27 MMOL/L (ref 20–31)
COLOR: YELLOW
CREAT SERPL-MCNC: 1 MG/DL (ref 0.7–1.2)
EOSINOPHILS RELATIVE PERCENT: 0 % (ref 1–4)
EPITHELIAL CELLS UA: ABNORMAL /HPF (ref 0–5)
GFR AFRICAN AMERICAN: >60 ML/MIN
GFR NON-AFRICAN AMERICAN: >60 ML/MIN
GFR SERPL CREATININE-BSD FRML MDRD: ABNORMAL ML/MIN/{1.73_M2}
GLUCOSE BLD-MCNC: 185 MG/DL (ref 75–110)
GLUCOSE BLD-MCNC: 223 MG/DL (ref 70–99)
GLUCOSE URINE: NEGATIVE
HCT VFR BLD CALC: 37.6 % (ref 41–53)
HEMOGLOBIN: 12.7 G/DL (ref 13.5–17.5)
INR BLD: 1.1
KETONES, URINE: NEGATIVE
LACTIC ACID, SEPSIS: 1.2 MMOL/L (ref 0.5–1.9)
LEUKOCYTE ESTERASE, URINE: ABNORMAL
LYMPHOCYTES # BLD: 5 % (ref 24–44)
MAGNESIUM: 1.7 MG/DL (ref 1.6–2.6)
MCH RBC QN AUTO: 30.6 PG (ref 26–34)
MCHC RBC AUTO-ENTMCNC: 33.8 G/DL (ref 31–37)
MCV RBC AUTO: 90.5 FL (ref 80–100)
MONOCYTES # BLD: 10 % (ref 2–11)
NITRITE, URINE: NEGATIVE
OTHER OBSERVATIONS UA: ABNORMAL
PARTIAL THROMBOPLASTIN TIME: 27.1 SEC (ref 21.3–31.3)
PDW BLD-RTO: 14 % (ref 12.5–15.4)
PH UA: 5.5 (ref 5–8)
PLATELET # BLD: 144 K/UL (ref 140–450)
PMV BLD AUTO: 8.3 FL (ref 6–12)
POTASSIUM SERPL-SCNC: 3.2 MMOL/L (ref 3.7–5.3)
PROTEIN UA: ABNORMAL
PROTHROMBIN TIME: 11.1 SEC (ref 9.4–12.6)
RBC # BLD: 4.15 M/UL (ref 4.5–5.9)
RBC UA: ABNORMAL /HPF (ref 0–2)
SEG NEUTROPHILS: 85 % (ref 36–66)
SEGMENTED NEUTROPHILS ABSOLUTE COUNT: 9.6 K/UL (ref 1.8–7.7)
SODIUM BLD-SCNC: 131 MMOL/L (ref 135–144)
SPECIFIC GRAVITY UA: 1.02 (ref 1–1.03)
TOTAL PROTEIN: 6.9 G/DL (ref 6.4–8.3)
TURBIDITY: ABNORMAL
URINE HGB: ABNORMAL
UROBILINOGEN, URINE: NORMAL
WBC # BLD: 11.2 K/UL (ref 3.5–11)
WBC UA: ABNORMAL /HPF (ref 0–5)

## 2022-04-22 PROCEDURE — 99222 1ST HOSP IP/OBS MODERATE 55: CPT | Performed by: STUDENT IN AN ORGANIZED HEALTH CARE EDUCATION/TRAINING PROGRAM

## 2022-04-22 PROCEDURE — 74018 RADEX ABDOMEN 1 VIEW: CPT

## 2022-04-22 PROCEDURE — 80053 COMPREHEN METABOLIC PANEL: CPT

## 2022-04-22 PROCEDURE — 85730 THROMBOPLASTIN TIME PARTIAL: CPT

## 2022-04-22 PROCEDURE — 87040 BLOOD CULTURE FOR BACTERIA: CPT

## 2022-04-22 PROCEDURE — 36415 COLL VENOUS BLD VENIPUNCTURE: CPT

## 2022-04-22 PROCEDURE — 6360000002 HC RX W HCPCS: Performed by: STUDENT IN AN ORGANIZED HEALTH CARE EDUCATION/TRAINING PROGRAM

## 2022-04-22 PROCEDURE — 85025 COMPLETE CBC W/AUTO DIFF WBC: CPT

## 2022-04-22 PROCEDURE — 6360000002 HC RX W HCPCS: Performed by: PHYSICIAN ASSISTANT

## 2022-04-22 PROCEDURE — 87077 CULTURE AEROBIC IDENTIFY: CPT

## 2022-04-22 PROCEDURE — 83735 ASSAY OF MAGNESIUM: CPT

## 2022-04-22 PROCEDURE — 87086 URINE CULTURE/COLONY COUNT: CPT

## 2022-04-22 PROCEDURE — 83605 ASSAY OF LACTIC ACID: CPT

## 2022-04-22 PROCEDURE — 85610 PROTHROMBIN TIME: CPT

## 2022-04-22 PROCEDURE — 2580000003 HC RX 258: Performed by: STUDENT IN AN ORGANIZED HEALTH CARE EDUCATION/TRAINING PROGRAM

## 2022-04-22 PROCEDURE — 1210000000 HC MED SURG R&B

## 2022-04-22 PROCEDURE — 6370000000 HC RX 637 (ALT 250 FOR IP): Performed by: STUDENT IN AN ORGANIZED HEALTH CARE EDUCATION/TRAINING PROGRAM

## 2022-04-22 PROCEDURE — 87186 SC STD MICRODIL/AGAR DIL: CPT

## 2022-04-22 PROCEDURE — 6370000000 HC RX 637 (ALT 250 FOR IP): Performed by: EMERGENCY MEDICINE

## 2022-04-22 PROCEDURE — 81001 URINALYSIS AUTO W/SCOPE: CPT

## 2022-04-22 PROCEDURE — 99285 EMERGENCY DEPT VISIT HI MDM: CPT

## 2022-04-22 PROCEDURE — 2580000003 HC RX 258: Performed by: PHYSICIAN ASSISTANT

## 2022-04-22 PROCEDURE — 82947 ASSAY GLUCOSE BLOOD QUANT: CPT

## 2022-04-22 RX ORDER — DULOXETIN HYDROCHLORIDE 30 MG/1
30 CAPSULE, DELAYED RELEASE ORAL 2 TIMES DAILY
Status: DISCONTINUED | OUTPATIENT
Start: 2022-04-22 | End: 2022-04-24 | Stop reason: HOSPADM

## 2022-04-22 RX ORDER — DEXTROSE MONOHYDRATE 50 MG/ML
100 INJECTION, SOLUTION INTRAVENOUS PRN
Status: DISCONTINUED | OUTPATIENT
Start: 2022-04-22 | End: 2022-04-24 | Stop reason: HOSPADM

## 2022-04-22 RX ORDER — ACETAMINOPHEN 325 MG/1
650 TABLET ORAL EVERY 6 HOURS PRN
Status: DISCONTINUED | OUTPATIENT
Start: 2022-04-22 | End: 2022-04-24 | Stop reason: HOSPADM

## 2022-04-22 RX ORDER — INSULIN LISPRO 100 [IU]/ML
0-6 INJECTION, SOLUTION INTRAVENOUS; SUBCUTANEOUS
Status: DISCONTINUED | OUTPATIENT
Start: 2022-04-23 | End: 2022-04-24 | Stop reason: HOSPADM

## 2022-04-22 RX ORDER — INSULIN GLARGINE 100 [IU]/ML
63 INJECTION, SOLUTION SUBCUTANEOUS NIGHTLY
Status: DISCONTINUED | OUTPATIENT
Start: 2022-04-22 | End: 2022-04-24 | Stop reason: HOSPADM

## 2022-04-22 RX ORDER — LISINOPRIL 10 MG/1
10 TABLET ORAL DAILY
Status: DISCONTINUED | OUTPATIENT
Start: 2022-04-23 | End: 2022-04-24 | Stop reason: HOSPADM

## 2022-04-22 RX ORDER — ONDANSETRON 2 MG/ML
4 INJECTION INTRAMUSCULAR; INTRAVENOUS EVERY 6 HOURS PRN
Status: DISCONTINUED | OUTPATIENT
Start: 2022-04-22 | End: 2022-04-24 | Stop reason: HOSPADM

## 2022-04-22 RX ORDER — CELECOXIB 100 MG/1
200 CAPSULE ORAL DAILY
Status: DISCONTINUED | OUTPATIENT
Start: 2022-04-23 | End: 2022-04-24 | Stop reason: HOSPADM

## 2022-04-22 RX ORDER — ACETAMINOPHEN 650 MG/1
650 SUPPOSITORY RECTAL EVERY 6 HOURS PRN
Status: DISCONTINUED | OUTPATIENT
Start: 2022-04-22 | End: 2022-04-24 | Stop reason: HOSPADM

## 2022-04-22 RX ORDER — SODIUM CHLORIDE 0.9 % (FLUSH) 0.9 %
5-40 SYRINGE (ML) INJECTION PRN
Status: DISCONTINUED | OUTPATIENT
Start: 2022-04-22 | End: 2022-04-24 | Stop reason: HOSPADM

## 2022-04-22 RX ORDER — CIPROFLOXACIN 250 MG/1
500 TABLET, FILM COATED ORAL ONCE
Status: COMPLETED | OUTPATIENT
Start: 2022-04-22 | End: 2022-04-22

## 2022-04-22 RX ORDER — CHLORTHALIDONE 25 MG/1
25 TABLET ORAL DAILY
Status: DISCONTINUED | OUTPATIENT
Start: 2022-04-23 | End: 2022-04-24 | Stop reason: HOSPADM

## 2022-04-22 RX ORDER — NICOTINE POLACRILEX 4 MG
15 LOZENGE BUCCAL PRN
Status: DISCONTINUED | OUTPATIENT
Start: 2022-04-22 | End: 2022-04-24 | Stop reason: HOSPADM

## 2022-04-22 RX ORDER — SODIUM CHLORIDE 0.9 % (FLUSH) 0.9 %
5-40 SYRINGE (ML) INJECTION EVERY 12 HOURS SCHEDULED
Status: DISCONTINUED | OUTPATIENT
Start: 2022-04-22 | End: 2022-04-24 | Stop reason: HOSPADM

## 2022-04-22 RX ORDER — OXYBUTYNIN CHLORIDE 5 MG/1
10 TABLET, EXTENDED RELEASE ORAL DAILY
Status: DISCONTINUED | OUTPATIENT
Start: 2022-04-22 | End: 2022-04-24 | Stop reason: HOSPADM

## 2022-04-22 RX ORDER — SODIUM CHLORIDE 9 MG/ML
INJECTION, SOLUTION INTRAVENOUS PRN
Status: DISCONTINUED | OUTPATIENT
Start: 2022-04-22 | End: 2022-04-24 | Stop reason: HOSPADM

## 2022-04-22 RX ORDER — 0.9 % SODIUM CHLORIDE 0.9 %
1000 INTRAVENOUS SOLUTION INTRAVENOUS ONCE
Status: COMPLETED | OUTPATIENT
Start: 2022-04-22 | End: 2022-04-22

## 2022-04-22 RX ORDER — TAMSULOSIN HYDROCHLORIDE 0.4 MG/1
0.4 CAPSULE ORAL DAILY
Status: DISCONTINUED | OUTPATIENT
Start: 2022-04-22 | End: 2022-04-24 | Stop reason: HOSPADM

## 2022-04-22 RX ORDER — CIPROFLOXACIN 500 MG/1
500 TABLET, FILM COATED ORAL 2 TIMES DAILY
Qty: 14 TABLET | Refills: 0 | Status: ON HOLD | OUTPATIENT
Start: 2022-04-22 | End: 2022-04-24 | Stop reason: HOSPADM

## 2022-04-22 RX ORDER — ENOXAPARIN SODIUM 100 MG/ML
40 INJECTION SUBCUTANEOUS DAILY
Status: DISCONTINUED | OUTPATIENT
Start: 2022-04-22 | End: 2022-04-24 | Stop reason: HOSPADM

## 2022-04-22 RX ORDER — POLYETHYLENE GLYCOL 3350 17 G/17G
17 POWDER, FOR SOLUTION ORAL DAILY PRN
Status: DISCONTINUED | OUTPATIENT
Start: 2022-04-22 | End: 2022-04-24 | Stop reason: HOSPADM

## 2022-04-22 RX ORDER — ONDANSETRON 4 MG/1
4 TABLET, ORALLY DISINTEGRATING ORAL EVERY 8 HOURS PRN
Status: DISCONTINUED | OUTPATIENT
Start: 2022-04-22 | End: 2022-04-24 | Stop reason: HOSPADM

## 2022-04-22 RX ORDER — SODIUM CHLORIDE 9 MG/ML
INJECTION, SOLUTION INTRAVENOUS CONTINUOUS
Status: DISCONTINUED | OUTPATIENT
Start: 2022-04-22 | End: 2022-04-24 | Stop reason: HOSPADM

## 2022-04-22 RX ORDER — DEXTROSE MONOHYDRATE 25 G/50ML
12.5 INJECTION, SOLUTION INTRAVENOUS PRN
Status: DISCONTINUED | OUTPATIENT
Start: 2022-04-22 | End: 2022-04-24 | Stop reason: HOSPADM

## 2022-04-22 RX ORDER — INSULIN LISPRO 100 [IU]/ML
0-3 INJECTION, SOLUTION INTRAVENOUS; SUBCUTANEOUS NIGHTLY
Status: DISCONTINUED | OUTPATIENT
Start: 2022-04-22 | End: 2022-04-24 | Stop reason: HOSPADM

## 2022-04-22 RX ADMIN — SODIUM CHLORIDE 1000 ML: 9 INJECTION, SOLUTION INTRAVENOUS at 19:09

## 2022-04-22 RX ADMIN — DULOXETINE 30 MG: 30 CAPSULE, DELAYED RELEASE ORAL at 21:51

## 2022-04-22 RX ADMIN — INSULIN GLARGINE 63 UNITS: 100 INJECTION, SOLUTION SUBCUTANEOUS at 21:48

## 2022-04-22 RX ADMIN — CEFTRIAXONE SODIUM 1000 MG: 1 INJECTION, POWDER, FOR SOLUTION INTRAMUSCULAR; INTRAVENOUS at 19:13

## 2022-04-22 RX ADMIN — CIPROFLOXACIN HYDROCHLORIDE 500 MG: 250 TABLET, FILM COATED ORAL at 01:36

## 2022-04-22 RX ADMIN — SODIUM CHLORIDE: 9 INJECTION, SOLUTION INTRAVENOUS at 20:18

## 2022-04-22 RX ADMIN — INSULIN LISPRO 1 UNITS: 100 INJECTION, SOLUTION INTRAVENOUS; SUBCUTANEOUS at 21:42

## 2022-04-22 RX ADMIN — ENOXAPARIN SODIUM 40 MG: 100 INJECTION SUBCUTANEOUS at 21:51

## 2022-04-22 ASSESSMENT — PAIN SCALES - GENERAL: PAINLEVEL_OUTOF10: 5

## 2022-04-22 NOTE — ED PROVIDER NOTES
[address] [facility] Surg ICU  [address] [facility] [address]  Phone: [phone]        Pt Name: Liang Lechuga  MRN: [id number]  Carrillogfroyce 1950  Date of evaluation: 4/22/22    CHIEFCOMPLAINT       Chief Complaint   Patient presents with    Abnormal Lab       HISTORY OF PRESENT ILLNESS (Location/Symptom, Timing/Onset, Context/Setting, Quality, Duration, Modifying Factors, Severity)      Liang Lechuga is a 67 y.o. male with pertinent PMH of kidney stones with recent stent placement by Dr. Marge Du on 4/20/2022 who presents to the ED via private auto with UTI and positive blood culture. Patient was seen in the emergency department last night for generalized illness and urinary symptoms and was diagnosed with UTI. Blood cultures were drawn and resulted positive today for gram-negative rods. Patient was contacted by myself earlier today after this result and was advised to return to the ED for admission and he presents here today for this. Notes that he does not feel any better and is most been sleeping all day. He has been having fevers they have been taking OTC antipyretics to help control them. He has taken 1 dose of Cipro today. No exacerbating relieving factors. Denies any new or different flank pain, abdominal pain, nausea, vomiting, diarrhea, pain, shortness of breath, or any other comfortable time. PAST MEDICAL / SURGICAL / SOCIAL / FAMILY HISTORY     PMH:  has a past medical history of Bladder stone, BPH with obstruction/lower urinary tract symptoms, Caffeine use, Cataracts, bilateral, Chronic back pain, COPD (chronic obstructive pulmonary disease) ([facility] [address] Hypertension, Irritable bowel syndrome, Kidney stone, Osteoarthritis, Pancreatitis, and Type II or unspecified type diabetes mellitus without mention of complication, not stated as uncontrolled. Surgical History:  has a past surgical history that includes back surgery; Cholecystectomy; Vasectomy;  Spine surgery (05/18/2010); Eye surgery (2015); other surgical history; eye surgery; Bladder surgery; Lithotripsy; Ureteroscopy; Cystocopy (04/06/2022); Cystoscopy (Left, 4/6/2022); Cystocopy (04/20/2022); and Cystoscopy (Left, 4/20/2022). Social History:  reports that he has been smoking cigarettes. He started smoking about 42 years ago. He has a 40.00 pack-year smoking history. He has quit using smokeless tobacco. He reports previous alcohol use. He reports that he does not use drugs. Family History: He indicated that the status of his mother is unknown. He indicated that the status of his father is unknown.   family history includes Arthritis in his mother; Cancer in his father. Psychiatric History: None    Allergies: Patient has no known allergies. Home Medications:   Prior to Admission medications    Medication Sig Start Date End Date Taking?  Authorizing Provider   ciprofloxacin (CIPRO) 500 MG tablet Take 1 tablet by mouth 2 times daily for 7 days 4/22/22 4/29/22  Harish Amezcua III, MD   tamsulosin [facility]) 0.4 MG capsule Take 1 capsule by mouth daily Take one capsule daily to facilitate passage of ureteral stone 4/6/22   Dahiana Muller MD   oxybutynin (DITROPAN-XL) 10 MG extended release tablet Take 1 tablet by mouth daily 4/6/22   Dahiana Muller MD   canagliflozin [facility]) 300 MG TABS tablet Take 1 tablet by mouth every morning (before breakfast) 1/6/22 1/6/23  BREANNA Kramer - NP   insulin detemir (LEVEMIR FLEXTOUCH) 100 UNIT/ML injection pen Inject 63 Units into the skin nightly 1/5/22 1/5/23  Ranjan Beltran APRN - NP   metFORMIN (GLUCOPHAGE) 1000 MG tablet Take 1 tablet by mouth 2 times daily (with meals) 9/19/21 9/19/22  BREANNA Kramer - NP   lisinopril (PRINIVIL;ZESTRIL) 10 MG tablet Take 1 tablet by mouth daily 9/19/21 9/19/22  Ranjan Beltran APRN - NP   chlorthalidone (HYGROTON) 25 MG tablet Take 1 tablet by mouth daily 9/19/21 9/19/22  Ranjan Ruby, APRN - NP   pioglitazone (ACTOS) 30 MG tablet Take 1 tablet by mouth daily 9/19/21 9/14/22  Eulalia Ramesh, APRN - NP   celecoxib (CELEBREX) 200 MG capsule Take 1 capsule by mouth daily 9/19/21 9/14/22  Eulalia Ramesh, APRN - NP   DULoxetine (CYMBALTA) 30 MG extended release capsule Take 1 capsule by mouth 2 times daily 9/19/21 9/19/22  Eulalia Ramesh APRN - NP   blood glucose test strips [facility] VERIO) strip Test three times daily and as needed 4/20/21 8/10/23  Eulalia Chandler APRN - NP   Insulin Pen Needle 31G X 5 MM MISC 1 each by Does not apply route daily 4/14/21 4/14/22  Eulalia Chandler APRN - NP   Lancets MISC Test twice a day and as needed, Please send lancets for Verio brand meter 1/4/16 3/17/22  Oval Seats, APRN - CNP   multivitamin [facility]) per tablet Take 1 tablet by mouth daily. Historical Provider, MD       REVIEW OF SYSTEMS  (2-9 systems for level 4, 10 ormore for level 5)      Review of Systems    Constitutional:  See HPI. Eyes: Denies vision changes. HENT: Denies sore throat or neck pain. Respiratory: Denies cough or shortness of breath. Cardiovascular: Denies chest pain. GI: See HPI. :  See HPI. Musculoskeletal: Denies recent trauma. Skin: Denies new rashes or wounds. Neurologic:  Denies new numbness or weakness. Psychiatric: Denies agitation. Heme: Denies bleeding disorders. All other systems negative except as marked. PHYSICAL EXAM  (up to 7 for level 4, 8 or more for level 5)      INITIAL VITALS:  height is 6' 2\" (1.88 m) and weight is 88.9 kg (196 lb). His oral temperature is 101.8 °F (38.8 °C). His blood pressure is 114/53 (abnormal) and his pulse is 87. His respiration is 14 and oxygen saturation is 100%. Vital signs reviewed. Physical Exam    General:  Alert, cooperative, well-groomed, well-nourished, appears stated age, and ill appearing but nontoxic and is in no acute distress. Head:  Normocephalic, atraumatic, and without obvious abnormality.    Eyes:  Sclerae/conjunctivae clear without injection, pallor, or icterus. Corneas clear without opacities. EOM's intact. ENT: Ears and nose are all without obvious masses lesion or deformity. Neck: Supple and symmetrical. Trachea midline. No adenopathy. No jugular venous distention. Lungs:   No respiratory distress. Clear to auscultation bilaterally. No wheezes, rhonchi, or rales. Heart:  Regular rate. Regular rhythm. No murmurs, rubs, or gallops. Abdomen:   Normoactive bowel sounds. Soft, nontender, nondistended without guarding or rebound. No palpable masses. No CVA tenderness. Extremities: Warm and dry without erythema or edema. Skin: Soft, good turgor, and well-hydrated. No obvious rashes or lesions. Neurologic: GCS is 15 and no focal deficits are appreciated. Normal gait. Grossly normal motor and sensation. Speech clear. Psychiatric: Normal mood and affect. Normal behavior. Coherent thought process. DIFFERENTIAL DIAGNOSIS / MDM     Patient presents emerged department for complaint as described above. Vital signs demonstrate a fever of 101.8, but are otherwise unremarkable. Physical exam demonstrates a ill-appearing but nontoxic male in no acute distress. Lungs are clear to auscultation. Heart rate and rhythm are regular. Abdomen is soft and nontender. No CVA tenderness. Reviewed previous visit. Did meet septic criteria yesterday, but was d/c'ed home as urology felt he could if the stent was in place and patient felt fine. I did contact Dr. Rafael Chaves earlier today when the blood culture resulted and he is aware that the patient was advised to come for admission. I spoke with the patient's wife earlier and he was not feeling any better and was just sleeping all day so we advised that he return of IV antibiotics. Will obtain some repeat labs and urine and during this time the second blood culture also resulted for same gram-negative rods.   We will start the patient on Rocephin and give fluids and plan for admission. PLAN (LABS / IMAGING / EKG):  Orders Placed This Encounter   Procedures    Culture, Urine    Culture, Blood 1    Culture, Blood 2    CBC with Auto Differential    Comprehensive Metabolic Panel w/ Reflex to MG    Protime-INR    APTT    Urinalysis    Basic Metabolic Panel w/ Reflex to MG    CBC with Auto Differential    Lactate, Sepsis    Magnesium    ADULT DIET; Regular; 5 carb choices (75 gm/meal)    HYPOGLYCEMIA TREATMENT: blood glucose less than 50 mg/dL and patient  ALERT and TOLERATING PO    HYPOGLYCEMIA TREATMENT: blood glucose less than 70 mg/dL and patient ALERT and TOLERATING PO    HYPOGLYCEMIA TREATMENT: blood glucose less than 70 mg/dL and patient NOT ALERT or NPO    Vital signs per unit routine    Notify physician    Up as tolerated    Intake and output    Full Code    Initiate Oxygen Therapy Protocol    POCT Glucose    Insert peripheral IV    ADMIT TO INPATIENT       MEDICATIONS ORDERED:  Orders Placed This Encounter   Medications    cefTRIAXone (ROCEPHIN) 1000 mg IVPB in 50 mL D5W minibag     Order Specific Question:   Antimicrobial Indications     Answer:   Urinary Tract Infection     Order Specific Question:   UTI duration of therapy     Answer: Other    0.9 % sodium chloride bolus    chlorthalidone (HYGROTON) tablet 25 mg    DULoxetine (CYMBALTA) extended release capsule 30 mg    DISCONTD: insulin detemir (LEVEMIR) injection pen 63 Units     Order Specific Question:   Please select a reason the therapeutic interchange was not accepted:      Answer:   [facility] for Pharmacy to Substitute    lisinopril (PRINIVIL;ZESTRIL) tablet 10 mg    oxybutynin (DITROPAN-XL) extended release tablet 10 mg    insulin lispro (HUMALOG) injection vial 0-6 Units    insulin lispro (HUMALOG) injection vial 0-3 Units    glucose (GLUTOSE) 40 % oral gel 15 g    dextrose 50 % IV solution    glucagon (rDNA) injection 1 mg    dextrose 5 % solution    tamsulosin (FLOMAX) capsule 0.4 mg    cefTRIAXone (ROCEPHIN) 1000 mg IVPB in 50 mL D5W minibag     Order Specific Question:   Antimicrobial Indications     Answer:   Urinary Tract Infection     Order Specific Question:   UTI duration of therapy     Answer:   10 days    sodium chloride flush 0.9 % injection 5-40 mL    sodium chloride flush 0.9 % injection 5-40 mL    0.9 % sodium chloride infusion    enoxaparin (LOVENOX) injection 40 mg     Order Specific Question:   Indication of Use     Answer:   Prophylaxis-DVT/PE    OR Linked Order Group     ondansetron (ZOFRAN-ODT) disintegrating tablet 4 mg     ondansetron (ZOFRAN) injection 4 mg    polyethylene glycol (GLYCOLAX) packet 17 g    OR Linked Order Group     acetaminophen (TYLENOL) tablet 650 mg     acetaminophen (TYLENOL) suppository 650 mg    0.9 % sodium chloride infusion    celecoxib (CELEBREX) capsule 200 mg    insulin glargine (LANTUS) injection vial 63 Units       Controlled Substances Monitoring:     DIAGNOSTIC RESULTS     RADIOLOGY: All images are read by the radiologist and their interpretations are reviewed. XR ABDOMEN (KUB) (SINGLE AP VIEW)    Result Date: 4/22/2022  EXAMINATION: ONE SUPINE XRAY VIEW(S) OF THE ABDOMEN 4/22/2022 1:45 am COMPARISON: None HISTORY: ORDERING SYSTEM PROVIDED HISTORY: flank pain TECHNOLOGIST PROVIDED HISTORY: flank pain Reason for Exam: back pain FINDINGS: There is a left ureteral stent. Spinal cord stimulating wires are noted. There is sequela of a thoracic-lumbar-sacral fusion. Normal bowel gas pattern without evidence of obstruction. There is a questionable stone in the upper pole of the left kidney measuring 8 mm. Stool is noted in the colon. 1. There is a suspected 8 mm stone in the upper pole of the left kidney. 2. Left ureteral stent. 3. Normal bowel gas pattern without evidence of obstruction.      LABS:  Results for orders placed or performed during the hospital encounter of 04/22/22   CBC with Auto Differential   Result Value Ref Range    WBC 11.2 (H) 3.5 - 11.0 k/uL    RBC 4.15 (L) 4.5 - 5.9 m/uL    Hemoglobin 12.7 (L) 13.5 - 17.5 g/dL    Hematocrit 37.6 (L) 41 - 53 %    MCV 90.5 80 - 100 fL    MCH 30.6 26 - 34 pg    MCHC 33.8 31 - 37 g/dL    RDW 14.0 12.5 - 15.4 %    Platelets 400 176 - 762 k/uL    MPV 8.3 6.0 - 12.0 fL    Seg Neutrophils 85 (H) 36 - 66 %    Lymphocytes 5 (L) 24 - 44 %    Monocytes 10 2 - 11 %    Eosinophils % 0 (L) 1 - 4 %    Basophils 0 0 - 2 %    Segs Absolute 9.60 (H) 1.8 - 7.7 k/uL    Absolute Lymph # 0.50 (L) 1.0 - 4.8 k/uL    Absolute Mono # 1.10 0.1 - 1.2 k/uL    Absolute Eos # 0.00 0.0 - 0.4 k/uL    Basophils Absolute 0.00 0.0 - 0.2 k/uL   Comprehensive Metabolic Panel w/ Reflex to MG   Result Value Ref Range    Glucose 223 (H) 70 - 99 mg/dL    BUN 24 (H) 8 - 23 mg/dL    CREATININE 1.00 0.70 - 1.20 mg/dL    Calcium 9.0 8.6 - 10.4 mg/dL    Sodium 131 (L) 135 - 144 mmol/L    Potassium 3.2 (L) 3.7 - 5.3 mmol/L    Chloride 94 (L) 98 - 107 mmol/L    CO2 27 20 - 31 mmol/L    Anion Gap 10 9 - 17 mmol/L    Alkaline Phosphatase 56 40 - 129 U/L    ALT 15 5 - 41 U/L    AST 14 <40 U/L    Total Bilirubin 0.52 0.3 - 1.2 mg/dL    Total Protein 6.9 6.4 - 8.3 g/dL    Albumin 3.7 3.5 - 5.2 g/dL    Albumin/Globulin Ratio 1.2 1.0 - 2.5    GFR Non-African American >60 >60 mL/min    GFR African American >60 >60 mL/min    GFR Comment         Protime-INR   Result Value Ref Range    Protime 11.1 9.4 - 12.6 sec    INR 1.1    APTT   Result Value Ref Range    PTT 27.1 21.3 - 31.3 sec   Magnesium   Result Value Ref Range    Magnesium 1.7 1.6 - 2.6 mg/dL       EMERGENCY DEPARTMENT COURSE           Vitals:    Vitals:    04/22/22 1724 04/22/22 2013   BP: (!) 114/53    Pulse: 87    Resp: 14    Temp: 101.8 °F (38.8 °C)    TempSrc: Oral    SpO2: 98% 100%   Weight: 88.9 kg (196 lb)    Height: 6' 2\" (1.88 m)      -------------------------  BP: (!) 114/53, Temp: 101.8 °F (38.8 °C), Pulse: 87, Resp: 14      RE-EVALUATION:  See ED Course notes above.     CONSULTS:  Hospitalist - 17:40 Spoke with Dr. Loyola and they will accept the admission    PROCEDURES:  None    FINAL IMPRESSION      1. Sepsis due to gram-negative urinary tract infection ([facility] [address]          DISPOSITION / PLAN     CONDITION ON DISPOSITION:   Good / Stable for admission    PATIENT REFERRED TO:  No follow-up provider specified.     DISCHARGE MEDICATIONS:  Current Discharge Medication List          Andra Keith [place]   Emergency Medicine Physician Assistant    (Please note that portions of this note were completed with a voice recognition program.  Efforts were made to edit the dictations but occasionally words aremis-transcribed.)        Andra Keith PA-C  04/22/22 2514

## 2022-04-22 NOTE — ED PROVIDER NOTES
eMERGENCY dEPARTMENT eNCOUnter      Pt Name: Andriy Juan  MRN: [id number]  [place] 1950  Date of evaluation: 4/21/2022      CHIEF COMPLAINT       Chief Complaint   Patient presents with    Headache          HISTORY OF PRESENT ILLNESS    Andriy Juan is a 67 y.o. male who presents to department for evaluation of low-grade fever and headache that has had for several days status post kidney stent that was placed just recently. Patient denies dysuria he says he has a little flank pain. He does not appear toxic but he does have a temp of 100.2. REVIEW OF SYSTEMS     Review of Systems   Constitutional: Negative. HENT: Negative. Eyes: Negative. Respiratory: Negative. Cardiovascular: Negative. Gastrointestinal: Negative. Endocrine: Negative. Genitourinary: Positive for flank pain. Skin: Negative. Allergic/Immunologic: Negative. Neurological: Negative. Hematological: Negative. Psychiatric/Behavioral: Negative. PAST MEDICAL HISTORY    has a past medical history of Bladder stone, BPH with obstruction/lower urinary tract symptoms, Caffeine use, Cataracts, bilateral, Chronic back pain, COPD (chronic obstructive pulmonary disease) ([address] Hypertension, Irritable bowel syndrome, Kidney stone, Osteoarthritis, Pancreatitis, and Type II or unspecified type diabetes mellitus without mention of complication, not stated as uncontrolled. SURGICAL HISTORY      has a past surgical history that includes back surgery; Cholecystectomy; Vasectomy; Spine surgery (05/18/2010); Eye surgery (2015); other surgical history; eye surgery; Bladder surgery; Lithotripsy; Ureteroscopy; Cystocopy (04/06/2022); Cystoscopy (Left, 4/6/2022); Cystocopy (04/20/2022); and Cystoscopy (Left, 4/20/2022).     CURRENT MEDICATIONS       Previous Medications    BLOOD GLUCOSE TEST STRIPS (ONETOUCH VERIO) STRIP    Test three times daily and as needed    CANAGLIFLOZIN (INVOKANA) 300 MG TABS TABLET    Take 1 tablet by mouth every morning (before breakfast)    CELECOXIB (CELEBREX) 200 MG CAPSULE    Take 1 capsule by mouth daily    CHLORTHALIDONE (HYGROTON) 25 MG TABLET    Take 1 tablet by mouth daily    DAPAGLIFLOZIN (FARXIGA) 5 MG TABLET    Take 1 tablet by mouth every morning    DULOXETINE (CYMBALTA) 30 MG EXTENDED RELEASE CAPSULE    Take 1 capsule by mouth 2 times daily    GABAPENTIN (NEURONTIN) 300 MG CAPSULE    Take 1 capsule by mouth 2 times daily for 180 days. Intended supply: 90 days    INSULIN DETEMIR (LEVEMIR FLEXTOUCH) 100 UNIT/ML INJECTION PEN    Inject 63 Units into the skin nightly    INSULIN PEN NEEDLE 31G X 5 MM MISC    1 each by Does not apply route daily    LANCETS MISC    Test twice a day and as needed, Please send lancets for Verio brand meter    LISINOPRIL (PRINIVIL;ZESTRIL) 10 MG TABLET    Take 1 tablet by mouth daily    METFORMIN (GLUCOPHAGE) 1000 MG TABLET    Take 1 tablet by mouth 2 times daily (with meals)    MULTIVITAMIN (THERAGRAN) PER TABLET    Take 1 tablet by mouth daily. OXYBUTYNIN (DITROPAN-XL) 10 MG EXTENDED RELEASE TABLET    Take 1 tablet by mouth daily    PIOGLITAZONE (ACTOS) 30 MG TABLET    Take 1 tablet by mouth daily    TAMSULOSIN (FLOMAX) 0.4 MG CAPSULE    Take 1 capsule by mouth daily Take one capsule daily to facilitate passage of ureteral stone       ALLERGIES     has No Known Allergies. FAMILY HISTORY     He indicated that the status of his mother is unknown. He indicated that the status of his father is unknown.     family history includes Arthritis in his mother; Cancer in his father. SOCIAL HISTORY      reports that he has been smoking cigarettes. He started smoking about 42 years ago. He has a 40.00 pack-year smoking history. He has quit using smokeless tobacco. He reports previous alcohol use. He reports that he does not use drugs. PHYSICAL EXAM     INITIAL VITALS:  height is 6' 2\" (1.88 m) and weight is 88.5 kg (195 lb).  His oral temperature is 100.2 °F (37.9 °C). His blood pressure is 90/65 and his pulse is 103. His respiration is 18 and oxygen saturation is 95%. Constitutional: Alert, oriented x3, nontoxic, afebrile, answering questions appropriately, acting properly for age, in no acute distress  HEENT: Extraocular muscles intact, mucus membranes moist, TMs clear bilaterally, no posterior pharyngeal erythema or exudates, Pupils equal, round, reactive to light,   Neck: Trachea midline, Supple without lymphadenopathy, no posterior midline neck tenderness to palpation  Cardiovascular: Regular rhythm and rate no S3, S4, or murmurs  Respiratory: Clear to auscultation bilaterally no wheezes, rhonchi, rales, no respiratory distress  Gastrointestinal: Soft, nontender, nondistended, positive bowel sounds. No rebound, rigidity, or guarding. Musculoskeletal: No extremity pain or swelling  Neurologic: Moving all 4 extremities without difficulty there are no gross focal neurologic deficits  Skin: Warm and dry      DIFFERENTIAL DIAGNOSIS/ MDM:     Headache and fever uncertain etiology possible urinary tract infection. Patient will get a CAT scan some Toradol laboratories urinalysis, reevaluation. DIAGNOSTIC RESULTS     EKG: All EKG's are interpreted by the Emergency Department Physician who either signs or Co-signs this chart in the absence of a cardiologist.        Not indicated unless otherwise documented above    LABS:  Results for orders placed or performed during the hospital encounter of 04/21/22   Culture, Blood 1    Specimen: Blood   Result Value Ref Range    Specimen Description . BLOOD     Special Requests 20 ML LEFT ARM IV START     Culture NO GROWTH <24 HRS    Culture, Blood 1    Specimen: Blood   Result Value Ref Range    Specimen Description . BLOOD     Special Requests 20MLS RIGHT ANTECUBITAL     Culture NO GROWTH <24 HRS    CBC with Auto Differential   Result Value Ref Range    WBC 12.0 (H) 3.5 - 11.0 k/uL    RBC 4.22 (L) 4.5 - 5.9 m/uL    Hemoglobin 12.8 (L) 13.5 - 17.5 g/dL    Hematocrit 38.1 (L) 41 - 53 %    MCV 90.3 80 - 100 fL    MCH 30.4 26 - 34 pg    MCHC 33.7 31 - 37 g/dL    RDW 14.0 12.5 - 15.4 %    Platelets 086 369 - 899 k/uL    MPV 8.3 6.0 - 12.0 fL    Seg Neutrophils 87 (H) 36 - 66 %    Lymphocytes 9 (L) 24 - 44 %    Monocytes 4 2 - 11 %    Eosinophils % 0 (L) 1 - 4 %    Basophils 0 0 - 2 %    Segs Absolute 10.30 (H) 1.8 - 7.7 k/uL    Absolute Lymph # 1.10 1.0 - 4.8 k/uL    Absolute Mono # 0.50 0.1 - 1.2 k/uL    Absolute Eos # 0.00 0.0 - 0.4 k/uL    Basophils Absolute 0.00 0.0 - 0.2 k/uL   Basic Metabolic Panel   Result Value Ref Range    Glucose 209 (H) 70 - 99 mg/dL    BUN 21 8 - 23 mg/dL    CREATININE 1.07 0.70 - 1.20 mg/dL    Calcium 9.0 8.6 - 10.4 mg/dL    Sodium 131 (L) 135 - 144 mmol/L    Potassium 3.2 (L) 3.7 - 5.3 mmol/L    Chloride 91 (L) 98 - 107 mmol/L    CO2 25 20 - 31 mmol/L    Anion Gap 15 9 - 17 mmol/L    GFR Non-African American >60 >60 mL/min    GFR African American >60 >60 mL/min    GFR Comment         Lactic Acid   Result Value Ref Range    Lactic Acid 3.0 (H) 0.5 - 2.2 mmol/L   Urinalysis with Reflex to Culture    Specimen: Urine   Result Value Ref Range    Color, UA Yellow Yellow    Turbidity UA Turbid (A) Clear    Glucose, Ur NEGATIVE NEGATIVE    Bilirubin Urine NEGATIVE NEGATIVE    Ketones, Urine NEGATIVE NEGATIVE    Specific Gravity, UA 1.025 1.005 - 1.030    Urine Hgb LARGE (A) NEGATIVE    pH, UA 5.5 5.0 - 8.0    Protein, UA 2+ (A) NEGATIVE    Urobilinogen, Urine Normal Normal    Nitrite, Urine NEGATIVE NEGATIVE    Leukocyte Esterase, Urine LARGE (A) NEGATIVE   Microscopic Urinalysis   Result Value Ref Range    -          WBC, UA TOO NUMEROUS TO COUNT 0 - 5 /HPF    RBC, UA 20 TO 50 0 - 2 /HPF    Epithelial Cells UA 0 TO 2 0 - 5 /HPF    Bacteria, UA MODERATE (A) None    Other Observations UA Culture ordered based on defined criteria. (A) NOT REQ.        Not indicated unless otherwise documented above    RADIOLOGY:   I reviewed the radiologist interpretations:  XR ABDOMEN (KUB) (SINGLE AP VIEW)   Preliminary Result   1. There is a suspected 8 mm stone in the upper pole of the left kidney. 2. Left ureteral stent. 3. Normal bowel gas pattern without evidence of obstruction. CT HEAD WO CONTRAST   Final Result   No acute intracranial abnormality. Not indicated unless otherwise documented above    EMERGENCY DEPARTMENT COURSE:     The patient was given the following medications:  Orders Placed This Encounter   Medications    0.9 % sodium chloride bolus    ketorolac (TORADOL) injection 15 mg    ciprofloxacin (CIPRO) tablet 500 mg     Order Specific Question:   Antimicrobial Indications     Answer:   Urinary Tract Infection    ciprofloxacin (CIPRO) 500 MG tablet     Sig: Take 1 tablet by mouth 2 times daily for 7 days     Dispense:  14 tablet     Refill:  0        Vitals:    Vitals:    04/21/22 2302   BP: 90/65   Pulse: 103   Resp: 18   Temp: 100.2 °F (37.9 °C)   TempSrc: Oral   SpO2: 95%   Weight: 88.5 kg (195 lb)   Height: 6' 2\" (1.88 m)     -------------------------  BP 90/65   Pulse 103   Temp 100.2 °F (37.9 °C) (Oral)   Resp 18   Ht 6' 2\" (1.88 m)   Wt 88.5 kg (195 lb)   SpO2 95%   BMI 25.04 kg/m²         I have reviewed the disposition diagnosis with the patient and or their family/guardian. I have answered their questions and given discharge instructions. They voiced understanding of these instructions and did not have any further questions or complaints. CRITICAL CARE:    None    CONSULTS:    None    PROCEDURES:    None      [facility] Report if indicated             FINAL IMPRESSION      1. Acute cystitis without hematuria    2. Infection and inflammatory reaction due to other urinary stents, initial encounter [facility])          DISPOSITION/PLAN   DISPOSITION Decision To Discharge    I have reviewed the disposition diagnosis with the patient and or their family/guardian.   I have answered their questions and given discharge instructions. They voiced understanding of these instructions and did not have any further questions or complaints. Reevaluation: I spoke with urologist on-call Dr. Eagle Marinelli who said that if the stent like it was in place patient could go home with antibiotics to follow-up with them in their office. KUB is read as unremarkable patient will be discharged home on Cipro to follow-up with urology in the morning.   PATIENT REFERRED TO:  Drake Morse MD  [place] [address], [address] [place] [address]    In 1 day        DISCHARGE MEDICATIONS:  New Prescriptions    CIPROFLOXACIN (CIPRO) 500 MG TABLET    Take 1 tablet by mouth 2 times daily for 7 days       (Please note that portions of this note were completed with a voice recognition program.  Efforts were made to edit the dictations but occasionally words are mis-transcribed.)    Crystal Fournier MD,, MD  Attending Emergency Physician           Crystal Fournier MD  04/22/22 Arun [id number] MD [facility]  04/22/22 0191

## 2022-04-22 NOTE — TELEPHONE ENCOUNTER
Marina Laird, called patient regarding lab results notifying patient that he needs to come in for admission to receive IV antibiotics. Patient aware of plan of care and will be arriving to our facility at some point today.     .Electronically signed by Jeffery Echols RN on 4/22/2022 at 3:20 PM

## 2022-04-22 NOTE — ED PROVIDER NOTES
[id number] [facility] ED  [address] [address] [facility] [address]  Phone: [phone]  Fax: [phone]      Attending Physician [address]       Chief Complaint   Patient presents with    Abnormal Lab       DIAGNOSTIC RESULTS     LABS:  Labs Reviewed   CULTURE, URINE   CBC WITH AUTO DIFFERENTIAL   COMPREHENSIVE METABOLIC PANEL W/ REFLEX TO MG FOR LOW K   PROTIME-INR   APTT   URINALYSIS       All other labs were within normal range or not returned as of this dictation. RADIOLOGY:  No orders to display         EMERGENCY DEPARTMENT COURSE:   Vitals:    Vitals:    04/22/22 1724   BP: (!) 114/53   Pulse: 87   Resp: 14   Temp: 101.8 °F (38.8 °C)   TempSrc: Oral   SpO2: 98%   Weight: 88.9 kg (196 lb)   Height: 6' 2\" (1.88 m)     -------------------------  BP: (!) 114/53, Temp: 101.8 °F (38.8 °C), Pulse: 87, Resp: 14             PERTINENT ATTENDING PHYSICIAN COMMENTS:    I performed a history and physical examination of the patient and discussed management with the mid level provider. I reviewed the mid level provider's note and agree with the documented findings and plan of care. Any areas of disagreement are noted on the chart. I was personally present for the key portions of any procedures. I have documented in the chart those procedures where I was not present during the key portions. I have reviewed the emergency nurses triage note. I agree with the chief complaint, past medical history, past surgical history, allergies, medications, social and family history as documented unless otherwise noted below. Documentation of the HPI, Physical Exam and Medical Decision Making performed by mid level providers is based on my personal performance of the HPI, PE and MDM. For Physician Assistant/ Nurse Practitioner cases/documentation I have personally evaluated this patient and have completed at least one if not all key elements of the E/M (history, physical exam, and MDM). Additional findings are as noted. Patient called back due to having a blood culture positive for gram negative rods. Febrile and ill-appearing. Recent cystoscopy with stent exchange on 4/20/22. Plan for sepsis workup and admission for IV antibiotics.        (Please note that portions of this note were completed with a voice recognition program.  Efforts were made to edit the dictations but occasionally words are mis-transcribed.)    Ciera Welsh MD  Attending Emergency Medicine Physician        Ciera Welsh MD  04/22/22 6292

## 2022-04-22 NOTE — H&P
[facility]  Office: [address], DO, Mik Lowe, DO, Bebeto Felix, DO, Yudith Calero Blood, DO, Bestey Vargas MD, Cassandra Lanza MD, Renato Padilla MD, Sharonda Treviño MD, Inga Stratton MD, Molly Kaye MD, Christo Felix MD, Radha Lacy, DO, Giovani Lopez, DO, Zak Wallace MD,  Lynne Kong, DO, Tracy Esquivel MD, Real Cerna MD, Jarred Pizarro MD, Sameer [facility], DO, Mackenzie Jacobs MD, Estevan Stephens MD, [facility], [facility], CNP, Ramon Smith, CNP, Emeterio Du, CNP, Estella Pino, CNP, Patience Llanos, CNP, Merlin Sun PA-C, Pascual Palmer, DNP, Yue Darling, CNP, Marky Jackson, CNP, Earlene Esposito, CNP, Christina Fields, CNS, Suzy Shahid, [facility], Audra Kahn, CNP, Immanuel Ritchie, CNP, Alicia Hoover, [address]    HISTORY AND PHYSICAL EXAMINATION            Date:   4/22/2022  Patient name:  Luis A Abraham  Date of admission:  4/22/2022  5:17 PM  MRN:   [id number]  Account:  [de-identified]  YOB: 1950  PCP:    BREANNA Jamison NP  Room:   ER10/ER10  Code Status:    No Order    Chief Complaint:     Chief Complaint   Patient presents with    Abnormal Lab     History Obtained From:     patient    History of Present Illness:     Paula Seaman is a 67 y.o. male with a past medical history of nephrolithiasis (s/p left ureteral stent placement and laser lithotripsy 4/6), DM2, HTN, BPH and depression who presented to the emergency department on 4/22/2022 complaining of fever/chills and abnormal lab. The patient was seen in the emergency department for the same last night and was sent home on ciprofloxacin after being diagnosed with a UTI. Blood cultures were drawn in the emergency department last night and came back positive for gram negative rods x 2 today. The patient received a call from the emergency department and was instructed to return.  In the ED, the patient was febrile (Tmax 101.8) but nontoxic appearing. The patient is admitted to internal medicine for further management of bacteremia secondary to urinary tract infection. Past Medical History:     Past Medical History:   Diagnosis Date    Bladder stone     BPH with obstruction/lower urinary tract symptoms     Caffeine use     4 coffee/2 soda per day    Cataracts, bilateral     Chronic back pain     COPD (chronic obstructive pulmonary disease) ([address]     Hypertension     Irritable bowel syndrome     Kidney stone     Osteoarthritis     Pancreatitis     Type II or unspecified type diabetes mellitus without mention of complication, not stated as uncontrolled         Past Surgical History:     Past Surgical History:   Procedure Laterality Date    BACK SURGERY      BLADDER SURGERY      CHOLECYSTECTOMY      CYSTOSCOPY  04/06/2022    CYSTOSCOPY URETEROSCOPY HOLMIUM LASER LEFT URETERAL STENT INSERTION - Left    CYSTOSCOPY Left 4/6/2022    CYSTOSCOPY URETEROSCOPY HOLMIUM LASER LEFT URETERAL STENT INSERTION performed by Garrett Zavala MD at [address] [facility]  04/20/2022    CYSTOSCOPY URETEROSCOPY LEFT STENT PLACEMENT WITH STONE BASKETING AND HOLMIUM LASER (    CYSTOSCOPY Left 4/20/2022    CYSTOSCOPY URETEROSCOPY LEFT STENT PLACEMENT WITH STONE BASKETING AND HOLMIUM LASER performed by Garrett Zavala MD at [address] [facility]. [address]  [address]    macular scrape    EYE SURGERY      LITHOTRIPSY      9-21-17    OTHER SURGICAL HISTORY      implanted nerve stimulater in lumbar back    SPINE SURGERY  05/18/2010    DR. Judie Carrasquillo (NS)    URETEROSCOPY      with string stent 9-21-17    VASECTOMY          Medications Prior to Admission:     Prior to Admission medications    Medication Sig Start Date End Date Taking?  Authorizing Provider   dapagliflozin (FARXIGA) 5 MG tablet Take 1 tablet by mouth every morning  Patient not taking: Reported on 3/31/2022 1/11/22 1/11/23  Consuelo DIAS Leim Kawasaki, APRN - NP   ciprofloxacin (CIPRO) 500 MG tablet Take 1 tablet by mouth 2 times daily for 7 days 4/22/22 4/29/22  Mary Peterson III, MD   tamsulosin [facility]) 0.4 MG capsule Take 1 capsule by mouth daily Take one capsule daily to facilitate passage of ureteral stone 4/6/22   Lou High MD   oxybutynin (DITROPAN-XL) 10 MG extended release tablet Take 1 tablet by mouth daily 4/6/22   Lou High MD   canagliflozin (INVOKANA) 300 MG TABS tablet Take 1 tablet by mouth every morning (before breakfast)  Patient not taking: Reported on 3/31/2022 1/6/22 1/6/23  BREANNA Wesley Ma, NP   gabapentin (NEURONTIN) 300 MG capsule Take 1 capsule by mouth 2 times daily for 180 days.  Intended supply: 90 days  Patient not taking: Reported on 3/31/2022 1/6/22 7/5/22  BREANNA Wesley Ma, NP   insulin detemir (LEVEMIR FLEXTOUCH) 100 UNIT/ML injection pen Inject 63 Units into the skin nightly 1/5/22 1/5/23  BREANNA Wesley Ma, NP   metFORMIN (GLUCOPHAGE) 1000 MG tablet Take 1 tablet by mouth 2 times daily (with meals) 9/19/21 9/19/22  BREANNA Wesley Ma, NP   lisinopril (PRINIVIL;ZESTRIL) 10 MG tablet Take 1 tablet by mouth daily 9/19/21 9/19/22  BREANNA Wesley Ma, NP   chlorthalidone (HYGROTON) 25 MG tablet Take 1 tablet by mouth daily 9/19/21 9/19/22  BREANNA Wesley Ma, NP   pioglitazone (ACTOS) 30 MG tablet Take 1 tablet by mouth daily 9/19/21 9/14/22  BREANNA Wesley Ma, NP   celecoxib (CELEBREX) 200 MG capsule Take 1 capsule by mouth daily 9/19/21 9/14/22  BREANNA Wesley Ma, NP   DULoxetine (CYMBALTA) 30 MG extended release capsule Take 1 capsule by mouth 2 times daily 9/19/21 9/19/22  BREANNA Wesley Ma, NP   blood glucose test strips [facility]) strip Test three times daily and as needed 4/20/21 8/10/23  BREANNA Wesley Ma, NP   Insulin Pen Needle 31G X 5 MM MISC 1 each by Does not apply route daily 4/14/21 4/14/22  BREANNA Wesley Ma, NP   Lancets MISC Test twice a day and as needed, Please send lancets for Verio brand meter 1/4/16 3/17/22  Kelli Pinedo, APRN - CNP   multivitamin [facility]) per tablet Take 1 tablet by mouth daily. Historical Provider, MD        Allergies:     Patient has no known allergies. Social History:     Tobacco:    reports that he has been smoking cigarettes. He started smoking about 42 years ago. He has a 40.00 pack-year smoking history. He has quit using smokeless tobacco.  Alcohol:      reports previous alcohol use. Drug Use:  reports no history of drug use. Family History:     Family History   Problem Relation Age of Onset    Arthritis Mother     Cancer Father        Review of Systems:     Positive and Negative as described in HPI. CONSTITUTIONAL:  Positive for fever and chills.    HEENT:  negative for vision, hearing changes, runny nose, throat pain  RESPIRATORY:  negative for shortness of breath, cough, congestion, wheezing  CARDIOVASCULAR:  negative for chest pain, palpitations  GASTROINTESTINAL:  negative for nausea, vomiting, diarrhea, constipation, change in bowel habits, abdominal pain   GENITOURINARY:  negative for difficulty of urination, burning with urination, frequency   INTEGUMENT:  negative for rash, skin lesions, easy bruising   HEMATOLOGIC/LYMPHATIC:  negative for swelling/edema   ALLERGIC/IMMUNOLOGIC:  negative for urticaria , itching  ENDOCRINE:  negative increase in drinking, increase in urination, hot or cold intolerance  MUSCULOSKELETAL:  negative joint pains, muscle aches, swelling of joints  NEUROLOGICAL:  negative for headaches, dizziness, lightheadedness, numbness, pain, tingling extremities  BEHAVIOR/PSYCH:  negative for depression, anxiety    Physical Exam:   BP (!) 114/53   Pulse 87   Temp 101.8 °F (38.8 °C) (Oral)   Resp 14   Ht 6' 2\" (1.88 m)   Wt 196 lb (88.9 kg)   SpO2 98%   BMI 25.16 kg/m²   Temp (24hrs), Av °F (38.3 °C), Min:100.2 °F (37.9 °C), Max:101.8 °F (38.8 °C)    Recent Labs 04/20/22  1030   POCGLU 184*     No intake or output data in the 24 hours ending 04/22/22 1832    General Appearance:  alert, well appearing, and in no acute distress  Mental status: oriented to person, place, and time  Head:  normocephalic, atraumatic  Eye: no icterus, redness, pupils equal and reactive, extraocular eye movements intact, conjunctiva clear  Ear: normal external ear, no discharge, hearing intact  Nose:  no drainage noted  Mouth: mucous membranes moist  Neck: supple, no carotid bruits, thyroid not palpable  Lungs: Bilateral equal air entry, clear to ausculation, no wheezing, rales or rhonchi, normal effort  Cardiovascular: normal rate, regular rhythm, no murmur, gallop, rub  Abdomen: Soft, nontender, nondistended, normal bowel sounds, no hepatomegaly or splenomegaly  Neurologic: There are no new focal motor or sensory deficits, normal muscle tone and bulk, no abnormal sensation, normal speech, cranial nerves II through XII grossly intact  Skin: No gross lesions, rashes, bruising or bleeding on exposed skin area  Extremities:  peripheral pulses palpable, no pedal edema or calf pain with palpation  Psych: normal affect     Investigations:      Laboratory Testing:  Recent Results (from the past 24 hour(s))   CBC with Auto Differential    Collection Time: 04/21/22 11:23 PM   Result Value Ref Range    WBC 12.0 (H) 3.5 - 11.0 k/uL    RBC 4.22 (L) 4.5 - 5.9 m/uL    Hemoglobin 12.8 (L) 13.5 - 17.5 g/dL    Hematocrit 38.1 (L) 41 - 53 %    MCV 90.3 80 - 100 fL    MCH 30.4 26 - 34 pg    MCHC 33.7 31 - 37 g/dL    RDW 14.0 12.5 - 15.4 %    Platelets 661 282 - 657 k/uL    MPV 8.3 6.0 - 12.0 fL    Seg Neutrophils 87 (H) 36 - 66 %    Lymphocytes 9 (L) 24 - 44 %    Monocytes 4 2 - 11 %    Eosinophils % 0 (L) 1 - 4 %    Basophils 0 0 - 2 %    Segs Absolute 10.30 (H) 1.8 - 7.7 k/uL    Absolute Lymph # 1.10 1.0 - 4.8 k/uL    Absolute Mono # 0.50 0.1 - 1.2 k/uL    Absolute Eos # 0.00 0.0 - 0.4 k/uL    Basophils Absolute 0. 00 0.0 - 0.2 k/uL   Culture, Blood 1    Collection Time: 04/21/22 11:23 PM    Specimen: Blood   Result Value Ref Range    Specimen Description . BLOOD     Special Requests 20 ML LEFT ARM IV START     Culture POSITIVE Blood Culture (A)     Culture DIRECT GRAM STAIN FROM BOTTLE: GRAM NEGATIVE RODS     Culture       (NOTE) Direct Gram Stain from bottle result called to and read back by: BOB Madison on 4/22/22 at 66:20   Basic Metabolic Panel    Collection Time: 04/21/22 11:23 PM   Result Value Ref Range    Glucose 209 (H) 70 - 99 mg/dL    BUN 21 8 - 23 mg/dL    CREATININE 1.07 0.70 - 1.20 mg/dL    Calcium 9.0 8.6 - 10.4 mg/dL    Sodium 131 (L) 135 - 144 mmol/L    Potassium 3.2 (L) 3.7 - 5.3 mmol/L    Chloride 91 (L) 98 - 107 mmol/L    CO2 25 20 - 31 mmol/L    Anion Gap 15 9 - 17 mmol/L    GFR Non-African American >60 >60 mL/min    GFR African American >60 >60 mL/min    GFR Comment         Lactic Acid    Collection Time: 04/21/22 11:23 PM   Result Value Ref Range    Lactic Acid 3.0 (H) 0.5 - 2.2 mmol/L   Culture, Blood 1    Collection Time: 04/21/22 11:40 PM    Specimen: Blood   Result Value Ref Range    Specimen Description . BLOOD     Special Requests 20MLS RIGHT ANTECUBITAL     Culture POSITIVE Blood Culture (A)     Culture DIRECT GRAM STAIN FROM BOTTLE: GRAM NEGATIVE RODS     Culture       (NOTE) Direct Gram Stain from bottle result called to and read back by:ELBA SMITH 4/22/2022 1430   Urinalysis with Reflex to Culture    Collection Time: 04/22/22 12:44 AM    Specimen: Urine   Result Value Ref Range    Color, UA Yellow Yellow    Turbidity UA Turbid (A) Clear    Glucose, Ur NEGATIVE NEGATIVE    Bilirubin Urine NEGATIVE NEGATIVE    Ketones, Urine NEGATIVE NEGATIVE    Specific Gravity, UA 1.025 1.005 - 1.030    Urine Hgb LARGE (A) NEGATIVE    pH, UA 5.5 5.0 - 8.0    Protein, UA 2+ (A) NEGATIVE    Urobilinogen, Urine Normal Normal    Nitrite, Urine NEGATIVE NEGATIVE    Leukocyte Esterase, Urine LARGE (A) NEGATIVE Microscopic Urinalysis    Collection Time: 04/22/22 12:44 AM   Result Value Ref Range    -          WBC, UA TOO NUMEROUS TO COUNT 0 - 5 /HPF    RBC, UA 20 TO 50 0 - 2 /HPF    Epithelial Cells UA 0 TO 2 0 - 5 /HPF    Bacteria, UA MODERATE (A) None    Other Observations UA Culture ordered based on defined criteria. (A) NOT REQ. Imaging/Diagnostics:  XR ABDOMEN (KUB) (SINGLE AP VIEW)    Result Date: 4/22/2022  1. There is a suspected 8 mm stone in the upper pole of the left kidney. 2. Left ureteral stent. 3. Normal bowel gas pattern without evidence of obstruction. CT HEAD WO CONTRAST    Result Date: 4/22/2022  No acute intracranial abnormality.        Assessment :      Hospital Problems           Last Modified POA    * (Principal) UTI (urinary tract infection) 4/22/2022 Yes    Essential hypertension (Chronic) 4/22/2022 Yes    Type 2 diabetes mellitus with diabetic polyneuropathy, with long-term current use of insulin ([facility] 4/22/2022 Yes    Kidney stone on left side 4/22/2022 Yes    Moderate episode of recurrent major depressive disorder ([facility] 4/22/2022 Yes          Plan:     Patient status inpatient in the Progressive Unit/Step down    Bacteremia   -Secondary to UTI and/or ureteral stent placement in the setting of underlying UTI   -Blood cultures x 2 growing gram negative rods   -Ceftriaxone 1 gram q24h   -Maintenance fluids at 100 mL/hr   -Repeat blood cultures pending   -Daily BMP   -Daily CMP     DM2   -Continue home Levemir 63 units nightly   -LDSSI   -Hypoglycemia protocol     HTN   -Continue home lisinopril 10 mg daily   -Continue home chlorthalidone 25 mg daily     Depression   -Continue home Cymbalta     Hx of nephrolithiasis   -Continue home Flomax   -Follow-up with urology outpatient as scheduled     Consultations:   None    Patient is admitted as inpatient status because of co-morbidities listed above, severity of signs and symptoms as outlined, requirement for current medical therapies and most importantly because of direct risk to patient if care not provided in a hospital setting. Expected length of stay > 48 hours.     Nathan Linares MD  4/22/2022  6:32 PM    Copy sent to Dr. Maryjane Rose, APRN - NP

## 2022-04-23 PROBLEM — E87.6 HYPOKALEMIA: Status: ACTIVE | Noted: 2022-04-23

## 2022-04-23 LAB
ABSOLUTE EOS #: 0 K/UL (ref 0–0.4)
ABSOLUTE LYMPH #: 0.69 K/UL (ref 1–4.8)
ABSOLUTE MONO #: 1.03 K/UL (ref 0.1–0.8)
ANION GAP SERPL CALCULATED.3IONS-SCNC: 11 MMOL/L (ref 9–17)
BASOPHILS # BLD: 0 % (ref 0–2)
BASOPHILS ABSOLUTE: 0 K/UL (ref 0–0.2)
BUN BLDV-MCNC: 18 MG/DL (ref 8–23)
CALCIUM SERPL-MCNC: 8.4 MG/DL (ref 8.6–10.4)
CHLORIDE BLD-SCNC: 100 MMOL/L (ref 98–107)
CO2: 24 MMOL/L (ref 20–31)
CREAT SERPL-MCNC: 0.88 MG/DL (ref 0.7–1.2)
CULTURE: ABNORMAL
CULTURE: NORMAL
EOSINOPHILS RELATIVE PERCENT: 0 % (ref 1–4)
GFR AFRICAN AMERICAN: >60 ML/MIN
GFR NON-AFRICAN AMERICAN: >60 ML/MIN
GFR SERPL CREATININE-BSD FRML MDRD: ABNORMAL ML/MIN/{1.73_M2}
GLUCOSE BLD-MCNC: 149 MG/DL (ref 70–99)
GLUCOSE BLD-MCNC: 159 MG/DL (ref 70–99)
GLUCOSE BLD-MCNC: 167 MG/DL (ref 75–110)
GLUCOSE BLD-MCNC: 175 MG/DL (ref 75–110)
GLUCOSE BLD-MCNC: 231 MG/DL (ref 75–110)
HCT VFR BLD CALC: 35.1 % (ref 41–53)
HEMOGLOBIN: 11.9 G/DL (ref 13.5–17.5)
LACTIC ACID, SEPSIS: 1 MMOL/L (ref 0.5–1.9)
LYMPHOCYTES # BLD: 8 % (ref 24–44)
Lab: ABNORMAL
Lab: ABNORMAL
MAGNESIUM: 1.8 MG/DL (ref 1.6–2.6)
MCH RBC QN AUTO: 30.4 PG (ref 26–34)
MCHC RBC AUTO-ENTMCNC: 33.9 G/DL (ref 31–37)
MCV RBC AUTO: 89.7 FL (ref 80–100)
MONOCYTES # BLD: 12 % (ref 1–7)
MORPHOLOGY: NORMAL
PDW BLD-RTO: 13.9 % (ref 12.5–15.4)
PLATELET # BLD: 129 K/UL (ref 140–450)
PMV BLD AUTO: 8.4 FL (ref 6–12)
POTASSIUM SERPL-SCNC: 2.9 MMOL/L (ref 3.7–5.3)
POTASSIUM SERPL-SCNC: 3.6 MMOL/L (ref 3.7–5.3)
RBC # BLD: 3.92 M/UL (ref 4.5–5.9)
SEG NEUTROPHILS: 80 % (ref 36–66)
SEGMENTED NEUTROPHILS ABSOLUTE COUNT: 6.88 K/UL (ref 1.8–7.7)
SODIUM BLD-SCNC: 135 MMOL/L (ref 135–144)
SPECIMEN DESCRIPTION: ABNORMAL
SPECIMEN DESCRIPTION: NORMAL
WBC # BLD: 8.6 K/UL (ref 3.5–11)

## 2022-04-23 PROCEDURE — 36415 COLL VENOUS BLD VENIPUNCTURE: CPT

## 2022-04-23 PROCEDURE — 1210000000 HC MED SURG R&B

## 2022-04-23 PROCEDURE — 6370000000 HC RX 637 (ALT 250 FOR IP): Performed by: NURSE PRACTITIONER

## 2022-04-23 PROCEDURE — 6360000002 HC RX W HCPCS: Performed by: STUDENT IN AN ORGANIZED HEALTH CARE EDUCATION/TRAINING PROGRAM

## 2022-04-23 PROCEDURE — 80048 BASIC METABOLIC PNL TOTAL CA: CPT

## 2022-04-23 PROCEDURE — 82947 ASSAY GLUCOSE BLOOD QUANT: CPT

## 2022-04-23 PROCEDURE — 84132 ASSAY OF SERUM POTASSIUM: CPT

## 2022-04-23 PROCEDURE — 83735 ASSAY OF MAGNESIUM: CPT

## 2022-04-23 PROCEDURE — 6370000000 HC RX 637 (ALT 250 FOR IP): Performed by: STUDENT IN AN ORGANIZED HEALTH CARE EDUCATION/TRAINING PROGRAM

## 2022-04-23 PROCEDURE — 85025 COMPLETE CBC W/AUTO DIFF WBC: CPT

## 2022-04-23 PROCEDURE — 99232 SBSQ HOSP IP/OBS MODERATE 35: CPT | Performed by: STUDENT IN AN ORGANIZED HEALTH CARE EDUCATION/TRAINING PROGRAM

## 2022-04-23 PROCEDURE — 2580000003 HC RX 258: Performed by: STUDENT IN AN ORGANIZED HEALTH CARE EDUCATION/TRAINING PROGRAM

## 2022-04-23 RX ORDER — POTASSIUM CHLORIDE 20 MEQ/1
40 TABLET, EXTENDED RELEASE ORAL ONCE
Status: COMPLETED | OUTPATIENT
Start: 2022-04-23 | End: 2022-04-23

## 2022-04-23 RX ORDER — POTASSIUM CHLORIDE 20 MEQ/1
40 TABLET, EXTENDED RELEASE ORAL PRN
Status: DISCONTINUED | OUTPATIENT
Start: 2022-04-23 | End: 2022-04-24 | Stop reason: HOSPADM

## 2022-04-23 RX ORDER — IBUPROFEN 200 MG
400 TABLET ORAL ONCE
Status: COMPLETED | OUTPATIENT
Start: 2022-04-23 | End: 2022-04-23

## 2022-04-23 RX ORDER — IBUPROFEN 600 MG/1
600 TABLET ORAL ONCE
Status: COMPLETED | OUTPATIENT
Start: 2022-04-23 | End: 2022-04-23

## 2022-04-23 RX ORDER — POTASSIUM CHLORIDE 7.45 MG/ML
10 INJECTION INTRAVENOUS PRN
Status: DISCONTINUED | OUTPATIENT
Start: 2022-04-23 | End: 2022-04-24 | Stop reason: HOSPADM

## 2022-04-23 RX ADMIN — IBUPROFEN 400 MG: 200 TABLET, FILM COATED ORAL at 23:12

## 2022-04-23 RX ADMIN — SODIUM CHLORIDE: 9 INJECTION, SOLUTION INTRAVENOUS at 06:56

## 2022-04-23 RX ADMIN — IBUPROFEN 600 MG: 600 TABLET ORAL at 10:50

## 2022-04-23 RX ADMIN — SODIUM CHLORIDE: 9 INJECTION, SOLUTION INTRAVENOUS at 14:24

## 2022-04-23 RX ADMIN — POTASSIUM CHLORIDE 10 MEQ: 7.46 INJECTION, SOLUTION INTRAVENOUS at 09:16

## 2022-04-23 RX ADMIN — SODIUM CHLORIDE, PRESERVATIVE FREE 10 ML: 5 INJECTION INTRAVENOUS at 20:17

## 2022-04-23 RX ADMIN — OXYBUTYNIN CHLORIDE 10 MG: 5 TABLET, EXTENDED RELEASE ORAL at 09:13

## 2022-04-23 RX ADMIN — POTASSIUM CHLORIDE 10 MEQ: 7.46 INJECTION, SOLUTION INTRAVENOUS at 08:15

## 2022-04-23 RX ADMIN — LISINOPRIL 10 MG: 10 TABLET ORAL at 08:20

## 2022-04-23 RX ADMIN — MEROPENEM 1000 MG: 1 INJECTION, POWDER, FOR SOLUTION INTRAVENOUS at 23:14

## 2022-04-23 RX ADMIN — DULOXETINE 30 MG: 30 CAPSULE, DELAYED RELEASE ORAL at 20:11

## 2022-04-23 RX ADMIN — INSULIN LISPRO 1 UNITS: 100 INJECTION, SOLUTION INTRAVENOUS; SUBCUTANEOUS at 20:12

## 2022-04-23 RX ADMIN — ENOXAPARIN SODIUM 40 MG: 100 INJECTION SUBCUTANEOUS at 08:14

## 2022-04-23 RX ADMIN — CHLORTHALIDONE 25 MG: 25 TABLET ORAL at 08:14

## 2022-04-23 RX ADMIN — MEROPENEM 1000 MG: 1 INJECTION, POWDER, FOR SOLUTION INTRAVENOUS at 08:16

## 2022-04-23 RX ADMIN — POTASSIUM CHLORIDE 10 MEQ: 7.46 INJECTION, SOLUTION INTRAVENOUS at 11:29

## 2022-04-23 RX ADMIN — ACETAMINOPHEN 650 MG: 325 TABLET ORAL at 21:51

## 2022-04-23 RX ADMIN — TAMSULOSIN HYDROCHLORIDE 0.4 MG: 0.4 CAPSULE ORAL at 08:14

## 2022-04-23 RX ADMIN — INSULIN LISPRO 1 UNITS: 100 INJECTION, SOLUTION INTRAVENOUS; SUBCUTANEOUS at 16:56

## 2022-04-23 RX ADMIN — INSULIN LISPRO 2 UNITS: 100 INJECTION, SOLUTION INTRAVENOUS; SUBCUTANEOUS at 11:30

## 2022-04-23 RX ADMIN — POTASSIUM CHLORIDE 10 MEQ: 7.46 INJECTION, SOLUTION INTRAVENOUS at 12:31

## 2022-04-23 RX ADMIN — POTASSIUM CHLORIDE 10 MEQ: 7.46 INJECTION, SOLUTION INTRAVENOUS at 06:58

## 2022-04-23 RX ADMIN — MEROPENEM 1000 MG: 1 INJECTION, POWDER, FOR SOLUTION INTRAVENOUS at 15:18

## 2022-04-23 RX ADMIN — CELECOXIB 200 MG: 100 CAPSULE ORAL at 08:14

## 2022-04-23 RX ADMIN — DULOXETINE 30 MG: 30 CAPSULE, DELAYED RELEASE ORAL at 08:14

## 2022-04-23 RX ADMIN — INSULIN GLARGINE 63 UNITS: 100 INJECTION, SOLUTION SUBCUTANEOUS at 20:12

## 2022-04-23 RX ADMIN — POTASSIUM CHLORIDE 10 MEQ: 7.46 INJECTION, SOLUTION INTRAVENOUS at 10:18

## 2022-04-23 RX ADMIN — POTASSIUM CHLORIDE 40 MEQ: 1500 TABLET, EXTENDED RELEASE ORAL at 16:56

## 2022-04-23 ASSESSMENT — PAIN SCALES - GENERAL
PAINLEVEL_OUTOF10: 0
PAINLEVEL_OUTOF10: 5
PAINLEVEL_OUTOF10: 6
PAINLEVEL_OUTOF10: 5

## 2022-04-23 ASSESSMENT — PAIN DESCRIPTION - LOCATION
LOCATION: BACK;HIP
LOCATION: HEAD

## 2022-04-23 ASSESSMENT — PAIN - FUNCTIONAL ASSESSMENT: PAIN_FUNCTIONAL_ASSESSMENT: PREVENTS OR INTERFERES SOME ACTIVE ACTIVITIES AND ADLS

## 2022-04-23 ASSESSMENT — PAIN DESCRIPTION - ORIENTATION: ORIENTATION: RIGHT

## 2022-04-23 ASSESSMENT — PAIN DESCRIPTION - PAIN TYPE: TYPE: CHRONIC PAIN

## 2022-04-23 ASSESSMENT — PAIN DESCRIPTION - DESCRIPTORS: DESCRIPTORS: ACHING;DISCOMFORT

## 2022-04-23 NOTE — PROGRESS NOTES
Physician Progress Note      Chelita Polanco  CSN #:                  [id number]  :                       1950  ADMIT DATE:       2022 5:17 PM  [address] [place] DATE:  RESPONDING  PROVIDER #:        Leonel Cook MD          QUERY TEXT:    Pt admitted with UTI. Pt noted to have S/P left ureteral stent placement and   laser lithotripsy . If possible, please document in the progress notes and   discharge summary if you are evaluating and/or treating any of the following: The medical record reflects the following:  Risk Factors: Recent uteral stent placement  Clinical Indicators:  Temp 101.8 , WBC12.0, UA  Turbid, Protien 2 +, Leukocyte   Esterase, Urine large, Bacteria UA moderate, UR HGb large, -Blood cultures x   2 growing gram negative rods - Enterobacter cloacae DNA detected   per H&P;   Bacteremia-Secondary to UTI and/or ureteral stent placement in the setting of   underlying UTI  Treatment: Meropenem IV, -Maintenance fluids at 100 mL/hr, monitoring labs and   vitals    Thank you, Please call if questions  Anna Siddiqui RN [facility]  [phone]  Options provided:  -- UTI due to ureteral stent  -- UTI not due to ureteral stent  -- Other - I will add my own diagnosis  -- Disagree - Not applicable / Not valid  -- Disagree - Clinically unable to determine / Unknown  -- Refer to Clinical Documentation Reviewer    PROVIDER RESPONSE TEXT:    UTI is not due to ureteral stent.     Query created by: Rachel Mancia on 2022 1:13 PM      Electronically signed by:  Leonel Cook MD 2022 1:56 PM

## 2022-04-23 NOTE — PROGRESS NOTES
[facility]  Office: [address], DO, Sumi Vani, DO, Maria Elena Garcia, DO, Meseret Rosadol Blood, DO, Juvenal Aguilar MD, Marcus Mott MD, Darryle So, MD, Gretchen Palmer MD, Jose Harrison MD, Bret Miller MD, Vic Farah MD, Chalino Bianchi, DO, Lili Garrison DO, Laura Barrios MD,  Marilyn Castellanos DO, Kendall Sheth MD, Pedro Pablo Spaulding MD, Aj Garza MD, Dylan Escobar DO, Boubacar Fajardo MD, Aisha Nguyen MD, Sophia Hines, [facility], [facility], CNP, Marcelino Sommers, CNP, Refugio Nina, CNP, Uriel Arevalo, CNP, Niru Fu, CNP, Rachell Westbrook, PA-C, Aracely Pérez, DNP, Luis Manuel Berman, CNP, Rashard Mensah, CNP, Tru Garcia, CNP, Hammad Small, CNS, Shauna Hatfield, [facility], Palak Marsh, CNP, Elaine Merritt, CNP, Sofia Waite, CNP         [address] [facility]    Progress Note    4/23/2022    7:48 AM    Name:   Samantha Agarwal  MRN:     [id number]     Acct:      [de-identified]   Room:   [address] [place] [address] Day:  1  Admit Date:  4/22/2022  5:17 PM    PCP:   BREANNA Kramer NP  Code Status:  Full Code    Subjective:     C/C:   Chief Complaint   Patient presents with    Abnormal Lab     Interval History Status: improved. Patient was seen and examined at bedside this AM. He reports feeling better overall but continues to endorse subjective fever and chills. Blood culture x 1 growing Enterobacter cloacae (sensitivities pending). Plan to continue IV antibiotics an monitor for improvement. Brief History:     Samantha Agarwal is a 67 y.o. male with a past medical history of nephrolithiasis (s/p left ureteral stent placement and laser lithotripsy 4/6), DM2, HTN, BPH and depression who presented to the emergency department on 4/22/2022 complaining of fever/chills and abnormal lab. The patient was seen in the emergency department for the same last night and was sent home on ciprofloxacin after being diagnosed with a UTI.  Blood cultures were drawn in the emergency department last night and came back positive for gram negative rods x 2 today. The patient received a call from the emergency department and was instructed to return. In the ED, the patient was febrile (Tmax 101.8) but nontoxic appearing. The patient is admitted to internal medicine for further management of bacteremia secondary to urinary tract infection. Review of Systems:     Constitutional:  Positive for subjective fever and chills. Respiratory:  negative for cough, dyspnea on exertion, shortness of breath, wheezing  Cardiovascular:  negative for chest pain, chest pressure/discomfort, lower extremity edema, palpitations  Gastrointestinal:  negative for abdominal pain, constipation, diarrhea, nausea, vomiting  Neurological:  negative for dizziness, headache    Medications:      Allergies:  No Known Allergies    Current Meds:   Scheduled Meds:    meropenem  1,000 mg IntraVENous Q8H    chlorthalidone  25 mg Oral Daily    DULoxetine  30 mg Oral BID    lisinopril  10 mg Oral Daily    oxybutynin  10 mg Oral Daily    insulin lispro  0-6 Units SubCUTAneous TID WC    insulin lispro  0-3 Units SubCUTAneous Nightly    tamsulosin  0.4 mg Oral Daily    sodium chloride flush  5-40 mL IntraVENous 2 times per day    enoxaparin  40 mg SubCUTAneous Daily    celecoxib  200 mg Oral Daily    insulin glargine  63 Units SubCUTAneous Nightly     Continuous Infusions:    dextrose      sodium chloride 10 mL/hr at 04/23/22 0656    sodium chloride 125 mL/hr at 04/22/22 2018     PRN Meds: potassium chloride **OR** potassium alternative oral replacement **OR** potassium chloride, glucose, dextrose, glucagon (rDNA), dextrose, sodium chloride flush, sodium chloride, ondansetron **OR** ondansetron, polyethylene glycol, acetaminophen **OR** acetaminophen    Data:     Past Medical History:   has a past medical history of Bladder stone, BPH with obstruction/lower urinary tract symptoms, Caffeine use, Cataracts, bilateral, Chronic back pain, COPD (chronic obstructive pulmonary disease) ([address] Hypertension, Irritable bowel syndrome, Kidney stone, Osteoarthritis, Pancreatitis, and Type II or unspecified type diabetes mellitus without mention of complication, not stated as uncontrolled. Social History:   reports that he has been smoking cigarettes. He started smoking about 42 years ago. He has a 40.00 pack-year smoking history. He has quit using smokeless tobacco. He reports previous alcohol use. He reports that he does not use drugs. Family History:   Family History   Problem Relation Age of Onset    Arthritis Mother     Cancer Father        Vitals:  /79   Pulse 86   Temp 98.7 °F (37.1 °C) (Oral)   Resp 16   Ht 6' 2\" (1.88 m)   Wt 196 lb (88.9 kg)   SpO2 100%   BMI 25.16 kg/m²   Temp (24hrs), Av.3 °F (37.9 °C), Min:98.7 °F (37.1 °C), Max:101.8 °F (38.8 °C)    Recent Labs     22  1030 22  0324   POCGLU 184* 185* 175*       I/O (24Hr):   No intake or output data in the 24 hours ending 22 0748    Labs:  Hematology:  Recent Labs     22  0604   WBC 12.0* 11.2* 8.6   RBC 4.22* 4.15* 3.92*   HGB 12.8* 12.7* 11.9*   HCT 38.1* 37.6* 35.1*   MCV 90.3 90.5 89.7   MCH 30.4 30.6 30.4   MCHC 33.7 33.8 33.9   RDW 14.0 14.0 13.9    144 129*   MPV 8.3 8.3 8.4   INR  --  1.1  --      Chemistry:  Recent Labs     22  0604   * 131* 135   K 3.2* 3.2* 2.9*   CL 91* 94* 100   CO2 25 27 24   GLUCOSE 209* 223* 149*   BUN 21 24* 18   CREATININE 1.07 1.00 0.88   MG  --  1.7 1.8   ANIONGAP 15 10 11   LABGLOM >60 >60 >60   GFRAA >60 >60 >60   CALCIUM 9.0 9.0 8.4*     Recent Labs     22  1030 22  1830 22  2137 22  0324   PROT  --  6.9  --   --    LABALBU  --  3.7  --   --    AST  --  14  --   --    ALT  --  15  --   --    ALKPHOS  --  56  --   --    BILITOT  --  0.52  --   -- POCGLU 184*  --  185* 175*     ABG:No results found for: POCPH, PHART, PH, POCPCO2, DMN6HLC, PCO2, POCPO2, PO2ART, PO2, POCHCO3, FVY1GNW, HCO3, NBEA, PBEA, BEART, BE, THGBART, THB, VJP0LCK, LHOV0FZG, W4CNXRGJ, O2SAT, FIO2  Lab Results   Component Value Date/Time    SPECIAL 20ML RIGHT HAND 04/22/2022 09:52 PM    SPECIAL 20ML RIGHT ARM 04/22/2022 09:52 PM     Lab Results   Component Value Date/Time    CULTURE NO GROWTH <24 HRS 04/22/2022 09:52 PM    CULTURE NO GROWTH <24 HRS 04/22/2022 09:52 PM       Radiology:  XR ABDOMEN (KUB) (SINGLE AP VIEW)    Result Date: 4/22/2022  1. There is a suspected 8 mm stone in the upper pole of the left kidney. 2. Left ureteral stent. 3. Normal bowel gas pattern without evidence of obstruction. CT HEAD WO CONTRAST    Result Date: 4/22/2022  No acute intracranial abnormality.        Physical Examination:     General appearance:  alert, cooperative and no distress  Mental Status:  oriented to person, place and time and normal affect  Lungs:  clear to auscultation bilaterally, normal effort  Heart:  regular rate and rhythm, no murmur  Abdomen:  soft, nontender, nondistended, normal bowel sounds, no masses, hepatomegaly, splenomegaly  Extremities:  no edema, redness, tenderness in the calves  Skin:  no gross lesions, rashes, induration    Assessment:     Hospital Problems           Last Modified POA    * (Principal) UTI (urinary tract infection) 4/22/2022 Yes    Hypokalemia 4/23/2022 Yes    Essential hypertension (Chronic) 4/22/2022 Yes    Type 2 diabetes mellitus with diabetic polyneuropathy, with long-term current use of insulin ([address] 4/22/2022 Yes    Kidney stone on left side 4/22/2022 Yes    Moderate episode of recurrent major depressive disorder ([address] 4/22/2022 Yes          Plan:     Bacteremia   -Secondary to UTI and/or ureteral stent placement in the setting of underlying UTI   -Blood cultures x 2 growing gram negative rods (Enterobacter cloacae DNA detected)   -Repeat blood cultures NGTD  -Meropenem 1 gram q8h   -Maintenance fluids at 100 mL/hr   -Daily BMP   -Daily CMP     Hypokalemia   -Replacement per protocol   -Daily CMP as above      DM2   -Continue home Levemir 63 units nightly   -LDSSI   -Hypoglycemia protocol      HTN   -Continue home lisinopril 10 mg daily   -Continue home chlorthalidone 25 mg daily      Depression   -Continue home Cymbalta      Hx of nephrolithiasis   -Continue home Flomax   -Follow-up with urology outpatient as scheduled     Cee Lennon MD  4/23/2022  7:48 AM

## 2022-04-23 NOTE — PLAN OF CARE
Problem: Pain  Goal: Verbalizes/displays adequate comfort level or baseline comfort level  Outcome: Progressing     Problem: Discharge Planning  Goal: Discharge to home or other facility with appropriate resources  Outcome: Progressing     Problem: Safety - Adult  Goal: Free from fall injury  Outcome: Progressing     Problem: Skin/Tissue Integrity  Goal: Absence of new skin breakdown  Description: 1. Monitor for areas of redness and/or skin breakdown  2. Assess vascular access sites hourly  3. Every 4-6 hours minimum:  Change oxygen saturation probe site  4. Every 4-6 hours:  If on nasal continuous positive airway pressure, respiratory therapy assess nares and determine need for appliance change or resting period.   Outcome: Progressing

## 2022-04-24 VITALS
TEMPERATURE: 99.1 F | BODY MASS INDEX: 25.15 KG/M2 | HEART RATE: 65 BPM | DIASTOLIC BLOOD PRESSURE: 78 MMHG | HEIGHT: 74 IN | SYSTOLIC BLOOD PRESSURE: 119 MMHG | OXYGEN SATURATION: 93 % | WEIGHT: 196 LBS | RESPIRATION RATE: 19 BRPM

## 2022-04-24 LAB
ABSOLUTE EOS #: 0.1 K/UL (ref 0–0.4)
ABSOLUTE LYMPH #: 1 K/UL (ref 1–4.8)
ABSOLUTE MONO #: 0.8 K/UL (ref 0.1–1.2)
ANION GAP SERPL CALCULATED.3IONS-SCNC: 9 MMOL/L (ref 9–17)
BASOPHILS # BLD: 0 % (ref 0–2)
BASOPHILS ABSOLUTE: 0 K/UL (ref 0–0.2)
BUN BLDV-MCNC: 18 MG/DL (ref 8–23)
CALCIUM SERPL-MCNC: 8.4 MG/DL (ref 8.6–10.4)
CHLORIDE BLD-SCNC: 103 MMOL/L (ref 98–107)
CO2: 24 MMOL/L (ref 20–31)
CREAT SERPL-MCNC: 0.72 MG/DL (ref 0.7–1.2)
EOSINOPHILS RELATIVE PERCENT: 2 % (ref 1–4)
GFR AFRICAN AMERICAN: >60 ML/MIN
GFR NON-AFRICAN AMERICAN: >60 ML/MIN
GFR SERPL CREATININE-BSD FRML MDRD: ABNORMAL ML/MIN/{1.73_M2}
GLUCOSE BLD-MCNC: 100 MG/DL (ref 70–99)
GLUCOSE BLD-MCNC: 96 MG/DL (ref 75–110)
HCT VFR BLD CALC: 35.1 % (ref 41–53)
HEMOGLOBIN: 11.9 G/DL (ref 13.5–17.5)
LYMPHOCYTES # BLD: 18 % (ref 24–44)
MCH RBC QN AUTO: 30.6 PG (ref 26–34)
MCHC RBC AUTO-ENTMCNC: 33.9 G/DL (ref 31–37)
MCV RBC AUTO: 90.3 FL (ref 80–100)
MONOCYTES # BLD: 15 % (ref 2–11)
PDW BLD-RTO: 14.4 % (ref 12.5–15.4)
PLATELET # BLD: 119 K/UL (ref 140–450)
PMV BLD AUTO: 8.8 FL (ref 6–12)
POTASSIUM SERPL-SCNC: 3.7 MMOL/L (ref 3.7–5.3)
RBC # BLD: 3.89 M/UL (ref 4.5–5.9)
SEG NEUTROPHILS: 65 % (ref 36–66)
SEGMENTED NEUTROPHILS ABSOLUTE COUNT: 3.6 K/UL (ref 1.8–7.7)
SODIUM BLD-SCNC: 136 MMOL/L (ref 135–144)
WBC # BLD: 5.5 K/UL (ref 3.5–11)

## 2022-04-24 PROCEDURE — 2580000003 HC RX 258: Performed by: STUDENT IN AN ORGANIZED HEALTH CARE EDUCATION/TRAINING PROGRAM

## 2022-04-24 PROCEDURE — 80048 BASIC METABOLIC PNL TOTAL CA: CPT

## 2022-04-24 PROCEDURE — 36415 COLL VENOUS BLD VENIPUNCTURE: CPT

## 2022-04-24 PROCEDURE — 85025 COMPLETE CBC W/AUTO DIFF WBC: CPT

## 2022-04-24 PROCEDURE — 6370000000 HC RX 637 (ALT 250 FOR IP): Performed by: STUDENT IN AN ORGANIZED HEALTH CARE EDUCATION/TRAINING PROGRAM

## 2022-04-24 PROCEDURE — 99238 HOSP IP/OBS DSCHRG MGMT 30/<: CPT | Performed by: STUDENT IN AN ORGANIZED HEALTH CARE EDUCATION/TRAINING PROGRAM

## 2022-04-24 PROCEDURE — 82947 ASSAY GLUCOSE BLOOD QUANT: CPT

## 2022-04-24 PROCEDURE — 6360000002 HC RX W HCPCS: Performed by: STUDENT IN AN ORGANIZED HEALTH CARE EDUCATION/TRAINING PROGRAM

## 2022-04-24 RX ORDER — CIPROFLOXACIN 500 MG/1
500 TABLET, FILM COATED ORAL 2 TIMES DAILY
Qty: 20 TABLET | Refills: 0 | Status: SHIPPED | OUTPATIENT
Start: 2022-04-24 | End: 2022-04-25 | Stop reason: SDUPTHER

## 2022-04-24 RX ADMIN — MEROPENEM 1000 MG: 1 INJECTION, POWDER, FOR SOLUTION INTRAVENOUS at 06:50

## 2022-04-24 RX ADMIN — OXYBUTYNIN CHLORIDE 10 MG: 5 TABLET, EXTENDED RELEASE ORAL at 08:12

## 2022-04-24 RX ADMIN — CHLORTHALIDONE 25 MG: 25 TABLET ORAL at 08:12

## 2022-04-24 RX ADMIN — SODIUM CHLORIDE: 9 INJECTION, SOLUTION INTRAVENOUS at 06:49

## 2022-04-24 RX ADMIN — CELECOXIB 200 MG: 100 CAPSULE ORAL at 08:12

## 2022-04-24 RX ADMIN — SODIUM CHLORIDE, PRESERVATIVE FREE 10 ML: 5 INJECTION INTRAVENOUS at 08:11

## 2022-04-24 RX ADMIN — LISINOPRIL 10 MG: 10 TABLET ORAL at 08:12

## 2022-04-24 RX ADMIN — TAMSULOSIN HYDROCHLORIDE 0.4 MG: 0.4 CAPSULE ORAL at 08:12

## 2022-04-24 RX ADMIN — DULOXETINE 30 MG: 30 CAPSULE, DELAYED RELEASE ORAL at 08:12

## 2022-04-24 RX ADMIN — ENOXAPARIN SODIUM 40 MG: 100 INJECTION SUBCUTANEOUS at 08:12

## 2022-04-24 ASSESSMENT — PAIN DESCRIPTION - PAIN TYPE: TYPE: CHRONIC PAIN

## 2022-04-24 ASSESSMENT — PAIN - FUNCTIONAL ASSESSMENT: PAIN_FUNCTIONAL_ASSESSMENT: ACTIVITIES ARE NOT PREVENTED

## 2022-04-24 ASSESSMENT — PAIN DESCRIPTION - LOCATION: LOCATION: BACK

## 2022-04-24 ASSESSMENT — PAIN SCALES - GENERAL: PAINLEVEL_OUTOF10: 3

## 2022-04-24 ASSESSMENT — PAIN DESCRIPTION - DESCRIPTORS: DESCRIPTORS: DISCOMFORT

## 2022-04-24 ASSESSMENT — PAIN DESCRIPTION - ONSET: ONSET: ON-GOING

## 2022-04-24 NOTE — PROGRESS NOTES
Report received from [address] [place] [address]. Performed bedside rounding. Introduced self to patient and updated on POC. Patient with no needs at this time.

## 2022-04-24 NOTE — DISCHARGE SUMMARY
[facility]  Office: [address], DO, Justin Taylor, DO, Martha Lujan, DO, Beckywilmer Ayers Melonie, DO, January Goddard MD, Irais Gallardo MD, Giles Ahuja MD, Martha Pino MD, Steffi Damian MD, Leah Sigala MD, Jeb Llanos MD, Mercedes Lance, DO, Media Sensor, DO, César Granados MD,  Rayo Gallo, DO, Bell Vigil MD, Fercho Justice MD, Evelyn Ahumada MD, Swati Smith DO, Kieran Robledo MD, Maria Guadalupe Suárez MD, Marian Draper, [facility], [facility], CNP, Teo Heaton, CNP, Jocelyne Craig, CNP, Suzette Jones, CNP, Dorothy Morton, CNP, Sherry Hinds PAEmelinaC, Trudy Booker, [facility], Sherman Decker, CNP, Renetta Romero, CNP, Casey Carrion, CNP, Briseida Reagan, CNS, Loreta Watson, [facility], Vesta Landa, CNP, Hanna Knox, CNP, Florencio Mccullough, CNP         [address] [facility]    Discharge Summary     Patient ID: Negro Mccall  :  1950   MRN: [id number]     ACCOUNT:  [de-identified]   Patient's PCP: BREANNA Banks NP  Admit Date: 2022   Discharge Date: 2022     Length of Stay: 2  Code Status:  Full Code  Admitting Physician: Evelyn Ahumada MD  Discharge Physician: Evelyn Ahumada MD     Active Discharge Diagnoses:     Hospital Problem Lists:  Principal Problem:    UTI (urinary tract infection)  Active Problems:    Hypokalemia    Essential hypertension    Type 2 diabetes mellitus with diabetic polyneuropathy, with long-term current use of insulin (HCC)    Kidney stone on left side    Moderate episode of recurrent major depressive disorder ([facility] [address]  Resolved Problems:    * No resolved hospital problems.  *      Admission Condition:  fair     Discharged Condition: good    Hospital Stay:     Hospital Course:  Negro Mccall is a 67 y.o. male with a past medical history of nephrolithiasis (s/p left ureteral stent placement and laser lithotripsy ), DM2, HTN, BPH and depression who presented to the emergency department on 2022 complaining of fever/chills and abnormal lab. The patient was seen in the emergency department for the same last night and was sent home on ciprofloxacin after being diagnosed with a UTI. Blood cultures were drawn in the emergency department last night and came back positive for gram negative rods x 2 today. The patient received a call from the emergency department and was instructed to return. In the ED, the patient was febrile (Tmax 101.8) but nontoxic appearing. The patient was admitted to internal medicine for further management of bacteremia secondary to urinary tract infection. He improved with IV antibiotics during admission. Blood cultures x 2 grew Enterobacter cloacae sensitive to ciprofloxacin. Urine culture also grew Enterobacter cloacae sensitive to ciprofloxacin. Repeat blood cultures were negative x 2. The patient is discharged today (4/24) in stable condition. He is instructed to follow-up with urology outpatient as scheduled. Significant therapeutic interventions: IV antibiotics     Significant Diagnostic Studies:   Labs / Micro:  BMP:    Lab Results   Component Value Date    GLUCOSE 100 04/24/2022     04/24/2022    K 3.7 04/24/2022     04/24/2022    CO2 24 04/24/2022    ANIONGAP 9 04/24/2022    BUN 18 04/24/2022    CREATININE 0.72 04/24/2022    BUNCRER NOT REPORTED 01/06/2022    CALCIUM 8.4 04/24/2022    LABGLOM >60 04/24/2022    GFRAA >60 04/24/2022    GFR      04/24/2022     Radiology:  XR ABDOMEN (KUB) (SINGLE AP VIEW)    Result Date: 4/22/2022  1. There is a suspected 8 mm stone in the upper pole of the left kidney. 2. Left ureteral stent. 3. Normal bowel gas pattern without evidence of obstruction. CT HEAD WO CONTRAST    Result Date: 4/22/2022  No acute intracranial abnormality.        Consultations:    Consults:     Final Specialist Recommendations/Findings:   None      The patient was seen and examined on day of discharge and this discharge summary is in conjunction with any daily progress note from day of discharge. Discharge plan:     Disposition: Home    Physician Follow Up:     Roxana Antoine MD  [place] [address], [address]  [place] [address]  [phone]    Call  Stent removal; further management of nephrolithiasis        Requiring Further Evaluation/Follow Up POST HOSPITALIZATION/Incidental Findings: Patient will need to follow-up with urology for stent removal as scheduled. Diet: Diabetic diet     Activity: As tolerated    Instructions to Patient: Follow-up with urology for stent removal as scheduled.      Discharge Medications:      Medication List      CONTINUE taking these medications    celecoxib 200 MG capsule  Commonly known as: CELEBREX  Take 1 capsule by mouth daily     chlorthalidone 25 MG tablet  Commonly known as: HYGROTON  Take 1 tablet by mouth daily     ciprofloxacin 500 MG tablet  Commonly known as: CIPRO  Take 1 tablet by mouth 2 times daily for 10 days     DULoxetine 30 MG extended release capsule  Commonly known as: CYMBALTA  Take 1 capsule by mouth 2 times daily     Insulin Pen Needle 31G X 5 MM Misc  1 each by Does not apply route daily     Invokana 300 MG Tabs tablet  Generic drug: canagliflozin  Take 1 tablet by mouth every morning (before breakfast)     Lancets Misc  Test twice a day and as needed, Please send lancets for Verio brand meter     Levemir FlexTouch 100 UNIT/ML injection pen  Generic drug: insulin detemir  Inject 63 Units into the skin nightly     lisinopril 10 MG tablet  Commonly known as: PRINIVIL;ZESTRIL  Take 1 tablet by mouth daily     metFORMIN 1000 MG tablet  Commonly known as: GLUCOPHAGE  Take 1 tablet by mouth 2 times daily (with meals)     multivitamin per tablet     OneTouch Verio strip  Generic drug: blood glucose test strips  Test three times daily and as needed     oxybutynin 10 MG extended release tablet  Commonly known as: DITROPAN-XL  Take 1 tablet by mouth daily     pioglitazone 30 MG tablet  Commonly known as: Actos  Take 1 tablet by mouth daily     tamsulosin 0.4 MG capsule  Commonly known as: FLOMAX  Take 1 capsule by mouth daily Take one capsule daily to facilitate passage of ureteral stone           Where to Get Your Medications      These medications were sent to [address] [place] [address], [address] [place] [id number]    Phone: [phone]   · ciprofloxacin 500 MG tablet         No discharge procedures on file. Time Spent on discharge is  20 mins in patient examination, evaluation, counseling as well as medication reconciliation, prescriptions for required medications, discharge plan and follow up. Electronically signed by   Ethan Araiza MD  4/24/2022  9:02 AM      Thank you BREANNA Ortiz - NP for the opportunity to be involved in this patient's care.

## 2022-04-24 NOTE — PLAN OF CARE
Problem: Pain  Goal: Verbalizes/displays adequate comfort level or baseline comfort level  Outcome: Completed     Problem: Discharge Planning  Goal: Discharge to home or other facility with appropriate resources  Outcome: Completed     Problem: Safety - Adult  Goal: Free from fall injury  Outcome: Completed     Problem: Skin/Tissue Integrity  Goal: Absence of new skin breakdown  Description: 1. Monitor for areas of redness and/or skin breakdown  2. Assess vascular access sites hourly  3. Every 4-6 hours minimum:  Change oxygen saturation probe site  4. Every 4-6 hours:  If on nasal continuous positive airway pressure, respiratory therapy assess nares and determine need for appliance change or resting period.   Outcome: Completed

## 2022-04-25 ENCOUNTER — CARE COORDINATION (OUTPATIENT)
Dept: CASE MANAGEMENT | Age: 72
End: 2022-04-25

## 2022-04-25 DIAGNOSIS — N30.00 ACUTE CYSTITIS WITHOUT HEMATURIA: Primary | ICD-10-CM

## 2022-04-25 PROCEDURE — 1111F DSCHRG MED/CURRENT MED MERGE: CPT | Performed by: NURSE PRACTITIONER

## 2022-04-25 NOTE — CARE COORDINATION
[facility] Transitions Initial Follow Up Call    Call within 2 business days of discharge: Yes    Patient: Michele Holt Patient : 1950    MRN: [id number]  Reason for Admission: UTI (urinary tract infection    Discharge Date: 22 RARS: Readmission Risk Score: 11 ( )      Last Discharge [facility]       Complaint Diagnosis Description Type Department Provider    22 Abnormal Lab Sepsis due to gram-negative urinary tract infection [facility]) ED to Hosp-Admission (Discharged) (ADMITTED) CASIE VALERA MS Juany Garcia MD; Ryanne Salas MD           Spoke with: Delaney Krishnamurthy he states he is doing well denies chest pain, SOB, dizziness, blood in urine, burning with urination, abdominal pain, he is eating and drinking well normal bowel and bladder elimination. Medications reviewed and taking as directed. Calling for Urology appointment today declined call to PCP. No concerns and agreeable to future calls. Facility: [facility]    Non-face-to-face services provided:  Obtained and reviewed discharge summary and/or continuity of care documents  Education of patient/family/caregiver/guardian to support self-management-PCP visit, s/s of infection finish all medications as presribed even if feeling better. increase fluid intake  Transitions of Care Initial Call    Was this an external facility discharge? No     Challenges to be reviewed by the provider   Additional needs identified to be addressed with provider: Yes  follow up appointment             Method of communication with provider : chart routing      Advance Care Planning:   Does patient have an Advance Directive: reviewed and current, reviewed and needs to be updated, not on file; education provided, patient declined education, decision maker updated and referral to internal ACP facilitator. Was this a readmission?  No  Patient stated reason for admission: UTI abnormal lab  Patients top risk factors for readmission: lack of knowledge about disease  medication management    Care Transition Nurse (CTN) contacted the patient by telephone to perform post hospital discharge assessment. Verified name and  with patient as identifiers. Provided introduction to self, and explanation of the CTN role. CTN reviewed discharge instructions, medical action plan and red flags with patient who verbalized understanding. Patient given an opportunity to ask questions and does not have any further questions or concerns at this time. Were discharge instructions available to patient? Yes. Reviewed appropriate site of care based on symptoms and resources available to patient including: PCP  Specialist. The patient agrees to contact the PCP office for questions related to their healthcare. Medication reconciliation was performed with patient, who verbalizes understanding of administration of home medications. Advised obtaining a 90-day supply of all daily and as-needed medications. Covid Risk Education     Educated patient about risk for severe COVID-19 due to risk factors according to CDC guidelines. LPN CC reviewed discharge instructions, medical action plan and red flag symptoms with the patient who verbalized understanding. Discussed COVID vaccination status: Yes. Education provided on COVID-19 vaccination as appropriate. Discussed exposure protocols and quarantine with CDC Guidelines. Patient was given an opportunity to verbalize any questions and concerns and agrees to contact LPN CC or health care provider for questions related to their healthcare. Reviewed and educated patient on any new and changed medications related to discharge diagnosis. Was patient discharged with a pulse oximeter? No Discussed and confirmed pulse oximeter discharge instructions and when to notify provider or seek emergency care. LPN CC provided contact information. Plan for follow-up call in 5-7 days based on severity of symptoms and risk factors.   Plan for next call: symptom management-UTI symptoms       Care Transitions 24 Hour Call    Schedule Follow Up Appointment with PCP: Declined  Do you have a copy of your discharge instructions?: Yes  Do you have all of your prescriptions and are they filled?: Yes  Have you been contacted by a [address]?: No  Have you scheduled your follow up appointment?: Yes  How are you going to get to your appointment?: Car - drive self  Do you feel like you have everything you need to keep you well at home?: Yes  Care Transitions Interventions         Follow Up  Future Appointments   Date Time Provider Bari Cervantes   5/17/2022 10:00 Christy Wynn MD Resp Lucy Cotter LPN

## 2022-04-27 DIAGNOSIS — G89.21 CHRONIC PAIN DUE TO TRAUMA: ICD-10-CM

## 2022-04-27 DIAGNOSIS — Z79.4 TYPE 2 DIABETES MELLITUS WITH DIABETIC POLYNEUROPATHY, WITH LONG-TERM CURRENT USE OF INSULIN (HCC): ICD-10-CM

## 2022-04-27 DIAGNOSIS — F32.A DEPRESSION, UNSPECIFIED DEPRESSION TYPE: ICD-10-CM

## 2022-04-27 DIAGNOSIS — M15.9 OSTEOARTHRITIS OF MULTIPLE JOINTS, UNSPECIFIED OSTEOARTHRITIS TYPE: ICD-10-CM

## 2022-04-27 DIAGNOSIS — E11.42 TYPE 2 DIABETES MELLITUS WITH DIABETIC POLYNEUROPATHY, WITH LONG-TERM CURRENT USE OF INSULIN (HCC): ICD-10-CM

## 2022-04-27 DIAGNOSIS — I10 ESSENTIAL HYPERTENSION: Chronic | ICD-10-CM

## 2022-04-28 LAB
CULTURE: NORMAL
CULTURE: NORMAL
Lab: NORMAL
Lab: NORMAL
SPECIMEN DESCRIPTION: NORMAL
SPECIMEN DESCRIPTION: NORMAL

## 2022-04-28 RX ORDER — LISINOPRIL 10 MG/1
10 TABLET ORAL DAILY
Qty: 90 TABLET | Refills: 1 | Status: SHIPPED | OUTPATIENT
Start: 2022-04-28 | End: 2022-10-23 | Stop reason: SDUPTHER

## 2022-04-28 RX ORDER — INSULIN DETEMIR 100 [IU]/ML
63 INJECTION, SOLUTION SUBCUTANEOUS NIGHTLY
Qty: 60 ML | Refills: 3 | Status: SHIPPED | OUTPATIENT
Start: 2022-04-28 | End: 2022-10-23 | Stop reason: SDUPTHER

## 2022-04-28 RX ORDER — CELECOXIB 200 MG/1
200 CAPSULE ORAL DAILY
Qty: 90 CAPSULE | Refills: 1 | Status: SHIPPED | OUTPATIENT
Start: 2022-04-28 | End: 2022-06-07 | Stop reason: SDUPTHER

## 2022-04-28 RX ORDER — CHLORTHALIDONE 25 MG/1
25 TABLET ORAL DAILY
Qty: 90 TABLET | Refills: 1 | Status: SHIPPED | OUTPATIENT
Start: 2022-04-28 | End: 2022-10-23 | Stop reason: SDUPTHER

## 2022-04-28 RX ORDER — CANAGLIFLOZIN 300 MG/1
300 TABLET, FILM COATED ORAL
Qty: 30 TABLET | Refills: 1 | Status: SHIPPED | OUTPATIENT
Start: 2022-04-28 | End: 2022-08-01

## 2022-04-28 RX ORDER — DULOXETIN HYDROCHLORIDE 30 MG/1
30 CAPSULE, DELAYED RELEASE ORAL 2 TIMES DAILY
Qty: 180 CAPSULE | Refills: 3 | Status: SHIPPED | OUTPATIENT
Start: 2022-04-28 | End: 2022-10-23 | Stop reason: SDUPTHER

## 2022-04-28 RX ORDER — PIOGLITAZONEHYDROCHLORIDE 30 MG/1
30 TABLET ORAL DAILY
Qty: 90 TABLET | Refills: 1 | Status: SHIPPED | OUTPATIENT
Start: 2022-04-28 | End: 2022-10-23 | Stop reason: SDUPTHER

## 2022-05-03 ENCOUNTER — CARE COORDINATION (OUTPATIENT)
Dept: CASE MANAGEMENT | Age: 72
End: 2022-05-03

## 2022-05-03 NOTE — PROGRESS NOTES
Physician Progress Note      PATIENT:               Reed Beatty  CSN #:                  [id number]  :                       1950  ADMIT DATE:       2022 5:17 PM  [address] DATE:        2022 9:32 AM  RESPONDING  PROVIDER #:        Madonna Church MD          QUERY TEXT:    Pt admitted with UTI. Pt noted to have elevated temp, WBC, and lac acid on   admission. If possible, please document in the progress notes and discharge   summary if you are evaluating and /or treating any of the following: The medical record reflects the following:  Risk Factors: UTI, calculus of kidney  Clinical Indicators: WBC- 12.0, Temp- 101.8, lac acid - 3.0, BC x2 gram   negative rods on  with a repeat on  with no growth for 5 days. Urine   culture - > 100,000 CFU/ml Enterobacter cloacae complex,  Treatment: IVF - 2 L bolus, then 125 ml/hr, IV Meropenem and Rocephin. Stent   exchange    Thank you, please contact me for any questions. Kelsea Vincent RN, CDS, cell- [phone]  office [phone]  email - [email or web address]  Options provided:  -- Sepsis, present on admission  -- Sepsis, present on admission, now resolved  -- UTI without Sepsis  -- Other - I will add my own diagnosis  -- Disagree - Not applicable / Not valid  -- Disagree - Clinically unable to determine / Unknown  -- Refer to Clinical Documentation Reviewer    PROVIDER RESPONSE TEXT:    This patient has sepsis due to UTI, which was present on admission.     Query created by: Michel Diaz on 5/3/2022 7:46 AM      Electronically signed by:  Madonna Church MD 5/3/2022 7:55 AM

## 2022-05-03 NOTE — CARE COORDINATION
[facility] Transitions Follow Up Call    5/3/2022    Patient: Luis A Russo  Patient : 1950   MRN: [id number]  Reason for Admission: Sepsis; acute cystits  Discharge Date: 22 RARS: Readmission Risk Score: 11 ( )         Spoke with: \"Miguel\"    Spoke to Robin Hurley who stated he is almost through with antibiotic, is making him fatigued. Stated he has chronic diarrhea. Staying hydrated. Patient pulled stent and Urology is aware, has follow up scheduled. Denies increased pain, hematuria, burning, fever. Taking flomax as prescribed. No needs or concerns. Needs to be reviewed by the provider   Additional needs identified to be addressed with provider: No  none             Method of communication with provider : none      Care Transition Nurse (CTN) contacted the patient by telephone to follow up after admission on 22. Verified name and  with patient as identifiers. Addressed changes since last contact: stent removed, urinating well, completing atb, has Urology f/u scheduled  Discussed follow-up appointments. CTN reviewed discharge instructions, medical action plan and red flags with patient and discussed any barriers to care and/or understanding of plan of care after discharge. Discussed appropriate site of care based on symptoms and resources available to patient including: PCP  Specialist  When to call [address] The patient agrees to contact the PCP office for questions related to their healthcare. CTN provided contact information for future needs. Plan for follow-up call in 7-10 days based on severity of symptoms and risk factors. Plan for next call: symptom management-compeltion of atb, fatigue, urinating well? pass stones?       Care Transitions Subsequent and Final Call    Subsequent and Final Calls  Do you have any ongoing symptoms?: No  Have your medications changed?: No  Do you have any questions related to your medications?: No  Do you currently have any active services?: No  Do you have any needs or concerns that I can assist you with?: No  Identified Barriers: None  Care Transitions Interventions  Other Interventions:            Follow Up  Future Appointments   Date Time Provider Bari Cervantes   5/17/2022 10:00 AM Seth Barba MD Resp Lucy ARCHER   8/1/2022 11:00 AM Ja Bernal MD [facility] Annetta Archuleta [place], [facility]

## 2022-05-10 ENCOUNTER — CARE COORDINATION (OUTPATIENT)
Dept: CASE MANAGEMENT | Age: 72
End: 2022-05-10

## 2022-05-10 NOTE — CARE COORDINATION
[facility] Transitions Follow Up Call    5/10/2022    Patient: Luis A Huston  Patient : 1950   MRN: [id number]  Reason for Admission: Sepsis; acute cystits  Discharge Date: 22 RARS: Readmission Risk Score: 11 ( )         Spoke with: Miguel he states he is still easily fatigued and is worried it is from the ATB CTN suggested he try to eat higher protein foods to build up energy. He denies chest pain, SOB, has some vertigo. No nausea. Is eating and drinking well 6 bottles of water daily. Normal bowel and bladder elimination without hematuria dysuria. Has no concerns and feels he is recovering well. Care Transitions Follow Up Call    Needs to be reviewed by the provider   Additional needs identified to be addressed with provider: No  none             Method of communication with provider : none      Care Transition Nurse (CTN) contacted the patient by telephone to follow up after admission on . Verified name and  with patient as identifiers. Addressed changes since last contact: none  Discussed follow-up appointments. If no appointment was previously scheduled, appointment scheduling offered: Yes. Is follow up appointment scheduled within 7 days of discharge? Yes. Advance Care Planning:   Does patient have an Advance Directive: reviewed and current, reviewed and needs to be updated, not on file; education provided, patient declined education, decision maker updated and referral to internal ACP facilitator. CTN reviewed discharge instructions, medical action plan and red flags with patient and discussed any barriers to care and/or understanding of plan of care after discharge. Discussed appropriate site of care based on symptoms and resources available to patient including: PCP  Specialist. The patient agrees to contact the PCP office for questions related to their healthcare.      Patients top risk factors for readmission: lack of knowledge about disease  medication management  Interventions to address risk factors: Obtained and reviewed discharge summary and/or continuity of care documents          CTN provided contact information for future needs. No further follow-up call indicated based on severity of symptoms and risk factors. Care Transitions Subsequent and Final Call    Subsequent and Final Calls  Do you have any ongoing symptoms?: Yes  Onset of Patient-reported symptoms: Today  Patient-reported symptoms: Other  Interventions for patient-reported symptoms: Notified PCP/Physician  Have your medications changed?: No  Do you have any questions related to your medications?: No  Do you currently have any active services?: No  Do you have any needs or concerns that I can assist you with?: No  Identified Barriers: Other  Care Transitions Interventions  Other Interventions:            Follow Up  Future Appointments   Date Time Provider Bari Cervantes   5/17/2022 10:00 AM Jovi Deutsch MD Resp Lucy FRANKTOLPKINJAL   8/1/2022 11:00 AM Maryellen Arias MD [facility] Annetta Panda [place], [place]

## 2022-05-17 ENCOUNTER — OFFICE VISIT (OUTPATIENT)
Dept: PULMONOLOGY | Age: 72
End: 2022-05-17
Payer: MEDICARE

## 2022-05-17 VITALS
SYSTOLIC BLOOD PRESSURE: 107 MMHG | OXYGEN SATURATION: 98 % | WEIGHT: 195 LBS | HEART RATE: 92 BPM | BODY MASS INDEX: 25.03 KG/M2 | RESPIRATION RATE: 16 BRPM | HEIGHT: 74 IN | DIASTOLIC BLOOD PRESSURE: 65 MMHG

## 2022-05-17 DIAGNOSIS — J43.2 CENTRILOBULAR EMPHYSEMA (HCC): ICD-10-CM

## 2022-05-17 DIAGNOSIS — R91.8 MULTIPLE LUNG NODULES ON CT: ICD-10-CM

## 2022-05-17 DIAGNOSIS — J44.9 COPD, SEVERITY TO BE DETERMINED (HCC): Primary | ICD-10-CM

## 2022-05-17 PROCEDURE — 4040F PNEUMOC VAC/ADMIN/RCVD: CPT | Performed by: INTERNAL MEDICINE

## 2022-05-17 PROCEDURE — 4004F PT TOBACCO SCREEN RCVD TLK: CPT | Performed by: INTERNAL MEDICINE

## 2022-05-17 PROCEDURE — G8417 CALC BMI ABV UP PARAM F/U: HCPCS | Performed by: INTERNAL MEDICINE

## 2022-05-17 PROCEDURE — 3023F SPIROM DOC REV: CPT | Performed by: INTERNAL MEDICINE

## 2022-05-17 PROCEDURE — 1123F ACP DISCUSS/DSCN MKR DOCD: CPT | Performed by: INTERNAL MEDICINE

## 2022-05-17 PROCEDURE — G8427 DOCREV CUR MEDS BY ELIG CLIN: HCPCS | Performed by: INTERNAL MEDICINE

## 2022-05-17 PROCEDURE — 99204 OFFICE O/P NEW MOD 45 MIN: CPT | Performed by: INTERNAL MEDICINE

## 2022-05-17 PROCEDURE — 1111F DSCHRG MED/CURRENT MED MERGE: CPT | Performed by: INTERNAL MEDICINE

## 2022-05-17 PROCEDURE — 3017F COLORECTAL CA SCREEN DOC REV: CPT | Performed by: INTERNAL MEDICINE

## 2022-05-17 ASSESSMENT — SLEEP AND FATIGUE QUESTIONNAIRES
HOW LIKELY ARE YOU TO NOD OFF OR FALL ASLEEP WHILE SITTING INACTIVE IN A PUBLIC PLACE: 0
HOW LIKELY ARE YOU TO NOD OFF OR FALL ASLEEP WHILE SITTING QUIETLY AFTER LUNCH WITHOUT ALCOHOL: 1
HOW LIKELY ARE YOU TO NOD OFF OR FALL ASLEEP IN A CAR, WHILE STOPPED FOR A FEW MINUTES IN TRAFFIC: 0
HOW LIKELY ARE YOU TO NOD OFF OR FALL ASLEEP WHEN YOU ARE A PASSENGER IN A CAR FOR AN HOUR WITHOUT A BREAK: 0
HOW LIKELY ARE YOU TO NOD OFF OR FALL ASLEEP WHILE SITTING AND TALKING TO SOMEONE: 0
HOW LIKELY ARE YOU TO NOD OFF OR FALL ASLEEP WHILE LYING DOWN TO REST IN THE AFTERNOON WHEN CIRCUMSTANCES PERMIT: 2
ESS TOTAL SCORE: 6
HOW LIKELY ARE YOU TO NOD OFF OR FALL ASLEEP WHILE WATCHING TV: 2
HOW LIKELY ARE YOU TO NOD OFF OR FALL ASLEEP WHILE SITTING AND READING: 1

## 2022-05-17 NOTE — PROGRESS NOTES
Kidney stone     Osteoarthritis     Pancreatitis     Type II or unspecified type diabetes mellitus without mention of complication, not stated as uncontrolled          Family History:       Problem Relation Age of Onset    Arthritis Mother     Cancer Father        SURGICAL HISTORY:   Past Surgical History:   Procedure Laterality Date    BACK SURGERY      BLADDER SURGERY      CHOLECYSTECTOMY      CYSTOSCOPY  04/06/2022    CYSTOSCOPY URETEROSCOPY HOLMIUM LASER LEFT URETERAL STENT INSERTION - Left    CYSTOSCOPY Left 4/6/2022    CYSTOSCOPY URETEROSCOPY HOLMIUM LASER LEFT URETERAL STENT INSERTION performed by Yin Villa MD at [address]  04/20/2022    CYSTOSCOPY URETEROSCOPY LEFT STENT PLACEMENT WITH STONE BASKETING AND HOLMIUM LASER (    CYSTOSCOPY Left 4/20/2022    CYSTOSCOPY URETEROSCOPY LEFT STENT PLACEMENT WITH STONE BASKETING AND HOLMIUM LASER performed by Yin Villa MD at [address] [facility]. [address]  2015    macular scrape    EYE SURGERY      LITHOTRIPSY      9-21-17    OTHER SURGICAL HISTORY      implanted nerve stimulater in lumbar back    SPINE SURGERY  05/18/2010    DR. Rendall Leventhal Buttram ([place])    URETEROSCOPY      with string stent 9-21-17    VASECTOMY             SOCIAL AND OCCUPATIONAL HEALTH:      There  No history of TB or TB exposure. There  Yes asbestos ,[place] dust exposure. The patient reports  [place] [facility] exposure. History of travel outside the country No  There  is use of   marijuana No   VapingNo  IV heroinNo  Crack cocaineNo  There  Nohot tub exposure. Pets -cats Yes, dogsNo  Birds parrot for 27 years - passes away 1 month ago         TOBACCO:   reports that he has been smoking cigarettes. He started smoking about 42 years ago. He has a 40.00 pack-year smoking history. He has quit using smokeless tobacco.  ETOH:   reports previous alcohol use.       ALLERGIES:      No Known Allergies      Home Meds:   Prior to Admission medications    Medication Sig Start Date End Date Taking? Authorizing Provider   metFORMIN (GLUCOPHAGE) 1000 MG tablet Take 1 tablet by mouth 2 times daily (with meals) 4/28/22 4/28/23 Yes BREANNA Lane NP   lisinopril (PRINIVIL;ZESTRIL) 10 MG tablet Take 1 tablet by mouth daily 4/28/22 4/28/23 Yes BREANNA Lane NP   chlorthalidone (HYGROTON) 25 MG tablet Take 1 tablet by mouth daily 4/28/22 4/28/23 Yes BREANNA Lane NP   pioglitazone (ACTOS) 30 MG tablet Take 1 tablet by mouth daily 4/28/22 4/23/23 Yes BREANNA Lane NP   celecoxib (CELEBREX) 200 MG capsule Take 1 capsule by mouth daily 4/28/22 4/23/23 Yes BREANNA Lane NP   DULoxetine (CYMBALTA) 30 MG extended release capsule Take 1 capsule by mouth 2 times daily 4/28/22 4/28/23 Yes BREANNA Lane NP   insulin detemir (LEVEMIR FLEXTOUCH) 100 UNIT/ML injection pen Inject 63 Units into the skin nightly 4/28/22 4/28/23 Yes BREANNA Lane NP   canagliflozin (INVOKANA) 300 MG TABS tablet Take 1 tablet by mouth every morning (before breakfast) 4/28/22 4/28/23 Yes BREANNA Lane NP   ciprofloxacin (CIPRO) 500 MG tablet Take 1 tablet by mouth 2 times daily 4/25/22  Yes Ja Bernal MD   tamsulosin (FLOMAX) 0.4 MG capsule Take 1 capsule by mouth daily Take one capsule daily to facilitate passage of ureteral stone 4/6/22  Yes Ja Bernal MD   oxybutynin (DITROPAN-XL) 10 MG extended release tablet Take 1 tablet by mouth daily 4/6/22  Yes Ja Bernal MD   blood glucose test strips (ONETOUCH VERIO) strip Test three times daily and as needed 4/20/21 8/10/23 Yes BREANNA Lane NP   multivitamin [facility]) per tablet Take 1 tablet by mouth daily.    Yes Historical Provider, MD   Insulin Pen Needle 31G X 5 MM MISC 1 each by Does not apply route daily 4/14/21 4/14/22  BREANNA Lane - NP   Lancets MISC Test twice a day and as needed, Please send lancets for Verio brand meter 1/4/16 3/17/22  Kris Torres, APRN - CNP              REVIEW OF SYSTEMS:    CONSTITUTIONAL:  negative for  fevers, chills, sweats, fatigue, malaise, anorexia and weight loss  EYES:  negative for  double vision, blurred vision, dry eyes, eye discharge and redness  HEENT:  negative for  hearing loss, tinnitus, ear drainage, earaches and nasal congestion  RESPIRATORY:  See hpi  CARDIOVASCULAR:  negative for  chest pain,, palpitations, orthopnea, PND  GASTROINTESTINAL:  negative for nausea, vomiting, change in bowel habits, diarrhea, constipation, abdominal pain, pruritus, abdominal mass and abdominal distention  GENITOURINARY:  negative for frequency, dysuria, nocturia, urinary incontinence and hesitancy  HEMATOLOGIC/LYMPHATIC:  negative for easy bruising, bleeding, lymphadenopathy, petechiae and swelling/edema  ALLERGIC/IMMUNOLOGIC:  negative for recurrent infections, urticaria and drug reactions  ENDOCRINE:  negative for heat intolerance, cold intolerance, tremor, weight changes and change in bowel habits  MUSCULOSKELETAL:  Chronic back and hip pain  NEUROLOGICAL:  negative for headaches, dizziness, seizures, memory problems, speech problems, visual disturbance and coordination problems  Skin no rash no dermatitis  Vitals:  /65 (Site: Left Upper Arm, Position: Sitting, Cuff Size: Medium Adult)   Pulse 92   Resp 16   Ht 6' 2\" (1.88 m)   Wt 195 lb (88.5 kg)   SpO2 98%   BMI 25.04 kg/m²     PHYSICAL EXAM:  General Appearance:    Alert, cooperative, no distress, appears stated age   Head:    Normocephalic, without obvious abnormality, atraumatic      Eyes:    PERRL, conjunctiva/corneas clear, EOM's intact   Ears:    Normal  external ear canals, both ears   Nose:   Nares normal, septum midline, mucosa normal, no drainage        or sinus tenderness   Throat:   Lips, mucosa, and tongue normal; teeth and gums normal   Neck:   Supple, symmetrical, trachea midline, no adenopathy;     thyroid:  no enlargement/tenderness/nodules; no carotid    bruit , noJVD   Back:     Symmetric, no curvature, ROM normal, no CVA tenderness   Lungs:     Ventilating all lobes without any rales, rhonchi, wheezes or dullness. No bronchial breath sounds. Chest expansion equal bilaterally    Chest Wall:    No tenderness or deformity      Heart:    Regular rate and rhythm, S1 and S2 normal, no murmur, rub        or gallop no rvh                           Abdomen:                                                 Pulses:                              Skin:                  Lymph nodes:                    Neurologic:                  Soft, non-tender, bowel sounds active all four quadrants,     no masses, no organomegaly         2+ and symmetric all extremities     Skin color, texture, turgor normal, no rashes or lesions       Cervical, supraclavicular not enlarged or matted or tender      CNII-XII intact, normal strength 5/5 . Clubbing No  Lower ext edema No  Upper ext edema No                   Ct lung screening 1/27/22  1. Pulmonary nodules in both lungs measuring up to 4 mm in the subpleural   left lower lobe.  Mild emphysema.  Mild dependent atelectasis and respiratory   motion. 2. Atherosclerotic calcification of the aorta and branch vasculature. Coronary artery disease. 3. Suspected renal cyst at the lateral-mid/upper pole of the left kidney   measuring 1.8 cm.  Further evaluation with renal ultrasound recommended. 4. Evidence of spinal fusion at the thoracolumbar junction.       LUNG RADS:   Per ACR Lung-RADS Version 1.1       Category 2, Benign appearance or behavior.       Management:  Continue annual lung screening with LDCT in 12 months. IMPRESSION:     Diagnosis Orders   1. COPD, severity to be determined ([address]  Full PFT Study With Bronchodilator   2. Centrilobular emphysema (HCC) mild      3.  Multiple lung nodules on CT 4 mm          :                PLAN: Reviewed her CT lung screening imaging with patient and his daughter in detail. Patient has mild centrilobular emphysema. There are multiple subcentimeter pulmonary nodules . Advised him to stop smoking. He will need yearly lung cancer screening CT. Will obtain complete pulmonary function test to assess obstructive lung disease and  based on severity  will start bronchodilator therapy. Requested Prescriptions      No prescriptions requested or ordered in this encounter       There are no discontinued medications. Gene received counseling on the following healthy behaviors: nutrition, exercise and medication adherence    Patient given educational materials : see patient instruction       Discussed use, benefit, and side effects of prescribed medications. Barriers to medication compliance addressed. All patient questions answered. Pt voiced understanding. I hope this updates you on my evaluation and clinical thinking. Thank you for allowing me to participate in his care. Sincerely,      Electronically signed by Sari Brennan MD on   5/17/22 at 9:51 AM EDT       Please note that this chart was generated using voice recognition Dragon dictation software. Although every effort was made to ensure the accuracy of this automated transcription, some errors in transcription may have occurred.

## 2022-05-22 PROBLEM — N39.0 UTI (URINARY TRACT INFECTION): Status: RESOLVED | Noted: 2022-04-22 | Resolved: 2022-05-22

## 2022-06-03 ENCOUNTER — TELEPHONE (OUTPATIENT)
Dept: FAMILY MEDICINE CLINIC | Age: 72
End: 2022-06-03

## 2022-06-07 ENCOUNTER — OFFICE VISIT (OUTPATIENT)
Dept: FAMILY MEDICINE CLINIC | Age: 72
End: 2022-06-07
Payer: MEDICARE

## 2022-06-07 VITALS
BODY MASS INDEX: 25.57 KG/M2 | SYSTOLIC BLOOD PRESSURE: 118 MMHG | HEART RATE: 84 BPM | HEIGHT: 74 IN | DIASTOLIC BLOOD PRESSURE: 68 MMHG | TEMPERATURE: 97.5 F | WEIGHT: 199.2 LBS | OXYGEN SATURATION: 95 % | RESPIRATION RATE: 20 BRPM

## 2022-06-07 DIAGNOSIS — G89.21 CHRONIC PAIN DUE TO TRAUMA: ICD-10-CM

## 2022-06-07 DIAGNOSIS — G62.9 NEUROPATHY: ICD-10-CM

## 2022-06-07 DIAGNOSIS — M15.9 OSTEOARTHRITIS OF MULTIPLE JOINTS, UNSPECIFIED OSTEOARTHRITIS TYPE: ICD-10-CM

## 2022-06-07 DIAGNOSIS — Z79.4 TYPE 2 DIABETES MELLITUS WITH DIABETIC POLYNEUROPATHY, WITH LONG-TERM CURRENT USE OF INSULIN (HCC): Primary | ICD-10-CM

## 2022-06-07 DIAGNOSIS — E11.42 TYPE 2 DIABETES MELLITUS WITH DIABETIC POLYNEUROPATHY, WITH LONG-TERM CURRENT USE OF INSULIN (HCC): Primary | ICD-10-CM

## 2022-06-07 DIAGNOSIS — I10 ESSENTIAL HYPERTENSION: ICD-10-CM

## 2022-06-07 LAB — HBA1C MFR BLD: 7 %

## 2022-06-07 PROCEDURE — G8417 CALC BMI ABV UP PARAM F/U: HCPCS | Performed by: NURSE PRACTITIONER

## 2022-06-07 PROCEDURE — 2022F DILAT RTA XM EVC RTNOPTHY: CPT | Performed by: NURSE PRACTITIONER

## 2022-06-07 PROCEDURE — 3017F COLORECTAL CA SCREEN DOC REV: CPT | Performed by: NURSE PRACTITIONER

## 2022-06-07 PROCEDURE — 99214 OFFICE O/P EST MOD 30 MIN: CPT | Performed by: NURSE PRACTITIONER

## 2022-06-07 PROCEDURE — 3051F HG A1C>EQUAL 7.0%<8.0%: CPT | Performed by: NURSE PRACTITIONER

## 2022-06-07 PROCEDURE — G8427 DOCREV CUR MEDS BY ELIG CLIN: HCPCS | Performed by: NURSE PRACTITIONER

## 2022-06-07 PROCEDURE — 4004F PT TOBACCO SCREEN RCVD TLK: CPT | Performed by: NURSE PRACTITIONER

## 2022-06-07 PROCEDURE — 83036 HEMOGLOBIN GLYCOSYLATED A1C: CPT | Performed by: NURSE PRACTITIONER

## 2022-06-07 PROCEDURE — 1123F ACP DISCUSS/DSCN MKR DOCD: CPT | Performed by: NURSE PRACTITIONER

## 2022-06-07 RX ORDER — GABAPENTIN 300 MG/1
300 CAPSULE ORAL 2 TIMES DAILY
Qty: 180 CAPSULE | Refills: 1 | Status: SHIPPED | OUTPATIENT
Start: 2022-06-07 | End: 2022-10-23 | Stop reason: SDUPTHER

## 2022-06-07 RX ORDER — CELECOXIB 200 MG/1
200 CAPSULE ORAL DAILY
Qty: 90 CAPSULE | Refills: 1 | Status: SHIPPED | OUTPATIENT
Start: 2022-06-07 | End: 2022-10-23 | Stop reason: SDUPTHER

## 2022-06-07 ASSESSMENT — ANXIETY QUESTIONNAIRES
3. WORRYING TOO MUCH ABOUT DIFFERENT THINGS: 2
4. TROUBLE RELAXING: 0
7. FEELING AFRAID AS IF SOMETHING AWFUL MIGHT HAPPEN: 0
IF YOU CHECKED OFF ANY PROBLEMS ON THIS QUESTIONNAIRE, HOW DIFFICULT HAVE THESE PROBLEMS MADE IT FOR YOU TO DO YOUR WORK, TAKE CARE OF THINGS AT HOME, OR GET ALONG WITH OTHER PEOPLE: SOMEWHAT DIFFICULT
GAD7 TOTAL SCORE: 8
6. BECOMING EASILY ANNOYED OR IRRITABLE: 2
5. BEING SO RESTLESS THAT IT IS HARD TO SIT STILL: 0
1. FEELING NERVOUS, ANXIOUS, OR ON EDGE: 2
2. NOT BEING ABLE TO STOP OR CONTROL WORRYING: 2

## 2022-06-07 ASSESSMENT — PATIENT HEALTH QUESTIONNAIRE - PHQ9
2. FEELING DOWN, DEPRESSED OR HOPELESS: 3
5. POOR APPETITE OR OVEREATING: 0
SUM OF ALL RESPONSES TO PHQ QUESTIONS 1-9: 12
10. IF YOU CHECKED OFF ANY PROBLEMS, HOW DIFFICULT HAVE THESE PROBLEMS MADE IT FOR YOU TO DO YOUR WORK, TAKE CARE OF THINGS AT HOME, OR GET ALONG WITH OTHER PEOPLE: 1
8. MOVING OR SPEAKING SO SLOWLY THAT OTHER PEOPLE COULD HAVE NOTICED. OR THE OPPOSITE, BEING SO FIGETY OR RESTLESS THAT YOU HAVE BEEN MOVING AROUND A LOT MORE THAN USUAL: 0
6. FEELING BAD ABOUT YOURSELF - OR THAT YOU ARE A FAILURE OR HAVE LET YOURSELF OR YOUR FAMILY DOWN: 0
SUM OF ALL RESPONSES TO PHQ9 QUESTIONS 1 & 2: 4
SUM OF ALL RESPONSES TO PHQ QUESTIONS 1-9: 12
9. THOUGHTS THAT YOU WOULD BE BETTER OFF DEAD, OR OF HURTING YOURSELF: 0
SUM OF ALL RESPONSES TO PHQ QUESTIONS 1-9: 12
1. LITTLE INTEREST OR PLEASURE IN DOING THINGS: 1
SUM OF ALL RESPONSES TO PHQ QUESTIONS 1-9: 12
4. FEELING TIRED OR HAVING LITTLE ENERGY: 3
3. TROUBLE FALLING OR STAYING ASLEEP: 3
7. TROUBLE CONCENTRATING ON THINGS, SUCH AS READING THE NEWSPAPER OR WATCHING TELEVISION: 2

## 2022-06-07 NOTE — PROGRESS NOTES
Luis A Waite (:  1950) is a 67 y.o. male,Established patient, here for evaluation of the following chief complaint(s):  Follow-up and Diabetes         ASSESSMENT/PLAN:  1. Type 2 diabetes mellitus with diabetic polyneuropathy, with long-term current use of insulin (HCC)  -     POCT glycosylated hemoglobin (Hb A1C)  2. Essential hypertension  3. Chronic pain due to trauma  4. Osteoarthritis of multiple joints, unspecified osteoarthritis type  -     celecoxib (CELEBREX) 200 MG capsule; Take 1 capsule by mouth daily, Disp-90 capsule, R-1Normal  -     gabapentin (NEURONTIN) 300 MG capsule; Take 1 capsule by mouth 2 times daily for 180 days. Intended supply: 90 days, Disp-180 capsule, R-1Normal  5. Neuropathy  -     gabapentin (NEURONTIN) 300 MG capsule; Take 1 capsule by mouth 2 times daily for 180 days. Intended supply: 90 days, Disp-180 capsule, R-1Normal    Continue medications as ordered  Encouraged healthy diabetic diet  Encourage adequate fluid intake  Encouraged daily exercise as tolerated  Encouraged to monitor glucose levels  Call office with any questions or concerns    Return in about 6 months (around 2022), or if symptoms worsen or fail to improve, for medication follow up. Subjective   SUBJECTIVE/OBJECTIVE:  Presents to the office today for routine follow-up. Has a history of but not limited to diabetes, hypertension, neuropathy, osteoarthritis, BPH, depression. A1c in office today is 7.0. He reports he has been under stress and is not eating the best.  He knows he needs to work on his diet. We did discuss this briefly today. He reports he is doing okay otherwise. Has good appetite. Normal bowel bladder pattern. Sleeping okay. Does not have any additional concerns today. Review of Systems   Constitutional: Negative for activity change, appetite change, fatigue and fever. HENT: Negative for congestion, ear pain, postnasal drip, rhinorrhea and sore throat. Eyes: Negative for discharge, redness and itching. Wears glasses     Respiratory: Negative for cough and shortness of breath. Cardiovascular: Negative for chest pain. Gastrointestinal: Negative for abdominal pain, constipation, diarrhea and nausea. Endocrine: Negative for polydipsia, polyphagia and polyuria. DM   Genitourinary: Negative for dysuria. Musculoskeletal: Positive for arthralgias, back pain and myalgias. Chronic pain issues   Skin: Negative for rash. Allergic/Immunologic:        NKA   Neurological: Negative for dizziness, light-headedness and headaches. Psychiatric/Behavioral: Positive for dysphoric mood (uncontrolled without medication). Negative for self-injury, sleep disturbance and suicidal ideas. The patient is nervous/anxious (PTSD from career). Objective   Physical Exam  Vitals and nursing note reviewed. Constitutional:       General: He is not in acute distress. Appearance: He is not ill-appearing. HENT:      Head: Normocephalic. Nose: Nose normal.      Mouth/Throat:      Lips: Pink. Pulmonary:      Effort: Pulmonary effort is normal. No respiratory distress. Neurological:      Mental Status: He is alert and oriented to person, place, and time. Psychiatric:         Attention and Perception: Attention normal.         Speech: Speech normal.         Behavior: Behavior is cooperative. An electronic signature was used to authenticate this note.     --BREANNA Kelly - NP

## 2022-06-14 ENCOUNTER — HOSPITAL ENCOUNTER (OUTPATIENT)
Dept: PULMONOLOGY | Age: 72
Discharge: HOME | End: 2022-06-14
Payer: MEDICARE

## 2022-06-14 DIAGNOSIS — J44.9 COPD, SEVERITY TO BE DETERMINED (HCC): ICD-10-CM

## 2022-06-14 PROCEDURE — 94060 EVALUATION OF WHEEZING: CPT

## 2022-06-14 PROCEDURE — 94729 DIFFUSING CAPACITY: CPT

## 2022-06-14 PROCEDURE — 94726 PLETHYSMOGRAPHY LUNG VOLUMES: CPT

## 2022-06-14 PROCEDURE — 6370000000 HC RX 637 (ALT 250 FOR IP): Performed by: INTERNAL MEDICINE

## 2022-06-14 RX ORDER — ALBUTEROL SULFATE 90 UG/1
2 AEROSOL, METERED RESPIRATORY (INHALATION) ONCE
Status: COMPLETED | OUTPATIENT
Start: 2022-06-14 | End: 2022-06-14

## 2022-06-14 RX ADMIN — ALBUTEROL SULFATE 2 PUFF: 90 AEROSOL, METERED RESPIRATORY (INHALATION) at 11:06

## 2022-06-19 ENCOUNTER — PATIENT MESSAGE (OUTPATIENT)
Dept: FAMILY MEDICINE CLINIC | Age: 72
End: 2022-06-19

## 2022-06-19 ASSESSMENT — ENCOUNTER SYMPTOMS
EYE REDNESS: 0
RHINORRHEA: 0
SHORTNESS OF BREATH: 0
COUGH: 0
EYE ITCHING: 0
DIARRHEA: 0
ABDOMINAL PAIN: 0
CONSTIPATION: 0
ALLERGIC/IMMUNOLOGIC COMMENTS: NKA
EYE DISCHARGE: 0
BACK PAIN: 1
NAUSEA: 0
SORE THROAT: 0

## 2022-06-20 NOTE — TELEPHONE ENCOUNTER
From: Luis A Barksdale  To: Harrington Boxer  Sent: 6/19/2022 5:35 PM EDT  Subject: Vitamin D2    Read an interesting article that extolled the virtues for diabetics of vitamin D2. Do you think this might be something that could help me? If so; what dosage would you recommend? Thanks in advance for your recommendation.  Armen Trevino

## 2022-06-21 LAB
DLCO %PRED: 53 %
DLCO PRED: NORMAL
DLCO/VA %PRED: NORMAL
DLCO/VA PRED: NORMAL
DLCO/VA: NORMAL
DLCO: NORMAL
EXPIRATORY TIME-POST: NORMAL
EXPIRATORY TIME: NORMAL
FEF 25-75% %CHNG: NORMAL
FEF 25-75% %PRED-POST: NORMAL
FEF 25-75% %PRED-PRE: NORMAL
FEF 25-75% PRED: NORMAL
FEF 25-75%-POST: NORMAL
FEF 25-75%-PRE: NORMAL
FEV1 %PRED-POST: 106 %
FEV1 %PRED-PRE: 102 %
FEV1 PRED: NORMAL
FEV1-POST: NORMAL
FEV1-PRE: NORMAL
FEV1/FVC %PRED-POST: NORMAL
FEV1/FVC %PRED-PRE: NORMAL
FEV1/FVC PRED: NORMAL
FEV1/FVC-POST: 89 %
FEV1/FVC-PRE: 87 %
FVC %PRED-POST: NORMAL
FVC %PRED-PRE: NORMAL
FVC PRED: NORMAL
FVC-POST: NORMAL
FVC-PRE: NORMAL
GAW %PRED: NORMAL
GAW PRED: NORMAL
GAW: NORMAL
IC %PRED: NORMAL
IC PRED: NORMAL
IC: NORMAL
MEP: NORMAL
MIP: NORMAL
MVV %PRED-PRE: NORMAL
MVV PRED: NORMAL
MVV-PRE: NORMAL
PEF %PRED-POST: NORMAL
PEF %PRED-PRE: NORMAL
PEF PRED: NORMAL
PEF%CHNG: NORMAL
PEF-POST: NORMAL
PEF-PRE: NORMAL
RAW %PRED: NORMAL
RAW PRED: NORMAL
RAW: NORMAL
RV %PRED: NORMAL
RV PRED: NORMAL
RV: NORMAL
SVC %PRED: NORMAL
SVC PRED: NORMAL
SVC: NORMAL
TLC %PRED: 102 %
TLC PRED: NORMAL
TLC: NORMAL
VA %PRED: NORMAL
VA PRED: NORMAL
VA: NORMAL
VTG %PRED: NORMAL
VTG PRED: NORMAL
VTG: NORMAL

## 2022-06-21 ASSESSMENT — PULMONARY FUNCTION TESTS
FEV1_PERCENT_PREDICTED_POST: 106
FEV1_PERCENT_PREDICTED_PRE: 102
FEV1/FVC_POST: 89
FEV1/FVC_PRE: 87

## 2022-06-21 NOTE — PROCEDURES
[id number] [facility] [address]                [address] [facility], [address] [place] [address]                               PULMONARY FUNCTION    PATIENT NAME: Diane Winters                 :        1950  MED REC NO:   [id number]                             ROOM:  ACCOUNT NO:   [de-identified]                           ADMIT DATE: 2022  PROVIDER:     Guille Elena MD    DATE OF PROCEDURE:  2022    TYPE OF STUDY:  Complete PFT results. RESULTS:  The patient had an FEV1 of 3.64, which is normal at 102% of  predicted. FVC was 5.52, which is increased to 115% of predicted. FEV1/FVC was 66. The mid-flows were mildly reduced at 71% of predicted. Total lung capacity was normal at 102% of predicted. Residual volume  was normal at 103% of predicted. Diffusion was moderately reduced at  53% of predicted. Following bronchodilators, there was no significant  change in the flows. IMPRESSION:  Moderate diffusion impairment without obstruction or  restriction. Reduced mid-flows may indicate small airways disease. No  response to bronchodilators. Clinical correlation is recommended.         Fito Kruse MD    D: 2022 7:06:41       T: 2022 7:09:01     [email or web address]  Job#: [id number]     Doc#: [id number]    CC:

## 2022-07-05 ENCOUNTER — OFFICE VISIT (OUTPATIENT)
Dept: PULMONOLOGY | Age: 72
End: 2022-07-05
Payer: MEDICARE

## 2022-07-05 VITALS
BODY MASS INDEX: 25.28 KG/M2 | HEART RATE: 86 BPM | WEIGHT: 197 LBS | SYSTOLIC BLOOD PRESSURE: 117 MMHG | HEIGHT: 74 IN | OXYGEN SATURATION: 96 % | RESPIRATION RATE: 18 BRPM | DIASTOLIC BLOOD PRESSURE: 77 MMHG

## 2022-07-05 DIAGNOSIS — R91.8 MULTIPLE LUNG NODULES ON CT: ICD-10-CM

## 2022-07-05 DIAGNOSIS — J43.2 CENTRILOBULAR EMPHYSEMA (HCC): ICD-10-CM

## 2022-07-05 DIAGNOSIS — J44.9 STAGE 1 MILD COPD BY GOLD CLASSIFICATION (HCC): Primary | ICD-10-CM

## 2022-07-05 DIAGNOSIS — Z87.891 PERSONAL HISTORY OF TOBACCO USE: ICD-10-CM

## 2022-07-05 PROCEDURE — 99214 OFFICE O/P EST MOD 30 MIN: CPT | Performed by: INTERNAL MEDICINE

## 2022-07-05 PROCEDURE — G0296 VISIT TO DETERM LDCT ELIG: HCPCS | Performed by: INTERNAL MEDICINE

## 2022-07-05 PROCEDURE — G8427 DOCREV CUR MEDS BY ELIG CLIN: HCPCS | Performed by: INTERNAL MEDICINE

## 2022-07-05 PROCEDURE — G8417 CALC BMI ABV UP PARAM F/U: HCPCS | Performed by: INTERNAL MEDICINE

## 2022-07-05 PROCEDURE — 4004F PT TOBACCO SCREEN RCVD TLK: CPT | Performed by: INTERNAL MEDICINE

## 2022-07-05 PROCEDURE — 3017F COLORECTAL CA SCREEN DOC REV: CPT | Performed by: INTERNAL MEDICINE

## 2022-07-05 PROCEDURE — 1123F ACP DISCUSS/DSCN MKR DOCD: CPT | Performed by: INTERNAL MEDICINE

## 2022-07-05 PROCEDURE — 3023F SPIROM DOC REV: CPT | Performed by: INTERNAL MEDICINE

## 2022-07-05 RX ORDER — ALBUTEROL SULFATE 90 UG/1
2 AEROSOL, METERED RESPIRATORY (INHALATION) EVERY 6 HOURS PRN
Qty: 18 G | Refills: 6 | Status: SHIPPED | OUTPATIENT
Start: 2022-07-05 | End: 2022-10-04 | Stop reason: SDUPTHER

## 2022-07-05 NOTE — PATIENT INSTRUCTIONS
What is lung cancer screening? Lung cancer screening is a way in which doctors check the lungs for early signs of cancer in people who have no symptoms of lung cancer. A low-dose CT scan uses much less radiation than a normal CT scan and shows a more detailed image of the lungs than a standard X-ray. The goal of lung cancer screening is to find cancer early, before it has a chance to grow, spread, or cause problems. One large study found that smokers who were screened with low-dose CT scans were less likely to die of lung cancer than those who were screened with standard X-ray. Below is a summary of the things you need to know regarding screening for lung cancer with low-dose computed tomography (LDCT). This is a screening program that involves routine annual screening with LDCT studies of the lung. The LDCTs are done using low-dose radiation that is not thought to increase your cancer risk. If you have other serious medical conditions (other cancers, congestive heart failure) that limit your life expectancy to less than 10 years, you should not undergo lung cancer screening with LDCT. The chance is 20%-60% that the LDCT result will show abnormalities. This would require additional testing which could include repeat imaging or even invasive procedures. Most (about 95%) of \"abnormal\" LDCT results are false in the sense that no lung cancer is ultimately found. Additionally, some (about 10%) of the cancers found would not affect your life expectancy, even if undetected and untreated. If you are still smoking, the single most important thing that you can do to reduce your risk of dying of lung cancer is to quit. For this screening to be covered by Medicare and most other insurers, strict criteria must be met. If you do not meet these criteria, but still wish to undergo LDCT testing, you will be required to sign a waiver indicating your willingness to pay for the scan. No

## 2022-07-05 NOTE — PROGRESS NOTES
REASON FOR THE CONSULTATION:  COPD  HISTORY OF PRESENT ILLNESS:    Luis A Dowling is a 67y.o. year old male denies worsening shortness of breath. He is still smoking 3 cigarettes on and off. No coughing no hemoptysis no purulent sputum and no wheezing. Last visit  Had ct chest and here to discuss results . He smokes 3 cig a day and was smoking 1 ppd till 2 months ago . Worked as  for 25 years ,  . Also was in [employer] and [employer] in [place] . He has extensive spine and hip surgery and pain which limits his activity , so unable to obtain true measure of dyspnea . Daughter has noticed sob with few steps , also due to pain . He has chronic sputum moderate but no hemoptysis or purulence . No wheeze ing   Never used inhalers . Father had lung cancer and emphysema .             Immunization   Immunization History   Administered Date(s) Administered    COVID-19, MODERNA BLUE border, Primary or Immunocompromised, (age 12y+), IM, 100 mcg/0.5mL 04/09/2021, 05/07/2021, 01/14/2022    Influenza 11/28/2012, 10/09/2013    Influenza Vaccine, unspecified formulation 11/28/2012, 10/09/2013    Influenza Virus Vaccine 11/28/2012, 10/09/2013, 10/09/2014, 09/16/2015, 10/07/2016, 11/20/2017, 10/29/2018    Influenza, Elieser Reed, IM, (6 mo and older Fluzone, Flulaval, Fluarix and 3 yrs and older Afluria) 10/07/2016, 11/20/2017, 10/29/2018    Influenza, Quadv, IM, PF (6 mo and older Fluzone, Flulaval, Fluarix, and 3 yrs and older Afluria) 09/04/2019    Influenza, Quadv, adjuvanted, 65 yrs +, IM, PF (Fluad) 11/13/2020, 01/06/2022    Pneumococcal Conjugate 13-valent ([id number]) 10/07/2016    Pneumococcal Polysaccharide ([id number]) 09/16/2015    Td, unspecified formulation 09/06/2016    Zoster Live (Zostavax) 02/10/2014        PAST MEDICAL HISTORY:       Diagnosis Date    Bladder stone     BPH with obstruction/lower urinary tract symptoms     Caffeine use     4 coffee/2 soda per day    Cataracts, bilateral     Chronic back pain     COPD (chronic obstructive pulmonary disease) ([address]     Hypertension     Irritable bowel syndrome     Kidney stone     Osteoarthritis     Pancreatitis     Type II or unspecified type diabetes mellitus without mention of complication, not stated as uncontrolled          Family History:       Problem Relation Age of Onset    Arthritis Mother     Cancer Father        SURGICAL HISTORY:   Past Surgical History:   Procedure Laterality Date    BACK SURGERY      BLADDER SURGERY      CHOLECYSTECTOMY      CYSTOSCOPY  04/06/2022    CYSTOSCOPY URETEROSCOPY HOLMIUM LASER LEFT URETERAL STENT INSERTION - Left    CYSTOSCOPY Left 4/6/2022    CYSTOSCOPY URETEROSCOPY HOLMIUM LASER LEFT URETERAL STENT INSERTION performed by Trever Maldonado MD at [address] [facility]  04/20/2022    CYSTOSCOPY URETEROSCOPY LEFT STENT PLACEMENT WITH STONE BASKETING AND HOLMIUM LASER (    CYSTOSCOPY Left 4/20/2022    CYSTOSCOPY URETEROSCOPY LEFT STENT PLACEMENT WITH STONE BASKETING AND HOLMIUM LASER performed by Trever Maldonado MD at [address] [facility]  2015    macular scrape    EYE SURGERY      LITHOTRIPSY      9-21-17    OTHER SURGICAL HISTORY      implanted nerve stimulater in lumbar back    SPINE SURGERY  05/18/2010    DR. Raul Carrasquillo ([facility])    URETEROSCOPY      with string stent 9-21-17    VASECTOMY             SOCIAL AND OCCUPATIONAL HEALTH:      There  No history of TB or TB exposure. There  Yes asbestos ,Yessilica dust exposure. The patient reports  No coal mine, [place], [place], [facility] exposure. History of travel outside the country No  There  is use of   marijuana No   VapingNo  IV heroinNo  Crack cocaineNo  There  Nohot tub exposure. Pets -cats Yes, dogsNo  Birds parrot for 27 years - passes away 1 month ago         TOBACCO:   reports that he has been smoking cigarettes.  He started smoking about 42 years ago. He has a 40.00 pack-year smoking history. He has quit using smokeless tobacco.  ETOH:   reports previous alcohol use. ALLERGIES:      No Known Allergies      Home Meds:   Prior to Admission medications    Medication Sig Start Date End Date Taking? Authorizing Provider   albuterol sulfate HFA (PROVENTIL;VENTOLIN;PROAIR) 108 (90 Base) MCG/ACT inhaler Inhale 2 puffs into the lungs every 6 hours as needed for Wheezing or Shortness of Breath (or take 1-2 puffs 30 mins prior to strenous activity) 7/5/22 8/4/22 Yes Christ Davila MD   celecoxib (CELEBREX) 200 MG capsule Take 1 capsule by mouth daily 6/7/22 6/2/23  BREANNA Fabian NP   gabapentin (NEURONTIN) 300 MG capsule Take 1 capsule by mouth 2 times daily for 180 days.  Intended supply: 90 days 6/7/22 12/4/22  BREANNA Fabian NP   metFORMIN (GLUCOPHAGE) 1000 MG tablet Take 1 tablet by mouth 2 times daily (with meals) 4/28/22 4/28/23  BREANNA Fabian NP   lisinopril (PRINIVIL;ZESTRIL) 10 MG tablet Take 1 tablet by mouth daily 4/28/22 4/28/23  BREANNA Fabian NP   chlorthalidone (HYGROTON) 25 MG tablet Take 1 tablet by mouth daily 4/28/22 4/28/23  BRAENNA Fabian NP   pioglitazone (ACTOS) 30 MG tablet Take 1 tablet by mouth daily 4/28/22 4/23/23  BREANNA Fabian NP   DULoxetine (CYMBALTA) 30 MG extended release capsule Take 1 capsule by mouth 2 times daily 4/28/22 4/28/23  BREANNA Fabian NP   insulin detemir (LEVEMIR FLEXTOUCH) 100 UNIT/ML injection pen Inject 63 Units into the skin nightly 4/28/22 4/28/23  BREANNA Fabian NP   canagliflozin (INVOKANA) 300 MG TABS tablet Take 1 tablet by mouth every morning (before breakfast)  Patient not taking: Reported on 6/7/2022 4/28/22 4/28/23  BREANNA Fabian NP   blood glucose test strips [facility] VER) strip Test three times daily and as needed 4/20/21 8/10/23  BREANNA Fabian NP   Insulin Pen Needle 31G X 5 MM MISC 1 each by Does not apply LUNG RADS:   Per ACR Lung-RADS Version 1.1       Category 2, Benign appearance or behavior.       Management:  Continue annual lung screening with LDCT in 12 months. IMPRESSION:     Diagnosis Orders   1. Stage 1 mild COPD by GOLD classification (HCC)  albuterol sulfate HFA (PROVENTIL;VENTOLIN;PROAIR) 108 (90 Base) MCG/ACT inhaler   2. Centrilobular emphysema (HCC) mild   albuterol sulfate HFA (PROVENTIL;VENTOLIN;PROAIR) 108 (90 Base) MCG/ACT inhaler   3. Multiple lung nodules on CT 4 mm     4. Personal history of tobacco use  OR VISIT TO DISCUSS LUNG CA SCREEN W LDCT    CT Lung Screen (Annual)        : PLAN:      PFT reviewed with patient. It shows stage I COPD and  moderately decreased diffusion capacity due to emphysema . Recommend complete cessation of smoking . Continue yearly CT lung screening . Maintain active lifestyle to prevent physical deconditioning . There was not a significant bronchodilator response and after discussion with patient, he does not feel he requires a long-acting inhaler. Will start on short acting beta agonist on as-needed basis for wheezing or shortness of breath and he can also use this 30 minutes prior to strenuous activity 1 to 2 puffs. If he notices that his shortness of breath/dyspnea is worsening then will start him on Spiriva or Incruse . Recommend yearly flu vaccine . Follow-up in 6 months with CT lung screening        Requested Prescriptions     Signed Prescriptions Disp Refills    albuterol sulfate HFA (PROVENTIL;VENTOLIN;PROAIR) 108 (90 Base) MCG/ACT inhaler 18 g 6     Sig: Inhale 2 puffs into the lungs every 6 hours as needed for Wheezing or Shortness of Breath (or take 1-2 puffs 30 mins prior to strenous activity)       There are no discontinued medications.     Gene received counseling on the following healthy behaviors: nutrition, exercise and medication adherence    Patient given educational materials : see patient instruction Discussed use, benefit, and side effects of prescribed medications. Barriers to medication compliance addressed. All patient questions answered. Pt voiced understanding. I hope this updates you on my evaluation and clinical thinking. Thank you for allowing me to participate in his care. Sincerely,      Electronically signed by Junior Stewart MD on   5/17/22 at 9:51 AM EDT       Please note that this chart was generated using voice recognition Dragon dictation software. Although every effort was made to ensure the accuracy of this automated transcription, some errors in transcription may have occurred. Low Dose CT (LDCT) Lung Screening criteria met:     Age 50-77(Medicare) or 50-80 ([facility])   Pack year smoking >20   Still smoking or less than 15 year since quit   No sign or symptoms of lung cancer   > 11 months since last LDCT     Risks and benefits of lung cancer screening with LDCT scans discussed:    Significance of positive screen - False-positive LDCT results often occur. 95% of all positive results do not lead to a diagnosis of cancer. Usually further imaging can resolve most false-positive results; however, some patients may require invasive procedures. Over diagnosis risk - 10% to 12% of screen-detected lung cancer cases are over diagnosed--that is, the cancer would not have been detected in the patient's lifetime without the screening. Need for follow up screens annually to continue lung cancer screening effectiveness     Risks associated with radiation from annual LDCT- Radiation exposure is about the same as for a mammogram, which is about 1/3 of the annual background radiation exposure from everyday life. Starting screening at age 54 is not likely to increase cancer risk from radiation exposure.     Patients with comorbidities resulting in life expectancy of < 10 years, or that would preclude treatment of an abnormality identified on CT, should not be screened due to lack of benefit.     To obtain maximal benefit from this screening, smoking cessation and long-term abstinence from smoking is critical

## 2022-07-15 ENCOUNTER — OFFICE VISIT (OUTPATIENT)
Dept: ORTHOPEDIC SURGERY | Age: 72
End: 2022-07-15
Payer: MEDICARE

## 2022-07-15 VITALS — HEIGHT: 74 IN | BODY MASS INDEX: 25.03 KG/M2 | WEIGHT: 195 LBS

## 2022-07-15 DIAGNOSIS — M25.512 LEFT SHOULDER PAIN, UNSPECIFIED CHRONICITY: ICD-10-CM

## 2022-07-15 DIAGNOSIS — M75.102 TEAR OF LEFT ROTATOR CUFF, UNSPECIFIED TEAR EXTENT, UNSPECIFIED WHETHER TRAUMATIC: Primary | ICD-10-CM

## 2022-07-15 PROCEDURE — G8427 DOCREV CUR MEDS BY ELIG CLIN: HCPCS | Performed by: ORTHOPAEDIC SURGERY

## 2022-07-15 PROCEDURE — 20610 DRAIN/INJ JOINT/BURSA W/O US: CPT | Performed by: ORTHOPAEDIC SURGERY

## 2022-07-15 PROCEDURE — 3017F COLORECTAL CA SCREEN DOC REV: CPT | Performed by: ORTHOPAEDIC SURGERY

## 2022-07-15 PROCEDURE — 1123F ACP DISCUSS/DSCN MKR DOCD: CPT | Performed by: ORTHOPAEDIC SURGERY

## 2022-07-15 PROCEDURE — G8417 CALC BMI ABV UP PARAM F/U: HCPCS | Performed by: ORTHOPAEDIC SURGERY

## 2022-07-15 PROCEDURE — 99204 OFFICE O/P NEW MOD 45 MIN: CPT | Performed by: ORTHOPAEDIC SURGERY

## 2022-07-15 PROCEDURE — 4004F PT TOBACCO SCREEN RCVD TLK: CPT | Performed by: ORTHOPAEDIC SURGERY

## 2022-07-15 RX ORDER — LIDOCAINE HYDROCHLORIDE 10 MG/ML
3 INJECTION, SOLUTION INFILTRATION; PERINEURAL ONCE
Status: COMPLETED | OUTPATIENT
Start: 2022-07-15 | End: 2022-07-15

## 2022-07-15 RX ORDER — TRIAMCINOLONE ACETONIDE 40 MG/ML
40 INJECTION, SUSPENSION INTRA-ARTICULAR; INTRAMUSCULAR ONCE
Status: COMPLETED | OUTPATIENT
Start: 2022-07-15 | End: 2022-07-15

## 2022-07-15 RX ADMIN — TRIAMCINOLONE ACETONIDE 40 MG: 40 INJECTION, SUSPENSION INTRA-ARTICULAR; INTRAMUSCULAR at 14:47

## 2022-07-15 RX ADMIN — LIDOCAINE HYDROCHLORIDE 3 ML: 10 INJECTION, SOLUTION INFILTRATION; PERINEURAL at 14:46

## 2022-07-15 NOTE — PROGRESS NOTES
Orthopedic Shoulder Encounter Note     Chief complaint: left shoulder pain    HPI: Luis A Aggarwal is a 67 y.o.  right-hand dominant male who presents for evaluation of his left shoulder. He indicates that he has been dealing with pain in his left shoulder for the past 6 months. Have seen him in the past for the right shoulder and diagnosed him with a likely rotator cuff tear. Treatment was initiated at that time with therapy and he was on Celebrex. Unfortunately he states that while he has tolerable pain in that shoulder he remains fairly weak. And this concerns him now because of the state his left shoulder is in. He has pain that is fairly constant in the shoulder that fluctuates in intensity. Its primarily localized to the anterior aspect of the shoulder and upper arm. When it is at its worst it radiates up into his neck and into the shoulder blade region. He also feels weak on the side especially with any activity out in front or out to the side. He also is unable to do anything overhead now with both arms. Previous treatment:    NSAIDs: Celebrex    Physical Therapy: No    Injections: None    Surgeries: None    Review of Systems:   Constitutional: Negative for fever, chills, sweats. Pain score: 9/10  Neurological: Negative for headache, numbness, or weakness. Musculoskeletal: As noted in HPI     Past Medical History  Luis A  has a past medical history of Bladder stone, BPH with obstruction/lower urinary tract symptoms, Caffeine use, Cataracts, bilateral, Chronic back pain, COPD (chronic obstructive pulmonary disease) ([facility] [address] Hypertension, Irritable bowel syndrome, Kidney stone, Osteoarthritis, Pancreatitis, and Type II or unspecified type diabetes mellitus without mention of complication, not stated as uncontrolled. Past Surgical History  Luis A  has a past surgical history that includes back surgery; Cholecystectomy; Vasectomy; Spine surgery (05/18/2010);  Eye surgery (2015); other surgical history; eye surgery; Bladder surgery; Lithotripsy; Ureteroscopy; Cystocopy (04/06/2022); Cystoscopy (Left, 4/6/2022); Cystocopy (04/20/2022); and Cystoscopy (Left, 4/20/2022). Current Medications  Current Outpatient Medications   Medication Sig Dispense Refill    albuterol sulfate HFA (PROVENTIL;VENTOLIN;PROAIR) 108 (90 Base) MCG/ACT inhaler Inhale 2 puffs into the lungs every 6 hours as needed for Wheezing or Shortness of Breath (or take 1-2 puffs 30 mins prior to strenous activity) 18 g 6    celecoxib (CELEBREX) 200 MG capsule Take 1 capsule by mouth daily 90 capsule 1    gabapentin (NEURONTIN) 300 MG capsule Take 1 capsule by mouth 2 times daily for 180 days. Intended supply: 90 days 180 capsule 1    metFORMIN (GLUCOPHAGE) 1000 MG tablet Take 1 tablet by mouth 2 times daily (with meals) 180 tablet 1    lisinopril (PRINIVIL;ZESTRIL) 10 MG tablet Take 1 tablet by mouth daily 90 tablet 1    chlorthalidone (HYGROTON) 25 MG tablet Take 1 tablet by mouth daily 90 tablet 1    pioglitazone (ACTOS) 30 MG tablet Take 1 tablet by mouth daily 90 tablet 1    DULoxetine (CYMBALTA) 30 MG extended release capsule Take 1 capsule by mouth 2 times daily 180 capsule 3    insulin detemir (LEVEMIR FLEXTOUCH) 100 UNIT/ML injection pen Inject 63 Units into the skin nightly 60 mL 3    canagliflozin (INVOKANA) 300 MG TABS tablet Take 1 tablet by mouth every morning (before breakfast) (Patient not taking: Reported on 6/7/2022) 30 tablet 1    blood glucose test strips (ONETOUCH VERIO) strip Test three times daily and as needed 300 strip 3    Insulin Pen Needle 31G X 5 MM MISC 1 each by Does not apply route daily 100 each 3    Lancets MISC Test twice a day and as needed, Please send lancets for Verio brand meter 180 each 2    multivitamin (THERAGRAN) per tablet Take 1 tablet by mouth daily. No current facility-administered medications for this visit. Allergies  Allergies have been reviewed.   Gene has No Known Allergies. Social History  Luis A  reports that he has been smoking cigarettes. He started smoking about 42 years ago. He has a 40.00 pack-year smoking history. He has quit using smokeless tobacco. He reports that he does not currently use alcohol. He reports that he does not use drugs. Family History  Luis A's family history includes Arthritis in his mother; Cancer in his father. Physical Exam:     There were no vitals taken for this visit. Constitutional: Patient is oriented to person, place, and time. Patient appears well-developed and well nourished. Mental Status/psychiatric: Behavior is normal. Thought content normal.  HENT: Negative otherwise noted  Head: Normocephalic and Atraumatic  Nose: Normal  Respiratory/Cardio: Effort normal. No respiratory distress. Shoulder:    Skin: Skin is warm and dry; no swelling or obvious muscular atrophy.    Vasculature: 2+ radial pulses bilaterally  Neuro: Sensation grossly intact to light touch diffusely  Tenderness: Tender to palpation over the anterior aspect of the left shoulder    ROM: (Degrees)    Right   A P   Left   A P    Elevation  125    Elevation  125 135  Abduction  125    Abduction  125  150  ER   35    ER   40 75  IR   L3    IR   T12   90 abd/ER      90 abd/ER     90 abd/IR      90 abd/IR     Crepitation  No    Crepitation No  Dyskenesia  No    Dyskenesia No      Muscle strength:    Right       Left    Deltoid   5    Deltoid   5  Supraspinatus  3    Supraspinatus  3  ER   3    ER   5  IR   5    IR   5    Special tests    Right   Rotator Cuff    Left    n   Painful arc    y   n   Pain with ER    n    n   Neer's     y    n   Hawkin's    y    n   Drop Arm    n  n   Lift off/Belly Press   n  n   ER Lag    n          AC Joint  n   AC tenderness   n  n   Cross-chest adduction  n       Labrum/biceps    n   Swain's    n   n   Biceps sheer    y      n   Speed's/Yergason's   y    n   Tenderness Biceps Groove  y    n   Umair's    n         Instability  n   Ant Apprehension   n    n   Post Apprehension   n    n   Ant Load shift    n    n   Post Load shift   n   n   Sulcus     n  n   Generalized Laxity   n  n   Relocation test   n  n   Crank test     n  n   Ander-superior escape  n       Imaging:  Xrays: 4 views of the left shoulder obtained on 7/15/2022 were independently reviewed  Indications: Left shoulder pain  Findings: Normal glenohumeral and acromioclavicular joint spaces. Mild osteophytic change involving the distal clavicle at the acromioclavicular joint. Type II acromion. No obvious fracture, dislocation or subluxation. Impression: Left shoulder radiographs with mild degenerative changes at the acromioclavicular joint as outlined above. Impression/Plan:     Luis A Lynn is a 67 y.o. old male with left shoulder pain and weakness that is likely due to a full-thickness rotator cuff tear as well as biceps tendinitis. I had a discussion with the patient today educating him about this condition and discussed treatment options available to him. I did recommend proceeding conservatively at this time with a course of physical therapy. We had a discussion about use of a cortisone injection and switching him briefly to a different anti-inflammatory. Following our discussions he would like to proceed with an injection. This was administered as outlined below and a prescription of physical therapy also provided. Should he fail this treatment I would recommend further evaluation with a CT arthrogram of his shoulder as he states that he has some implants in his back that will preclude him from getting an MRI. I will see him back in my clinic as needed but he may return or call at anytime with persistent or worsening symptoms and again I would recommend a CT arthrogram of his shoulder prior to that visit.     Procedure: left shoulder subacromial space injection  Following an appropriate discussion with the patient regarding the risks and benefits of the procedure he consented to proceed. his left shoulder was prepped using chlorhexadine solution. Using aseptic technique and through a posterior approach, his left shoulder subacromial space was injected with a 4 cc mixture of 1cc 40mg/ml kenalog and 3 cc of 1% lidocaine without epinephrine. A band aid was applied to the injection site. he tolerated the injection with no immediate adverse reactions. The patient was counseled about the risks of obtaining a cortisone injection as a diabetic and was instructed to monitor their blood sugar levels for the next 7 to 10 days adjusting their medication and/or notifying their PCP as needed. This note is created with the assistance of a speech recognition program.  While intending to generate adocument that actually reflects the content of the visit, the document can still have some errors including those of syntax and sound a like substitutions which may escape proof reading. It such instances, actual meaningcan be extrapolated by contextual diversion.     NA = Not assessed  RTC = Rotator cuff  RCT = Rotator cuff tear  ER = External rotation  IR = Internal rotation  AC = Acromioclavicular  GH = Glenohumeral  n = No  y = Yes

## 2022-08-09 ENCOUNTER — PATIENT MESSAGE (OUTPATIENT)
Dept: ORTHOPEDIC SURGERY | Age: 72
End: 2022-08-09

## 2022-08-09 DIAGNOSIS — M25.512 LEFT SHOULDER PAIN, UNSPECIFIED CHRONICITY: ICD-10-CM

## 2022-08-09 DIAGNOSIS — M75.102 TEAR OF LEFT ROTATOR CUFF, UNSPECIFIED TEAR EXTENT, UNSPECIFIED WHETHER TRAUMATIC: Primary | ICD-10-CM

## 2022-08-10 NOTE — TELEPHONE ENCOUNTER
Dr. Otis Camarillo,    Please see message below. Can you advise if waiting too long will cause a RCT to become irreparable? Also you mentioned ordering a CT if conservative tx fails during LOV. Would you like to go a head order a CT now or wait? From: Luis A Lynn  To: Dr. Chio March: 8/9/2022 9:46 AM EDT  Subject: Rotator cuff atrophy     Question: Im doing physical therapy for left side/shoulder. My right shoulder (rotator cuff) has atrophied because I waited too long and cant be repaired. If it turns out that I have a torn rotator cuff ; can I wait up to a year or so to have it fixed or will it atrophy and be unrepairable? PT is reducing the pain but it still hurts and I believe that Ill need a CT scan to determine if it is a torn rotator cuff or something else that Ill just have to live with.    Thank you Im advance for a rapid response   Gene Paige Claros) Liban Sarah

## 2022-08-22 ENCOUNTER — HOSPITAL ENCOUNTER (OUTPATIENT)
Dept: CT IMAGING | Age: 72
Discharge: HOME | End: 2022-08-24
Payer: MEDICARE

## 2022-08-22 ENCOUNTER — HOSPITAL ENCOUNTER (OUTPATIENT)
Dept: INTERVENTIONAL RADIOLOGY/VASCULAR | Age: 72
Discharge: HOME | End: 2022-08-24
Payer: MEDICARE

## 2022-08-22 DIAGNOSIS — M25.512 LEFT SHOULDER PAIN, UNSPECIFIED CHRONICITY: ICD-10-CM

## 2022-08-22 DIAGNOSIS — M75.102 TEAR OF LEFT ROTATOR CUFF, UNSPECIFIED TEAR EXTENT, UNSPECIFIED WHETHER TRAUMATIC: ICD-10-CM

## 2022-08-22 PROCEDURE — 2709999900 IR ARTHR/ASP/INJ MAJOR JT/BURSA LEFT WO US

## 2022-08-22 PROCEDURE — 73201 CT UPPER EXTREMITY W/DYE: CPT

## 2022-08-22 PROCEDURE — 6360000004 HC RX CONTRAST MEDICATION: Performed by: RADIOLOGY

## 2022-08-22 PROCEDURE — 77002 NEEDLE LOCALIZATION BY XRAY: CPT

## 2022-08-22 PROCEDURE — 23350 INJECTION FOR SHOULDER X-RAY: CPT

## 2022-08-22 RX ORDER — ACETAMINOPHEN 325 MG/1
650 TABLET ORAL EVERY 4 HOURS PRN
Status: DISCONTINUED | OUTPATIENT
Start: 2022-08-22 | End: 2022-08-25 | Stop reason: HOSPADM

## 2022-08-22 RX ADMIN — IOPAMIDOL 10 ML: 612 INJECTION, SOLUTION INTRAVENOUS at 10:34

## 2022-08-22 NOTE — FLOWSHEET NOTE
Pt tolerated procedure without distress. Dressing applied to procedure site. Discharge instructions reviewed understanding verbalized, pt released ambulatory in nad.  Taken to ct for further imaging

## 2022-08-25 ENCOUNTER — OFFICE VISIT (OUTPATIENT)
Dept: PRIMARY CARE CLINIC | Age: 72
End: 2022-08-25
Payer: MEDICARE

## 2022-08-25 ENCOUNTER — TELEPHONE (OUTPATIENT)
Dept: ORTHOPEDIC SURGERY | Age: 72
End: 2022-08-25

## 2022-08-25 ENCOUNTER — TELEPHONE (OUTPATIENT)
Dept: FAMILY MEDICINE CLINIC | Age: 72
End: 2022-08-25

## 2022-08-25 VITALS
DIASTOLIC BLOOD PRESSURE: 62 MMHG | TEMPERATURE: 98.7 F | OXYGEN SATURATION: 95 % | WEIGHT: 195 LBS | SYSTOLIC BLOOD PRESSURE: 136 MMHG | HEART RATE: 90 BPM | BODY MASS INDEX: 25.04 KG/M2

## 2022-08-25 DIAGNOSIS — M25.512 ACUTE PAIN OF LEFT SHOULDER: Primary | ICD-10-CM

## 2022-08-25 PROCEDURE — 1123F ACP DISCUSS/DSCN MKR DOCD: CPT | Performed by: PHYSICIAN ASSISTANT

## 2022-08-25 PROCEDURE — 4004F PT TOBACCO SCREEN RCVD TLK: CPT | Performed by: PHYSICIAN ASSISTANT

## 2022-08-25 PROCEDURE — 96372 THER/PROPH/DIAG INJ SC/IM: CPT | Performed by: PHYSICIAN ASSISTANT

## 2022-08-25 PROCEDURE — 99213 OFFICE O/P EST LOW 20 MIN: CPT | Performed by: PHYSICIAN ASSISTANT

## 2022-08-25 PROCEDURE — G8417 CALC BMI ABV UP PARAM F/U: HCPCS | Performed by: PHYSICIAN ASSISTANT

## 2022-08-25 PROCEDURE — 3017F COLORECTAL CA SCREEN DOC REV: CPT | Performed by: PHYSICIAN ASSISTANT

## 2022-08-25 PROCEDURE — G8427 DOCREV CUR MEDS BY ELIG CLIN: HCPCS | Performed by: PHYSICIAN ASSISTANT

## 2022-08-25 RX ORDER — PREDNISONE 20 MG/1
20 TABLET ORAL 2 TIMES DAILY
Qty: 10 TABLET | Refills: 0 | Status: SHIPPED | OUTPATIENT
Start: 2022-08-25 | End: 2022-08-30

## 2022-08-25 RX ORDER — BACLOFEN 10 MG/1
10 TABLET ORAL 3 TIMES DAILY PRN
Qty: 30 TABLET | Refills: 0 | Status: SHIPPED | OUTPATIENT
Start: 2022-08-25

## 2022-08-25 RX ORDER — KETOROLAC TROMETHAMINE 30 MG/ML
60 INJECTION, SOLUTION INTRAMUSCULAR; INTRAVENOUS ONCE
Status: COMPLETED | OUTPATIENT
Start: 2022-08-25 | End: 2022-08-25

## 2022-08-25 RX ORDER — IBUPROFEN 800 MG/1
800 TABLET ORAL EVERY 6 HOURS PRN
Qty: 28 TABLET | Refills: 0 | Status: ON HOLD | OUTPATIENT
Start: 2022-08-25 | End: 2022-10-25 | Stop reason: HOSPADM

## 2022-08-25 RX ADMIN — KETOROLAC TROMETHAMINE 60 MG: 30 INJECTION, SOLUTION INTRAMUSCULAR; INTRAVENOUS at 12:14

## 2022-08-25 NOTE — TELEPHONE ENCOUNTER
He can use tylenol and ibuprofen for pain. Continue to use ice as tolerated. I do not think this is an allergic reaction. Monitor symptoms.

## 2022-08-25 NOTE — TELEPHONE ENCOUNTER
Pt's daughter called in and stated that since his CT arthrogram, pt is having increased pain and headaches. She denies him having a fever or redness/swelling around the injection site. Patient daughter is asking what he should do, he started icing the arm and site on Wednesday and it is not helping. Patient daughter states that he took ibuprofen  500 mg last night with no relief. Writer advised to call [facility] office. Patient daughter stated that they referred him back to our office. Writer advised the patient to utilize the walk in.

## 2022-08-25 NOTE — PROGRESS NOTES
[address]  [address] [facility]  Phone: [phone]  Fax: Louis Faith    Pt Name: Luis A Zamora  MRN: [id number]  Armsmtgfroyce 1950  Date of evaluation: 8/25/2022  Provider: Brandy Neff Dr       Chief Complaint   Patient presents with    Other     Ct on Monday- has had increased pain in his left arm and shoulder- with a headache. Took ibuprofen and it didn't help. HISTORY OF PRESENT ILLNESS  (Location/Symptom, Timing/Onset, Context/Setting, Quality, Duration, Modifying Factors, Severity.)   Luis A Zamora is a 67 y.o. White (non-) [1] male who presents to the office for evaluation of      Left arm and left shoulder pain. Patient recently had a joint injection and started to have some major pain in his left shoulder. Nursing Notes were reviewed. REVIEW OF SYSTEMS    (2-9 systems for level 4, 10 or more for level 5)     Review of Systems   Constitutional:  Negative for chills, diaphoresis, fatigue and fever. HENT: Negative. Eyes: Negative. Respiratory: Negative. Cardiovascular: Negative. Gastrointestinal: Negative. Genitourinary: Negative. Musculoskeletal:         Left shoulder pain. Skin: Negative. Except as noted above the remainder of the review of systems was reviewed andnegative. PAST MEDICAL HISTORY   History reviewed. Past Medical History:   Diagnosis Date    Bladder stone     BPH with obstruction/lower urinary tract symptoms     Caffeine use     4 coffee/2 soda per day    Cataracts, bilateral     Chronic back pain     COPD (chronic obstructive pulmonary disease) ([facility] [address]     Hypertension     Irritable bowel syndrome     Kidney stone     Osteoarthritis     Pancreatitis     Type II or unspecified type diabetes mellitus without mention of complication, not stated as uncontrolled          SURGICAL HISTORY     History reviewed.     Past Surgical History: Procedure Laterality Date    BACK SURGERY      BLADDER SURGERY      CHOLECYSTECTOMY      CYSTOSCOPY  04/06/2022    CYSTOSCOPY URETEROSCOPY HOLMIUM LASER LEFT URETERAL STENT INSERTION - Left    CYSTOSCOPY Left 4/6/2022    CYSTOSCOPY URETEROSCOPY HOLMIUM LASER LEFT URETERAL STENT INSERTION performed by Tiffanie Draper MD at [address]  04/20/2022    CYSTOSCOPY URETEROSCOPY LEFT STENT PLACEMENT WITH STONE BASKETING AND HOLMIUM LASER (    CYSTOSCOPY Left 4/20/2022    CYSTOSCOPY URETEROSCOPY LEFT STENT PLACEMENT WITH STONE BASKETING AND HOLMIUM LASER performed by Tiffanie Draper MD at [address] [facility]  2015    macular scrape    EYE SURGERY      LITHOTRIPSY      9-21-17    OTHER SURGICAL HISTORY      implanted nerve stimulater in lumbar back    SPINE SURGERY  05/18/2010    DR. Lars Carrasquillo (NS)    URETEROSCOPY      with string stent 9-21-17    VASECTOMY           CURRENT MEDICATIONS       Current Outpatient Medications   Medication Sig Dispense Refill    baclofen (LIORESAL) 10 MG tablet Take 1 tablet by mouth 3 times daily as needed (muscle spasm) 30 tablet 0    ibuprofen (ADVIL;MOTRIN) 800 MG tablet Take 1 tablet by mouth every 6 hours as needed for Pain 28 tablet 0    celecoxib (CELEBREX) 200 MG capsule Take 1 capsule by mouth daily 90 capsule 1    gabapentin (NEURONTIN) 300 MG capsule Take 1 capsule by mouth 2 times daily for 180 days.  Intended supply: 90 days 180 capsule 1    metFORMIN (GLUCOPHAGE) 1000 MG tablet Take 1 tablet by mouth 2 times daily (with meals) 180 tablet 1    lisinopril (PRINIVIL;ZESTRIL) 10 MG tablet Take 1 tablet by mouth daily 90 tablet 1    chlorthalidone (HYGROTON) 25 MG tablet Take 1 tablet by mouth daily 90 tablet 1    pioglitazone (ACTOS) 30 MG tablet Take 1 tablet by mouth daily 90 tablet 1    DULoxetine (CYMBALTA) 30 MG extended release capsule Take 1 capsule by mouth 2 times daily 180 capsule 3    insulin detemir (LEVEMIR FLEXTOUCH) 100 UNIT/ML injection pen Inject 63 Units into the skin nightly 60 mL 3    blood glucose test strips (ONETOUCH VERIO) strip Test three times daily and as needed 300 strip 3    multivitamin (THERAGRAN) per tablet Take 1 tablet by mouth daily. ketorolac (TORADOL) 10 MG tablet Take 1 tablet by mouth every 8 hours as needed for Pain 8 tablet 0    traMADol (ULTRAM) 50 MG tablet Take 1 tablet by mouth every 4 hours as needed for Pain for up to 7 days. Intended supply: 7 days. Take lowest dose possible to manage pain 30 tablet 0    oxyCODONE-acetaminophen (PERCOCET) 5-325 MG per tablet Take 2 tablets by mouth every 6 hours as needed for Pain for up to 3 days. Intended supply: 3 days. Take lowest dose possible to manage pain 24 tablet 0    albuterol sulfate HFA (PROVENTIL;VENTOLIN;PROAIR) 108 (90 Base) MCG/ACT inhaler Inhale 2 puffs into the lungs every 6 hours as needed for Wheezing or Shortness of Breath (or take 1-2 puffs 30 mins prior to strenous activity) 18 g 6    Insulin Pen Needle 31G X 5 MM MISC 1 each by Does not apply route daily 100 each 3    Lancets MISC Test twice a day and as needed, Please send lancets for Verio brand meter 180 each 2     No current facility-administered medications for this visit. ALLERGIES     Patient has no known allergies. FAMILY HISTORY           Problem Relation Age of Onset    Arthritis Mother     Cancer Father      Family Status   Relation Name Status    Mother  (Not Specified)    Father  (Not Specified)          SOCIAL HISTORY      reports that he has been smoking cigarettes. He started smoking about 42 years ago. He has a 40.00 pack-year smoking history. He has quit using smokeless tobacco. He reports that he does not currently use alcohol. He reports that he does not use drugs.       PHYSICAL EXAM    (up to 7 for level 4, 8 or more for level 5)     Vitals:    08/25/22 1132   BP: 136/62   Site: Right Upper Arm   Position: Sitting   Cuff Size: Medium Adult   Pulse: 90 Temp: 98.7 °F (37.1 °C)   SpO2: 95%   Weight: 195 lb (88.5 kg)         Physical Exam  Vitals and nursing note reviewed. Constitutional:       Appearance: Normal appearance. HENT:      Head: Normocephalic and atraumatic. Right Ear: External ear normal.      Left Ear: External ear normal.      Nose: Nose normal.      Mouth/Throat:      Mouth: Mucous membranes are moist.   Eyes:      Extraocular Movements: Extraocular movements intact. Conjunctiva/sclera: Conjunctivae normal.      Pupils: Pupils are equal, round, and reactive to light. Cardiovascular:      Rate and Rhythm: Normal rate. Pulmonary:      Effort: Pulmonary effort is normal.   Abdominal:      Palpations: Abdomen is soft. Musculoskeletal:      Left shoulder: Tenderness present. No swelling or bony tenderness. Decreased range of motion. Decreased strength. Comments: No erythema    Skin:     General: Skin is warm and dry. Neurological:      Mental Status: He is alert and oriented to person, place, and time. DIFFERENTIAL DIAGNOSIS:       Mercedez [place] reviewed the disposition diagnosis with the patient and or their family/guardian. I have answered their questions and given discharge instructions. They voiced understanding of these instructions and did not have anyfurther questions or complaints. PROCEDURES:  No orders of the defined types were placed in this encounter. No results found for this visit on 08/25/22. FINALIMPRESSION      Visit Diagnoses and Associated Orders       Acute pain of left shoulder    -  Primary         ORDERS WITHOUT AN ASSOCIATED DIAGNOSIS    ketorolac (TORADOL) injection 60 mg [53766]      baclofen (LIORESAL) 10 MG tablet [860]      ibuprofen (ADVIL;MOTRIN) 800 MG tablet [3845]      predniSONE (DELTASONE) 20 MG tablet [6496]                PLAN     Return if symptoms worsen or fail to improve.       DISCHARGEMEDICATIONS:  Orders Placed This Encounter   Medications    ketorolac (TORADOL) injection 60 mg    baclofen (LIORESAL) 10 MG tablet     Sig: Take 1 tablet by mouth 3 times daily as needed (muscle spasm)     Dispense:  30 tablet     Refill:  0    ibuprofen (ADVIL;MOTRIN) 800 MG tablet     Sig: Take 1 tablet by mouth every 6 hours as needed for Pain     Dispense:  28 tablet     Refill:  0    predniSONE (DELTASONE) 20 MG tablet     Sig: Take 1 tablet by mouth 2 times daily for 5 days     Dispense:  10 tablet     Refill:  0         Plan:  Motrin 800 mg TID for the discomfort/inflammation. Muscle relaxer  prn for the muscle spasms/pain. Heat therapy if desired. Home stretches provided on AVS.  Patient agreeable to treatment plan. Follow up if symptoms do not improve/worsen. Patient instructed to return to the office if symptoms worsen, return, or have any other concerns. Patient understands and is agreeable.          Edwige Conn PA-C 8/31/2022 11:56 PM

## 2022-08-25 NOTE — TELEPHONE ENCOUNTER
Patient notified of recommendations and expressed understanding. He was evaluated at the walk in clinic and will  prescriptions. To call back on Monday with an update.

## 2022-08-29 ENCOUNTER — APPOINTMENT (OUTPATIENT)
Dept: GENERAL RADIOLOGY | Age: 72
End: 2022-08-29
Payer: MEDICARE

## 2022-08-29 ENCOUNTER — HOSPITAL ENCOUNTER (OUTPATIENT)
Age: 72
Setting detail: OBSERVATION
Discharge: HOME | End: 2022-08-29
Attending: EMERGENCY MEDICINE | Admitting: STUDENT IN AN ORGANIZED HEALTH CARE EDUCATION/TRAINING PROGRAM
Payer: MEDICARE

## 2022-08-29 ENCOUNTER — TELEPHONE (OUTPATIENT)
Dept: ORTHOPEDIC SURGERY | Age: 72
End: 2022-08-29

## 2022-08-29 ENCOUNTER — TELEPHONE (OUTPATIENT)
Dept: PRIMARY CARE CLINIC | Age: 72
End: 2022-08-29

## 2022-08-29 VITALS
HEART RATE: 102 BPM | TEMPERATURE: 97.5 F | BODY MASS INDEX: 26.11 KG/M2 | RESPIRATION RATE: 16 BRPM | HEIGHT: 74 IN | OXYGEN SATURATION: 94 % | WEIGHT: 203.48 LBS | DIASTOLIC BLOOD PRESSURE: 86 MMHG | SYSTOLIC BLOOD PRESSURE: 144 MMHG

## 2022-08-29 DIAGNOSIS — M25.512 ACUTE PAIN OF LEFT SHOULDER: Primary | ICD-10-CM

## 2022-08-29 DIAGNOSIS — R52 INTRACTABLE PAIN: ICD-10-CM

## 2022-08-29 LAB
ABSOLUTE EOS #: 0 K/UL (ref 0–0.4)
ABSOLUTE LYMPH #: 0.37 K/UL (ref 1–4.8)
ABSOLUTE MONO #: 0.62 K/UL (ref 0.1–0.8)
ANION GAP SERPL CALCULATED.3IONS-SCNC: 11 MMOL/L (ref 9–17)
BASOPHILS # BLD: 0 % (ref 0–2)
BASOPHILS ABSOLUTE: 0 K/UL (ref 0–0.2)
BUN BLDV-MCNC: 24 MG/DL (ref 8–23)
C-REACTIVE PROTEIN: 221.3 MG/L (ref 0–5)
CALCIUM SERPL-MCNC: 9.8 MG/DL (ref 8.6–10.4)
CHLORIDE BLD-SCNC: 94 MMOL/L (ref 98–107)
CO2: 31 MMOL/L (ref 20–31)
CREAT SERPL-MCNC: 0.82 MG/DL (ref 0.7–1.2)
EOSINOPHILS RELATIVE PERCENT: 0 % (ref 1–4)
GFR AFRICAN AMERICAN: >60 ML/MIN
GFR NON-AFRICAN AMERICAN: >60 ML/MIN
GFR SERPL CREATININE-BSD FRML MDRD: ABNORMAL ML/MIN/{1.73_M2}
GLUCOSE BLD-MCNC: 104 MG/DL (ref 70–99)
GLUCOSE BLD-MCNC: 133 MG/DL (ref 75–110)
HCT VFR BLD CALC: 39.2 % (ref 41–53)
HEMOGLOBIN: 13.3 G/DL (ref 13.5–17.5)
LYMPHOCYTES # BLD: 3 % (ref 24–44)
MCH RBC QN AUTO: 30.9 PG (ref 26–34)
MCHC RBC AUTO-ENTMCNC: 34 G/DL (ref 31–37)
MCV RBC AUTO: 91 FL (ref 80–100)
MONOCYTES # BLD: 5 % (ref 1–7)
MORPHOLOGY: NORMAL
PDW BLD-RTO: 14.2 % (ref 12.5–15.4)
PLATELET # BLD: 217 K/UL (ref 140–450)
PMV BLD AUTO: 7.6 FL (ref 6–12)
POTASSIUM SERPL-SCNC: 3.5 MMOL/L (ref 3.7–5.3)
RBC # BLD: 4.3 M/UL (ref 4.5–5.9)
SEG NEUTROPHILS: 92 % (ref 36–66)
SEGMENTED NEUTROPHILS ABSOLUTE COUNT: 11.41 K/UL (ref 1.8–7.7)
SODIUM BLD-SCNC: 136 MMOL/L (ref 135–144)
WBC # BLD: 12.4 K/UL (ref 3.5–11)

## 2022-08-29 PROCEDURE — 99285 EMERGENCY DEPT VISIT HI MDM: CPT

## 2022-08-29 PROCEDURE — 6360000002 HC RX W HCPCS: Performed by: STUDENT IN AN ORGANIZED HEALTH CARE EDUCATION/TRAINING PROGRAM

## 2022-08-29 PROCEDURE — 99219 PR INITIAL OBSERVATION CARE/DAY 50 MINUTES: CPT | Performed by: STUDENT IN AN ORGANIZED HEALTH CARE EDUCATION/TRAINING PROGRAM

## 2022-08-29 PROCEDURE — 6370000000 HC RX 637 (ALT 250 FOR IP): Performed by: STUDENT IN AN ORGANIZED HEALTH CARE EDUCATION/TRAINING PROGRAM

## 2022-08-29 PROCEDURE — 96376 TX/PRO/DX INJ SAME DRUG ADON: CPT

## 2022-08-29 PROCEDURE — 6360000002 HC RX W HCPCS: Performed by: EMERGENCY MEDICINE

## 2022-08-29 PROCEDURE — 80048 BASIC METABOLIC PNL TOTAL CA: CPT

## 2022-08-29 PROCEDURE — 85025 COMPLETE CBC W/AUTO DIFF WBC: CPT

## 2022-08-29 PROCEDURE — 36415 COLL VENOUS BLD VENIPUNCTURE: CPT

## 2022-08-29 PROCEDURE — 82947 ASSAY GLUCOSE BLOOD QUANT: CPT

## 2022-08-29 PROCEDURE — G0378 HOSPITAL OBSERVATION PER HR: HCPCS

## 2022-08-29 PROCEDURE — 73030 X-RAY EXAM OF SHOULDER: CPT

## 2022-08-29 PROCEDURE — 96375 TX/PRO/DX INJ NEW DRUG ADDON: CPT

## 2022-08-29 PROCEDURE — 2580000003 HC RX 258: Performed by: EMERGENCY MEDICINE

## 2022-08-29 PROCEDURE — 96374 THER/PROPH/DIAG INJ IV PUSH: CPT

## 2022-08-29 PROCEDURE — 86140 C-REACTIVE PROTEIN: CPT

## 2022-08-29 RX ORDER — ONDANSETRON 4 MG/1
4 TABLET, ORALLY DISINTEGRATING ORAL EVERY 8 HOURS PRN
Status: DISCONTINUED | OUTPATIENT
Start: 2022-08-29 | End: 2022-08-29 | Stop reason: HOSPADM

## 2022-08-29 RX ORDER — POLYETHYLENE GLYCOL 3350 17 G/17G
17 POWDER, FOR SOLUTION ORAL DAILY PRN
Status: DISCONTINUED | OUTPATIENT
Start: 2022-08-29 | End: 2022-08-29 | Stop reason: HOSPADM

## 2022-08-29 RX ORDER — CHLORTHALIDONE 25 MG/1
25 TABLET ORAL DAILY
Status: DISCONTINUED | OUTPATIENT
Start: 2022-08-30 | End: 2022-08-29 | Stop reason: HOSPADM

## 2022-08-29 RX ORDER — DEXTROSE MONOHYDRATE 100 MG/ML
INJECTION, SOLUTION INTRAVENOUS CONTINUOUS PRN
Status: DISCONTINUED | OUTPATIENT
Start: 2022-08-29 | End: 2022-08-29 | Stop reason: HOSPADM

## 2022-08-29 RX ORDER — OXYCODONE HYDROCHLORIDE AND ACETAMINOPHEN 5; 325 MG/1; MG/1
2 TABLET ORAL EVERY 6 HOURS PRN
Qty: 24 TABLET | Refills: 0 | Status: SHIPPED | OUTPATIENT
Start: 2022-08-29 | End: 2022-09-01

## 2022-08-29 RX ORDER — SODIUM CHLORIDE 9 MG/ML
INJECTION, SOLUTION INTRAVENOUS PRN
Status: DISCONTINUED | OUTPATIENT
Start: 2022-08-29 | End: 2022-08-29 | Stop reason: HOSPADM

## 2022-08-29 RX ORDER — ONDANSETRON 2 MG/ML
4 INJECTION INTRAMUSCULAR; INTRAVENOUS EVERY 6 HOURS PRN
Status: DISCONTINUED | OUTPATIENT
Start: 2022-08-29 | End: 2022-08-29 | Stop reason: HOSPADM

## 2022-08-29 RX ORDER — ALBUTEROL SULFATE 90 UG/1
2 AEROSOL, METERED RESPIRATORY (INHALATION) EVERY 6 HOURS PRN
Status: DISCONTINUED | OUTPATIENT
Start: 2022-08-29 | End: 2022-08-29 | Stop reason: HOSPADM

## 2022-08-29 RX ORDER — GABAPENTIN 300 MG/1
300 CAPSULE ORAL 2 TIMES DAILY
Status: DISCONTINUED | OUTPATIENT
Start: 2022-08-29 | End: 2022-08-29 | Stop reason: HOSPADM

## 2022-08-29 RX ORDER — OXYCODONE HYDROCHLORIDE AND ACETAMINOPHEN 5; 325 MG/1; MG/1
2 TABLET ORAL EVERY 4 HOURS PRN
Status: DISCONTINUED | OUTPATIENT
Start: 2022-08-29 | End: 2022-08-29 | Stop reason: HOSPADM

## 2022-08-29 RX ORDER — ACETAMINOPHEN 650 MG/1
650 SUPPOSITORY RECTAL EVERY 6 HOURS PRN
Status: DISCONTINUED | OUTPATIENT
Start: 2022-08-29 | End: 2022-08-29 | Stop reason: HOSPADM

## 2022-08-29 RX ORDER — MORPHINE SULFATE 2 MG/ML
1 INJECTION, SOLUTION INTRAMUSCULAR; INTRAVENOUS
Status: DISCONTINUED | OUTPATIENT
Start: 2022-08-29 | End: 2022-08-29 | Stop reason: HOSPADM

## 2022-08-29 RX ORDER — CELECOXIB 100 MG/1
200 CAPSULE ORAL DAILY
Status: DISCONTINUED | OUTPATIENT
Start: 2022-08-30 | End: 2022-08-29 | Stop reason: HOSPADM

## 2022-08-29 RX ORDER — MORPHINE SULFATE 2 MG/ML
2 INJECTION, SOLUTION INTRAMUSCULAR; INTRAVENOUS
Status: DISCONTINUED | OUTPATIENT
Start: 2022-08-29 | End: 2022-08-29 | Stop reason: HOSPADM

## 2022-08-29 RX ORDER — FENTANYL CITRATE 50 UG/ML
75 INJECTION, SOLUTION INTRAMUSCULAR; INTRAVENOUS ONCE
Status: COMPLETED | OUTPATIENT
Start: 2022-08-29 | End: 2022-08-29

## 2022-08-29 RX ORDER — SODIUM CHLORIDE 0.9 % (FLUSH) 0.9 %
5-40 SYRINGE (ML) INJECTION PRN
Status: DISCONTINUED | OUTPATIENT
Start: 2022-08-29 | End: 2022-08-29 | Stop reason: HOSPADM

## 2022-08-29 RX ORDER — LISINOPRIL 10 MG/1
10 TABLET ORAL DAILY
Status: DISCONTINUED | OUTPATIENT
Start: 2022-08-30 | End: 2022-08-29 | Stop reason: HOSPADM

## 2022-08-29 RX ORDER — ONDANSETRON 2 MG/ML
4 INJECTION INTRAMUSCULAR; INTRAVENOUS ONCE
Status: COMPLETED | OUTPATIENT
Start: 2022-08-29 | End: 2022-08-29

## 2022-08-29 RX ORDER — PIOGLITAZONEHYDROCHLORIDE 30 MG/1
30 TABLET ORAL DAILY
Status: DISCONTINUED | OUTPATIENT
Start: 2022-08-30 | End: 2022-08-29 | Stop reason: HOSPADM

## 2022-08-29 RX ORDER — SODIUM CHLORIDE 9 MG/ML
INJECTION, SOLUTION INTRAVENOUS CONTINUOUS
Status: DISCONTINUED | OUTPATIENT
Start: 2022-08-29 | End: 2022-08-29

## 2022-08-29 RX ORDER — INSULIN LISPRO 100 [IU]/ML
0-4 INJECTION, SOLUTION INTRAVENOUS; SUBCUTANEOUS NIGHTLY
Status: DISCONTINUED | OUTPATIENT
Start: 2022-08-29 | End: 2022-08-29 | Stop reason: HOSPADM

## 2022-08-29 RX ORDER — ENOXAPARIN SODIUM 100 MG/ML
40 INJECTION SUBCUTANEOUS DAILY
Status: DISCONTINUED | OUTPATIENT
Start: 2022-08-29 | End: 2022-08-29 | Stop reason: HOSPADM

## 2022-08-29 RX ORDER — INSULIN GLARGINE 100 [IU]/ML
63 INJECTION, SOLUTION SUBCUTANEOUS NIGHTLY
Status: DISCONTINUED | OUTPATIENT
Start: 2022-08-29 | End: 2022-08-29 | Stop reason: HOSPADM

## 2022-08-29 RX ORDER — BACLOFEN 10 MG/1
10 TABLET ORAL 3 TIMES DAILY PRN
Status: DISCONTINUED | OUTPATIENT
Start: 2022-08-29 | End: 2022-08-29 | Stop reason: HOSPADM

## 2022-08-29 RX ORDER — INSULIN LISPRO 100 [IU]/ML
0-4 INJECTION, SOLUTION INTRAVENOUS; SUBCUTANEOUS
Status: DISCONTINUED | OUTPATIENT
Start: 2022-08-29 | End: 2022-08-29 | Stop reason: HOSPADM

## 2022-08-29 RX ORDER — DULOXETIN HYDROCHLORIDE 30 MG/1
30 CAPSULE, DELAYED RELEASE ORAL 2 TIMES DAILY
Status: DISCONTINUED | OUTPATIENT
Start: 2022-08-29 | End: 2022-08-29 | Stop reason: HOSPADM

## 2022-08-29 RX ORDER — SODIUM CHLORIDE 0.9 % (FLUSH) 0.9 %
5-40 SYRINGE (ML) INJECTION EVERY 12 HOURS SCHEDULED
Status: DISCONTINUED | OUTPATIENT
Start: 2022-08-29 | End: 2022-08-29 | Stop reason: HOSPADM

## 2022-08-29 RX ORDER — ACETAMINOPHEN 325 MG/1
650 TABLET ORAL EVERY 6 HOURS PRN
Status: DISCONTINUED | OUTPATIENT
Start: 2022-08-29 | End: 2022-08-29 | Stop reason: HOSPADM

## 2022-08-29 RX ADMIN — FENTANYL CITRATE 75 MCG: 50 INJECTION, SOLUTION INTRAMUSCULAR; INTRAVENOUS at 10:08

## 2022-08-29 RX ADMIN — HYDROMORPHONE HYDROCHLORIDE 1 MG: 1 INJECTION, SOLUTION INTRAMUSCULAR; INTRAVENOUS; SUBCUTANEOUS at 10:56

## 2022-08-29 RX ADMIN — MORPHINE SULFATE 2 MG: 2 INJECTION, SOLUTION INTRAMUSCULAR; INTRAVENOUS at 12:55

## 2022-08-29 RX ADMIN — ONDANSETRON 4 MG: 2 INJECTION INTRAMUSCULAR; INTRAVENOUS at 10:06

## 2022-08-29 RX ADMIN — MORPHINE SULFATE 2 MG: 2 INJECTION, SOLUTION INTRAMUSCULAR; INTRAVENOUS at 15:39

## 2022-08-29 RX ADMIN — MORPHINE SULFATE 2 MG: 2 INJECTION, SOLUTION INTRAMUSCULAR; INTRAVENOUS at 18:23

## 2022-08-29 RX ADMIN — OXYCODONE HYDROCHLORIDE AND ACETAMINOPHEN 2 TABLET: 5; 325 TABLET ORAL at 16:56

## 2022-08-29 RX ADMIN — SODIUM CHLORIDE: 9 INJECTION, SOLUTION INTRAVENOUS at 10:13

## 2022-08-29 ASSESSMENT — PAIN DESCRIPTION - ORIENTATION
ORIENTATION: LEFT
ORIENTATION: LEFT

## 2022-08-29 ASSESSMENT — PAIN SCALES - GENERAL
PAINLEVEL_OUTOF10: 10
PAINLEVEL_OUTOF10: 6
PAINLEVEL_OUTOF10: 8
PAINLEVEL_OUTOF10: 7
PAINLEVEL_OUTOF10: 7
PAINLEVEL_OUTOF10: 8
PAINLEVEL_OUTOF10: 6

## 2022-08-29 ASSESSMENT — PAIN DESCRIPTION - LOCATION
LOCATION: SHOULDER

## 2022-08-29 ASSESSMENT — PAIN - FUNCTIONAL ASSESSMENT: PAIN_FUNCTIONAL_ASSESSMENT: 0-10

## 2022-08-29 NOTE — DISCHARGE SUMMARY
[facility]  Office: [address], DO, Nat Chandra DO, Rusty Aden, DO, Lyndseykarentre Martha Wilhelm, DO, Danita Gilliam MD, Tiffanie Troncoso MD, Jose Angel Crawford MD, Bryce Grissom MD,  Michael Tate MD, Pramod Arguello MD, Roxy Reid DO, Aisha Ferreira MD,  Jayant Mccoy MD, Maximo Kevin MD, Ximena Floyd DO, Marco Mart MD, Eli Mishra MD, Donaldo Ruth MD, Ginger Mclean DO, Tracey Craven MD, Dianne Sepulveda MD, Sarah Hubbard, CNP,  Cici Ch, CNP, Bhaskar Fletcher, CNP, Daniel Vargas, CNP, Linda Anne PAEmelinaC, Leslie Silverman, DNP, Raymond Davis, CNP, Jac Rico, CNP, Carlee Noel, CNP, Soledad Santana, CNP, Martha Garcia, CNP, Sheeba Nurse, CNS, Regla Schwartz, [facility], Juliet Painting, CNP, Mary Zavala, CNP, Diandra Barrientos, [facility]           [address] [facility]    Discharge Summary     Patient ID: Luis A Lazar  :  1950   MRN: [id number]     ACCOUNT:  [de-identified]   Patient's PCP: BREANNA Hernandez NP  Admit Date: 2022   Discharge Date: 2022     Length of Stay: 0  Code Status:  Full Code  Admitting Physician: Maximo Kevin MD  Discharge Physician: Maximo Kevin MD     Active Discharge Diagnoses:     Hospital Problem Lists:  Principal Problem:    Intractable pain  Active Problems:    Essential hypertension    Type 2 diabetes mellitus with diabetic polyneuropathy, with long-term current use of insulin (HCC)    Moderate episode of recurrent major depressive disorder [facility])  Resolved Problems:    * No resolved hospital problems. *      Admission Condition:  good     Discharged Condition: good    Hospital Stay:     Hospital Course:  Luis A Lazar is a 67 y.o. male with past medical history as below who presented to the emergency department on  complaining of severe left shoulder pain.  The patient was recently diagnosed with a left rotator cuff tear and had a CT arthrogram done on 8/22 per orthopedic surgery recommendation. The patient states that he has had severe, intractable left shoulder pain since the arthrogram and has been in so much pain that he has been unable to sleep for the past several days. In the ED, the patient was afebrile and nontoxic appearing. X-ray of the left shoulder was unremarkable. According to the the ED, the patient was in so much pain on arrival that he was unable to to made comfortable despite multiple doses of IV pain medication. The patient is admitted to internal medicine for further management of intractable pain. Discussed case with orthopedic surgery; no intervention is indicated at this time. The patient improved throughout the course of admission. He is discharged home in stable condition. The patient is instructed to follow-up with orthopedic surgery on Wednesday as scheduled. Significant therapeutic interventions: Pain control     Significant Diagnostic Studies:   Labs / Micro:  BMP:    Lab Results   Component Value Date/Time    GLUCOSE 104 08/29/2022 09:59 AM     08/29/2022 09:59 AM    K 3.5 08/29/2022 09:59 AM    CL 94 08/29/2022 09:59 AM    CO2 31 08/29/2022 09:59 AM    ANIONGAP 11 08/29/2022 09:59 AM    BUN 24 08/29/2022 09:59 AM    CREATININE 0.82 08/29/2022 09:59 AM    BUNCRER NOT REPORTED 01/06/2022 09:20 AM    CALCIUM 9.8 08/29/2022 09:59 AM    LABGLOM >60 08/29/2022 09:59 AM    GFRAA >60 08/29/2022 09:59 AM    GFR      08/29/2022 09:59 AM       Radiology:  XR SHOULDER LEFT (MIN 2 VIEWS)    Result Date: 8/29/2022  No acute osseous or soft tissue abnormality. Consultations:    Consults:     Final Specialist Recommendations/Findings:   IP CONSULT TO ORTHOPEDIC SURGERY      The patient was seen and examined on day of discharge and this discharge summary is in conjunction with any daily progress note from day of discharge. Discharge plan:     Disposition: Home    Physician Follow Up:      Follow-up with Dr. Loly Paige on Wednesday as scheduled. Requiring Further Evaluation/Follow Up POST HOSPITALIZATION/Incidental Findings: None. Diet: regular diet    Activity: As tolerated    Instructions to Patient: Follow-up with orthopedic surgery as scheduled. Discharge Medications:      Medication List        START taking these medications      oxyCODONE-acetaminophen 5-325 MG per tablet  Commonly known as: Percocet  Take 2 tablets by mouth every 6 hours as needed for Pain for up to 3 days. Intended supply: 3 days. Take lowest dose possible to manage pain            CONTINUE taking these medications      albuterol sulfate  (90 Base) MCG/ACT inhaler  Commonly known as: PROVENTIL;VENTOLIN;PROAIR  Inhale 2 puffs into the lungs every 6 hours as needed for Wheezing or Shortness of Breath (or take 1-2 puffs 30 mins prior to strenous activity)     baclofen 10 MG tablet  Commonly known as: LIORESAL  Take 1 tablet by mouth 3 times daily as needed (muscle spasm)     celecoxib 200 MG capsule  Commonly known as: CELEBREX  Take 1 capsule by mouth daily     chlorthalidone 25 MG tablet  Commonly known as: HYGROTON  Take 1 tablet by mouth daily     DULoxetine 30 MG extended release capsule  Commonly known as: CYMBALTA  Take 1 capsule by mouth 2 times daily     gabapentin 300 MG capsule  Commonly known as: NEURONTIN  Take 1 capsule by mouth 2 times daily for 180 days.  Intended supply: 90 days     ibuprofen 800 MG tablet  Commonly known as: ADVIL;MOTRIN  Take 1 tablet by mouth every 6 hours as needed for Pain     Insulin Pen Needle 31G X 5 MM Misc  1 each by Does not apply route daily     Lancets Misc  Test twice a day and as needed, Please send lancets for Verio brand meter     Levemir FlexTouch 100 UNIT/ML injection pen  Generic drug: insulin detemir  Inject 63 Units into the skin nightly     lisinopril 10 MG tablet  Commonly known as: PRINIVIL;ZESTRIL  Take 1 tablet by mouth daily     metFORMIN 1000 MG tablet  Commonly known as: GLUCOPHAGE  Take 1 tablet by mouth 2 times daily (with meals)     multivitamin per tablet     OneTouch Verio strip  Generic drug: blood glucose test strips  Test three times daily and as needed     pioglitazone 30 MG tablet  Commonly known as: Actos  Take 1 tablet by mouth daily     predniSONE 20 MG tablet  Commonly known as: DELTASONE  Take 1 tablet by mouth 2 times daily for 5 days               Where to Get Your Medications        These medications were sent to [address] [place] [id number]      Phone: [phone]   oxyCODONE-acetaminophen 5-325 MG per tablet         No discharge procedures on file. Time Spent on discharge is  20 mins in patient examination, evaluation, counseling as well as medication reconciliation, prescriptions for required medications, discharge plan and follow up. Electronically signed by   Iliana Caraballo MD  8/29/2022  6:54 PM      Thank you Dr. An Dumont, APRN - NP for the opportunity to be involved in this patient's care.

## 2022-08-29 NOTE — TELEPHONE ENCOUNTER
Patient called, after he had his Arthrogram & CT the pain in his shoulder has gotten extremely worse, 11-12 in pain. Went to walk in clinic on Wednesday, was given pain meds which took the pain to 8-10. Pt has 5-6 spots in shoulder, muscle under mid back up neck to eye & teeth that are all causing him pain. Can only move arm when straight down only slightly, hand is swelling up. Between the pain and medications pt's blood sugars are going up. Please contact patient ASAP, pt's wife did come on the line and stated that if they don't get a call back within the next 30 mins she will be taking the patient to the ED.     Please contact pt at [phone]

## 2022-08-29 NOTE — PLAN OF CARE
Problem: Discharge Planning  Goal: Discharge to home or other facility with appropriate resources  8/29/2022 1849 by Marylen Ohms, RN  Outcome: Completed  8/29/2022 1849 by Marylen Ohms, RN  Outcome: Adequate for Discharge  Flowsheets  Taken 8/29/2022 1332  Discharge to home or other facility with appropriate resources:   Identify barriers to discharge with patient and caregiver   Identify discharge learning needs (meds, wound care, etc)   Refer to discharge planning if patient needs post-hospital services based on physician order or complex needs related to functional status, cognitive ability or social support system   Arrange for needed discharge resources and transportation as appropriate  Taken 8/29/2022 1154  Discharge to home or other facility with appropriate resources: Identify barriers to discharge with patient and caregiver     Problem: Pain  Goal: Verbalizes/displays adequate comfort level or baseline comfort level  8/29/2022 1849 by Marylen Ohms, RN  Outcome: Completed  8/29/2022 1849 by Marylen Ohms, RN  Outcome: Adequate for Discharge  Flowsheets  Taken 8/29/2022 1531  Verbalizes/displays adequate comfort level or baseline comfort level:   Encourage patient to monitor pain and request assistance   Assess pain using appropriate pain scale  Taken 8/29/2022 1154  Verbalizes/displays adequate comfort level or baseline comfort level:   Encourage patient to monitor pain and request assistance   Assess pain using appropriate pain scale   Administer analgesics based on type and severity of pain and evaluate response     Problem: Safety - Adult  Goal: Free from fall injury  8/29/2022 1849 by Marylen Ohms, RN  Outcome: Completed  8/29/2022 1849 by Marylen Ohms, RN  Outcome: Adequate for Discharge

## 2022-08-29 NOTE — TELEPHONE ENCOUNTER
Patient's spouse phoned again to inform office of patient's admission to [facility] for evalluation and pain control.

## 2022-08-29 NOTE — TELEPHONE ENCOUNTER
Spoke with patient. He stated that he was in terrible pain. I recommended that he go the ED if he is truly in significant pain and to keep his f/u appt scheduled for 8/31/22. Patient voiced understanding.

## 2022-08-29 NOTE — TELEPHONE ENCOUNTER
Patient's spouse called, stated patient having severe pain from shoulder to head of \"12\" with headache, teeth, and eye pain. Spouse stated patient unable to move arm, pull up pants, or dress himself, and has been in pain the entire weekend. Spouse also reports patient's blood sugar higher than usual.   Writer suggested patient go to urgent care or ED for evaluation and pain management. Spouse expressed understanding and will take [facility] to [facility] ED.

## 2022-08-29 NOTE — H&P
[facility]  Office: [address], DO, [facility], DO, Abdelrahman Colon, DO, Elissa Amando Blood, DO, Erika Butler MD, Baljinder Thomas MD, Miguel Narayanan MD, Lilliam Castellanos MD,  Renetta Pike MD, Lazarus Moats, MD, Vidhi Caputo, DO, Leslie Simpson MD,  Angie Olsen MD, Carolyn Coe MD, Milagros Mckee, DO, Glendy Bryant MD, Ariana Chapmna MD, Tony Perez MD, Pasha Siddiqui DO, Viji Baldwin MD, Namrata Manuel MD, Kalin Morton, CNP,  Jewel Thurston, CNP, Bouchra Faustin, CNP, Lima Amin, CNP, Monserrat Sutton PAEmelinaC, Vannessa Morrissey, DNP, Toni Ferro, CNP, Chapincito Camarillo, CNP, Maggy Salinas, CNP, Maryjane Hernandez, CNP, Mika Tello, CNP, Jose Cespedes, CNS, Laverne Mackey, [facility], Stephanie Ag, CNP, Yolanda Diaz, CNP, Maria Luisa Myrick, CNP           [employer]    HISTORY AND PHYSICAL EXAMINATION            Date:   8/29/2022  Patient name:  Cindy Garcia  Date of admission:  8/29/2022  9:30 AM  MRN:   [id number]  Account:  [de-identified]  YOB: 1950  PCP:    BREANNA Espinoza NP  Room:   [address] [place] [address]  Code Status:    Full Code    Chief Complaint:     Chief Complaint   Patient presents with    Shoulder Pain     Left shoulder pain, was in PT, arthogram 1 week ago torn rotator cuff, Tuesday pain worsening, limited ROM, through shoulder, neck, head     History Obtained From:     patient    History of Present Illness:     Luis A Casey is a 67 y.o. male with past medical history as below who presented to the emergency department on 8/29 complaining of severe left shoulder pain. The patient was recently diagnosed with a left rotator cuff tear and had a CT arthrogram done on 8/22 per orthopedic surgery recommendation.  The patient states that he has had severe, intractable left shoulder pain since the arthrogram and has been in so much pain that he has been unable to sleep for the past several days. In the ED, the patient was afebrile and nontoxic appearing. X-ray of the left shoulder was unremarkable. According to the the ED, the patient was in so much pain on arrival that he was unable to to made comfortable despite multiple doses of IV pain medication. The patient is admitted to internal medicine for further management of intractable pain. Discussed case with orthopedic surgery; no intervention is indicated at this time. Anticipate discharge home later today once pain is controlled. The patient is instructed to follow-up with orthopedic surgery on Wednesday as scheduled.      Past Medical History:     Past Medical History:   Diagnosis Date    Bladder stone     BPH with obstruction/lower urinary tract symptoms     Caffeine use     4 coffee/2 soda per day    Cataracts, bilateral     Chronic back pain     COPD (chronic obstructive pulmonary disease) (HCC)     Hypertension     Irritable bowel syndrome     Kidney stone     Osteoarthritis     Pancreatitis     Type II or unspecified type diabetes mellitus without mention of complication, not stated as uncontrolled         Past Surgical History:     Past Surgical History:   Procedure Laterality Date    BACK SURGERY      BLADDER SURGERY      CHOLECYSTECTOMY      CYSTOSCOPY  04/06/2022    CYSTOSCOPY URETEROSCOPY HOLMIUM LASER LEFT URETERAL STENT INSERTION - Left    CYSTOSCOPY Left 4/6/2022    CYSTOSCOPY URETEROSCOPY HOLMIUM LASER LEFT URETERAL STENT INSERTION performed by Reji Fung MD at [address] [place] [address]  04/20/2022    CYSTOSCOPY URETEROSCOPY LEFT STENT PLACEMENT WITH STONE BASKETING AND HOLMIUM LASER (    CYSTOSCOPY Left 4/20/2022    CYSTOSCOPY URETEROSCOPY LEFT STENT PLACEMENT WITH STONE BASKETING AND HOLMIUM LASER performed by Reji Fung MD at [address] [place] [address]  2015    macular scrape    EYE SURGERY      LITHOTRIPSY      9-21-17    OTHER SURGICAL HISTORY      implanted nerve stimulater in lumbar back SPINE SURGERY  05/18/2010    DR. Joyce Carrasquillo (NS)    URETEROSCOPY      with string stent 9-21-17    VASECTOMY          Medications Prior to Admission:     Prior to Admission medications    Medication Sig Start Date End Date Taking? Authorizing Provider   baclofen (LIORESAL) 10 MG tablet Take 1 tablet by mouth 3 times daily as needed (muscle spasm) 8/25/22   Jamilah Thompson PA-C   ibuprofen (ADVIL;MOTRIN) 800 MG tablet Take 1 tablet by mouth every 6 hours as needed for Pain 8/25/22 9/1/22  Jamilah Thompson PA-C   predniSONE (DELTASONE) 20 MG tablet Take 1 tablet by mouth 2 times daily for 5 days 8/25/22 8/30/22  Jamilah Thompson PA-C   albuterol sulfate HFA (PROVENTIL;VENTOLIN;PROAIR) 108 (90 Base) MCG/ACT inhaler Inhale 2 puffs into the lungs every 6 hours as needed for Wheezing or Shortness of Breath (or take 1-2 puffs 30 mins prior to strenous activity) 7/5/22 8/4/22  Sherie Serna MD   celecoxib (CELEBREX) 200 MG capsule Take 1 capsule by mouth daily 6/7/22 6/2/23  Wolf Garcia APRN - NP   gabapentin (NEURONTIN) 300 MG capsule Take 1 capsule by mouth 2 times daily for 180 days.  Intended supply: 90 days 6/7/22 12/4/22  Wolf Garcia, APRN - NP   metFORMIN (GLUCOPHAGE) 1000 MG tablet Take 1 tablet by mouth 2 times daily (with meals) 4/28/22 4/28/23  Wolf Garcia APRN - NP   lisinopril (PRINIVIL;ZESTRIL) 10 MG tablet Take 1 tablet by mouth daily 4/28/22 4/28/23  Almaia Radha, APRN - NP   chlorthalidone (HYGROTON) 25 MG tablet Take 1 tablet by mouth daily 4/28/22 4/28/23  Wolf Gravrene, APRN - NP   pioglitazone (ACTOS) 30 MG tablet Take 1 tablet by mouth daily 4/28/22 4/23/23  Wolf Garcia, APRN - NP   DULoxetine (CYMBALTA) 30 MG extended release capsule Take 1 capsule by mouth 2 times daily 4/28/22 4/28/23  BREANNA Chandler NP   insulin detemir (LEVEMIR FLEXTOUCH) 100 UNIT/ML injection pen Inject 63 Units into the skin nightly 4/28/22 4/28/23  BREANNA Chandler NP   blood glucose test strips (ONETOUCH VERIO) strip Test three times daily and as needed 4/20/21 8/10/23  BREANNA Fonseca NP   Insulin Pen Needle 31G X 5 MM MISC 1 each by Does not apply route daily 4/14/21 4/14/22  BREANNA oFnseca NP   Lancets MISC Test twice a day and as needed, Please send lancets for Verio brand meter 1/4/16 3/17/22  BREANNA Wharton CNP   multivitamin [facility]) per tablet Take 1 tablet by mouth daily. Historical Provider, MD        Allergies:     Patient has no known allergies. Social History:     Tobacco:    reports that he has been smoking cigarettes. He started smoking about 42 years ago. He has a 40.00 pack-year smoking history. He has quit using smokeless tobacco.  Alcohol:      reports that he does not currently use alcohol. Drug Use:  reports no history of drug use. Family History:     Family History   Problem Relation Age of Onset    Arthritis Mother     Cancer Father        Review of Systems:     Positive and Negative as described in HPI. CONSTITUTIONAL:  negative for fevers, chills, sweats, fatigue, weight loss  HEENT:  negative for vision, hearing changes, runny nose, throat pain  RESPIRATORY:  negative for shortness of breath, cough, congestion, wheezing  CARDIOVASCULAR:  negative for chest pain, palpitations  GASTROINTESTINAL:  negative for nausea, vomiting, diarrhea, constipation, change in bowel habits, abdominal pain   GENITOURINARY:  negative for difficulty of urination, burning with urination, frequency   INTEGUMENT:  negative for rash, skin lesions, easy bruising   HEMATOLOGIC/LYMPHATIC:  negative for swelling/edema   ALLERGIC/IMMUNOLOGIC:  negative for urticaria , itching  ENDOCRINE:  negative increase in drinking, increase in urination, hot or cold intolerance  MUSCULOSKELETAL:  Positive for severe left shoulder pain.    NEUROLOGICAL:  negative for headaches, dizziness, lightheadedness, numbness, pain, tingling extremities  BEHAVIOR/PSYCH:  negative for depression, anxiety    Physical Exam:   /71   Pulse 89   Temp 98 °F (36.7 °C) (Oral)   Resp 16   Ht 6' 2\" (1.88 m)   Wt 200 lb (90.7 kg)   SpO2 95%   BMI 25.68 kg/m²   Temp (24hrs), Av °F (36.7 °C), Min:98 °F (36.7 °C), Max:98 °F (36.7 °C)    No results for input(s): POCGLU in the last 72 hours. No intake or output data in the 24 hours ending 22 1152    General Appearance:  alert, well appearing, and in no acute distress  Mental status: oriented to person, place, and time  Head:  normocephalic, atraumatic  Eye: no icterus, redness, pupils equal and reactive, extraocular eye movements intact, conjunctiva clear  Ear: normal external ear, no discharge, hearing intact  Nose:  no drainage noted  Mouth: mucous membranes moist  Neck: supple, no carotid bruits, thyroid not palpable  Lungs: Bilateral equal air entry, clear to ausculation, no wheezing, rales or rhonchi, normal effort  Cardiovascular: normal rate, regular rhythm, no murmur, gallop, rub  Abdomen: Soft, nontender, nondistended, normal bowel sounds, no hepatomegaly or splenomegaly  Neurologic: There are no new focal motor or sensory deficits, normal muscle tone and bulk, no abnormal sensation, normal speech, cranial nerves II through XII grossly intact  Skin: No gross lesions, rashes, bruising or bleeding on exposed skin area  Extremities:  Reduced ROM in left shoulder.    Psych: normal affect     Investigations:      Laboratory Testing:  Recent Results (from the past 24 hour(s))   CBC with Auto Differential    Collection Time: 22  9:59 AM   Result Value Ref Range    WBC 12.4 (H) 3.5 - 11.0 k/uL    RBC 4.30 (L) 4.5 - 5.9 m/uL    Hemoglobin 13.3 (L) 13.5 - 17.5 g/dL    Hematocrit 39.2 (L) 41 - 53 %    MCV 91.0 80 - 100 fL    MCH 30.9 26 - 34 pg    MCHC 34.0 31 - 37 g/dL    RDW 14.2 12.5 - 15.4 %    Platelets 649 896 - 806 k/uL    MPV 7.6 6.0 - 12.0 fL    Seg Neutrophils 92 (H) 36 - 66 %    Lymphocytes 3 (L) 24 - 44 %    Monocytes 5 1 - -LDSSI  -Hypoglycemia protocol     Depression   -Continue home Cymbalta     Consultations:   Paulina Kaplan MD  8/29/2022  11:52 AM    Copy sent to BREANNA Stanley - NP

## 2022-08-29 NOTE — PROGRESS NOTES
Patient discharged home with all belongings and discharge paperwork. No other questions or concerns voiced at this time.

## 2022-08-30 ENCOUNTER — CARE COORDINATION (OUTPATIENT)
Dept: CASE MANAGEMENT | Age: 72
End: 2022-08-30

## 2022-08-30 DIAGNOSIS — R52 INTRACTABLE PAIN: Primary | ICD-10-CM

## 2022-08-30 PROCEDURE — 1111F DSCHRG MED/CURRENT MED MERGE: CPT | Performed by: NURSE PRACTITIONER

## 2022-08-30 NOTE — CARE COORDINATION
[facility] Transitions Initial Follow Up Call    Call within 2 business days of discharge: Yes    Patient: Luis A Steward Cousin Patient : 1950   MRN: [id number]  Reason for Admission: Intractable pain  Discharge Date: 22 RARS: Readmission Risk Score: 11 ( )      Last Discharge [facility]       Date Complaint Diagnosis Description Type Department Provider    22 Shoulder Pain Acute pain of left shoulder . .. ED to Hosp-Admission (Discharged) (ADMITTED) CASIE VALERA MS Den Macario MD; Sarah Diaz MD             Spoke with: Miguel he states he is doing okay not in as much pain but still very very uncomfortable in left shoulder, neck , side forearm. He has some left hand swelling today CTN advised elevation . He is eating and drinking okay normal bowel and bladder elimination. Medication reviewed and taking as directed no other concerns at present time. Facility: [facility]    Non-face-to-face services provided:  Obtained and reviewed discharge summary and/or continuity of care documents  Transitions of Care Initial Call    Was this an external facility discharge? No     Challenges to be reviewed by the provider   Additional needs identified to be addressed with provider: No  none             Method of communication with provider : none    Advance Care Planning:   Does patient have an Advance Directive: not on file. LPN Care Coordinator contacted the patient by telephone to perform post hospital discharge assessment. Verified name and  with patient as identifiers. Provided introduction to self, and explanation of the LPN CC role. LPN CC reviewed discharge instructions, medical action plan and red flags with patient who verbalized understanding. Patient given an opportunity to ask questions and does not have any further questions or concerns at this time. Were discharge instructions available to patient? Yes.  Reviewed appropriate site of care based on symptoms and resources available to patient including: PCP  Specialist. The patient agrees to contact the PCP office for questions related to their healthcare. Medication reconciliation was performed with patient, who verbalizes understanding of administration of home medications. Advised obtaining a 90-day supply of all daily and as-needed medications. Was patient discharged with a pulse oximeter? no    LPN CC provided contact information. Plan for follow-up call in 3-5 days based on severity of symptoms and risk factors.   Plan for next call: symptom management-pain     Care Transitions 24 Hour Call    Do you have a copy of your discharge instructions?: Yes  Do you have all of your prescriptions and are they filled?: Yes  Have you been contacted by a [address]?: No  Have you scheduled your follow up appointment?: Yes  How are you going to get to your appointment?: Car - family or friend to transport  Do you feel like you have everything you need to keep you well at home?: Yes  Care Transitions Interventions         Follow Up  Future Appointments   Date Time Provider Bari Cervantes   8/31/2022 10:15 AM [facility]   1/30/2023  1:15 PM [facility] [place] [facility] RM 2 [facility] [place]   2/7/2023 10:30 AM Leticia Gustafson MD Resp Lucy Vallecillo LPN

## 2022-08-31 ENCOUNTER — TELEPHONE (OUTPATIENT)
Dept: ORTHOPEDIC SURGERY | Age: 72
End: 2022-08-31

## 2022-08-31 ENCOUNTER — OFFICE VISIT (OUTPATIENT)
Dept: ORTHOPEDIC SURGERY | Age: 72
End: 2022-08-31
Payer: MEDICARE

## 2022-08-31 VITALS — RESPIRATION RATE: 16 BRPM | WEIGHT: 203 LBS | BODY MASS INDEX: 26.05 KG/M2 | HEIGHT: 74 IN

## 2022-08-31 DIAGNOSIS — M75.122 COMPLETE TEAR OF LEFT ROTATOR CUFF, UNSPECIFIED WHETHER TRAUMATIC: Primary | ICD-10-CM

## 2022-08-31 DIAGNOSIS — Z01.818 PREOP TESTING: ICD-10-CM

## 2022-08-31 PROCEDURE — G8427 DOCREV CUR MEDS BY ELIG CLIN: HCPCS | Performed by: ORTHOPAEDIC SURGERY

## 2022-08-31 PROCEDURE — 99214 OFFICE O/P EST MOD 30 MIN: CPT | Performed by: ORTHOPAEDIC SURGERY

## 2022-08-31 PROCEDURE — 4004F PT TOBACCO SCREEN RCVD TLK: CPT | Performed by: ORTHOPAEDIC SURGERY

## 2022-08-31 PROCEDURE — 1123F ACP DISCUSS/DSCN MKR DOCD: CPT | Performed by: ORTHOPAEDIC SURGERY

## 2022-08-31 PROCEDURE — G8417 CALC BMI ABV UP PARAM F/U: HCPCS | Performed by: ORTHOPAEDIC SURGERY

## 2022-08-31 PROCEDURE — 3017F COLORECTAL CA SCREEN DOC REV: CPT | Performed by: ORTHOPAEDIC SURGERY

## 2022-08-31 RX ORDER — KETOROLAC TROMETHAMINE 10 MG/1
10 TABLET, FILM COATED ORAL EVERY 8 HOURS PRN
Qty: 8 TABLET | Refills: 0 | Status: ON HOLD | OUTPATIENT
Start: 2022-08-31 | End: 2022-10-25 | Stop reason: HOSPADM

## 2022-08-31 RX ORDER — TRAMADOL HYDROCHLORIDE 50 MG/1
50 TABLET ORAL EVERY 4 HOURS PRN
Qty: 30 TABLET | Refills: 0 | Status: SHIPPED | OUTPATIENT
Start: 2022-08-31 | End: 2022-09-07

## 2022-08-31 ASSESSMENT — ENCOUNTER SYMPTOMS
EYES NEGATIVE: 1
GASTROINTESTINAL NEGATIVE: 1
RESPIRATORY NEGATIVE: 1

## 2022-08-31 NOTE — TELEPHONE ENCOUNTER
Patient called pcp office to schedule surgery clearance appt. PCP wants to know if Dr Bessy Caro will require an EKG to be done?     Please call patient to let him know  Thank you

## 2022-08-31 NOTE — TELEPHONE ENCOUNTER
Spoke with patient and informed him that Dr. Suzanne Villarreal did not specifically require an EKG but he was informed that an EKG will probably be completed during his PAT appt. Patient voiced understanding.

## 2022-09-01 ENCOUNTER — TELEPHONE (OUTPATIENT)
Dept: ORTHOPEDIC SURGERY | Age: 72
End: 2022-09-01

## 2022-09-01 NOTE — TELEPHONE ENCOUNTER
[facility] stating she contacted the pt to set up a time a date to come to his home pt refused to schedule.  Ernesto Ríos explained why dr Juan Robles referred pt to [facility] and pt still did not want to be scheduled for any further questions geeta can be reached at  [phone]

## 2022-09-01 NOTE — TELEPHONE ENCOUNTER
Called Shiramitul Hodgkin back and she explained that the pt was very nice about everything but the pt has decided that with all of his appointments, he does not believe that he has the time for someone to come in. She stated that he kept going back to saying how much pain he is is and believes there is more going on with his shoulder. She explained that a pre surgical visit may be beneficial for the pt to help with modify his ADL's around the house that would make things easier for his following surgery but to have someone come in to help him daily is not covered by insurance and would be an out of pocket cost. She gave him her direct number if he wishes to get an appt scheduled to have someone come out to his house.

## 2022-09-02 ENCOUNTER — CARE COORDINATION (OUTPATIENT)
Dept: CASE MANAGEMENT | Age: 72
End: 2022-09-02

## 2022-09-02 ENCOUNTER — OFFICE VISIT (OUTPATIENT)
Dept: PRIMARY CARE CLINIC | Age: 72
End: 2022-09-02
Payer: MEDICARE

## 2022-09-02 VITALS
DIASTOLIC BLOOD PRESSURE: 64 MMHG | RESPIRATION RATE: 16 BRPM | SYSTOLIC BLOOD PRESSURE: 122 MMHG | OXYGEN SATURATION: 96 % | TEMPERATURE: 98.2 F | HEART RATE: 90 BPM

## 2022-09-02 DIAGNOSIS — G89.29 CHRONIC LEFT SHOULDER PAIN: ICD-10-CM

## 2022-09-02 DIAGNOSIS — E11.69 TYPE 2 DIABETES MELLITUS WITH OTHER SPECIFIED COMPLICATION, WITH LONG-TERM CURRENT USE OF INSULIN (HCC): Primary | ICD-10-CM

## 2022-09-02 DIAGNOSIS — Z79.4 TYPE 2 DIABETES MELLITUS WITH OTHER SPECIFIED COMPLICATION, WITH LONG-TERM CURRENT USE OF INSULIN (HCC): Primary | ICD-10-CM

## 2022-09-02 DIAGNOSIS — R60.0 PEDAL EDEMA: ICD-10-CM

## 2022-09-02 DIAGNOSIS — M25.512 CHRONIC LEFT SHOULDER PAIN: ICD-10-CM

## 2022-09-02 PROCEDURE — 3017F COLORECTAL CA SCREEN DOC REV: CPT | Performed by: FAMILY MEDICINE

## 2022-09-02 PROCEDURE — G8427 DOCREV CUR MEDS BY ELIG CLIN: HCPCS | Performed by: FAMILY MEDICINE

## 2022-09-02 PROCEDURE — 4004F PT TOBACCO SCREEN RCVD TLK: CPT | Performed by: FAMILY MEDICINE

## 2022-09-02 PROCEDURE — 3051F HG A1C>EQUAL 7.0%<8.0%: CPT | Performed by: FAMILY MEDICINE

## 2022-09-02 PROCEDURE — 1123F ACP DISCUSS/DSCN MKR DOCD: CPT | Performed by: FAMILY MEDICINE

## 2022-09-02 PROCEDURE — 2022F DILAT RTA XM EVC RTNOPTHY: CPT | Performed by: FAMILY MEDICINE

## 2022-09-02 PROCEDURE — G8417 CALC BMI ABV UP PARAM F/U: HCPCS | Performed by: FAMILY MEDICINE

## 2022-09-02 PROCEDURE — 99214 OFFICE O/P EST MOD 30 MIN: CPT | Performed by: FAMILY MEDICINE

## 2022-09-02 NOTE — CARE COORDINATION
[facility] Transitions Follow Up Call    2022    Patient: Luis A Mullins Colon  Patient : 1950   MRN: [id number]  Reason for Admission:   Intractable pain  Discharge Date: 22 RARS: Readmission Risk Score: 11 ( )         Spoke with: Called to speak with patient for update with transition of care. Left HIPPA compliant voice message with contact information [phone] for a call  Back with an update. Noted patient had PCP visit today    Care Transitions Subsequent and Final Call    Subsequent and Final Calls  Care Transitions Interventions  Other Interventions:              Follow Up  Future Appointments   Date Time Provider Bari Cervantes   2022  1:45 PM Deloris Velasquez MD [facility] ORT SP [facility]   10/4/2022 10:45 AM Marly Bolognese, APRN - NP [place] [facility]   10/11/2022 11:00 AM SCHEDULE, [facility] [place] [facility]   10/21/2022 11:15 AM Deloris Velasquez MD [facility] ORT SP [facility]   2022 10:15 AM Deloris Velasquez MD [facility]   2022 10:15 AM Deloris Velasquez MD [facility] SP [facility]   2023 10:15 AM Deloris Velasquez MD [facility]T [facility]   2023  1:15 PM [facility] [place] [facility] RM 2 [facility]Z PB CT [facility] [place]   2023 10:30 AM MD Tarik Olmedo LPN

## 2022-09-02 NOTE — PROGRESS NOTES
Subjective:  Gene presents for   Chief Complaint   Patient presents with    Blood Sugar Problem     BS flucuating low to very high     Foot Swelling     Bilateral and ankles- Tuesday evening. Started after arthrogram testing. Shoulder Pain     Bilateral. PT for mobililty and pain. Does have orthopedic. They saw ortho 2 days ago. no redness or swelling. No fevers. Ortho was not concerned about the increased pain post procedure. Slgiht swelling of ankles since he was in hospital.    The past week or so his glucose has been fluctuating. Admits he does not stick with a diabetic diet, eats a lot of salt and does not eat on a regular basis. Denies any sob or chest pain    Patient Active Problem List   Diagnosis    Essential hypertension    Osteoarthritis    Vitamin D deficiency    Testosterone deficiency    Difficulty sleeping    Sacro-iliac pain    Neuropathy    Nocturia    Benign prostatic hyperplasia with urinary obstruction    Type 2 diabetes mellitus with diabetic polyneuropathy, with long-term current use of insulin (HCC)    Kidney stone on left side    Hypercalciuria    Personal history of kidney stones    Moderate episode of recurrent major depressive disorder (HCC)    Epiretinal membrane    Renal cyst, left    Hypokalemia    Intractable pain       Objective:  Physical Exam   Vitals: Wt Readings from Last 3 Encounters:   08/31/22 203 lb (92.1 kg)   08/29/22 203 lb 7.8 oz (92.3 kg)   08/25/22 195 lb (88.5 kg)     Ht Readings from Last 3 Encounters:   08/31/22 6' 2\" (1.88 m)   08/29/22 6' 2\" (1.88 m)   07/15/22 6' 2\" (1.88 m)     There is no height or weight on file to calculate BMI. Vitals:    09/02/22 1054   BP: 122/64   Site: Right Upper Arm   Position: Sitting   Cuff Size: Medium Adult   Pulse: 90   Resp: 16   Temp: 98.2 °F (36.8 °C)   TempSrc: Temporal   SpO2: 96%   Weight: Comment: Patient unsteady today. Constitutional: He is oriented to person, place, and time.   He appears well-developed and well-nourished and in no acute distress. Answers all my questions appropriately. Head: Normocephalic and atraumatic. E  Neck: Normal range of motion. Neck supple. No tracheal deviation present. No abnormal lymphadenopathy. No JVD noted. Carotids are clear bilaterally. No thyroid masses noted. Heart: RRR without murmur. No S3, S4, or gallop noted. Chest:   Good breath sounds noted. Clear to auscultation bilaterally. No rales, wheezes, or rhonchi noted. No respiratory retractions noted. Wall has symmetrical movement with respirations. Has trace 1+ pedal edema at the level of the ankles to mid ant shin. No palpable calf tenderness noted    Does have significant varicose veins present. Shoulders - no visible swelling or redness or deformity of the shoulders. Has palpable tenderenss noted. Has significan decrease in his ROM      Assessment:   Encounter Diagnoses   Name Primary? Type 2 diabetes mellitus with other specified complication, with long-term current use of insulin (HCC) Yes    Chronic left shoulder pain     Pedal edema          Plan:     There are no discontinued medications. THE ABOVE NOTED DISCONTINUED MEDS MAY ONLY BE FROM 'CLEANING UP' THE MED LIST AND WERE NOT ACTUALLY CANCELED;  SEE CHART FOR DETAILS! No orders of the defined types were placed in this encounter. Orders Placed This Encounter   Procedures    Hemoglobin A1C     Standing Status:   Future     Standing Expiration Date:   9/2/2023    Comprehensive Metabolic Panel     Standing Status:   Future     Standing Expiration Date:   9/2/2023    CBC with Auto Differential     Standing Status:   Future     Standing Expiration Date:   9/2/2023     No follow-ups on file. There are no Patient Instructions on file for this visit. Data Unavailable      Swelling is minimal.  Elevate the legs wear knee high socks. Stay out of the heat. Stop using salt. Stick with the diabetic diet.   Will await labs. Discussed stabilizing the eating pattern    No concern for infection of the shoulder. I think the recent procedure jsut provoked an inflammatory response. He is currently awaiting surgery.

## 2022-09-02 NOTE — PROGRESS NOTES
and/or permanent nerve injury, medical risks including DVT, PE, and stroke, and risk of anesthesia. He demonstrated a good understanding of our discussion and would like to proceed. We will facilitate him getting cleared for surgery preoperatively and we'll schedule him for surgery 3 months from his last cortisone injection. All questions were appropriately answered. Of note a request was put in for home health due to the fact that as a result of the condition of his shoulder he is unable to care adequately for himself and his wife. I would like to see him back for reevaluation in 4 weeks but he may return or call earlier with questions or concerns.

## 2022-09-06 DIAGNOSIS — M75.122 COMPLETE TEAR OF LEFT ROTATOR CUFF, UNSPECIFIED WHETHER TRAUMATIC: Primary | ICD-10-CM

## 2022-09-06 NOTE — TELEPHONE ENCOUNTER
Pt's daughter called in as well and stated that the pt is getting no relief from medication prescribed at [address] [place] [address]. She is also wondering what the plan would be to help with pain until his sx that is scheduled for 10/25.

## 2022-09-06 NOTE — TELEPHONE ENCOUNTER
Patient is having severe pain. He says the Tramadol brings pain level down to a 7 but at when he is sleeping and it starts to wear off his pain level goes up to a 12. Causing severe pain for at least an hour until he takes another and gives it time to kick in. He is asking if you can send something stronger to [facility] in [facility]?     Surgery is scheduled for 10/25    Please call him with advice  Thank you

## 2022-09-07 ENCOUNTER — CARE COORDINATION (OUTPATIENT)
Dept: CASE MANAGEMENT | Age: 72
End: 2022-09-07

## 2022-09-07 ENCOUNTER — TELEPHONE (OUTPATIENT)
Dept: ORTHOPEDIC SURGERY | Age: 72
End: 2022-09-07

## 2022-09-07 RX ORDER — HYDROCODONE BITARTRATE AND ACETAMINOPHEN 5; 325 MG/1; MG/1
1 TABLET ORAL EVERY 4 HOURS PRN
Qty: 10 TABLET | Refills: 0 | Status: SHIPPED | OUTPATIENT
Start: 2022-09-07 | End: 2022-09-14

## 2022-09-07 NOTE — CARE COORDINATION
[facility] Transitions Follow Up Call    2022    Patient: Luis A Scott  Patient : 1950   MRN: [id number]  Reason for Admission: Acute left shoulder pain  Discharge Date: 22 RARS: Readmission Risk Score: 11 ( )         Spoke with: Patient and daughter Karthik Barreto to patient then his daughter Antione Franco. Pt stated he is not doing well, having severe pain in shoulder. Pt has 2 tramadol left and is awaiting refill. Pt handed phone to his daughter. Spoke to pt's daughter Antione Franco who stated pt is wanting refill on tramadol, is getting very anxious and she will need to take him back to ED if unable to get refill today. Noted daughter contacted office yesterday and left message with MA. Stated she called earlier and is still awaiting approval before clinic closes today. LE edema is improved, was advised by PCP to wear compression socks, elevate legs, avoid heat and limit sodium. Stated they are learning about healthy diet for DM 2 and low sodium. Pt has history of kidney stones, needs to avoid oxalates as well. Agreeable to referral to RD. Noted pt's daughter is also requesting [place] [id number] services. Stated pt's wife is unable to assist d/t health concerns. Daughter lives in [place] nand needs to return by end of month. Contacted Dr Sahni's office, informed Chadricky Florence will speak to him regarding refill on tramadol as soon as he is out of examination room. Pt has procedure scheduled for 10/25 for shoulder repair. Needs to be reviewed by the provider   Additional needs identified to be addressed with provider: Yes  Ortho Dr Dana Salas . Pt needs refill on tramadol or is going to ED             Method of communication with provider : phone      Care Transition Nurse contacted the family by telephone to follow up after admission on 22. Verified name and  with family as identifiers.     Addressed changes since last contact:  having pain in shoulder, needs tramadol refill  Discussed follow-up appointments. CTN reviewed discharge instructions, medical action plan and red flags with family and discussed any barriers to care and/or understanding of plan of care after discharge. Discussed appropriate site of care based on symptoms and resources available to patient including: PCP  Specialist  Home health  When to call [address] The family agrees to contact the PCP office for questions related to their healthcare. Patients top risk factors for readmission: ineffective coping  Interventions to address risk factors: Obtained and reviewed discharge summary and/or continuity of care documents and Communication with specialists who will assume or re-assume care of the patient's system-specific problems-Dr Sahni        CTN provided contact information for future needs. Plan for follow-up call in 1-2 days based on severity of symptoms and risk factors. Plan for next call: symptom management-pain  medication management-refill on tramadol? Care Transitions Subsequent and Final Call    Subsequent and Final Calls  Do you have any ongoing symptoms?: Yes  Onset of Patient-reported symptoms: Other  Patient-reported symptoms: Pain  Interventions for patient-reported symptoms: Notified PCP/Physician  Have your medications changed?: No  Do you have any questions related to your medications?: No  Do you currently have any active services?: No  Do you have any needs or concerns that I can assist you with?: Yes  Patient-reported Needs or Concerns: Needs pain medication, referral to RD  Identified Barriers: Lack of Education, Stress  Care Transitions Interventions  Other Interventions:              Follow Up  Future Appointments   Date Time Provider Bari Cervantes   9/28/2022  1:45 PM MD MALIK Smith [facility]   10/4/2022 10:45 AM Jennifer Hamlin, APRN - NP [facility]   10/11/2022 11:00 AM SCHEDULE, CASIE Ruelas   10/21/2022 11:15 AM MD MALIK Smith [facility]   11/9/2022 10:15 AM MD MALIK [facility]   12/14/2022 10:15 AM MD MALIK Smith [facility]   1/25/2023 10:15 AM MD MALIK Smith [facility]   1/30/2023  1:15 PM [facility] [place] [facility] RM 2 [facility]   2/7/2023 10:30 AM Alee Hartman MD Resp Lucy Trejo RN

## 2022-09-07 NOTE — TELEPHONE ENCOUNTER
[facility],    Patient called back concerned because the earliest that pain management can see the patient is 9/22. Patient is worried that 10 tabs of norco will not be enough to get him through until his first appt with pain management. Please advise. Also note that the patient instructions for the norco script that is pended is 1 tablet every 4 hours. I was not sure if you wanted to keep those instructions or change it.

## 2022-09-07 NOTE — TELEPHONE ENCOUNTER
Kvng Dowd from [facility] called in stating that they received a referral for pt. Pt declined services geeta did speak to pts daughter and after speaking with pt daughter Tor March states pt will need orders for physical therapy and out pt therapy, soical worker,and skilled nursing for DM edu.  Please contact geeta at [phone]

## 2022-09-07 NOTE — TELEPHONE ENCOUNTER
Dr. Daysi Mccall,    The norco script for 10 tabs that you recommended is pended and awaiting your signature. A referral for pain management was also provided to the patient.

## 2022-09-08 ENCOUNTER — TELEPHONE (OUTPATIENT)
Dept: FAMILY MEDICINE CLINIC | Age: 72
End: 2022-09-08

## 2022-09-08 ENCOUNTER — TELEPHONE (OUTPATIENT)
Dept: ORTHOPEDIC SURGERY | Age: 72
End: 2022-09-08

## 2022-09-08 ENCOUNTER — CARE COORDINATION (OUTPATIENT)
Dept: CARE COORDINATION | Age: 72
End: 2022-09-08

## 2022-09-08 ENCOUNTER — CARE COORDINATION (OUTPATIENT)
Dept: CASE MANAGEMENT | Age: 72
End: 2022-09-08

## 2022-09-08 DIAGNOSIS — Z79.4 TYPE 2 DIABETES MELLITUS WITH DIABETIC POLYNEUROPATHY, WITH LONG-TERM CURRENT USE OF INSULIN (HCC): Primary | ICD-10-CM

## 2022-09-08 DIAGNOSIS — F32.A DEPRESSION, UNSPECIFIED DEPRESSION TYPE: ICD-10-CM

## 2022-09-08 DIAGNOSIS — E11.42 TYPE 2 DIABETES MELLITUS WITH DIABETIC POLYNEUROPATHY, WITH LONG-TERM CURRENT USE OF INSULIN (HCC): Primary | ICD-10-CM

## 2022-09-08 DIAGNOSIS — M15.9 OSTEOARTHRITIS OF MULTIPLE JOINTS, UNSPECIFIED OSTEOARTHRITIS TYPE: ICD-10-CM

## 2022-09-08 DIAGNOSIS — Z91.81 AT HIGH RISK FOR FALLS: ICD-10-CM

## 2022-09-08 DIAGNOSIS — M25.512 LEFT SHOULDER PAIN, UNSPECIFIED CHRONICITY: ICD-10-CM

## 2022-09-08 DIAGNOSIS — M25.519 SHOULDER PAIN, UNSPECIFIED CHRONICITY, UNSPECIFIED LATERALITY: Primary | ICD-10-CM

## 2022-09-08 RX ORDER — HYDROCODONE BITARTRATE AND ACETAMINOPHEN 5; 325 MG/1; MG/1
1 TABLET ORAL EVERY 6 HOURS PRN
Qty: 20 TABLET | Refills: 0 | Status: SHIPPED | OUTPATIENT
Start: 2022-09-08 | End: 2022-09-13

## 2022-09-08 NOTE — TELEPHONE ENCOUNTER
Chapin Bravo from [facility] called office again to see if the office could provide clarification on the home health order. I told her that Dr. Alexei Jones will oversee the home health services that pertain to his upcoming shoulder sx but that any additional home health services for mobility assistance or DM education will need to be ordered by PCP. Chapin Bravo voiced understanding and is going to contact patients PCP office.

## 2022-09-08 NOTE — CARE COORDINATION
Registered Dietitian Initial Assessment for Care Coordination      Name-Luis A Flaherty  September 8, 2022    Initial Referral Reason: Diabetes/ low sodium    Patient Care Team:  BREANNA Villaseñor NP as PCP - General (Nurse Practitioner)  BREANNA Villaseñor NP as PCP - [facility]  Renzo Romo MD as Consulting Physician (Urology)  Rakel Acharya LPN as Care Transitions Nurse  Claudette Lofts, RD, LD as Dietitian    Patient Active Problem List   Diagnosis    Essential hypertension    Osteoarthritis    Vitamin D deficiency    Testosterone deficiency    Difficulty sleeping    Sacro-iliac pain    Neuropathy    Nocturia    Benign prostatic hyperplasia with urinary obstruction    Type 2 diabetes mellitus with diabetic polyneuropathy, with long-term current use of insulin ([address]    Kidney stone on left side    Hypercalciuria    Personal history of kidney stones    Moderate episode of recurrent major depressive disorder ([address]    Epiretinal membrane    Renal cyst, left    Hypokalemia    Intractable pain       Current Outpatient Medications   Medication Sig Dispense Refill    HYDROcodone-acetaminophen (NORCO) 5-325 MG per tablet Take 1 tablet by mouth every 4 hours as needed for Pain for up to 7 days.  10 tablet 0    ketorolac (TORADOL) 10 MG tablet Take 1 tablet by mouth every 8 hours as needed for Pain (Patient not taking: Reported on 9/2/2022) 8 tablet 0    baclofen (LIORESAL) 10 MG tablet Take 1 tablet by mouth 3 times daily as needed (muscle spasm) 30 tablet 0    ibuprofen (ADVIL;MOTRIN) 800 MG tablet Take 1 tablet by mouth every 6 hours as needed for Pain 28 tablet 0    albuterol sulfate HFA (PROVENTIL;VENTOLIN;PROAIR) 108 (90 Base) MCG/ACT inhaler Inhale 2 puffs into the lungs every 6 hours as needed for Wheezing or Shortness of Breath (or take 1-2 puffs 30 mins prior to strenous activity) 18 g 6    celecoxib (CELEBREX) 200 MG capsule Take 1 capsule by mouth daily 90 capsule 1 gabapentin (NEURONTIN) 300 MG capsule Take 1 capsule by mouth 2 times daily for 180 days. Intended supply: 90 days 180 capsule 1    metFORMIN (GLUCOPHAGE) 1000 MG tablet Take 1 tablet by mouth 2 times daily (with meals) 180 tablet 1    lisinopril (PRINIVIL;ZESTRIL) 10 MG tablet Take 1 tablet by mouth daily 90 tablet 1    chlorthalidone (HYGROTON) 25 MG tablet Take 1 tablet by mouth daily 90 tablet 1    pioglitazone (ACTOS) 30 MG tablet Take 1 tablet by mouth daily 90 tablet 1    DULoxetine (CYMBALTA) 30 MG extended release capsule Take 1 capsule by mouth 2 times daily 180 capsule 3    insulin detemir (LEVEMIR FLEXTOUCH) 100 UNIT/ML injection pen Inject 63 Units into the skin nightly 60 mL 3    blood glucose test strips (ONETOUCH VERIO) strip Test three times daily and as needed 300 strip 3    Insulin Pen Needle 31G X 5 MM MISC 1 each by Does not apply route daily 100 each 3    Lancets MISC Test twice a day and as needed, Please send lancets for Verio brand meter 180 each 2    multivitamin (THERAGRAN) per tablet Take 1 tablet by mouth daily. No current facility-administered medications for this visit.          Visit for:  Obesity/Weight loss  Diabetes:X  Hypertension:  Hyperlipidemia:  Other:low sodium     Anthropometric Measurements:  HT:6'2  Weight:203  IBW:190 + or - 10%  BMI:26    Biochemical Data, Medical Tests and Procedures:    Lab Results   Component Value Date    LABA1C 7.0 06/07/2022     Lab Results   Component Value Date     03/17/2021       Lab Results   Component Value Date    CHOL 169 06/02/2020    CHOL 170 02/19/2019    CHOL 162 04/09/2018     Lab Results   Component Value Date    TRIG 96 06/02/2020    TRIG 123 02/19/2019    TRIG 103 04/09/2018     Lab Results   Component Value Date    HDL 51 01/06/2022    HDL 55 06/02/2020    HDL 39 (L) 02/19/2019     Lab Results   Component Value Date    LDLCHOLESTEROL 85 01/06/2022    LDLCHOLESTEROL 95 06/02/2020    LDLCHOLESTEROL 106 02/19/2019     Lab Results   Component Value Date    VLDL NOT REPORTED (H) 01/06/2022    VLDL NOT REPORTED 06/02/2020    VLDL NOT REPORTED 02/19/2019     Lab Results   Component Value Date    CHOLHDLRATIO 3.3 01/06/2022    CHOLHDLRATIO 3.1 06/02/2020    CHOLHDLRATIO 4.4 02/19/2019       Lab Results   Component Value Date    WBC 12.4 (H) 08/29/2022    HGB 13.3 (L) 08/29/2022    HCT 39.2 (L) 08/29/2022    MCV 91.0 08/29/2022     08/29/2022       Lab Results   Component Value Date    CREATININE 0.82 08/29/2022    BUN 24 (H) 08/29/2022     08/29/2022    K 3.5 (L) 08/29/2022    CL 94 (L) 08/29/2022    CO2 31 08/29/2022         NUTRITION DIAGNOSIS    #1 Problem  Food and nutrition-related knowledge deficit       Etiology  related to lack of prior nutrition related education       Signs/Symptoms  as evidenced by referral for nutrition education for diabetes and low sodium guidelines    NUTRITION INTERVENTION  Nutrition Prescription:    diabetic diet providing 2200 kcals/day    Estimated daily CHO Needs: 60 gms/meal, 15-30gms/snack   Protein needs: 86gms/d  Fluid needs:2500cc/day      Patient Goals: spoke with patient and patient's daughter  1. Discussed working on meal patten,  eating 2-3meals/d skips breakfast occassionally. Reviewed and encouraged set meal times daily. Quick/easy breakfast suggestions discussed, will send patient a list of suggestions. Goal is 3 meals daily spaced every 4-5 hours. 2. Discussed what foods contain carbohydrate to limit and how to measure out portions. Encouraged patient to limit carb intake to 60gms/meal, 15-30gms/snack. Patient admits snacks often in the evening, discussed low carb snacking options and will send patient lists of low carb snacks. 3. Discussed plate method, encouraged patient to increase intake of non-starchy vegetables. Goal is 1/2 of  plate to be non-starchy vegetables, 1/4 lean protein, 1/4 carbs. Foods from each category reviewed along with what a serving size is.   4.

## 2022-09-08 NOTE — TELEPHONE ENCOUNTER
Dr. Alejandro Downs see message below and advise. 10 tabs of norco was sent to his pharmacy on 9/7/22.

## 2022-09-08 NOTE — CARE COORDINATION
Referral from CTN, Prosper Meng, RN-  Pt would like information on diet for DM2, low sodium    Will reach out to patient for consult.   Meme RD,LD

## 2022-09-08 NOTE — TELEPHONE ENCOUNTER
Tasha Beckham from [facility] radiology states that she spoke with the radiologist and unfortunately the CT for the patient cannot be reformatted. She said that if you would like they could do a CT without contrast with your preferred formatting at no charge of the patient. The radiologist feels like it is too invasive for another CT with contrast.     Would you like to order a CT without contrast for the patient? Tasha Beckham requested that we call [facility] radiology dept if another CT is ordered.

## 2022-09-08 NOTE — TELEPHONE ENCOUNTER
Tim Liz from [facility] called stating patients daughter Violeta Osman is requesting a referral for [address] [place] [address] [facility] for Diabetes Education, PT and OT for  strengthening & balance.      [facility] Fax number [phone]

## 2022-09-08 NOTE — TELEPHONE ENCOUNTER
Patient was notified of the additional norco script and Dr. Otilio Huntley recommendation of OTC NSAIDs. Patient voiced understanding.

## 2022-09-08 NOTE — CARE COORDINATION
[facility] Transitions Follow Up Call    2022    Patient: Luis A Min  Patient : 1950   MRN: [id number]  Reason for Admission: Acute pain of left shoulder  Discharge Date: 22 RARS: Readmission Risk Score: 11 ( )         Spoke with: Luis A Min    Was able to contact Luis A for transitional outreach. He stated that he was doing \"so-so\". He said that the orthopedic surgeon did order him some pain medication, that if he stretched with using NSAIDS will make until he sees the pain clinic on . He said that with the pain medications,  and immobilization his pain will go down to a 6, but with movement 8-10. Carrington Mendiola His daughter did call PCP office for home care through [facility]. Luis A said that his daughter was handling it. Call placed [facility] and spoke with Jason. Updated on telephone call charted in encounters, that daughter would like to proceed with home care. He said that they will monitor for the order and will contact PCP office to follow. Luis A did request no further outreaches at this time. Episode ended as requested. Care Transitions Follow Up Call    Needs to be reviewed by the provider   Additional needs identified to be addressed with provider: No  none             Method of communication with provider : none      Care Transition Nurse contacted the patient by telephone to follow up after admission on 22. Verified name and  with patient as identifiers. Addressed changes since last contact: home health care-called [facility] and updated on request by daughter and that pt had PCP visit on   Discussed follow-up appointments. CTN reviewed discharge instructions, medical action plan and red flags with patient and discussed any barriers to care and/or understanding of plan of care after discharge. Discussed appropriate site of care based on symptoms and resources available to patient including: PCP  Specialist  When to call 911.  The patient agrees to contact the PCP office for questions related to their healthcare. Patients top risk factors for readmission: functional physical ability  medical condition-Lt shoulder pain  Interventions to address risk factors: Obtained and reviewed discharge summary and/or continuity of care documents      Non-[facility] follow up appointment(s):     CTN provided contact information for future needs. No further follow-up call indicated based on severity of symptoms and risk factors. Plan for next call:  final call per request of pt        Care Transitions Subsequent and Final Call    Subsequent and Final Calls  Do you have any ongoing symptoms?: Yes  Onset of Patient-reported symptoms: In the past 7 days  Patient-reported symptoms: Pain  Have your medications changed?: Yes  Patient Reports: Georgie Garcia added  Do you have any questions related to your medications?: No  Do you currently have any active services?: Yes  Are you currently active with any services?: Home Health  Do you have any needs or concerns that I can assist you with?: No  Care Transitions Interventions  Other Interventions:              Follow Up  Future Appointments   Date Time Provider Bari Cervantes   9/26/2022  3:15 PM Tricia Smith MD [facility]   9/28/2022  1:45 PM Janice Waite MD [facility]   10/4/2022 10:45 AM BREANNA Chavarria NP [facility]   10/11/2022 11:00 AM SCHEDULE, [facility] [place] [facility]   10/21/2022 11:15 AM Janice Waite MD [facility]   11/9/2022 10:15 AM Janice Waite MD [facility]   12/14/2022 10:15 AM Alena Sahni MD [facility]   1/25/2023 10:15 AM Alena Sahni MD [facility]   1/30/2023  1:15 PM [facility] [place] [facility] RM 2 [facility]   2/7/2023 10:30 AM Stephanie Watkins MD Resp Lucy Christian RN

## 2022-09-08 NOTE — TELEPHONE ENCOUNTER
Patient received a call from Pain mangment and original appt for 09/22 had to be reschedule per provider until 09/26    He is worried the 10 tablets you sent yesterday will defiantly not last this long and he is trying to avoid going to the ED.      He is asking for a return call with advice  Thank you

## 2022-09-13 NOTE — TELEPHONE ENCOUNTER
Writer spoke with patient's dtr [place] Marjorie, who stated that since he had arthrogram on 8/22, patient has had severe left shoulder pain, and patient was dx'd on 8/31 by Dr Gretchne Wheeler with deep thickness tear in left rotator cuff. Daughter would like order for Home health to assist with dressing and showers. Also, Dtr is concerned with patient's mental status, he is having much frustration, occasionally when pain is severe, mentions a bullet to his head. His spouse is unable to help with any thing for him. He has surgery scheduled for Oct 25 and an appointment on 9/26 for pain management. She also said his blood sugars have been erratic. She said if PCP wants to see him, she would be sure to get him here. Patient telephoned office, was unaware of what his daughter had set up. Patient sounding frustrated, stated he has been having extreme pain to left shoulder-stated often 12-13 on 1-10 scale, and has had difficulties getting pain medication on a regular basis. Patient stated he will have dtr call the office to clarify what she wants.

## 2022-09-13 NOTE — TELEPHONE ENCOUNTER
I need more information. Why does he need home care? As far I know, he drives and is completely independent. I tried calling patient, no answer. GET more information.

## 2022-09-14 ENCOUNTER — CARE COORDINATION (OUTPATIENT)
Dept: CARE COORDINATION | Age: 72
End: 2022-09-14

## 2022-09-15 ENCOUNTER — HOSPITAL ENCOUNTER (OUTPATIENT)
Age: 72
Setting detail: SPECIMEN
Discharge: HOME | End: 2022-09-15

## 2022-09-15 DIAGNOSIS — Z79.4 TYPE 2 DIABETES MELLITUS WITH OTHER SPECIFIED COMPLICATION, WITH LONG-TERM CURRENT USE OF INSULIN (HCC): ICD-10-CM

## 2022-09-15 DIAGNOSIS — E11.69 TYPE 2 DIABETES MELLITUS WITH OTHER SPECIFIED COMPLICATION, WITH LONG-TERM CURRENT USE OF INSULIN (HCC): ICD-10-CM

## 2022-09-15 DIAGNOSIS — R60.0 PEDAL EDEMA: ICD-10-CM

## 2022-09-15 LAB
ABSOLUTE EOS #: 0.35 K/UL (ref 0–0.44)
ABSOLUTE IMMATURE GRANULOCYTE: 0.04 K/UL (ref 0–0.3)
ABSOLUTE LYMPH #: 1.62 K/UL (ref 1.1–3.7)
ABSOLUTE MONO #: 1.15 K/UL (ref 0.1–1.2)
ALBUMIN SERPL-MCNC: 3.3 G/DL (ref 3.5–5.2)
ALBUMIN/GLOBULIN RATIO: 0.8 (ref 1–2.5)
ALP BLD-CCNC: 69 U/L (ref 40–129)
ALT SERPL-CCNC: 18 U/L (ref 5–41)
ANION GAP SERPL CALCULATED.3IONS-SCNC: 17 MMOL/L (ref 9–17)
AST SERPL-CCNC: 15 U/L
BASOPHILS # BLD: 1 % (ref 0–2)
BASOPHILS ABSOLUTE: 0.07 K/UL (ref 0–0.2)
BILIRUB SERPL-MCNC: 0.3 MG/DL (ref 0.3–1.2)
BUN BLDV-MCNC: 26 MG/DL (ref 8–23)
CALCIUM SERPL-MCNC: 9.8 MG/DL (ref 8.6–10.4)
CHLORIDE BLD-SCNC: 98 MMOL/L (ref 98–107)
CO2: 25 MMOL/L (ref 20–31)
CREAT SERPL-MCNC: 0.81 MG/DL (ref 0.7–1.2)
EOSINOPHILS RELATIVE PERCENT: 3 % (ref 1–4)
GFR AFRICAN AMERICAN: >60 ML/MIN
GFR NON-AFRICAN AMERICAN: >60 ML/MIN
GFR SERPL CREATININE-BSD FRML MDRD: ABNORMAL ML/MIN/{1.73_M2}
GLUCOSE BLD-MCNC: 108 MG/DL (ref 70–99)
HCT VFR BLD CALC: 38.3 % (ref 40.7–50.3)
HEMOGLOBIN: 12.4 G/DL (ref 13–17)
IMMATURE GRANULOCYTES: 0 %
LYMPHOCYTES # BLD: 14 % (ref 24–43)
MCH RBC QN AUTO: 30.8 PG (ref 25.2–33.5)
MCHC RBC AUTO-ENTMCNC: 32.4 G/DL (ref 28.4–34.8)
MCV RBC AUTO: 95.3 FL (ref 82.6–102.9)
MONOCYTES # BLD: 10 % (ref 3–12)
NRBC AUTOMATED: 0 PER 100 WBC
PDW BLD-RTO: 13.2 % (ref 11.8–14.4)
PLATELET # BLD: ABNORMAL K/UL (ref 138–453)
PLATELET, FLUORESCENCE: 540 K/UL (ref 138–453)
PLATELET, IMMATURE FRACTION: 1.6 % (ref 1.1–10.3)
POTASSIUM SERPL-SCNC: 3.8 MMOL/L (ref 3.7–5.3)
RBC # BLD: 4.02 M/UL (ref 4.21–5.77)
SEG NEUTROPHILS: 72 % (ref 36–65)
SEGMENTED NEUTROPHILS ABSOLUTE COUNT: 8.32 K/UL (ref 1.5–8.1)
SODIUM BLD-SCNC: 140 MMOL/L (ref 135–144)
TOTAL PROTEIN: 7.6 G/DL (ref 6.4–8.3)
WBC # BLD: 11.6 K/UL (ref 3.5–11.3)

## 2022-09-16 ENCOUNTER — TELEPHONE (OUTPATIENT)
Dept: FAMILY MEDICINE CLINIC | Age: 72
End: 2022-09-16

## 2022-09-16 LAB
ESTIMATED AVERAGE GLUCOSE: 171 MG/DL
HBA1C MFR BLD: 7.6 % (ref 4–6)

## 2022-09-16 NOTE — TELEPHONE ENCOUNTER
----- Message from Figueroa Townsend sent at 9/15/2022  1:28 PM EDT -----  Subject: Message to Provider    QUESTIONS  Information for Provider? [place] from [facility] calling to inform that   patient will be receiving home care for SN, PT, OT and dietic education. Amy would also like to inform PCP that patient does have fire arms in   the home per the note saying patient would put a bullet to his head. Please follow up if need be,  ---------------------------------------------------------------------------  --------------  CALL BACK INFO  [phone]; OK to leave message on voicemail  ---------------------------------------------------------------------------  --------------  SCRIPT ANSWERS  Relationship to Patient? Third Party  Third Party Type? Home Health Care?    Representative Name? Chelo Mann

## 2022-09-16 NOTE — TELEPHONE ENCOUNTER
Writer called patient to do a safety check and patient did clarify that he did not word it in those terms. Patient states that he said \"I am in so much pain on a scale of 0-10 it's and 11 or 12 I would put a bullet in my head, but no I am not going to shoot myself, I am just in so much pain. He states he runs out of pain medication too soon. Patient states he has an appointment with pain management 9/26/22.

## 2022-09-22 ENCOUNTER — CARE COORDINATION (OUTPATIENT)
Dept: CARE COORDINATION | Age: 72
End: 2022-09-22

## 2022-09-22 NOTE — CARE COORDINATION
Nutrition Care Coordinator Follow-Up visit:    Food Recall:eating 2-3 meals/d    Activity Level:  Sedentary:X  Lightly Active: Moderately Active:  Very Active:    Adult BMI:  Underweight (below 18.5)  Normal Weight (18.5-24.9)  Overweight (25-29.9)X  Obese (30-39. 9)  Morbidly Obese (>40)    Weight Change:no change    Plan:  Plan was established with patient:  Increase dietary fiber by consuming whole grains, fruits and vegetables:X  Limit dietary cholesterol to >200mg/day: Increase water intake:  Avoid added sugar:X  Avoid sweetened beverages:X  Choose lean meats:  Limit sodium intake: X    Monitoring: Will monitor weight:  Will monitor adherence to meal plan:X  Will monitor adherence to exercise plan: Will monitor HGA1c:    Handouts Provided :  Low Carb snacking:  Carb counting /individual meal plan:  Portion Control:  Food Labels:  Physical Activity:  Low Fat/Cholesterol:  Hypo/Hyperglycemia:  Calorie Controlled Meal Plan:  Low sodium guidelines: X    Goals: Increase water consumption to 8oz. 6-8 times daily:  Manage blood sugars by consuming 3 meals spaced every 4-5 hours with 2-3 snacks daily:discussed  Increase fiber and decrease fat intake by consuming 1-2 fruit servings and 2-3 vegetable servings per day. Increase physical activity by:  Consume less than 2,000mg of sodium/day: discussed  Avoid consumption of sweetened beverages and added sugar by reading food labels:discussed  Monitor blood sugars by using meter to check blood glucose before morning meal and 2 hours after a meal daily:  Decrease risk of coronary heart disease by consuming fish that contains omega-3 fatty acids at least twice a week, avoiding partially hydrogenated oil/trans fats and limiting saturated fat intake by reading food labels:    Patient goals set: 1. Reviewed working on meal patten,  eating 2-3meals/d skips breakfast occassionally. Reviewed and encouraged set meal times daily.   Quick/easy breakfast suggestions discussed, will send patient a list of suggestions. Goal is 3 meals daily spaced every 4-5 hours. 2. Reviewed what foods contain carbohydrate to limit and how to measure out portions. Encouraged patient to limit carb intake to 60gms/meal, 15-30gms/snack. Patient admits snacks often in the evening, discussed low carb snacking options and will send patient lists of low carb snacks. 3. Reviewed plate method, encouraged patient to increase intake of non-starchy vegetables. Goal is 1/2 of  plate to be non-starchy vegetables, 1/4 lean protein, 1/4 carbs. Foods from each category reviewed along with what a serving size is. 4. Reviewed avoiding/limiting sweets and sweetened drinks. Patient drinks mostly water but does drink juice and some milk. Reviewed limiting juices to 4 ounces/day and milk to 8 ounces/day- explained milk contains carbohydrates. Encouraged also to limit/avoid sugary foods. Discussed sugar free alternatives to sweets- sugar free jello, pudding, ect. Encouraged patient to keep sweets out of the house as much as possible and limit to once a week or less. 5. Reviewed avoiding canned, prepackaged foods, processed meats and cheese. Reviewed processed meats in detail to avoid- sausage, morgan, bologna, ham, ect. Encouraged patient/ patient's daughter to read food labels to identify foods to avoid. Goal is <2gm of sodium/day. Spoke with patient who stated he did not get the nutrition info yet, should be there any day provided with contact information in case he did not get it in the next few days. He stated he is watching his sodium intake. Reviewed using plate method at meals. Will follow up in 3-4 weeks to review and answer questions.   Reece Esquivel

## 2022-09-26 ENCOUNTER — INITIAL CONSULT (OUTPATIENT)
Dept: PAIN MANAGEMENT | Age: 72
End: 2022-09-26
Payer: MEDICARE

## 2022-09-26 VITALS — HEIGHT: 74 IN | BODY MASS INDEX: 26.05 KG/M2 | WEIGHT: 203 LBS

## 2022-09-26 DIAGNOSIS — M25.512 CHRONIC LEFT SHOULDER PAIN: Primary | ICD-10-CM

## 2022-09-26 DIAGNOSIS — G89.29 CHRONIC LEFT SHOULDER PAIN: Primary | ICD-10-CM

## 2022-09-26 PROCEDURE — G8417 CALC BMI ABV UP PARAM F/U: HCPCS | Performed by: PAIN MEDICINE

## 2022-09-26 PROCEDURE — 1036F TOBACCO NON-USER: CPT | Performed by: PAIN MEDICINE

## 2022-09-26 PROCEDURE — 3017F COLORECTAL CA SCREEN DOC REV: CPT | Performed by: PAIN MEDICINE

## 2022-09-26 PROCEDURE — G8427 DOCREV CUR MEDS BY ELIG CLIN: HCPCS | Performed by: PAIN MEDICINE

## 2022-09-26 PROCEDURE — 99203 OFFICE O/P NEW LOW 30 MIN: CPT | Performed by: PAIN MEDICINE

## 2022-09-26 PROCEDURE — 1123F ACP DISCUSS/DSCN MKR DOCD: CPT | Performed by: PAIN MEDICINE

## 2022-09-26 NOTE — BRIEF OP NOTE
Brief Postoperative Note    Gene Johnye Lundborg  YOB: 1950  [id number]    Pre-operative Diagnosis: Tear of left rotator cuff, unspecified tear extent, unspecified whether traumatic, Left shoulder . .. Post-operative Diagnosis: Same    Procedure: Left shoulder inj pre CT    Medications Given: none    Anesthesia: Local    Surgeons/Assistants: Kimmy Napier MD    Estimated Blood Loss: minimal    Complications: none    Specimens: were not obtained    Findings: Left shoulder injected with 12 mls dilute contrast pre CT without incident. Pt tolerated well.       Electronically signed by Kimmy Napier MD on 8/22/2022 at 10:37 AM What Is The Reason For Today's Visit?: Excessive sun exposure

## 2022-09-26 NOTE — PROGRESS NOTES
HPI:     Shoulder Pain   The pain is present in the left shoulder. This is a chronic problem. The current episode started more than 1 month ago. There has been no history of extremity trauma. The problem occurs constantly. The problem has been gradually worsening. The quality of the pain is described as aching, burning, pounding and sharp. The pain is at a severity of 7/10. The pain is severe. Associated symptoms include an inability to bear weight, a limited range of motion, numbness and stiffness. Pertinent negatives include no fever, itching, joint locking, joint swelling or tingling. The symptoms are aggravated by activity, contact, cold, lying down and standing. He has tried acetaminophen, cold, OTC ointments, OTC pain meds, heat, NSAIDS, movement, oral narcotics and rest for the symptoms. The treatment provided no relief. Family history does not include gout or rheumatoid arthritis. His past medical history is significant for diabetes. There is no history of gout, osteoarthritis or rheumatoid arthritis. CT arthrogram with cuff tear. Having surgery next month. Patient denies any new neurological symptoms. No bowel or bladder incontinence, no weakness, and no falling. Review of OARRS does not show any aberrant prescription behavior.      Past Medical History:   Diagnosis Date    Bladder stone     BPH with obstruction/lower urinary tract symptoms     Caffeine use     4 coffee/2 soda per day    Cataracts, bilateral     Chronic back pain     COPD (chronic obstructive pulmonary disease) ([address]     Hypertension     Irritable bowel syndrome     Kidney stone     Osteoarthritis     Pancreatitis     Type II or unspecified type diabetes mellitus without mention of complication, not stated as uncontrolled        Past Surgical History:   Procedure Laterality Date    BACK SURGERY      BLADDER SURGERY      CHOLECYSTECTOMY      CYSTOSCOPY  04/06/2022    CYSTOSCOPY URETEROSCOPY HOLMIUM LASER LEFT URETERAL STENT INSERTION - Left    CYSTOSCOPY Left 4/6/2022    CYSTOSCOPY URETEROSCOPY HOLMIUM LASER LEFT URETERAL STENT INSERTION performed by Layla Hess MD at [address]  04/20/2022    CYSTOSCOPY URETEROSCOPY LEFT STENT PLACEMENT WITH STONE BASKETING AND HOLMIUM LASER (    CYSTOSCOPY Left 4/20/2022    CYSTOSCOPY URETEROSCOPY LEFT STENT PLACEMENT WITH STONE BASKETING AND HOLMIUM LASER performed by Layla Hess MD at [address] [facility]  2015    macular scrape    EYE SURGERY      LITHOTRIPSY      9-21-17    OTHER SURGICAL HISTORY      implanted nerve stimulater in lumbar back    SPINE SURGERY  05/18/2010    DR. Katherine Carrasquillo ([facility])    URETEROSCOPY      with string stent 9-21-17    VASECTOMY         No Known Allergies      Current Outpatient Medications:     ketorolac (TORADOL) 10 MG tablet, Take 1 tablet by mouth every 8 hours as needed for Pain (Patient not taking: Reported on 9/2/2022), Disp: 8 tablet, Rfl: 0    baclofen (LIORESAL) 10 MG tablet, Take 1 tablet by mouth 3 times daily as needed (muscle spasm), Disp: 30 tablet, Rfl: 0    ibuprofen (ADVIL;MOTRIN) 800 MG tablet, Take 1 tablet by mouth every 6 hours as needed for Pain, Disp: 28 tablet, Rfl: 0    albuterol sulfate HFA (PROVENTIL;VENTOLIN;PROAIR) 108 (90 Base) MCG/ACT inhaler, Inhale 2 puffs into the lungs every 6 hours as needed for Wheezing or Shortness of Breath (or take 1-2 puffs 30 mins prior to strenous activity), Disp: 18 g, Rfl: 6    celecoxib (CELEBREX) 200 MG capsule, Take 1 capsule by mouth daily, Disp: 90 capsule, Rfl: 1    gabapentin (NEURONTIN) 300 MG capsule, Take 1 capsule by mouth 2 times daily for 180 days.  Intended supply: 90 days, Disp: 180 capsule, Rfl: 1    metFORMIN (GLUCOPHAGE) 1000 MG tablet, Take 1 tablet by mouth 2 times daily (with meals), Disp: 180 tablet, Rfl: 1    lisinopril (PRINIVIL;ZESTRIL) 10 MG tablet, Take 1 tablet by mouth daily, Disp: 90 tablet, Rfl: 1    chlorthalidone (HYGROTON) 25 MG tablet, Take 1 tablet by mouth daily, Disp: 90 tablet, Rfl: 1    pioglitazone (ACTOS) 30 MG tablet, Take 1 tablet by mouth daily, Disp: 90 tablet, Rfl: 1    DULoxetine (CYMBALTA) 30 MG extended release capsule, Take 1 capsule by mouth 2 times daily, Disp: 180 capsule, Rfl: 3    insulin detemir (LEVEMIR FLEXTOUCH) 100 UNIT/ML injection pen, Inject 63 Units into the skin nightly, Disp: 60 mL, Rfl: 3    blood glucose test strips (ONETOUCH VERIO) strip, Test three times daily and as needed, Disp: 300 strip, Rfl: 3    Insulin Pen Needle 31G X 5 MM MISC, 1 each by Does not apply route daily, Disp: 100 each, Rfl: 3    Lancets MISC, Test twice a day and as needed, Please send lancets for Verio brand meter, Disp: 180 each, Rfl: 2    multivitamin (THERAGRAN) per tablet, Take 1 tablet by mouth daily. , Disp: , Rfl:     Family History   Problem Relation Age of Onset    Arthritis Mother     Cancer Father        Social History     Socioeconomic History    Marital status:      Spouse name: Not on file    Number of children: Not on file    Years of education: Not on file    Highest education level: Not on file   Occupational History    Not on file   Tobacco Use    Smoking status: Former     Packs/day: 1.00     Years: 40.00     Pack years: 40.00     Types: Cigarettes     Start date: 3/3/1980     Quit date: 2022     Years since quittin.0    Smokeless tobacco: Former    Tobacco comments:     now smoking 1-3 cigrattes on and off    Vaping Use    Vaping Use: Former    Quit date: 3/17/2019   Substance and Sexual Activity    Alcohol use: Not Currently     Comment: occasional    Drug use: No    Sexual activity: Not Currently   Other Topics Concern    Not on file   Social History Narrative    Not on file     Social Determinants of Health     Financial Resource Strain: Low Risk     Difficulty of Paying Living Expenses: Not hard at all   Food Insecurity: No Food Insecurity    Worried About Running Out of Food in the Last Year: Never true    Ran Out of Food in the Last Year: Never true   Transportation Needs: Not on file   Physical Activity: Not on file   Stress: Not on file   Social Connections: Not on file   Intimate Partner Violence: Not on file   Housing Stability: Not on file       Review of Systems:  Review of Systems   Constitutional: Negative for fever. Skin:  Negative for itching. Musculoskeletal:  Positive for stiffness. Negative for gout. Neurological:  Positive for numbness. Negative for tingling. Physical Exam:      Physical Exam  Constitutional:       Appearance: Normal appearance. Pulmonary:      Effort: Pulmonary effort is normal.   Neurological:      Mental Status: He is alert. Psychiatric:         Attention and Perception: Attention and perception normal.         Mood and Affect: Mood and affect normal.       Record/Diagnostics Review:    As above, I did review the imaging    Assessment:  1. Chronic left shoulder pain        Treatment Plan:  DISCUSSION: Treatment options discussed with patient and all questions answered to patient's satisfaction. OARRS Review: Reviewed and acceptable for medications prescribed. TREATMENT OPTIONS:     Discussed different treatment options including continued conservative care such as physical therapy, chiropractic care, acupuncture. Discussed different interventional options such as epidural steroids or medial branch blocks. Also discussed surgical evaluation. Has surgery scheduled for next month. Abundio Dolan M.D. I have reviewed the chief complaint and history of present illness (including ROS and PFSH) and vital documentation by my staff and I agree with their documentation and have added where applicable.

## 2022-09-28 ENCOUNTER — OFFICE VISIT (OUTPATIENT)
Dept: ORTHOPEDIC SURGERY | Age: 72
End: 2022-09-28
Payer: MEDICARE

## 2022-09-28 VITALS — HEIGHT: 74 IN | WEIGHT: 203 LBS | RESPIRATION RATE: 16 BRPM | BODY MASS INDEX: 26.05 KG/M2

## 2022-09-28 DIAGNOSIS — M75.122 COMPLETE TEAR OF LEFT ROTATOR CUFF, UNSPECIFIED WHETHER TRAUMATIC: ICD-10-CM

## 2022-09-28 DIAGNOSIS — Z01.818 PREOP TESTING: Primary | ICD-10-CM

## 2022-09-28 DIAGNOSIS — M75.122 COMPLETE TEAR OF LEFT ROTATOR CUFF, UNSPECIFIED WHETHER TRAUMATIC: Primary | ICD-10-CM

## 2022-09-28 PROCEDURE — 1036F TOBACCO NON-USER: CPT | Performed by: ORTHOPAEDIC SURGERY

## 2022-09-28 PROCEDURE — G8417 CALC BMI ABV UP PARAM F/U: HCPCS | Performed by: ORTHOPAEDIC SURGERY

## 2022-09-28 PROCEDURE — 3017F COLORECTAL CA SCREEN DOC REV: CPT | Performed by: ORTHOPAEDIC SURGERY

## 2022-09-28 PROCEDURE — G8427 DOCREV CUR MEDS BY ELIG CLIN: HCPCS | Performed by: ORTHOPAEDIC SURGERY

## 2022-09-28 PROCEDURE — 1123F ACP DISCUSS/DSCN MKR DOCD: CPT | Performed by: ORTHOPAEDIC SURGERY

## 2022-09-28 PROCEDURE — 99212 OFFICE O/P EST SF 10 MIN: CPT | Performed by: ORTHOPAEDIC SURGERY

## 2022-09-28 RX ORDER — HYDROCODONE BITARTRATE AND ACETAMINOPHEN 5; 325 MG/1; MG/1
1 TABLET ORAL EVERY 4 HOURS PRN
Qty: 30 TABLET | Refills: 0 | Status: SHIPPED | OUTPATIENT
Start: 2022-09-28 | End: 2022-10-05

## 2022-10-03 NOTE — PROGRESS NOTES
HPI: Mr. Manuela Bridges is here today for reevaluation of his left shoulder. He has a rotator cuff tear and has been dealing with increased pain in his shoulder ever since he had a CT arthrogram of the shoulder. It has improved somewhat but he still quite painful. He did see pain management but was advised to continue on with his pain management through my clinic until after surgery. He is currently taking ibuprofen 800 mg every 5-6 hours and is also taken a Percocet pill once a day if needed. On examination today he has limited range of motion through the left shoulder as a result of pain. Sensation is grossly intact light touch in all dermatomes and he has a 2+ radial pulse with brisk capillary refill in his fingers. Impression and plan: Mr. Manuela Bridges is a 60-year-old male with left shoulder pain due to left shoulder rotator cuff tear. He is currently scheduled for surgery in about a month and a half. His hemoglobin A1c from 9/15/2022 was 7.6. I did have a discussion with him today and explained that this would need to be below 7.1 prior to surgery and so he is going to continue to work to get this better controlled. We will recheck this leading up to surgery and I explained that if it remains high surgery may need to be postponed. To assist him with the appropriate pain control a prescription for Norco was electronically sent to his pharmacy. He may continue to supplement this with over-the-counter anti-inflammatories and also recommended he start working on stretching the shoulder so that he is not stiff come surgery.

## 2022-10-04 ENCOUNTER — OFFICE VISIT (OUTPATIENT)
Dept: FAMILY MEDICINE CLINIC | Age: 72
End: 2022-10-04
Payer: MEDICARE

## 2022-10-04 VITALS
TEMPERATURE: 98.2 F | DIASTOLIC BLOOD PRESSURE: 84 MMHG | WEIGHT: 196 LBS | SYSTOLIC BLOOD PRESSURE: 120 MMHG | OXYGEN SATURATION: 98 % | BODY MASS INDEX: 25.16 KG/M2 | HEART RATE: 94 BPM

## 2022-10-04 DIAGNOSIS — I10 ESSENTIAL HYPERTENSION: ICD-10-CM

## 2022-10-04 DIAGNOSIS — J43.2 CENTRILOBULAR EMPHYSEMA (HCC): ICD-10-CM

## 2022-10-04 DIAGNOSIS — J44.9 STAGE 1 MILD COPD BY GOLD CLASSIFICATION (HCC): ICD-10-CM

## 2022-10-04 DIAGNOSIS — M15.9 OSTEOARTHRITIS OF MULTIPLE JOINTS, UNSPECIFIED OSTEOARTHRITIS TYPE: ICD-10-CM

## 2022-10-04 DIAGNOSIS — M25.512 LEFT SHOULDER PAIN, UNSPECIFIED CHRONICITY: ICD-10-CM

## 2022-10-04 DIAGNOSIS — E11.42 TYPE 2 DIABETES MELLITUS WITH DIABETIC POLYNEUROPATHY, WITH LONG-TERM CURRENT USE OF INSULIN (HCC): ICD-10-CM

## 2022-10-04 DIAGNOSIS — Z23 NEED FOR INFLUENZA VACCINATION: ICD-10-CM

## 2022-10-04 DIAGNOSIS — Z01.818 PRE-OP TESTING: Primary | ICD-10-CM

## 2022-10-04 DIAGNOSIS — Z79.4 TYPE 2 DIABETES MELLITUS WITH DIABETIC POLYNEUROPATHY, WITH LONG-TERM CURRENT USE OF INSULIN (HCC): ICD-10-CM

## 2022-10-04 PROCEDURE — 3023F SPIROM DOC REV: CPT | Performed by: NURSE PRACTITIONER

## 2022-10-04 PROCEDURE — G8484 FLU IMMUNIZE NO ADMIN: HCPCS | Performed by: NURSE PRACTITIONER

## 2022-10-04 PROCEDURE — 99214 OFFICE O/P EST MOD 30 MIN: CPT | Performed by: NURSE PRACTITIONER

## 2022-10-04 PROCEDURE — 3051F HG A1C>EQUAL 7.0%<8.0%: CPT | Performed by: NURSE PRACTITIONER

## 2022-10-04 PROCEDURE — 1036F TOBACCO NON-USER: CPT | Performed by: NURSE PRACTITIONER

## 2022-10-04 PROCEDURE — 90694 VACC AIIV4 NO PRSRV 0.5ML IM: CPT | Performed by: NURSE PRACTITIONER

## 2022-10-04 PROCEDURE — 2022F DILAT RTA XM EVC RTNOPTHY: CPT | Performed by: NURSE PRACTITIONER

## 2022-10-04 PROCEDURE — 1123F ACP DISCUSS/DSCN MKR DOCD: CPT | Performed by: NURSE PRACTITIONER

## 2022-10-04 PROCEDURE — G8427 DOCREV CUR MEDS BY ELIG CLIN: HCPCS | Performed by: NURSE PRACTITIONER

## 2022-10-04 PROCEDURE — G0008 ADMIN INFLUENZA VIRUS VAC: HCPCS | Performed by: NURSE PRACTITIONER

## 2022-10-04 PROCEDURE — G8417 CALC BMI ABV UP PARAM F/U: HCPCS | Performed by: NURSE PRACTITIONER

## 2022-10-04 PROCEDURE — 3017F COLORECTAL CA SCREEN DOC REV: CPT | Performed by: NURSE PRACTITIONER

## 2022-10-04 RX ORDER — ALBUTEROL SULFATE 90 UG/1
2 AEROSOL, METERED RESPIRATORY (INHALATION) EVERY 6 HOURS PRN
Qty: 36 G | Refills: 1 | Status: SHIPPED | OUTPATIENT
Start: 2022-10-04 | End: 2022-11-03

## 2022-10-04 RX ORDER — FLASH GLUCOSE SENSOR
1 KIT MISCELLANEOUS
Qty: 1 EACH | Refills: 5 | Status: SHIPPED | OUTPATIENT
Start: 2022-10-04 | End: 2022-10-13 | Stop reason: SDUPTHER

## 2022-10-04 RX ORDER — GLUCOSAMINE HCL/CHONDROITIN SU 500-400 MG
CAPSULE ORAL
Qty: 300 STRIP | Refills: 1 | Status: SHIPPED | OUTPATIENT
Start: 2022-10-04

## 2022-10-04 NOTE — PROGRESS NOTES
Luis A Duckworth (:  1950) is a 67 y.o. male,Established patient, here for evaluation of the following chief complaint(s):  Pre-op Exam         ASSESSMENT/PLAN:  1. Pre-op testing  2. Left shoulder pain, unspecified chronicity  3. Osteoarthritis of multiple joints, unspecified osteoarthritis type  4. Type 2 diabetes mellitus with diabetic polyneuropathy, with long-term current use of insulin ([facility])  -     insulin aspart (NOVOLOG) 100 UNIT/ML injection pen; Inject 2 Units into the skin 3 times daily (before meals) 0-150 = 0 units, 151-200 = 2 units, 201-250 = 4 units, 251-300 = 6 units, 301-350 = 8 units, 351-400 of greater = 10 units. Max dose 30units per day, Disp-3 Adjustable Dose Pre-filled Pen Syringe, R-1Normal  -     blood glucose monitor strips; Test 4 times a day & as needed for symptoms of irregular blood glucose. Dispense sufficient amount for indicated testing frequency plus additional to accommodate PRN testing needs. , Disp-300 strip, R-1, Normal  -     Insulin Pen Needle 31G X 5 MM MISC; 3 TIMES DAILY Starting Tue 10/4/2022, Until Wed 10/4/2023, For 365 days, Disp-100 each, R-3, Normal  -     Continuous Blood Gluc Sensor ([address] [place] [address]) MISC; 1 kit by Does not apply route once for 1 dose, Disp-1 each, R-0Sample  5. Essential hypertension  6. Stage 1 mild COPD by GOLD classification ([facility])  -     albuterol sulfate HFA (PROVENTIL;VENTOLIN;PROAIR) 108 (90 Base) MCG/ACT inhaler; Inhale 2 puffs into the lungs every 6 hours as needed for Wheezing or Shortness of Breath (or take 1-2 puffs 30 mins prior to strenous activity), Disp-36 g, R-1Normal  7. Centrilobular emphysema ([facility]) mild   -     albuterol sulfate HFA (PROVENTIL;VENTOLIN;PROAIR) 108 (90 Base) MCG/ACT inhaler; Inhale 2 puffs into the lungs every 6 hours as needed for Wheezing or Shortness of Breath (or take 1-2 puffs 30 mins prior to strenous activity), Disp-36 g, R-1Normal  8.  Need for influenza vaccination  -     Influenza, NICKI, (age 72 y+), IM, Preservative Free, 0.5 mL      Return in about 3 months (around 1/4/2023), or if symptoms worsen or fail to improve, for follow up to testing, medication follow up. Subjective   SUBJECTIVE/OBJECTIVE:  Pre-op appt. Has testing next week. a1c needs to be below 7.1 for Dr. Katie Rodriguez to perform surgery. Reports he has made drastic diet changes to try and get this lower before surgery. Will recheck a1c on the 17th. Otherwise he is doing ok. His shoulder pain is at least tolerable. It was not tolerable for several days/weeks. He would like to use a continuous glucometer to better monitor his glucose levels. His glucose levels have been relatively uncontrolled. He is testing his glucose 4x per day. Continuous monitor would greatly benefit him at this point. Will also add on short acting insulin today with sliding scale. Review of Systems   Constitutional:  Negative for activity change, appetite change, fatigue and fever. HENT:  Negative for congestion, ear pain, postnasal drip, rhinorrhea and sore throat. Eyes:  Negative for discharge, redness and itching. Wears glasses     Respiratory:  Negative for cough and shortness of breath. Cardiovascular:  Negative for chest pain. Gastrointestinal:  Negative for abdominal pain, constipation, diarrhea and nausea. Endocrine: Negative for polydipsia, polyphagia and polyuria. DM   Genitourinary:  Negative for dysuria. Musculoskeletal:  Positive for arthralgias, back pain and myalgias. Chronic pain issues   Skin:  Negative for rash. Allergic/Immunologic:        NKA   Neurological:  Negative for dizziness, light-headedness and headaches. Psychiatric/Behavioral:  Positive for dysphoric mood (uncontrolled without medication). Negative for self-injury, sleep disturbance and suicidal ideas. The patient is nervous/anxious (PTSD from career). Objective   Physical Exam  Vitals and nursing note reviewed. Constitutional:       General: He is not in acute distress. Appearance: He is not ill-appearing. HENT:      Head: Normocephalic. Nose: Nose normal.      Mouth/Throat:      Lips: Pink. Pulmonary:      Effort: Pulmonary effort is normal. No respiratory distress. Neurological:      Mental Status: He is alert and oriented to person, place, and time. Psychiatric:         Attention and Perception: Attention normal.         Speech: Speech normal.         Behavior: Behavior is cooperative. An electronic signature was used to authenticate this note.     --BREANNA Mckay - NP

## 2022-10-06 DIAGNOSIS — Z79.4 TYPE 2 DIABETES MELLITUS WITH DIABETIC POLYNEUROPATHY, WITH LONG-TERM CURRENT USE OF INSULIN (HCC): ICD-10-CM

## 2022-10-06 DIAGNOSIS — E11.42 TYPE 2 DIABETES MELLITUS WITH DIABETIC POLYNEUROPATHY, WITH LONG-TERM CURRENT USE OF INSULIN (HCC): ICD-10-CM

## 2022-10-06 NOTE — TELEPHONE ENCOUNTER
Patient's freestyle needs to go through [facility]. New order needs printed and sent with LOV notes proving need.      FDO:[phone]  Phone: [phone]

## 2022-10-11 ENCOUNTER — HOSPITAL ENCOUNTER (OUTPATIENT)
Age: 72
Setting detail: SPECIMEN
Discharge: HOME | End: 2022-10-11

## 2022-10-11 ENCOUNTER — HOSPITAL ENCOUNTER (OUTPATIENT)
Dept: PREADMISSION TESTING | Age: 72
Discharge: HOME | End: 2022-10-15
Payer: MEDICARE

## 2022-10-11 VITALS
OXYGEN SATURATION: 98 % | BODY MASS INDEX: 25.84 KG/M2 | SYSTOLIC BLOOD PRESSURE: 141 MMHG | HEART RATE: 98 BPM | DIASTOLIC BLOOD PRESSURE: 79 MMHG | HEIGHT: 73 IN | WEIGHT: 195 LBS | RESPIRATION RATE: 16 BRPM

## 2022-10-11 LAB
ANION GAP SERPL CALCULATED.3IONS-SCNC: 15 MMOL/L (ref 9–17)
BUN BLDV-MCNC: 23 MG/DL (ref 8–23)
CALCIUM SERPL-MCNC: 9.8 MG/DL (ref 8.6–10.4)
CHLORIDE BLD-SCNC: 98 MMOL/L (ref 98–107)
CO2: 28 MMOL/L (ref 20–31)
CREAT SERPL-MCNC: 0.89 MG/DL (ref 0.7–1.2)
GFR SERPL CREATININE-BSD FRML MDRD: >60 ML/MIN/1.73M2
GLUCOSE BLD-MCNC: 81 MG/DL (ref 70–99)
HCT VFR BLD CALC: 35.9 % (ref 40.7–50.3)
HEMOGLOBIN: 11.3 G/DL (ref 13–17)
MCH RBC QN AUTO: 28.5 PG (ref 25.2–33.5)
MCHC RBC AUTO-ENTMCNC: 31.5 G/DL (ref 28.4–34.8)
MCV RBC AUTO: 90.4 FL (ref 82.6–102.9)
NRBC AUTOMATED: 0 PER 100 WBC
PDW BLD-RTO: 14 % (ref 11.8–14.4)
PLATELET # BLD: 380 K/UL (ref 138–453)
PMV BLD AUTO: 9.3 FL (ref 8.1–13.5)
POTASSIUM SERPL-SCNC: 3.9 MMOL/L (ref 3.7–5.3)
RBC # BLD: 3.97 M/UL (ref 4.21–5.77)
SODIUM BLD-SCNC: 141 MMOL/L (ref 135–144)
WBC # BLD: 11.4 K/UL (ref 3.5–11.3)

## 2022-10-11 PROCEDURE — 93005 ELECTROCARDIOGRAM TRACING: CPT | Performed by: ANESTHESIOLOGY

## 2022-10-11 ASSESSMENT — PAIN DESCRIPTION - LOCATION: LOCATION: SHOULDER

## 2022-10-11 ASSESSMENT — PAIN DESCRIPTION - ORIENTATION: ORIENTATION: LEFT

## 2022-10-11 ASSESSMENT — PAIN - FUNCTIONAL ASSESSMENT: PAIN_FUNCTIONAL_ASSESSMENT: PREVENTS OR INTERFERES WITH MANY ACTIVE NOT PASSIVE ACTIVITIES

## 2022-10-11 ASSESSMENT — PAIN DESCRIPTION - ONSET: ONSET: AWAKENED FROM SLEEP

## 2022-10-11 ASSESSMENT — PAIN SCALES - GENERAL: PAINLEVEL_OUTOF10: 4

## 2022-10-11 ASSESSMENT — PAIN DESCRIPTION - FREQUENCY: FREQUENCY: CONTINUOUS

## 2022-10-11 ASSESSMENT — PAIN DESCRIPTION - PAIN TYPE: TYPE: CHRONIC PAIN

## 2022-10-11 NOTE — DISCHARGE INSTRUCTIONS
Preoperative Instructions:    Stop eating solid foods at midnight the night prior to your surgery. Stop drinking clear liquids at midnight the night prior to your surgery. Arrive at the surgery center (3rd entrance) on ___[id number]____________ by ___0530 am____________. Please stop any blood thinning medications as directed by your surgeon or prescribing physician. Failure to stop certain medications may interfere with your scheduled surgery. These may include: Aspirin, Coumadin, Plavix, NSAIDS (Motrin, Aleve, Advil, Mobic, Celebrex), Eliquis, Pradaxa, Xarelto, Fish oil, and herbal supplements. Hold Multivitamin, Celebrex as directed by Dr. Clayton Head may continue the rest of your medications through the night before surgery unless instructed otherwise. Please only take half of evening Levemir dose the night before the procedure. Day of surgery please take only the following medication(s) with a small sip of water: Lisinopril   ( DO not use nicotine gum the morning of surgery)    Please shower with provided CHG soap the night before and the morning of surgery. Please use and bring inhalers the day of surgery. ALbuterol  Please bring copy of Advance directive if want it scanned in our [address]:  -If you are going home the day of your procedure, you will need a family member or friend to stay during the procedure and drive you home after your procedure. Your  must be 25years of age or older and able to sign off on your discharge instructions.    -If you are going home the same day of your surgery, someone must remain with you for the first 24 hours after your surgery if you receive sedation or anesthesia.      -Please do not wear any jewelery or body piercing the day of surgery

## 2022-10-12 LAB
EKG ATRIAL RATE: 87 BPM
EKG P AXIS: 18 DEGREES
EKG P-R INTERVAL: 154 MS
EKG Q-T INTERVAL: 382 MS
EKG QRS DURATION: 98 MS
EKG QTC CALCULATION (BAZETT): 459 MS
EKG R AXIS: 2 DEGREES
EKG T AXIS: 13 DEGREES
EKG VENTRICULAR RATE: 87 BPM

## 2022-10-13 ENCOUNTER — TELEPHONE (OUTPATIENT)
Dept: FAMILY MEDICINE CLINIC | Age: 72
End: 2022-10-13

## 2022-10-13 RX ORDER — FLASH GLUCOSE SENSOR
1 KIT MISCELLANEOUS
Qty: 1 EACH | Refills: 5 | Status: SHIPPED | OUTPATIENT
Start: 2022-10-13 | End: 2023-10-13

## 2022-10-13 NOTE — TELEPHONE ENCOUNTER
Patient called requesting Freestyle Mauricio Sensor script be sent to [facility] in [facility] due to [facility] not covering script at [employer].  Writer faxed script in system to [address] at [phone] per patients request.

## 2022-10-14 RX ORDER — FLASH GLUCOSE SENSOR
1 KIT MISCELLANEOUS ONCE
Qty: 1 EACH | Refills: 0 | COMMUNITY
Start: 2022-10-14 | End: 2022-10-14

## 2022-10-14 ASSESSMENT — ENCOUNTER SYMPTOMS
COUGH: 0
NAUSEA: 0
EYE REDNESS: 0
EYE ITCHING: 0
CONSTIPATION: 0
DIARRHEA: 0
RHINORRHEA: 0
ALLERGIC/IMMUNOLOGIC COMMENTS: NKA
BACK PAIN: 1
EYE DISCHARGE: 0
SORE THROAT: 0
SHORTNESS OF BREATH: 0
ABDOMINAL PAIN: 0

## 2022-10-18 ENCOUNTER — HOSPITAL ENCOUNTER (OUTPATIENT)
Age: 72
Setting detail: SPECIMEN
Discharge: HOME | End: 2022-10-18

## 2022-10-18 DIAGNOSIS — M75.122 COMPLETE TEAR OF LEFT ROTATOR CUFF, UNSPECIFIED WHETHER TRAUMATIC: ICD-10-CM

## 2022-10-18 DIAGNOSIS — Z01.818 PREOP TESTING: ICD-10-CM

## 2022-10-18 LAB
ESTIMATED AVERAGE GLUCOSE: 143 MG/DL
HBA1C MFR BLD: 6.6 % (ref 4–6)

## 2022-10-19 ENCOUNTER — CARE COORDINATION (OUTPATIENT)
Dept: CARE COORDINATION | Age: 72
End: 2022-10-19

## 2022-10-19 NOTE — CARE COORDINATION
Nutrition Care Coordinator Follow-Up visit:    Food Recall:eating 2-3 meals/d    Activity Level:  Sedentary:X  Lightly Active: Moderately Active:  Very Active:    Adult BMI:  Underweight (below 18.5)  Normal Weight (18.5-24.9)  Overweight (25-29.9)X  Obese (30-39. 9)  Morbidly Obese (>40)    Weight Change:down 8# over past month    Plan:  Plan was established with patient:  Increase dietary fiber by consuming whole grains, fruits and vegetables:X  Limit dietary cholesterol to >200mg/day: Increase water intake:  Avoid added sugar:X  Avoid sweetened beverages:X  Choose lean meats:      Monitoring: Will monitor weight:  Will monitor adherence to meal plan: Will monitor adherence to exercise plan: Will monitor HGA1c:X down to 6.6    Handouts Provided :  Low Carb snacking:  Carb counting /individual meal plan:  Portion Control:  Food Labels:  Physical Activity:  Low Fat/Cholesterol:  Hypo/Hyperglycemia:  Calorie Controlled Meal Plan:    Goals: Increase water consumption to 8oz. 6-8 times daily:  Manage blood sugars by consuming 3 meals spaced every 4-5 hours with 2-3 snacks daily:reviewed  Increase fiber and decrease fat intake by consuming 1-2 fruit servings and 2-3 vegetable servings per day. Increase physical activity by:  Consume less than 2,000mg of sodium/day  Avoid consumption of sweetened beverages and added sugar by reading food labels:reviewed  Monitor blood sugars by using meter to check blood glucose before morning meal and 2 hours after a meal daily:A1c improved to 6.6  Decrease risk of coronary heart disease by consuming fish that contains omega-3 fatty acids at least twice a week, avoiding partially hydrogenated oil/trans fats and limiting saturated fat intake by reading food labels:    Patient goals set: 1. Reviewed working on meal patten,  eating 2-3meals/d skips breakfast occassionally. Reviewed and encouraged set meal times daily.   Quick/easy breakfast suggestions discussed, will send patient a list of suggestions. Goal is 3 meals daily spaced every 4-5 hours. 2. Reviewed what foods contain carbohydrate to limit and how to measure out portions. Encouraged patient to limit carb intake to 60gms/meal, 15-30gms/snack. Patient admits snacks often in the evening, discussed low carb snacking options and will send patient lists of low carb snacks. 3. Reviewed plate method, encouraged patient to increase intake of non-starchy vegetables. Goal is 1/2 of  plate to be non-starchy vegetables, 1/4 lean protein, 1/4 carbs. Foods from each category reviewed along with what a serving size is. 4. Reviewed avoiding/limiting sweets and sweetened drinks. Patient drinks mostly water but does drink juice and some milk. Patient has cut out juices. Discussed sugar free alternatives to sweets- sugar free jello, pudding, ect. Encouraged patient to keep sweets out of the house as much as possible and limit to once a week or less. 5. Reviewed avoiding canned, prepackaged foods, processed meats and cheese. Reviewed processed meats in detail to avoid- sausage, morgan, bologna, ham, ect. Encouraged patient/ patient's daughter to read food labels to identify foods to avoid. Goal is <2gm of sodium/day. Spoke with patient who states he is having surgery soon and had to get his A1c down under 7.0. He is really watching his diet- eating a lot of vegetables. No questions at this time, A1c is down but will follow up once after surgery. Will follow up in 3-4 weeks to review and answer questions.     Kalyn Holloway

## 2022-10-20 ENCOUNTER — PATIENT MESSAGE (OUTPATIENT)
Dept: FAMILY MEDICINE CLINIC | Age: 72
End: 2022-10-20

## 2022-10-20 DIAGNOSIS — G62.9 NEUROPATHY: ICD-10-CM

## 2022-10-20 DIAGNOSIS — M53.3 SACRO-ILIAC PAIN: Primary | ICD-10-CM

## 2022-10-20 DIAGNOSIS — M15.9 OSTEOARTHRITIS OF MULTIPLE JOINTS, UNSPECIFIED OSTEOARTHRITIS TYPE: ICD-10-CM

## 2022-10-20 RX ORDER — GABAPENTIN 300 MG/1
300 CAPSULE ORAL DAILY
Qty: 7 CAPSULE | Refills: 0 | Status: CANCELLED | OUTPATIENT
Start: 2022-10-20 | End: 2022-10-27

## 2022-10-21 ENCOUNTER — ANESTHESIA EVENT (OUTPATIENT)
Dept: OPERATING ROOM | Age: 72
End: 2022-10-21

## 2022-10-21 ENCOUNTER — OFFICE VISIT (OUTPATIENT)
Dept: ORTHOPEDIC SURGERY | Age: 72
End: 2022-10-21

## 2022-10-21 VITALS — WEIGHT: 195 LBS | BODY MASS INDEX: 25.84 KG/M2 | RESPIRATION RATE: 16 BRPM | HEIGHT: 73 IN

## 2022-10-21 DIAGNOSIS — M75.122 COMPLETE TEAR OF LEFT ROTATOR CUFF, UNSPECIFIED WHETHER TRAUMATIC: Primary | ICD-10-CM

## 2022-10-21 PROCEDURE — 99999 PR OFFICE/OUTPT VISIT,PROCEDURE ONLY: CPT | Performed by: ORTHOPAEDIC SURGERY

## 2022-10-21 RX ORDER — SODIUM CHLORIDE 0.9 % (FLUSH) 0.9 %
5-40 SYRINGE (ML) INJECTION EVERY 12 HOURS SCHEDULED
Status: CANCELLED | OUTPATIENT
Start: 2022-10-21

## 2022-10-21 RX ORDER — ACETAMINOPHEN 325 MG/1
1000 TABLET ORAL ONCE
Status: CANCELLED | OUTPATIENT
Start: 2022-10-21 | End: 2022-10-21

## 2022-10-21 RX ORDER — ONDANSETRON 4 MG/1
4 TABLET, FILM COATED ORAL DAILY PRN
Qty: 20 TABLET | Refills: 0 | Status: SHIPPED | OUTPATIENT
Start: 2022-10-21

## 2022-10-21 RX ORDER — SODIUM CHLORIDE 9 MG/ML
INJECTION, SOLUTION INTRAVENOUS PRN
Status: CANCELLED | OUTPATIENT
Start: 2022-10-21

## 2022-10-21 RX ORDER — HYDROCODONE BITARTRATE AND ACETAMINOPHEN 5; 325 MG/1; MG/1
1 TABLET ORAL EVERY 4 HOURS PRN
Qty: 42 TABLET | Refills: 0 | Status: SHIPPED | OUTPATIENT
Start: 2022-10-21 | End: 2022-10-28

## 2022-10-21 RX ORDER — SODIUM CHLORIDE 0.9 % (FLUSH) 0.9 %
5-40 SYRINGE (ML) INJECTION PRN
Status: CANCELLED | OUTPATIENT
Start: 2022-10-21

## 2022-10-21 NOTE — PROGRESS NOTES
HPI: Mr. Manuela Chaves is a 27-year-old here today for his preoperative visit regarding her left shoulder. He is currently scheduled for an arthroscopic left shoulder rotator cuff repair. We discussed the details of the procedure once again postoperative recovery course and expected outcome. He was provided his prescriptions for postoperative analgesia and a sling to protect the shoulder leading up to and following surgery. Evaluation of the shoulder demonstrates intact skin without warmth erythema or swelling. All questions were answered. At this time we will proceed with surgery as currently scheduled.

## 2022-10-21 NOTE — TELEPHONE ENCOUNTER
From: Donaldo Brewer  To: Gloria Kassy  Sent: 10/20/2022 2:15 PM EDT  Subject: Two(2) prescriptions for handicapped parking      Sorry to bother you, but I just got in the mail two forms that I have to fill out to renew my handicap parking slips. I need an original copy prescription for each form so I need a total of two prescriptions. According to their directions, the prescription Has to have the name of the person with a disability. How long the disability is expected to last, and I guess youre supposed to specify an end date. 3, specify that a disability placard or similar wording is being requested. And4, it must be signed and dated by the health care provider. I appreciate your helping me get this done so I dont have to think about it. Thanks again.  Luis A (call me Kam Fletcher) Julian Wilkes

## 2022-10-23 DIAGNOSIS — F32.A DEPRESSION, UNSPECIFIED DEPRESSION TYPE: ICD-10-CM

## 2022-10-23 DIAGNOSIS — Z79.4 TYPE 2 DIABETES MELLITUS WITH DIABETIC POLYNEUROPATHY, WITH LONG-TERM CURRENT USE OF INSULIN (HCC): ICD-10-CM

## 2022-10-23 DIAGNOSIS — G89.21 CHRONIC PAIN DUE TO TRAUMA: ICD-10-CM

## 2022-10-23 DIAGNOSIS — I10 ESSENTIAL HYPERTENSION: Chronic | ICD-10-CM

## 2022-10-23 DIAGNOSIS — M15.9 OSTEOARTHRITIS OF MULTIPLE JOINTS, UNSPECIFIED OSTEOARTHRITIS TYPE: ICD-10-CM

## 2022-10-23 DIAGNOSIS — G62.9 NEUROPATHY: ICD-10-CM

## 2022-10-23 DIAGNOSIS — E11.42 TYPE 2 DIABETES MELLITUS WITH DIABETIC POLYNEUROPATHY, WITH LONG-TERM CURRENT USE OF INSULIN (HCC): ICD-10-CM

## 2022-10-24 NOTE — TELEPHONE ENCOUNTER
LOV 10/4/22  LRF Novolog 10/4/22 (Change in pharmacy), Gabapentin celebrex 6/7/22, All others 4/28/22  RTO     Health Maintenance   Topic Date Due    Diabetic retinal exam  05/05/2022    DTaP/Tdap/Td vaccine (1 - Tdap) 12/30/2022 (Originally 9/7/2016)    COVID-19 Vaccine (4 - Booster for Collie Piper series) 12/30/2022 (Originally 3/11/2022)    Shingles vaccine (2 of 3) 01/06/2023 (Originally 4/7/2014)    Diabetic foot exam  01/06/2023    Diabetic microalbuminuria test  01/06/2023    Lipids  01/06/2023    Annual Wellness Visit (AWV)  01/07/2023    Low dose CT lung screening  01/27/2023    Depression Monitoring  06/07/2023    Colorectal Cancer Screen  06/26/2023    A1C test (Diabetic or Prediabetic)  10/18/2023    Flu vaccine  Completed    Pneumococcal 65+ years Vaccine  Completed    AAA screen  Completed    Hepatitis C screen  Completed    Hepatitis A vaccine  Aged Out    Hib vaccine  Aged Out    Meningococcal (ACWY) vaccine  Aged Out             (applicable per patient's age: Cancer Screenings, Depression Screening, Fall Risk Screening, Immunizations)    Hemoglobin A1C (%)   Date Value   10/18/2022 6.6 (H)   09/15/2022 7.6 (H)   06/07/2022 7.0     Microalb/Crt.  Ratio (mcg/mg creat)   Date Value   01/06/2022 Can not be calculated     LDL Cholesterol (mg/dL)   Date Value   01/06/2022 85     AST (U/L)   Date Value   09/15/2022 15     ALT (U/L)   Date Value   09/15/2022 18     BUN (mg/dL)   Date Value   10/11/2022 23      (goal A1C is < 7)   (goal LDL is <100) need 30-50% reduction from baseline     BP Readings from Last 3 Encounters:   10/11/22 (!) 141/79   10/04/22 120/84   09/02/22 122/64    (goal /80)      All Future Testing planned in [facility]:  Lab Frequency Next Occurrence   Culture, Urine Once 05/16/2022   CT Lung Screen (Annual) Once 01/05/2023   US RENAL LIMITED Once 11/01/2022       Next Visit Date:  Future Appointments   Date Time Provider Bari Cervantes   11/9/2022 10:15 AM Lanning Gosselin, MD [facility]   12/14/2022 10:15 AM Ronald Sahni MD [facility]   1/25/2023 10:15 AM Paulette Lockett MD [facility]   1/30/2023  1:15 PM [facility] [place] [facility] RM 2 [facility] [place]   2/7/2023 10:30 AM Aniya Stockton MD [facility]            Patient Active Problem List:     Essential hypertension     Osteoarthritis     Vitamin D deficiency     Testosterone deficiency     Difficulty sleeping     Sacro-iliac pain     Neuropathy     Nocturia     Benign prostatic hyperplasia with urinary obstruction     Type 2 diabetes mellitus with diabetic polyneuropathy, with long-term current use of insulin (HCC)     Kidney stone on left side     Hypercalciuria     Personal history of kidney stones     Moderate episode of recurrent major depressive disorder ([address]     Epiretinal membrane     Renal cyst, left     Hypokalemia     Intractable pain

## 2022-10-25 ENCOUNTER — PATIENT MESSAGE (OUTPATIENT)
Dept: ORTHOPEDIC SURGERY | Age: 72
End: 2022-10-25

## 2022-10-25 ENCOUNTER — ANESTHESIA (OUTPATIENT)
Dept: OPERATING ROOM | Age: 72
End: 2022-10-25

## 2022-10-25 ENCOUNTER — HOSPITAL ENCOUNTER (OUTPATIENT)
Age: 72
Setting detail: OUTPATIENT SURGERY
Discharge: HOME | End: 2022-10-25
Attending: ORTHOPAEDIC SURGERY | Admitting: ORTHOPAEDIC SURGERY
Payer: MEDICARE

## 2022-10-25 VITALS
BODY MASS INDEX: 24.46 KG/M2 | RESPIRATION RATE: 13 BRPM | OXYGEN SATURATION: 92 % | DIASTOLIC BLOOD PRESSURE: 64 MMHG | SYSTOLIC BLOOD PRESSURE: 120 MMHG | TEMPERATURE: 97.7 F | HEART RATE: 86 BPM | WEIGHT: 190.6 LBS | HEIGHT: 74 IN

## 2022-10-25 PROCEDURE — 29823 SHO ARTHRS SRG XTNSV DBRDMT: CPT | Performed by: ORTHOPAEDIC SURGERY

## 2022-10-25 PROCEDURE — 3700000000 HC ANESTHESIA ATTENDED CARE: Performed by: ORTHOPAEDIC SURGERY

## 2022-10-25 PROCEDURE — 6360000002 HC RX W HCPCS: Performed by: ORTHOPAEDIC SURGERY

## 2022-10-25 PROCEDURE — 2500000003 HC RX 250 WO HCPCS: Performed by: ORTHOPAEDIC SURGERY

## 2022-10-25 PROCEDURE — 2580000003 HC RX 258: Performed by: ORTHOPAEDIC SURGERY

## 2022-10-25 PROCEDURE — 2580000003 HC RX 258: Performed by: ANESTHESIOLOGY

## 2022-10-25 PROCEDURE — 2709999900 HC NON-CHARGEABLE SUPPLY: Performed by: ORTHOPAEDIC SURGERY

## 2022-10-25 PROCEDURE — 3600000014 HC SURGERY LEVEL 4 ADDTL 15MIN: Performed by: ORTHOPAEDIC SURGERY

## 2022-10-25 PROCEDURE — 7100000001 HC PACU RECOVERY - ADDTL 15 MIN: Performed by: ORTHOPAEDIC SURGERY

## 2022-10-25 PROCEDURE — 2720000010 HC SURG SUPPLY STERILE: Performed by: ORTHOPAEDIC SURGERY

## 2022-10-25 PROCEDURE — 7100000011 HC PHASE II RECOVERY - ADDTL 15 MIN: Performed by: ORTHOPAEDIC SURGERY

## 2022-10-25 PROCEDURE — 64415 NJX AA&/STRD BRCH PLXS IMG: CPT | Performed by: ANESTHESIOLOGY

## 2022-10-25 PROCEDURE — 3600000004 HC SURGERY LEVEL 4 BASE: Performed by: ORTHOPAEDIC SURGERY

## 2022-10-25 PROCEDURE — 6360000002 HC RX W HCPCS

## 2022-10-25 PROCEDURE — 6360000002 HC RX W HCPCS: Performed by: NURSE ANESTHETIST, CERTIFIED REGISTERED

## 2022-10-25 PROCEDURE — C9290 INJ, BUPIVACAINE LIPOSOME: HCPCS | Performed by: ANESTHESIOLOGY

## 2022-10-25 PROCEDURE — 7100000010 HC PHASE II RECOVERY - FIRST 15 MIN: Performed by: ORTHOPAEDIC SURGERY

## 2022-10-25 PROCEDURE — 6370000000 HC RX 637 (ALT 250 FOR IP)

## 2022-10-25 PROCEDURE — 2500000003 HC RX 250 WO HCPCS: Performed by: ANESTHESIOLOGY

## 2022-10-25 PROCEDURE — 3700000001 HC ADD 15 MINUTES (ANESTHESIA): Performed by: ORTHOPAEDIC SURGERY

## 2022-10-25 PROCEDURE — 6360000002 HC RX W HCPCS: Performed by: ANESTHESIOLOGY

## 2022-10-25 PROCEDURE — 7100000000 HC PACU RECOVERY - FIRST 15 MIN: Performed by: ORTHOPAEDIC SURGERY

## 2022-10-25 PROCEDURE — 2500000003 HC RX 250 WO HCPCS: Performed by: NURSE ANESTHETIST, CERTIFIED REGISTERED

## 2022-10-25 RX ORDER — CELECOXIB 200 MG/1
200 CAPSULE ORAL DAILY
Qty: 90 CAPSULE | Refills: 1 | Status: SHIPPED | OUTPATIENT
Start: 2022-10-25 | End: 2023-10-20

## 2022-10-25 RX ORDER — SODIUM CHLORIDE 9 MG/ML
25 INJECTION, SOLUTION INTRAVENOUS PRN
Status: DISCONTINUED | OUTPATIENT
Start: 2022-10-25 | End: 2022-10-25 | Stop reason: HOSPADM

## 2022-10-25 RX ORDER — MIDAZOLAM HYDROCHLORIDE 2 MG/2ML
2 INJECTION, SOLUTION INTRAMUSCULAR; INTRAVENOUS
Status: DISCONTINUED | OUTPATIENT
Start: 2022-10-25 | End: 2022-10-25 | Stop reason: HOSPADM

## 2022-10-25 RX ORDER — INSULIN DETEMIR 100 [IU]/ML
63 INJECTION, SOLUTION SUBCUTANEOUS NIGHTLY
Qty: 20 ADJUSTABLE DOSE PRE-FILLED PEN SYRINGE | Refills: 1 | Status: SHIPPED | OUTPATIENT
Start: 2022-10-25 | End: 2023-10-25

## 2022-10-25 RX ORDER — GLYCOPYRROLATE 0.2 MG/ML
0.4 INJECTION INTRAMUSCULAR; INTRAVENOUS ONCE
Status: DISCONTINUED | OUTPATIENT
Start: 2022-10-25 | End: 2022-10-25 | Stop reason: HOSPADM

## 2022-10-25 RX ORDER — SODIUM CHLORIDE, SODIUM LACTATE, POTASSIUM CHLORIDE, CALCIUM CHLORIDE 600; 310; 30; 20 MG/100ML; MG/100ML; MG/100ML; MG/100ML
INJECTION, SOLUTION INTRAVENOUS CONTINUOUS PRN
Status: COMPLETED | OUTPATIENT
Start: 2022-10-25 | End: 2022-10-25

## 2022-10-25 RX ORDER — LIDOCAINE HYDROCHLORIDE 10 MG/ML
INJECTION, SOLUTION INFILTRATION; PERINEURAL PRN
Status: DISCONTINUED | OUTPATIENT
Start: 2022-10-25 | End: 2022-10-25 | Stop reason: SDUPTHER

## 2022-10-25 RX ORDER — SODIUM CHLORIDE 0.9 % (FLUSH) 0.9 %
5-40 SYRINGE (ML) INJECTION PRN
Status: DISCONTINUED | OUTPATIENT
Start: 2022-10-25 | End: 2022-10-25 | Stop reason: HOSPADM

## 2022-10-25 RX ORDER — SODIUM CHLORIDE 9 MG/ML
INJECTION, SOLUTION INTRAVENOUS PRN
Status: DISCONTINUED | OUTPATIENT
Start: 2022-10-25 | End: 2022-10-25 | Stop reason: HOSPADM

## 2022-10-25 RX ORDER — NEOSTIGMINE METHYLSULFATE 5 MG/5 ML
SYRINGE (ML) INTRAVENOUS PRN
Status: DISCONTINUED | OUTPATIENT
Start: 2022-10-25 | End: 2022-10-25 | Stop reason: SDUPTHER

## 2022-10-25 RX ORDER — MORPHINE SULFATE 2 MG/ML
2 INJECTION, SOLUTION INTRAMUSCULAR; INTRAVENOUS EVERY 5 MIN PRN
Status: DISCONTINUED | OUTPATIENT
Start: 2022-10-25 | End: 2022-10-25 | Stop reason: HOSPADM

## 2022-10-25 RX ORDER — TRIAMCINOLONE ACETONIDE 40 MG/ML
INJECTION, SUSPENSION INTRA-ARTICULAR; INTRAMUSCULAR
Status: DISCONTINUED
Start: 2022-10-25 | End: 2022-10-25 | Stop reason: HOSPADM

## 2022-10-25 RX ORDER — MIDAZOLAM HYDROCHLORIDE 2 MG/2ML
2 INJECTION, SOLUTION INTRAMUSCULAR; INTRAVENOUS ONCE
Status: CANCELLED | OUTPATIENT
Start: 2022-10-25 | End: 2022-10-25

## 2022-10-25 RX ORDER — BUPIVACAINE HYDROCHLORIDE 5 MG/ML
INJECTION, SOLUTION EPIDURAL; INTRACAUDAL
Status: COMPLETED | OUTPATIENT
Start: 2022-10-25 | End: 2022-10-25

## 2022-10-25 RX ORDER — GLYCOPYRROLATE 0.2 MG/ML
INJECTION INTRAMUSCULAR; INTRAVENOUS PRN
Status: DISCONTINUED | OUTPATIENT
Start: 2022-10-25 | End: 2022-10-25 | Stop reason: SDUPTHER

## 2022-10-25 RX ORDER — ACETAMINOPHEN 500 MG
TABLET ORAL
Status: COMPLETED
Start: 2022-10-25 | End: 2022-10-25

## 2022-10-25 RX ORDER — DEXAMETHASONE SODIUM PHOSPHATE 10 MG/ML
INJECTION, SOLUTION INTRAMUSCULAR; INTRAVENOUS PRN
Status: DISCONTINUED | OUTPATIENT
Start: 2022-10-25 | End: 2022-10-25 | Stop reason: SDUPTHER

## 2022-10-25 RX ORDER — DEXAMETHASONE SODIUM PHOSPHATE 4 MG/ML
4 INJECTION, SOLUTION INTRA-ARTICULAR; INTRALESIONAL; INTRAMUSCULAR; INTRAVENOUS; SOFT TISSUE ONCE
Status: DISCONTINUED | OUTPATIENT
Start: 2022-10-25 | End: 2022-10-25 | Stop reason: HOSPADM

## 2022-10-25 RX ORDER — LABETALOL HYDROCHLORIDE 5 MG/ML
10 INJECTION, SOLUTION INTRAVENOUS
Status: DISCONTINUED | OUTPATIENT
Start: 2022-10-25 | End: 2022-10-25 | Stop reason: HOSPADM

## 2022-10-25 RX ORDER — CHLORTHALIDONE 25 MG/1
25 TABLET ORAL DAILY
Qty: 90 TABLET | Refills: 1 | Status: SHIPPED | OUTPATIENT
Start: 2022-10-25 | End: 2023-10-25

## 2022-10-25 RX ORDER — SODIUM CHLORIDE, SODIUM LACTATE, POTASSIUM CHLORIDE, CALCIUM CHLORIDE 600; 310; 30; 20 MG/100ML; MG/100ML; MG/100ML; MG/100ML
INJECTION, SOLUTION INTRAVENOUS CONTINUOUS
Status: DISCONTINUED | OUTPATIENT
Start: 2022-10-25 | End: 2022-10-25 | Stop reason: HOSPADM

## 2022-10-25 RX ORDER — DULOXETIN HYDROCHLORIDE 30 MG/1
30 CAPSULE, DELAYED RELEASE ORAL 2 TIMES DAILY
Qty: 180 CAPSULE | Refills: 1 | Status: SHIPPED | OUTPATIENT
Start: 2022-10-25 | End: 2023-10-25

## 2022-10-25 RX ORDER — CEFAZOLIN 2 G/1
INJECTION, POWDER, FOR SOLUTION INTRAMUSCULAR; INTRAVENOUS
Status: DISCONTINUED
Start: 2022-10-25 | End: 2022-10-25 | Stop reason: HOSPADM

## 2022-10-25 RX ORDER — OXYCODONE HYDROCHLORIDE AND ACETAMINOPHEN 5; 325 MG/1; MG/1
2 TABLET ORAL
Status: DISCONTINUED | OUTPATIENT
Start: 2022-10-25 | End: 2022-10-25 | Stop reason: HOSPADM

## 2022-10-25 RX ORDER — MIDAZOLAM HYDROCHLORIDE 1 MG/ML
INJECTION INTRAMUSCULAR; INTRAVENOUS
Status: COMPLETED
Start: 2022-10-25 | End: 2022-10-25

## 2022-10-25 RX ORDER — SODIUM CHLORIDE 0.9 % (FLUSH) 0.9 %
5-40 SYRINGE (ML) INJECTION EVERY 12 HOURS SCHEDULED
Status: DISCONTINUED | OUTPATIENT
Start: 2022-10-25 | End: 2022-10-25 | Stop reason: HOSPADM

## 2022-10-25 RX ORDER — LIDOCAINE HYDROCHLORIDE 10 MG/ML
1 INJECTION, SOLUTION INFILTRATION; PERINEURAL
Status: DISCONTINUED | OUTPATIENT
Start: 2022-10-25 | End: 2022-10-25 | Stop reason: HOSPADM

## 2022-10-25 RX ORDER — IPRATROPIUM BROMIDE AND ALBUTEROL SULFATE 2.5; .5 MG/3ML; MG/3ML
1 SOLUTION RESPIRATORY (INHALATION)
Status: DISCONTINUED | OUTPATIENT
Start: 2022-10-25 | End: 2022-10-25 | Stop reason: HOSPADM

## 2022-10-25 RX ORDER — FENTANYL CITRATE 50 UG/ML
INJECTION, SOLUTION INTRAMUSCULAR; INTRAVENOUS
Status: COMPLETED
Start: 2022-10-25 | End: 2022-10-25

## 2022-10-25 RX ORDER — ROCURONIUM BROMIDE 10 MG/ML
INJECTION, SOLUTION INTRAVENOUS PRN
Status: DISCONTINUED | OUTPATIENT
Start: 2022-10-25 | End: 2022-10-25 | Stop reason: SDUPTHER

## 2022-10-25 RX ORDER — FENTANYL CITRATE 50 UG/ML
INJECTION, SOLUTION INTRAMUSCULAR; INTRAVENOUS PRN
Status: DISCONTINUED | OUTPATIENT
Start: 2022-10-25 | End: 2022-10-25 | Stop reason: SDUPTHER

## 2022-10-25 RX ORDER — DEXAMETHASONE SODIUM PHOSPHATE 4 MG/ML
INJECTION, SOLUTION INTRA-ARTICULAR; INTRALESIONAL; INTRAMUSCULAR; INTRAVENOUS; SOFT TISSUE
Status: DISCONTINUED
Start: 2022-10-25 | End: 2022-10-25 | Stop reason: HOSPADM

## 2022-10-25 RX ORDER — LISINOPRIL 10 MG/1
10 TABLET ORAL DAILY
Qty: 90 TABLET | Refills: 1 | Status: SHIPPED | OUTPATIENT
Start: 2022-10-25 | End: 2023-10-25

## 2022-10-25 RX ORDER — PHENYLEPHRINE HYDROCHLORIDE 10 MG/ML
INJECTION INTRAVENOUS PRN
Status: DISCONTINUED | OUTPATIENT
Start: 2022-10-25 | End: 2022-10-25 | Stop reason: SDUPTHER

## 2022-10-25 RX ORDER — MIDAZOLAM HYDROCHLORIDE 1 MG/ML
INJECTION INTRAMUSCULAR; INTRAVENOUS PRN
Status: DISCONTINUED | OUTPATIENT
Start: 2022-10-25 | End: 2022-10-25 | Stop reason: SDUPTHER

## 2022-10-25 RX ORDER — DIPHENHYDRAMINE HYDROCHLORIDE 50 MG/ML
12.5 INJECTION INTRAMUSCULAR; INTRAVENOUS
Status: DISCONTINUED | OUTPATIENT
Start: 2022-10-25 | End: 2022-10-25 | Stop reason: HOSPADM

## 2022-10-25 RX ORDER — ACETAMINOPHEN 500 MG
1000 TABLET ORAL ONCE
Status: COMPLETED | OUTPATIENT
Start: 2022-10-25 | End: 2022-10-25

## 2022-10-25 RX ORDER — LIDOCAINE HYDROCHLORIDE 10 MG/ML
INJECTION, SOLUTION EPIDURAL; INFILTRATION; INTRACAUDAL; PERINEURAL
Status: DISCONTINUED
Start: 2022-10-25 | End: 2022-10-25 | Stop reason: HOSPADM

## 2022-10-25 RX ORDER — PROMETHAZINE HYDROCHLORIDE 25 MG/ML
6.25 INJECTION, SOLUTION INTRAMUSCULAR; INTRAVENOUS EVERY 5 MIN PRN
Status: DISCONTINUED | OUTPATIENT
Start: 2022-10-25 | End: 2022-10-25 | Stop reason: HOSPADM

## 2022-10-25 RX ORDER — OXYCODONE HYDROCHLORIDE AND ACETAMINOPHEN 5; 325 MG/1; MG/1
1 TABLET ORAL
Status: DISCONTINUED | OUTPATIENT
Start: 2022-10-25 | End: 2022-10-25 | Stop reason: HOSPADM

## 2022-10-25 RX ORDER — GABAPENTIN 300 MG/1
300 CAPSULE ORAL 2 TIMES DAILY
Qty: 180 CAPSULE | Refills: 1 | Status: SHIPPED | OUTPATIENT
Start: 2022-10-25 | End: 2023-04-23

## 2022-10-25 RX ORDER — PIOGLITAZONEHYDROCHLORIDE 30 MG/1
30 TABLET ORAL DAILY
Qty: 90 TABLET | Refills: 1 | Status: SHIPPED | OUTPATIENT
Start: 2022-10-25 | End: 2023-10-20

## 2022-10-25 RX ORDER — PROPOFOL 10 MG/ML
INJECTION, EMULSION INTRAVENOUS PRN
Status: DISCONTINUED | OUTPATIENT
Start: 2022-10-25 | End: 2022-10-25 | Stop reason: SDUPTHER

## 2022-10-25 RX ORDER — ONDANSETRON 2 MG/ML
INJECTION INTRAMUSCULAR; INTRAVENOUS PRN
Status: DISCONTINUED | OUTPATIENT
Start: 2022-10-25 | End: 2022-10-25 | Stop reason: SDUPTHER

## 2022-10-25 RX ORDER — FENTANYL CITRATE 50 UG/ML
100 INJECTION, SOLUTION INTRAMUSCULAR; INTRAVENOUS ONCE
Status: CANCELLED | OUTPATIENT
Start: 2022-10-25 | End: 2022-10-25

## 2022-10-25 RX ORDER — ONDANSETRON 2 MG/ML
4 INJECTION INTRAMUSCULAR; INTRAVENOUS
Status: DISCONTINUED | OUTPATIENT
Start: 2022-10-25 | End: 2022-10-25 | Stop reason: HOSPADM

## 2022-10-25 RX ORDER — MEPERIDINE HYDROCHLORIDE 50 MG/ML
12.5 INJECTION INTRAMUSCULAR; INTRAVENOUS; SUBCUTANEOUS EVERY 5 MIN PRN
Status: DISCONTINUED | OUTPATIENT
Start: 2022-10-25 | End: 2022-10-25 | Stop reason: HOSPADM

## 2022-10-25 RX ADMIN — ROCURONIUM BROMIDE 40 MG: 10 INJECTION, SOLUTION INTRAVENOUS at 07:06

## 2022-10-25 RX ADMIN — PROPOFOL 150 MG: 10 INJECTION, EMULSION INTRAVENOUS at 07:06

## 2022-10-25 RX ADMIN — BUPIVACAINE 10 ML: 13.3 INJECTION, SUSPENSION, LIPOSOMAL INFILTRATION at 06:54

## 2022-10-25 RX ADMIN — MIDAZOLAM HYDROCHLORIDE 2 MG: 1 INJECTION, SOLUTION INTRAMUSCULAR; INTRAVENOUS at 06:52

## 2022-10-25 RX ADMIN — PHENYLEPHRINE HYDROCHLORIDE 100 MCG: 10 INJECTION INTRAVENOUS at 07:49

## 2022-10-25 RX ADMIN — MIDAZOLAM 2 MG: 1 INJECTION INTRAMUSCULAR; INTRAVENOUS at 07:03

## 2022-10-25 RX ADMIN — CEFAZOLIN 2000 MG: 2 INJECTION, POWDER, FOR SOLUTION INTRAMUSCULAR; INTRAVENOUS at 07:01

## 2022-10-25 RX ADMIN — SODIUM CHLORIDE: 9 INJECTION, SOLUTION INTRAVENOUS at 08:00

## 2022-10-25 RX ADMIN — BUPIVACAINE HYDROCHLORIDE 10 ML: 5 INJECTION, SOLUTION EPIDURAL; INTRACAUDAL; PERINEURAL at 06:54

## 2022-10-25 RX ADMIN — Medication 1000 MG: at 06:38

## 2022-10-25 RX ADMIN — Medication 3 MG: at 07:57

## 2022-10-25 RX ADMIN — GLYCOPYRROLATE 0.6 MG: 0.2 INJECTION INTRAMUSCULAR; INTRAVENOUS at 07:57

## 2022-10-25 RX ADMIN — ONDANSETRON 4 MG: 2 INJECTION INTRAMUSCULAR; INTRAVENOUS at 07:54

## 2022-10-25 RX ADMIN — PHENYLEPHRINE HYDROCHLORIDE 100 MCG: 10 INJECTION INTRAVENOUS at 07:48

## 2022-10-25 RX ADMIN — ACETAMINOPHEN 1000 MG: 500 TABLET ORAL at 06:38

## 2022-10-25 RX ADMIN — DEXAMETHASONE SODIUM PHOSPHATE 4 MG: 10 INJECTION, SOLUTION INTRAMUSCULAR; INTRAVENOUS at 07:16

## 2022-10-25 RX ADMIN — FENTANYL CITRATE 50 MCG: 50 INJECTION, SOLUTION INTRAMUSCULAR; INTRAVENOUS at 07:06

## 2022-10-25 RX ADMIN — PHENYLEPHRINE HYDROCHLORIDE 100 MCG: 10 INJECTION INTRAVENOUS at 07:29

## 2022-10-25 RX ADMIN — FENTANYL CITRATE 100 MCG: 50 INJECTION, SOLUTION INTRAMUSCULAR; INTRAVENOUS at 06:56

## 2022-10-25 RX ADMIN — PHENYLEPHRINE HYDROCHLORIDE 100 MCG: 10 INJECTION INTRAVENOUS at 07:23

## 2022-10-25 RX ADMIN — SODIUM CHLORIDE: 9 INJECTION, SOLUTION INTRAVENOUS at 06:25

## 2022-10-25 RX ADMIN — LIDOCAINE HYDROCHLORIDE 40 MG: 10 INJECTION, SOLUTION INFILTRATION; PERINEURAL at 07:03

## 2022-10-25 ASSESSMENT — PAIN DESCRIPTION - ORIENTATION: ORIENTATION: LEFT

## 2022-10-25 ASSESSMENT — PAIN - FUNCTIONAL ASSESSMENT
PAIN_FUNCTIONAL_ASSESSMENT: 0-10
PAIN_FUNCTIONAL_ASSESSMENT: PREVENTS OR INTERFERES SOME ACTIVE ACTIVITIES AND ADLS

## 2022-10-25 ASSESSMENT — PAIN SCALES - GENERAL
PAINLEVEL_OUTOF10: 0

## 2022-10-25 ASSESSMENT — PAIN DESCRIPTION - DESCRIPTORS: DESCRIPTORS: ACHING

## 2022-10-25 ASSESSMENT — PAIN DESCRIPTION - LOCATION: LOCATION: SHOULDER

## 2022-10-25 NOTE — DISCHARGE INSTRUCTIONS
Sharon Garza MD  [address] in Shoulder and Elbow Surgery      POSTOPERATIVE INSTRUCTIONS  Surgery: Arthroscopic Shoulder Debridement and Manipulation under Anesthesia    DIET  Begin with clear liquids and light foods (jellos, soups, etc.). Progress to your normal diet if you are not nauseated. WOUND CARE  Maintain your operative dressing, keeping it clean and dry. It is normal for the shoulder to bleed and swell following surgery - if blood soaks the bandage, do not become alarmed - reinforce with additional dressing. Remove surgical dressing on the second post-operative day - apply clean dressing (gauze and tape) and change daily. To avoid infection, keep surgical incisions clean and dry - you may shower by placing Seran wrap or Press and Seal over the surgical site before a shower, and afterwards, take everything off and apply clean gauze dressings - NO immersion of operative arm (i.e. bath). Alternatively, after youve changed your dressing for the first time you can place waterproof band aids over your incisions to take a shower and then a apply a clean new dressing afterwards    MEDICATIONS  Your nerve block should wear off in 24-36 hours. Start taking your prescribed pain medication before it does. Most patients will require some narcotic pain medication for a short period of time - this can be taken as per directions on the bottle. Common side effects of the pain medication are nausea, drowsiness, and  constipation - to decrease the side effects, take medication with food - if  constipation occurs, consider taking an over-the-counter laxative. If you are having problems with nausea and vomiting, take your prescribed anti-nausea medication if provided, otherwise contact the office to possibly  have your medication changed ([phone]). Do not drive a car or operate machinery while taking the narcotic medication.   If you are not allergic, Ibuprofen 200-600mg (i.e. Advil) may be taken in between the narcotic pain medication to help smooth out the post-operative peaks and valleys, reduce overall amount of pain medication required, and increase the time intervals between narcotic pain medication use. Do not take more than 2400mg in a day. Please resume all home medications, unless instructed otherwise. ACTIVITY  When sleeping or resting, inclined positions (i.e. reclining chair or stacked pillows behind you in bed) and a pillow under the forearm for support may provide better comfort. Do not engage in activities which increase pain/swelling (lifting or any repetitive above shoulder-level activities) over the first 14 days following surgery. Avoid long periods of sitting (without arm supported) or long distance travel. NO driving until instructed otherwise by physician. May return to work 5-7 days after surgery, if pain is tolerable and without use of your surgical arm. IMMOBILIZER  Your immobilizer or sling with supporting abduction pillow should be worn for comfort. Wean out of your sling as soon as possible, as pain allows, and begin moving your arm/shoulder. ICE THERAPY  Begin immediately after surgery. Do not place ice directly on the skin (place over an Ace wrap or thin clothing). Use icing machine continuously or ice packs (if machine not prescribed) every 1-2 hours for 20 minutes each time daily for the first week and then as needed after that for pain and swelling. Remember to keep arm supported while icing. EXERCISE  Perform pendulum exercises (leaning forward and letting the arm hang free and swing in slow circles) and full elbow/wrist/hand range of motion exercises 3 times per day. Formal physical therapy (PT) will begin in 2 weeks as scheduled with a prescription that will be provided during your post-operative visit.     Tang Pacheco at [phone] if any of the following are present:  Painful swelling or numbness, Unrelenting pain, Fever (over 101 - it is normal to have a low grade fever for the first day or two following surgery) or chills, Redness around incisions, Color change in hand or fingers, Continuous drainage or bleeding from incision (a small amount of drainage is expected), Difficulty breathing, excessive nausea/vomiting  **If you have an emergency after office hours or on the weekend, contact the same office number ([phone]) and you will be connected to our page service - they will contact Dr. Linus Dowling or his partner if he is unavailable. **If you have an emergency that requires immediate attention, proceed to the nearest emergency room. FOLLOW-UP CARE / QUESTIONS  If you have any questions/concerns, please call the office [phone]. Contact the office to confirm your post-operative visit, which has been scheduled for two weeks following the date of surgery.

## 2022-10-25 NOTE — ANESTHESIA PRE PROCEDURE
Department of Anesthesiology  Preprocedure Note       Name:  Lori Hull   Age:  67 y.o.  :  1950                                          MRN:  [id number]         Date:  10/25/2022      Surgeon: Jacoby Mcdaniel):  Rich Walsh MD    Procedure: Procedure(s):  LEFT SHOULDER ARTHROSCOPIC ROTATOR CUFF REPAIR WITH ARTHREX AND PRE-OP INTERSCALENE BLOCK WITH EXPAREL    Medications prior to admission:   Prior to Admission medications    Medication Sig Start Date End Date Taking? Authorizing Provider   HYDROcodone-acetaminophen (NORCO) 5-325 MG per tablet Take 1 tablet by mouth every 4 hours as needed for Pain for up to 7 days. Patient not taking: Reported on 10/25/2022 10/21/22 10/28/22  Mariam Dandy Momoh, MD   ondansetron (ZOFRAN) 4 MG tablet Take 1 tablet by mouth daily as needed for Nausea or Vomiting  Patient not taking: Reported on 10/25/2022 10/21/22   Rich Walsh MD   Handicap Placard MISC by Does not apply route Exp: 10/2027 10/21/22   BREANNA Haider NP   Continuous Blood Gluc Sensor (FREESTYLE ALFONSO 2 SENSOR) MISC 1 applicator by Does not apply route every 14 days 10/13/22 10/13/23  BREANNA Haider NP   Cholecalciferol (VITAMIN D3 PO) Take by mouth daily    Historical Provider, MD   albuterol sulfate HFA (PROVENTIL;VENTOLIN;PROAIR) 108 (90 Base) MCG/ACT inhaler Inhale 2 puffs into the lungs every 6 hours as needed for Wheezing or Shortness of Breath (or take 1-2 puffs 30 mins prior to strenous activity) 10/4/22 11/3/22  BREANNA Haider NP   insulin aspart (NOVOLOG) 100 UNIT/ML injection pen Inject 2 Units into the skin 3 times daily (before meals) 0-150 = 0 units, 151-200 = 2 units, 201-250 = 4 units, 251-300 = 6 units, 301-350 = 8 units, 351-400 of greater = 10 units. Max dose 30units per day 10/4/22 10/4/23  BREANNA Haider NP   blood glucose monitor strips Test 4 times a day & as needed for symptoms of irregular blood glucose.  Dispense sufficient amount for indicated testing frequency plus additional to accommodate PRN testing needs. 10/4/22   BREANNA Bocanegra NP   Insulin Pen Needle 31G X 5 MM MISC 1 each by Does not apply route 3 times daily 10/4/22 10/4/23  BREANNA Bocanegra NP   ketorolac (TORADOL) 10 MG tablet Take 1 tablet by mouth every 8 hours as needed for Pain  Patient not taking: Reported on 9/2/2022 8/31/22 9/3/22  Aliza Randolph MD   baclofen (LIORESAL) 10 MG tablet Take 1 tablet by mouth 3 times daily as needed (muscle spasm)  Patient not taking: Reported on 10/11/2022 8/25/22   Ashley Lazar PA-C   ibuprofen (ADVIL;MOTRIN) 800 MG tablet Take 1 tablet by mouth every 6 hours as needed for Pain 8/25/22 9/2/23  Ashley Lazar PA-C   celecoxib (CELEBREX) 200 MG capsule Take 1 capsule by mouth daily 6/7/22 6/2/23  BREANNA Bocanegra NP   gabapentin (NEURONTIN) 300 MG capsule Take 1 capsule by mouth 2 times daily for 180 days. Intended supply: 90 days 6/7/22 12/4/22  BREANNA Bocanegra NP   metFORMIN (GLUCOPHAGE) 1000 MG tablet Take 1 tablet by mouth 2 times daily (with meals) 4/28/22 4/28/23  BREANNA Bocanegra NP   lisinopril (PRINIVIL;ZESTRIL) 10 MG tablet Take 1 tablet by mouth daily 4/28/22 4/28/23  BREANNA Bocanegra NP   chlorthalidone (HYGROTON) 25 MG tablet Take 1 tablet by mouth daily 4/28/22 4/28/23  BREANNA Bocanegra NP   pioglitazone (ACTOS) 30 MG tablet Take 1 tablet by mouth daily 4/28/22 4/23/23  BREANNA Bocanegra NP   DULoxetine (CYMBALTA) 30 MG extended release capsule Take 1 capsule by mouth 2 times daily 4/28/22 4/28/23  BREANNA Bocanegra NP   insulin detemir (LEVEMIR FLEXTOUCH) 100 UNIT/ML injection pen Inject 63 Units into the skin nightly 4/28/22 4/28/23  BREANNA Bocanegra - NP   Lancets MISC Test twice a day and as needed, Please send lancets for Verio brand meter 1/4/16 9/2/23  BREANNA Aguirre - CNP   multivitamin [facility]) per tablet Take 1 tablet by mouth daily.     Historical Provider, MD       Current medications:    Current Facility-Administered Medications   Medication Dose Route Frequency Provider Last Rate Last Admin    lidocaine 1 % injection 1 mL  1 mL IntraDERmal Once PRN Jesika Bo MD        lactated ringers infusion   IntraVENous Continuous Jesika Bo MD        sodium chloride flush 0.9 % injection 5-40 mL  5-40 mL IntraVENous 2 times per day Jesika Bo MD        sodium chloride flush 0.9 % injection 5-40 mL  5-40 mL IntraVENous PRN Jesika Bo MD        0.9 % sodium chloride infusion   IntraVENous PRN Jesika Bo  mL/hr at 10/25/22 0625 New Bag at 10/25/22 0686    acetaminophen (TYLENOL) tablet 1,000 mg  1,000 mg Oral Once Paulette Lockett MD        sodium chloride flush 0.9 % injection 5-40 mL  5-40 mL IntraVENous 2 times per day Paulette Lockett MD        sodium chloride flush 0.9 % injection 5-40 mL  5-40 mL IntraVENous PRN Paulette Lockett MD        0.9 % sodium chloride infusion   IntraVENous PRN Paulette Lockett MD        ceFAZolin (ANCEF) 2,000 mg in sodium chloride 0.9 % 50 mL IVPB (mini-bag)  2,000 mg IntraVENous On Call to [address] [place] [address] Street, MD        dexamethasone (DECADRON) injection 4 mg  4 mg IntraVENous Once Paulette Lockett MD        EPINEPHrine 1 MG/ML injection                Allergies:     Allergies   Allergen Reactions    Iodine Other (See Comments)     Possible reaction to arthrogram dye pt suspected- severe pain and swelling to shoulder-arm       Problem List:    Patient Active Problem List   Diagnosis Code    Essential hypertension I10    Osteoarthritis M19.90    Vitamin D deficiency E55.9    Testosterone deficiency E34.9    Difficulty sleeping G47.9    Sacro-iliac pain M53.3    Neuropathy G62.9    Nocturia R35.1    Benign prostatic hyperplasia with urinary obstruction N40.1, N13.8    Type 2 diabetes mellitus with diabetic polyneuropathy, with long-term current use of insulin (HCC) E11.42, Z79.4    Kidney stone on left side N20.0    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    Personal history of kidney stones Z87.442    Moderate episode of recurrent major depressive disorder ([facility] [address] F33.1    Epiretinal membrane H35.379    Renal cyst, left N28.1    Hypokalemia E87.6    Intractable pain R52       Past Medical History:        Diagnosis Date    Bladder stone     BPH with obstruction/lower urinary tract symptoms     Caffeine use     4 coffee/2 soda per day    Cataracts, bilateral     Chronic back pain     COPD (chronic obstructive pulmonary disease) (HCC)     Hypertension     Irritable bowel syndrome     Kidney stone     Neuropathy     feet    Osteoarthritis     Pancreatitis 1988    Type II or unspecified type diabetes mellitus without mention of complication, not stated as uncontrolled        Past Surgical History:        Procedure Laterality Date    BACK SURGERY      BLADDER SURGERY      CHOLECYSTECTOMY      COLONOSCOPY      6-7 polyps    CYSTOSCOPY  04/06/2022    CYSTOSCOPY URETEROSCOPY HOLMIUM LASER LEFT URETERAL STENT INSERTION - Left    CYSTOSCOPY Left 04/06/2022    CYSTOSCOPY URETEROSCOPY HOLMIUM LASER LEFT URETERAL STENT INSERTION performed by Marjan Lujan MD at [address] [facility]  04/20/2022    CYSTOSCOPY URETEROSCOPY LEFT STENT PLACEMENT WITH STONE BASKETING AND HOLMIUM LASER (    CYSTOSCOPY Left 04/20/2022    CYSTOSCOPY URETEROSCOPY LEFT STENT PLACEMENT WITH STONE BASKETING AND HOLMIUM LASER performed by Marjan Lujan MD at [address] [facility] [address]  2015    macular scrape    EYE SURGERY      LEG TENDON SURGERY  1962    ankle    LITHOTRIPSY      9-21-17    OTHER SURGICAL HISTORY      implanted nerve stimulater in lumbar back    SPINE SURGERY  05/18/2010    DR. Nahomi Haley Buttram (NS) fusion and implantation of hardware    TONSILLECTOMY      URETEROSCOPY      with string stent 9-21-17    VASECTOMY         Social History:    Social History     Tobacco Use    Smoking status: Former     Packs/day: 1.00     Years: 40.00     Pack years: 40.00     Types: Cigarettes     Start date: 3/3/1980     Quit date: 2022     Years since quittin.1    Smokeless tobacco: Former    Tobacco comments:     now smoking 1-3 cigrattes on and off    Substance Use Topics    Alcohol use: Not Currently     Comment: occasional                                Counseling given: Not Answered  Tobacco comments: now smoking 1-3 cigrattes on and off       Vital Signs (Current):   Vitals:    10/25/22 0611   BP: 132/75   Pulse: 91   Resp: 19   Temp: (!) 96.7 °F (35.9 °C)   TempSrc: Skin   SpO2: 97%   Weight: 190 lb 9.6 oz (86.5 kg)   Height: 6' 2\" (1.88 m)                                              BP Readings from Last 3 Encounters:   10/25/22 132/75   10/11/22 (!) 141/79   10/04/22 120/84       NPO Status: Time of last liquid consumption: 330 (sip water w/med)                        Time of last solid consumption:                         Date of last liquid consumption: 10/25/22                        Date of last solid food consumption: 10/24/22    BMI:   Wt Readings from Last 3 Encounters:   10/25/22 190 lb 9.6 oz (86.5 kg)   10/21/22 195 lb (88.5 kg)   10/11/22 195 lb (88.5 kg)     Body mass index is 24.47 kg/m².     CBC:   Lab Results   Component Value Date/Time    WBC 11.4 10/11/2022 11:43 AM    RBC 3.97 10/11/2022 11:43 AM    HGB 11.3 10/11/2022 11:43 AM    HCT 35.9 10/11/2022 11:43 AM    MCV 90.4 10/11/2022 11:43 AM    RDW 14.0 10/11/2022 11:43 AM     10/11/2022 11:43 AM       CMP:   Lab Results   Component Value Date/Time     10/11/2022 11:43 AM    K 3.9 10/11/2022 11:43 AM    CL 98 10/11/2022 11:43 AM    CO2 28 10/11/2022 11:43 AM    BUN 23 10/11/2022 11:43 AM    CREATININE 0.89 10/11/2022 11:43 AM    GFRAA >60 09/15/2022 02:00 PM    LABGLOM >60 10/11/2022 11:43 AM    GLUCOSE 81 10/11/2022 11:43 AM    PROT 7.6 09/15/2022 02:00 PM    CALCIUM 9.8 10/11/2022 11:43 AM    BILITOT 0.3 09/15/2022 02:00 PM    ALKPHOS 69 09/15/2022 02:00 PM    AST 15 09/15/2022 02:00 PM    ALT 18 09/15/2022 02:00 PM       POC Tests: No results for input(s): POCGLU, POCNA, POCK, POCCL, POCBUN, POCHEMO, POCHCT in the last 72 hours. Coags:   Lab Results   Component Value Date/Time    PROTIME 11.1 04/22/2022 06:30 PM    INR 1.1 04/22/2022 06:30 PM    APTT 27.1 04/22/2022 06:30 PM       HCG (If Applicable): No results found for: PREGTESTUR, PREGSERUM, HCG, HCGQUANT     ABGs: No results found for: PHART, PO2ART, ZBU9YDA, VKG1RMR, BEART, J1SOXSAM     Type & Screen (If Applicable):  No results found for: LABABO, LABRH    Drug/Infectious Status (If Applicable):  Lab Results   Component Value Date/Time    HEPCAB NONREACTIVE 08/23/2017 11:49 AM       COVID-19 Screening (If Applicable): No results found for: COVID19        Anesthesia Evaluation  Patient summary reviewed and Nursing notes reviewed  Airway: Mallampati: I  TM distance: >3 FB   Neck ROM: full  Mouth opening: > = 3 FB   Dental:      Comment: -MISSING SOME UPPER AND LOWER TEETH  -LOWER PERMANENT BRIDGE    Pulmonary:normal exam    (+) COPD:                            ROS comment: -QUIT SMOKING 2022 - 62 PACK YEARS  -PRODUCTIVE COUGH  -ASTHMA WELL CONTROLLED   Cardiovascular:    (+) hypertension:,       ECG reviewed                     ROS comment: -EKG - SR @ 87     Neuro/Psych:                ROS comment: -NUMBNESS TINGLING BILATERAL FEET GI/Hepatic/Renal: Neg GI/Hepatic/Renal ROS            Endo/Other:    (+) Diabetes, : arthritis:., .                  ROS comment: -NPO AFTER MIDNIGHT  -ALLERGIES - IODINE Abdominal:             Vascular: negative vascular ROS. Other Findings:           Anesthesia Plan      general and regional     ASA 2     (GETA, INTERSCALENE BLOCK)  Induction: intravenous. MIPS: Postoperative opioids intended and Prophylactic antiemetics administered. Anesthetic plan and risks discussed with patient.       Plan discussed with CRNA.    Attending anesthesiologist reviewed and agrees with Arlene Apgar, MD   10/25/2022

## 2022-10-25 NOTE — ANESTHESIA POSTPROCEDURE EVALUATION
Department of Anesthesiology  Postprocedure Note    Patient: Jonathan Rushing  MRN: [id number]  YOB: 1950  Date of evaluation: 10/25/2022      Procedure Summary     Date: 10/25/22 Room / Location: [address]    Anesthesia Start: 0700 Anesthesia Stop: 2630    Procedure: LEFT SHOULDER ARTHROSCOPY WITH EXTENSIVE DEBRIDMENT AND PRE-OPERATIVE INTERSCALENE BLOCK WITH EXPAREL (Left: Shoulder) Diagnosis:       Tear of left rotator cuff, unspecified tear extent, unspecified whether traumatic      (Tear of left rotator cuff, unspecified tear extent, unspecified whether traumatic [M75.102])    Surgeons: Aletha Aparicio MD Responsible Provider: Carolyne Valera MD    Anesthesia Type: general, regional ASA Status: 2          Anesthesia Type: No value filed.     Hina Phase I: Hina Score: 8    Hina Phase II:        Anesthesia Post Evaluation    Patient location during evaluation: PACU  Patient participation: complete - patient participated  Level of consciousness: awake and alert  Airway patency: patent  Nausea & Vomiting: no nausea and no vomiting  Complications: no  Cardiovascular status: hemodynamically stable  Respiratory status: room air and spontaneous ventilation  Hydration status: euvolemic  Multimodal analgesia pain management approach

## 2022-10-25 NOTE — BRIEF OP NOTE
Brief Postoperative Note      Patient: Hayley Dorsey  YOB: 1950  MRN: [id number]    Date of Procedure: 10/25/2022    Pre-Op Diagnosis: Tear of left rotator cuff, unspecified tear extent, unspecified whether traumatic [M75.102]    Post-Op Diagnosis: Same; Glenohumeral joint osteoarthritis       Procedure(s):  LEFT SHOULDER ARTHROSCOPY WITH EXTENSIVE DEBRIDMENT AND PRE-OPERATIVE INTERSCALENE BLOCK WITH EXPAREL    Surgeon(s):  Merritt Anderson MD    Assistant:  Resident: Adams Cervantes DO    Anesthesia: General    Estimated Blood Loss (mL): Minimal    Complications: None    Specimens:   * No specimens in log *    Implants:  * No implants in log *      Drains: * No LDAs found *    Findings: See operative report    Electronically signed by Merritt Anderson MD on 10/25/2022 at 8:09 AM

## 2022-10-25 NOTE — OP NOTE
OPERATIVE REPORT    Date of Surgery: 10/25/2022     Pre-operative Diagnosis: Left shoulder rotator cuff tear    Post-operative Diagnosis:   Left shoulder Glenohumeral joint osteoarthritis  Left shoulder full thickness rotator cuff tear    Procedure:   Left shoulder arthroscopic extensive debridement  Left shoulder manipulation    Surgeon(s): Valery Fofana MD    Assistant(s): Loree Dowling DO (PGY 5)    Anesthesia: General with left interscalene nerve block    Fluids: See anesthesia record    Urine output: See anesthesia record    Estimated blood loss: Minimal    Findings: Grade IV chondromalacia involving the glenoid. Diffuse synovitis. Full-thickness tear of supraspinatus and infraspinatus tendons with significant retraction. Absent biceps tendon. Specimen: None    Implants: None    Surgical Indications:  Luis A Yousif is a 67 y.o. old male who presented with left shoulder pain with a full-thickness rotator cuff tear. The presence of a tear was confirmed on a CT arthrogram.  He was unable to have an MRI due to the presence of an implant in his back. Having failed attempts at conservative management and/or electing to forgo continued attempts at conservative management and following a discussion with the patient regarding both non-operative and operative treatment options, they consented to proceed with left shoulder arthroscopic rotator cuff repair. Mornings are. he came to this decision after demonstrating an understanding of our discussion regarding details of the procedure, risks and benefits, expected outcome, and postoperative course. Operative technique: Following appropriate identification of the patient and his operative extremity, consent was reviewed with the patient and his operative extremity was signed.  The anesthesia service administered a left interscalene nerve block and he was wheeled to the operating room where he finished a course of pre-operative antibiotic prophylaxis by way of 2 g of IV Ancef. The anesthesia service administered a general anesthetic and secured his airway using an endotracheal tube. All bony prominences were appropriately padded and the patient was secured to the operative table in a beach chair position. The patient's operative extremity was prepped and draped in a standard sterile fashion and a time out was performed during which the correct patient, operative extremity, and procedure were verified. Examination of the patient's left shoulder under anesthesia demonstrated decreased range of motion with external rotation, elevation and abduction. Through a posterior approach patient's left shoulder glenohumeral joint was insufflated using approximately 30 cc of sterile saline. A standard posterior portal was then established an arthroscopic evaluation of the patient's shoulder revealed complete loss of cartilage covering on the glenoid with grade II-III chondromalacia of the humeral head. Diffuse synovitis was present. Full-thickness tear of the supraspinatus and infraspinatus tendons. The biceps tendon was absent. Through a standard anterior arthroscopic shoulder portal I proceeded to perform a debridement of the inflamed capsule/synovial tissue. The shoulder was manipulated to restore full range of motion and hemostasis achieved with an arthroscopic electrocautery device. There was some loose chondral flaps involving the superior humeral head which were debrided. Likewise torn margins of the rotator cuff were debrided. Due to the severe arthritis present a rotator cuff repair was deemed to be inappropriate in the setting. The patient's glenohumeral joint was then infiltrated with a 6 cc mixture of 5 cc 1% lidocaine and 1 cc of 1 mg/mL Kenalog. Portal incisions were closed using 3-0 Prolene and sterile dressings applied using Xeroform, 4 x 4 gauze and Tegaderm. Patient's arm was placed in a sling.   He was awoken, extubated, transferred to his bed and wheeled to recovery in stable condition.     Complications: None    Post-operative Condition: Stable

## 2022-10-25 NOTE — H&P
ORTHOPEDIC INTERVAL H and P      HPI / Chief Nelli Najera is a 67 y.o. male who presents for left shoulder surgery. He's been diagnosed with a rotator cuff tear, not responding to conservative management. Past Medical History  Luis A  has a past medical history of Bladder stone, BPH with obstruction/lower urinary tract symptoms, Caffeine use, Cataracts, bilateral, Chronic back pain, COPD (chronic obstructive pulmonary disease) ([address] Hypertension, Irritable bowel syndrome, Kidney stone, Neuropathy, Osteoarthritis, Pancreatitis, and Type II or unspecified type diabetes mellitus without mention of complication, not stated as uncontrolled. Past Surgical History  Luis A  has a past surgical history that includes back surgery; Cholecystectomy; Vasectomy; Spine surgery (05/18/2010); Eye surgery (2015); other surgical history; eye surgery; Bladder surgery; Lithotripsy; Ureteroscopy; Cystocopy (04/06/2022); Cystoscopy (Left, 04/06/2022); Cystocopy (04/20/2022); Cystoscopy (Left, 04/20/2022); Colonoscopy; Leg Tendon Surgery (1962); and Tonsillectomy.     Current Medications  Current Facility-Administered Medications   Medication Dose Route Frequency Provider Last Rate Last Admin    lidocaine 1 % injection 1 mL  1 mL IntraDERmal Once PRN Martha Cortez MD        lactated ringers infusion   IntraVENous Continuous Martha Cortez MD        sodium chloride flush 0.9 % injection 5-40 mL  5-40 mL IntraVENous 2 times per day Martha Cortez MD        sodium chloride flush 0.9 % injection 5-40 mL  5-40 mL IntraVENous PRN Martha Cortez MD        0.9 % sodium chloride infusion   IntraVENous PRN Martha Cortez  mL/hr at 10/25/22 0625 New Bag at 10/25/22 0625    sodium chloride flush 0.9 % injection 5-40 mL  5-40 mL IntraVENous 2 times per day Adan Sahni MD        sodium chloride flush 0.9 % injection 5-40 mL  5-40 mL IntraVENous PRN Adan Sahni MD        0.9 % sodium chloride infusion IntraVENous PRN Kate Landis MD        ceFAZolin (ANCEF) 2,000 mg in sodium chloride 0.9 % 50 mL IVPB (mini-bag)  2,000 mg IntraVENous On Call to [address] MD        dexamethasone (DECADRON) injection 4 mg  4 mg IntraVENous Once Kate Landis MD        EPINEPHrine 1 MG/ML injection             fentaNYL (SUBLIMAZE) 100 MCG/2ML injection             midazolam (VERSED) 2 MG/2ML injection             ceFAZolin (ANCEF) 2 g injection             dexamethasone (DECADRON) 4 MG/ML injection             bupivacaine liposome (EXPAREL) 1.3 % injection                Allergies  Allergies have been reviewed. Luis A is allergic to iodine. Social History  Luis A  reports that he quit smoking about 1 months ago. His smoking use included cigarettes. He started smoking about 42 years ago. He has a 40.00 pack-year smoking history. He has quit using smokeless tobacco. He reports that he does not currently use alcohol. He reports that he does not use drugs. Family History  Luis A's family history includes Arthritis in his mother; Cancer in his father. Review of Systems   History obtained from the patient. REVIEW OF SYSTEMS:   Constitution: negative for fever, chills, weight loss or malaise   Musculoskeletal: As noted in the HPI   Neurologic: As noted in the HPI    Physical Exam  /75   Pulse 91   Temp (!) 96.7 °F (35.9 °C) (Skin)   Resp 19   Ht 6' 2\" (1.88 m)   Wt 190 lb 9.6 oz (86.5 kg)   SpO2 97%   BMI 24.47 kg/m²    General Appearance: alert, well appearing, and in no distress  Mental Status: alert, oriented to person, place, and time  Left shoulder and upper extremity with intact skin. 2+ radial pulse. Heart: RRR  Lungs: CTA bilaterally      Assessment and Caitlin Manoj is a 67 y.o. old male with a torn left rotator cuff. He's electing to forgo continued conservative management and proceed with surgical intervention by way of an arthroscopic rotator cuff repair.  The surgical site was confirmed by the patient and me. The risks, benefits, expected outcome, and alternative to the recommended procedure have been discussed with the patient. Patient understands and wants to proceed with the procedure. This note is created with the assistance of a speech recognition program.  While intending to generate adocument that actually reflects the content of the visit, the document can still have some errors including those of syntax and sound a like substitutions which may escape proof reading. It such instances, actual meaningcan be extrapolated by contextual diversion.

## 2022-10-25 NOTE — ANESTHESIA PROCEDURE NOTES
Peripheral Block    Patient location during procedure: pre-op  Reason for block: post-op pain management and at surgeon's request  Start time: 10/25/2022 6:52 AM  End time: 10/25/2022 6:54 AM  Staffing  Performed: anesthesiologist   Anesthesiologist: Leyla Go MD  Preanesthetic Checklist  Completed: patient identified, IV checked, site marked, risks and benefits discussed, surgical/procedural consents, equipment checked, pre-op evaluation, timeout performed, anesthesia consent given, oxygen available, monitors applied/VS acknowledged, fire risk safety assessment completed and verbalized and blood product R/B/A discussed and consented  Peripheral Block   Patient position: sitting  Prep: ChloraPrep  Provider prep: mask and sterile gloves  Patient monitoring: cardiac monitor, continuous pulse ox and frequent blood pressure checks  Block type: Brachial plexus  Interscalene  Laterality: left  Injection technique: single-shot  Guidance: nerve stimulator and ultrasound guided    Needle   Needle type: insulated echogenic nerve stimulator needle   Needle gauge: 22 G  Needle localization: anatomical landmarks, nerve stimulator and ultrasound guidance  Needle length: 2 IN.   Assessment   Injection assessment: negative aspiration for heme, no paresthesia on injection, local visualized surrounding nerve on ultrasound and no intravascular symptoms  Paresthesia pain: none  Slow fractionated injection: yes  Hemodynamics: stableno  Outcomes: patient tolerated procedure well    Additional Notes  (+) LEFT DELTOID TWITCH FROM 1.5MA DOWN TO 0.7MA  Medications Administered  bupivacaine (PF) 0.5 % - Perineural   10 mL - 10/25/2022 6:54:00 AM  bupivacaine liposome 1.3 % - Perineural   10 mL - 10/25/2022 6:54:00 AM

## 2022-10-26 NOTE — TELEPHONE ENCOUNTER
Unfortunately, he would have to wait 3 months before a shoulder replacement on the left. With regards to his right shoulder I would have to re-evaluate him to determine if he's a candidate for a reverse shoulder replacement.

## 2022-11-09 ENCOUNTER — OFFICE VISIT (OUTPATIENT)
Dept: ORTHOPEDIC SURGERY | Age: 72
End: 2022-11-09

## 2022-11-09 ENCOUNTER — CARE COORDINATION (OUTPATIENT)
Dept: CARE COORDINATION | Age: 72
End: 2022-11-09

## 2022-11-09 ENCOUNTER — TELEPHONE (OUTPATIENT)
Dept: ORTHOPEDIC SURGERY | Age: 72
End: 2022-11-09

## 2022-11-09 VITALS — RESPIRATION RATE: 16 BRPM | WEIGHT: 190 LBS | HEIGHT: 74 IN | BODY MASS INDEX: 24.38 KG/M2

## 2022-11-09 DIAGNOSIS — M75.102 TEAR OF LEFT ROTATOR CUFF, UNSPECIFIED TEAR EXTENT, UNSPECIFIED WHETHER TRAUMATIC: ICD-10-CM

## 2022-11-09 DIAGNOSIS — M19.012 OSTEOARTHRITIS OF LEFT GLENOHUMERAL JOINT: Primary | ICD-10-CM

## 2022-11-09 DIAGNOSIS — R93.6 ABNORMAL FINDINGS ON DIAGNOSTIC IMAGING OF LIMBS: ICD-10-CM

## 2022-11-09 PROCEDURE — 99024 POSTOP FOLLOW-UP VISIT: CPT | Performed by: ORTHOPAEDIC SURGERY

## 2022-11-09 NOTE — TELEPHONE ENCOUNTER
Left shoulder CT for RSA that was ordered on 11/9/22 is uploaded into [address]. Still needs to be added to list at [facility].

## 2022-11-09 NOTE — PROGRESS NOTES
Procedure: Left shoulder arthroscopic debridement  Date of procedure: 10/25/2022    HPI:  Jd Guerin is a 67 y.o. male who is approximately 2 weeks status post aforementioned procedure. Indicates that he is still dealing with a lot of pain in the shoulder. He rates this as a 9/10. He has remained in his sling. Physical examination:  Evaluation of patient's left shoulder and upper extremity demonstrates his incisions to be clean, dry, intact. There is no warmth, erythema, wound dehiscence or drainage. Sensation is grossly intact light touch in all dermatomes and he has a 2+ radial pulse with brisk capillary refill in his fingers. Impression and plan:  Jd Guerin is a 67 y.o. male who is approximately 2 weeks status post an arthroscopic left shoulder debridement. He continues to struggle with shoulder pain at this time. Intraoperatively he was noted to have severe degeneration involving especially the glenoid surface with diffuse grade IV chondromalacia. He also had a massive irreparable rotator cuff tear and diffuse synovitis. Following appropriate debridement a cortisone injection was administered but this has failed to provide any pain relief as suggested above. I had a discussion with the patient and his daughter today about the condition of his shoulder and treatment options moving forward. At this time I believe he would be a candidate for reverse shoulder arthroplasty given the severe glenohumeral joint osteoarthritis in addition to a massive irreparable rotator cuff tear. We discussed the details of the procedure, risks and benefits of surgery, expected outcome and postoperative recovery course. Risks as discussed included but were not limited to risk of infection, wound healing problems, stiffness, progressive arthritis, persistent pain, re-tear of the rotator cuff, failure of the rotator cuff repair to heal, suture and/or anchor related problems, vascular injury, excessive bleeding, temporary and/or permanent nerve injury, medical risks including DVT, PE, and stroke, and risk of anesthesia. He demonstrated a good understanding of our discussion and at this time would like to proceed with surgical intervention as outlined above. Due to the fact that he just recently had surgery and a cortisone injection I would recommend he wait a full 3 months before surgery. We will begin the process of getting him scheduled as he is going to be away with his daughter in [place] during what would be his typical 6-week postop visit.

## 2022-11-11 ENCOUNTER — TELEPHONE (OUTPATIENT)
Dept: ORTHOPEDIC SURGERY | Age: 72
End: 2022-11-11

## 2022-12-02 ENCOUNTER — CARE COORDINATION (OUTPATIENT)
Dept: CARE COORDINATION | Age: 72
End: 2022-12-02

## 2022-12-02 NOTE — CARE COORDINATION
Patient goals reviewed: 1. Reviewed working on meal patten,  eating 2-3meals/d skips breakfast occassionally. Reviewed and encouraged set meal times daily. Quick/easy breakfast suggestions discussed, will send patient a list of suggestions. Goal is 3 meals daily spaced every 4-5 hours. 2. Reviewed what foods contain carbohydrate to limit and how to measure out portions. Encouraged patient to limit carb intake to 60gms/meal, 15-30gms/snack. Patient admits snacks often in the evening, discussed low carb snacking options and will send patient lists of low carb snacks. 3. Reviewed plate method, encouraged patient to increase intake of non-starchy vegetables. Goal is 1/2 of  plate to be non-starchy vegetables, 1/4 lean protein, 1/4 carbs. Foods from each category reviewed along with what a serving size is. 4. Reviewed avoiding/limiting sweets and sweetened drinks. Patient drinks mostly water but does drink juice and some milk. Patient has cut out juices. Discussed sugar free alternatives to sweets- sugar free jello, pudding, ect. Encouraged patient to keep sweets out of the house as much as possible and limit to once a week or less. 5. Reviewed avoiding canned, prepackaged foods, processed meats and cheese. Reviewed processed meats in detail to avoid- sausage, morgan, bologna, ham, ect. Reviewed reading food labels to identify foods to avoid. Goal is <2gm of sodium/day. 12/2/22-patient doing well, has met his goal of keeping sugars down, last A1c was 6.6. Patient has no further nutrition questions, provided with contact information to call with questions. Will remove from panel.   JUANCHO Zapata

## 2022-12-05 ENCOUNTER — NURSE TRIAGE (OUTPATIENT)
Dept: OTHER | Facility: CLINIC | Age: 72
End: 2022-12-05

## 2022-12-05 NOTE — TELEPHONE ENCOUNTER
Location of patient: [address] call from [place] at [facility] with [email or web address]. Subjective: Caller states \"I have been having problems with vertigo - I am not real active right now and I think the dizziness is related to that\"     Current Symptoms: dizziness (been using a tripod cane since 2011), No inner issues, no fever    Onset: 3 months ago; intermittent    Associated Symptoms: reduced activity due to recent shoulder surgery    Pain Severity:  has shoulder pain but no pain that he relates to dizziness     Temperature: denies       What has been tried: occasional walk around the block, extra fluids    LMP: NA Pregnant: NA    Recommended disposition: See PCP within 3 Days    Care advice provided, patient verbalizes understanding; denies any other questions or concerns; instructed to call back for any new or worsening symptoms. Patient/Caller agrees with recommended disposition; writer provided warm transfer to Jeffery Rich at [facility] for appointment scheduling    Attention Provider: Thank you for allowing me to participate in the care of your patient. The patient was connected to triage in response to information provided to the ECC/PSC. Please do not respond through this encounter as the response is not directed to a shared pool.       Reason for Disposition   MILD dizziness (e.g., walking normally) and has NOT been evaluated by physician for this (Exception: dizziness caused by heat exposure, sudden standing, or poor fluid intake)    Protocols used: Dizziness-ADULT-[place]

## 2022-12-08 ENCOUNTER — PATIENT MESSAGE (OUTPATIENT)
Dept: ORTHOPEDIC SURGERY | Age: 72
End: 2022-12-08

## 2022-12-08 DIAGNOSIS — R93.6 ABNORMAL FINDINGS ON DIAGNOSTIC IMAGING OF LIMBS: ICD-10-CM

## 2022-12-08 DIAGNOSIS — M19.012 OSTEOARTHRITIS OF LEFT GLENOHUMERAL JOINT: ICD-10-CM

## 2022-12-08 DIAGNOSIS — M75.102 TEAR OF LEFT ROTATOR CUFF, UNSPECIFIED TEAR EXTENT, UNSPECIFIED WHETHER TRAUMATIC: Primary | ICD-10-CM

## 2022-12-08 NOTE — TELEPHONE ENCOUNTER
Dr. Nina Dang   Please see message and advise if you would like to refill the pain medication or if there is something else that you recommend. SP shoulder debridement on 10/25/22 and is scheduled for RSA on 1/24/23. From: Buzz Charlton  To: Dr. Gonzalo Lea: 12/8/2022 11:35 AM EST  Subject: Continuing back and shoulder pain     mark Desouza prescribed a prescription for hydrocodone-acetaminophen on 21october ([id number]). I take less than one per day and only have a few left. I usually take it in the evening so the pain subsides enough for me to sleep. The pain is still quite severe. Would you send an RX to [address] [place] [address] Beem to get me through to my shoulder surgery on  January24, 2023. Thank-you.  --Gene (call me Glory Scott) Beverly Paz

## 2022-12-19 RX ORDER — TRAMADOL HYDROCHLORIDE 50 MG/1
50 TABLET ORAL EVERY 6 HOURS PRN
Qty: 20 TABLET | Refills: 0 | Status: SHIPPED | OUTPATIENT
Start: 2022-12-19 | End: 2023-12-19

## 2022-12-28 ENCOUNTER — HOSPITAL ENCOUNTER (OUTPATIENT)
Age: 72
Setting detail: SPECIMEN
Discharge: HOME | End: 2022-12-28

## 2022-12-28 DIAGNOSIS — M19.012 OSTEOARTHRITIS OF LEFT GLENOHUMERAL JOINT: ICD-10-CM

## 2022-12-28 DIAGNOSIS — R93.6 ABNORMAL FINDINGS ON DIAGNOSTIC IMAGING OF LIMBS: ICD-10-CM

## 2022-12-28 DIAGNOSIS — M75.102 TEAR OF LEFT ROTATOR CUFF, UNSPECIFIED TEAR EXTENT, UNSPECIFIED WHETHER TRAUMATIC: ICD-10-CM

## 2022-12-28 LAB
BILIRUBIN URINE: ABNORMAL
CASTS UA: ABNORMAL /LPF (ref 0–2)
CASTS UA: ABNORMAL /LPF (ref 0–2)
COLOR: ABNORMAL
CRYSTALS, UA: ABNORMAL /HPF
CRYSTALS, UA: ABNORMAL /HPF
EPITHELIAL CELLS UA: ABNORMAL /HPF (ref 0–5)
GLUCOSE URINE: NEGATIVE
HCT VFR BLD CALC: 36.2 % (ref 40.7–50.3)
HEMOGLOBIN: 10.9 G/DL (ref 13–17)
KETONES, URINE: ABNORMAL
LEUKOCYTE ESTERASE, URINE: NEGATIVE
MCH RBC QN AUTO: 26.5 PG (ref 25.2–33.5)
MCHC RBC AUTO-ENTMCNC: 30.1 G/DL (ref 28.4–34.8)
MCV RBC AUTO: 87.9 FL (ref 82.6–102.9)
MUCUS: ABNORMAL
NITRITE, URINE: NEGATIVE
NRBC AUTOMATED: 0 PER 100 WBC
PDW BLD-RTO: 15.1 % (ref 11.8–14.4)
PH UA: 5.5 (ref 5–8)
PLATELET # BLD: 328 K/UL (ref 138–453)
PMV BLD AUTO: 9.7 FL (ref 8.1–13.5)
PROTEIN UA: ABNORMAL
RBC # BLD: 4.12 M/UL (ref 4.21–5.77)
RBC UA: ABNORMAL /HPF (ref 0–2)
SPECIFIC GRAVITY UA: 1.03 (ref 1–1.03)
TURBIDITY: ABNORMAL
URINE HGB: NEGATIVE
UROBILINOGEN, URINE: NORMAL
WBC # BLD: 10.1 K/UL (ref 3.5–11.3)
WBC UA: ABNORMAL /HPF (ref 0–5)

## 2022-12-29 LAB
ANION GAP SERPL CALCULATED.3IONS-SCNC: 14 MMOL/L (ref 9–17)
BUN BLDV-MCNC: 20 MG/DL (ref 8–23)
CALCIUM SERPL-MCNC: 9.5 MG/DL (ref 8.6–10.4)
CHLORIDE BLD-SCNC: 100 MMOL/L (ref 98–107)
CO2: 28 MMOL/L (ref 20–31)
CREAT SERPL-MCNC: 0.81 MG/DL (ref 0.7–1.2)
GFR SERPL CREATININE-BSD FRML MDRD: >60 ML/MIN/1.73M2
GLUCOSE BLD-MCNC: 210 MG/DL (ref 70–99)
POTASSIUM SERPL-SCNC: 3.6 MMOL/L (ref 3.7–5.3)
SODIUM BLD-SCNC: 142 MMOL/L (ref 135–144)

## 2023-01-10 ENCOUNTER — TELEPHONE (OUTPATIENT)
Dept: ORTHOPEDIC SURGERY | Age: 73
End: 2023-01-10

## 2023-01-10 ENCOUNTER — HOSPITAL ENCOUNTER (OUTPATIENT)
Dept: CT IMAGING | Age: 73
Discharge: HOME | End: 2023-01-12
Payer: MEDICARE

## 2023-01-10 DIAGNOSIS — M75.102 TEAR OF LEFT ROTATOR CUFF, UNSPECIFIED TEAR EXTENT, UNSPECIFIED WHETHER TRAUMATIC: ICD-10-CM

## 2023-01-10 DIAGNOSIS — M19.012 OSTEOARTHRITIS OF LEFT GLENOHUMERAL JOINT: ICD-10-CM

## 2023-01-10 PROCEDURE — 73200 CT UPPER EXTREMITY W/O DYE: CPT

## 2023-01-10 NOTE — TELEPHONE ENCOUNTER
Closing encounter. Pt sent registracija vozilahart message that has also been forwarded with impression from study.

## 2023-01-10 NOTE — TELEPHONE ENCOUNTER
Dr. Godinez Read, radiologist called in with results from CT scan completed today. Pt is scheduled for L RSA on 1/24/23. If anything is needed for the pt, please let us know.

## 2023-01-11 ENCOUNTER — TELEPHONE (OUTPATIENT)
Dept: FAMILY MEDICINE CLINIC | Age: 73
End: 2023-01-11

## 2023-01-11 ENCOUNTER — OFFICE VISIT (OUTPATIENT)
Dept: ORTHOPEDIC SURGERY | Age: 73
End: 2023-01-11

## 2023-01-11 ENCOUNTER — HOSPITAL ENCOUNTER (OUTPATIENT)
Age: 73
Setting detail: SPECIMEN
Discharge: HOME | End: 2023-01-11

## 2023-01-11 VITALS — WEIGHT: 200 LBS | HEIGHT: 74 IN | BODY MASS INDEX: 25.67 KG/M2 | RESPIRATION RATE: 16 BRPM

## 2023-01-11 DIAGNOSIS — Z01.818 PRE-OP TESTING: ICD-10-CM

## 2023-01-11 DIAGNOSIS — M25.512 CHRONIC LEFT SHOULDER PAIN: Primary | ICD-10-CM

## 2023-01-11 DIAGNOSIS — G89.29 CHRONIC LEFT SHOULDER PAIN: Primary | ICD-10-CM

## 2023-01-11 LAB
ANION GAP SERPL CALCULATED.3IONS-SCNC: 13 MMOL/L (ref 9–17)
BUN BLDV-MCNC: 19 MG/DL (ref 8–23)
C-REACTIVE PROTEIN: 35.9 MG/L (ref 0–5)
CALCIUM SERPL-MCNC: 9.4 MG/DL (ref 8.6–10.4)
CHLORIDE BLD-SCNC: 94 MMOL/L (ref 98–107)
CO2: 28 MMOL/L (ref 20–31)
CREAT SERPL-MCNC: 0.69 MG/DL (ref 0.7–1.2)
GFR SERPL CREATININE-BSD FRML MDRD: >60 ML/MIN/1.73M2
GLUCOSE BLD-MCNC: 108 MG/DL (ref 70–99)
HCT VFR BLD CALC: 38.4 % (ref 40.7–50.3)
HEMOGLOBIN: 11.7 G/DL (ref 13–17)
MCH RBC QN AUTO: 26.4 PG (ref 25.2–33.5)
MCHC RBC AUTO-ENTMCNC: 30.5 G/DL (ref 28.4–34.8)
MCV RBC AUTO: 86.5 FL (ref 82.6–102.9)
NRBC AUTOMATED: 0 PER 100 WBC
PDW BLD-RTO: 15.5 % (ref 11.8–14.4)
PLATELET # BLD: 382 K/UL (ref 138–453)
PMV BLD AUTO: 9.7 FL (ref 8.1–13.5)
POTASSIUM SERPL-SCNC: 3.8 MMOL/L (ref 3.7–5.3)
RBC # BLD: 4.44 M/UL (ref 4.21–5.77)
SODIUM BLD-SCNC: 135 MMOL/L (ref 135–144)
WBC # BLD: 13 K/UL (ref 3.5–11.3)

## 2023-01-11 RX ORDER — TRAMADOL HYDROCHLORIDE 50 MG/1
50 TABLET ORAL EVERY 4 HOURS PRN
Qty: 42 TABLET | Refills: 0 | Status: SHIPPED | OUTPATIENT
Start: 2023-01-11 | End: 2023-01-18

## 2023-01-11 NOTE — PROGRESS NOTES
HPI: Mr. Manuela Chaves is here today to review the results of his left shoulder CT scan. This was completed on 1/10/2023. I did review the images with the patient independently and it demonstrates degeneration of the glenohumeral joint associated with a moth-eaten appearance of the humeral head and glenoid. This is a significant change compared to his CT scan from 8/22/2022. I explained to the patient that the CT scan findings are concerning for the presence of a septic joint possibly osteomyelitis. Given the patient's history and the imaging findings it would not be prudent to move ahead with his planned shoulder replacement. Instead we need to begin a work-up to rule out and infection and treat any infection if present. Consequently using aseptic technique and following appropriate consent I did attempt to aspirate the patient's left glenohumeral joint as outlined below. This was a dry aspiration as I was unable to obtain any  fluid. Lab work was ordered by way of a CBC, ESR and CRP. An MRI study was also ordered to better assess the osseous structures for possible osteomyelitis and any pockets of fluid as suggested by the radiology read of the CT scan. Finally a referral to the infectious disease service was placed. Ultimately we may be moving towards an arthroscopic biopsy in addition to irrigation and debridement of the shoulder with subsequent antibiotic therapy. I would recommend avoiding initiation of any antibiotic therapy until appropriate cultures from the joint can be obtained. I did explain to the patient that this could be due to a C. Acnes infection, a diagnosis which can be difficult to obtain. Of note evaluation of the patient's left shoulder and upper extremity today demonstrates diffuse moderate swelling in the shoulder with limited range of motion which is quite painful. No warmth erythema noted. No areas of induration or fluctuance appreciated.     Procedure: left shoulder glenohumeral joint aspiration  Following an appropriate discussion with the patient regarding the risks and benefits of the procedure he consented to proceed. his left shoulder was prepped using chlorhexadine solution. Using aseptic technique and through a posterior approach, an 18-gauge needle attached to a 20 cc syringe was inserted into his left shoulder glenohumeral joint space and an attempt to aspirate some fluid. No fluid was obtained. A band aid was applied to the aspiration site. he tolerated the aspiration site with no immediate adverse reactions.

## 2023-01-12 ENCOUNTER — OFFICE VISIT (OUTPATIENT)
Dept: INFECTIOUS DISEASES | Age: 73
End: 2023-01-12
Payer: MEDICARE

## 2023-01-12 ENCOUNTER — HOSPITAL ENCOUNTER (OUTPATIENT)
Age: 73
Setting detail: SPECIMEN
Discharge: HOME | End: 2023-01-12

## 2023-01-12 ENCOUNTER — HOSPITAL ENCOUNTER (OUTPATIENT)
Dept: MRI IMAGING | Age: 73
Discharge: HOME | End: 2023-01-14
Payer: MEDICARE

## 2023-01-12 VITALS
BODY MASS INDEX: 23.54 KG/M2 | SYSTOLIC BLOOD PRESSURE: 122 MMHG | HEART RATE: 110 BPM | DIASTOLIC BLOOD PRESSURE: 74 MMHG | HEIGHT: 74 IN | TEMPERATURE: 97.1 F | WEIGHT: 183.4 LBS

## 2023-01-12 DIAGNOSIS — M60.012 INFECTIVE MYOSITIS OF LEFT SHOULDER: ICD-10-CM

## 2023-01-12 DIAGNOSIS — M25.512 CHRONIC LEFT SHOULDER PAIN: ICD-10-CM

## 2023-01-12 DIAGNOSIS — R52 INTRACTABLE PAIN: ICD-10-CM

## 2023-01-12 DIAGNOSIS — R52 INTRACTABLE PAIN: Primary | ICD-10-CM

## 2023-01-12 DIAGNOSIS — G89.29 CHRONIC LEFT SHOULDER PAIN: ICD-10-CM

## 2023-01-12 PROCEDURE — 1123F ACP DISCUSS/DSCN MKR DOCD: CPT | Performed by: INTERNAL MEDICINE

## 2023-01-12 PROCEDURE — 73221 MRI JOINT UPR EXTREM W/O DYE: CPT

## 2023-01-12 PROCEDURE — 3074F SYST BP LT 130 MM HG: CPT | Performed by: INTERNAL MEDICINE

## 2023-01-12 PROCEDURE — 99204 OFFICE O/P NEW MOD 45 MIN: CPT | Performed by: INTERNAL MEDICINE

## 2023-01-12 PROCEDURE — 3078F DIAST BP <80 MM HG: CPT | Performed by: INTERNAL MEDICINE

## 2023-01-12 ASSESSMENT — ENCOUNTER SYMPTOMS
GASTROINTESTINAL NEGATIVE: 1
RESPIRATORY NEGATIVE: 1

## 2023-01-12 NOTE — PROGRESS NOTES
[facility]   Office Consult Note  Today's Date and Time: 1/12/2023, 9:52 AM    Impression:     1. Intractable pain    2. Infective myositis of left shoulder         Recommendations   The CAT scan findings raise concern for infection and aspirate did not yield fluid. The patient is scheduled for an MRI later today  At this point in time I will just check CPK and myoglobin due to the concern for myositis to see if these are elevated  Depending on the MRI findings we will decide whether a course of antibiotics is appropriate for him  All of the above was discussed with the patient who expressed understanding and once I review the MRI we will reach out to him and make further recommendations    I have ordered the following medications/ labs:  Orders Placed This Encounter   Procedures    Myoglobin, Blood     Standing Status:   Future     Standing Expiration Date:   1/12/2024    CK     Standing Status:   Future     Standing Expiration Date:   1/12/2024      No orders of the defined types were placed in this encounter. 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     The patient reports that he had fallen in the remote past and was treated conservatively but has been diagnosed with rotator cuff tear in the left shoulder and had a left shoulder injection with dilute contrast pre-CT without incident on 8/22/2022 and he did have some increasing pain after the arthrogram.    The patient ended up being taken to the operating room 10/25/2022 underwent a left shoulder arthroscopy and was found to have left severe degeneration involving the glenoid surface with diffuse grade 4 chronic malacia/glenohumeral joint osteoarthritis and a left shoulder massive irreparable full-thickness rotator cuff tear and diffuse synovitis for which he underwent extensive arthroscopic debridement and left shoulder manipulation    It has been recommended that the patient undergo reverse shoulder arthroplasty given the severe glenohumeral joint osteoarthritis in addition to the repairable rotator cuff tear. Reverse shoulder    The patient had a left shoulder CT scan done 1/10/2023 and there was a significant change in his CT scan compared to 8/22/2022 which raise concern for presence of septic joint/possibly osteomyelitis. Due to these findings raising concern for infection the planned reverse total shoulder replacement has been put on hold. Inflammatory markers with ESR CRP has been ordered an MRI study has also been ordered to better evaluate to the shoulder findings. The patient has been sent in to see me and to be considered for antibiotic therapy. An aspiration was attempted in the left shoulder but no fluid was obtained. The patient does have significant pain with limited range of motion and all of this has been worsening over time. I have personally reviewed the past medical history,past surgical history, medications, social history, and family history, and I have updated the database accordingly. PastMedical History:     Past Medical History:   Diagnosis Date    Bladder stone     BPH with obstruction/lower urinary tract symptoms     Caffeine use     4 coffee/2 soda per day    Cataracts, bilateral     Chronic back pain     COPD (chronic obstructive pulmonary disease) ([facility] [address]     History of blood transfusion 2010    s/p 10 hour back surgery, pt unsure but thinks he had transfusion?     Hypertension     Irritable bowel syndrome     Kidney stone     Neuropathy     feet    Osteoarthritis Pancreatitis 1988    Type II or unspecified type diabetes mellitus without mention of complication, not stated as uncontrolled      Past Surgical  History:     Past Surgical History:   Procedure Laterality Date    BACK SURGERY      BLADDER SURGERY      CHOLECYSTECTOMY      COLONOSCOPY      6-7 polyps    CYSTOSCOPY  04/06/2022    CYSTOSCOPY URETEROSCOPY HOLMIUM LASER LEFT URETERAL STENT INSERTION - Left    CYSTOSCOPY Left 04/06/2022    CYSTOSCOPY URETEROSCOPY HOLMIUM LASER LEFT URETERAL STENT INSERTION performed by Valdemar Garcia MD at [facility]  04/20/2022    CYSTOSCOPY URETEROSCOPY LEFT STENT PLACEMENT WITH STONE BASKETING AND HOLMIUM LASER (    CYSTOSCOPY Left 04/20/2022    CYSTOSCOPY URETEROSCOPY LEFT STENT PLACEMENT WITH STONE BASKETING AND HOLMIUM LASER performed by Valdemar Garcia MD at [facility]  2015    macular scrape    EYE SURGERY      LEG TENDON SURGERY  1962    ankle    LITHOTRIPSY      9-21-17    OTHER SURGICAL HISTORY      implanted nerve stimulater in lumbar back    SHOULDER ARTHROSCOPY Left 10/25/2022    LEFT SHOULDER ARTHROSCOPY WITH EXTENSIVE DEBRIDMENT AND PRE-OPERATIVE INTERSCALENE BLOCK WITH EXPAREL (Left: Shoulder)    SHOULDER ARTHROSCOPY Left 10/25/2022    LEFT SHOULDER ARTHROSCOPY WITH EXTENSIVE DEBRIDMENT AND PRE-OPERATIVE INTERSCALENE BLOCK WITH EXPAREL performed by Florentin Reagan MD at [place] [id number]  05/18/2010    DR. Zoya Renae Buttram (NS) fusion and implantation of hardware    TONSILLECTOMY      URETEROSCOPY      with string stent 9-21-17    VASECTOMY       Medications:     Current Outpatient Medications   Medication Sig Dispense Refill    traMADol (ULTRAM) 50 MG tablet Take 1 tablet by mouth every 4 hours as needed for Pain for up to 7 days. Intended supply: 7 days.  Take lowest dose possible to manage pain Max Daily Amount: 300 mg 42 tablet 0    traMADol (ULTRAM) 50 MG tablet Take 1 tablet by mouth every 6 hours as needed for Pain. 20 tablet 0    Continuous Blood Gluc Sensor (FREESTYLE ALFONSO 2 SENSOR) MISC 1 each by Does not apply route every 14 days 1 each 0    Continuous Blood Gluc Sensor (FREESTYLE ALFONSO 2 SENSOR) MISC 1 each by Does not apply route every 14 days 1 each 0    metFORMIN (GLUCOPHAGE) 1000 MG tablet Take 1 tablet by mouth 2 times daily (with meals) 180 tablet 1    lisinopril (PRINIVIL;ZESTRIL) 10 MG tablet Take 1 tablet by mouth daily 90 tablet 1    chlorthalidone (HYGROTON) 25 MG tablet Take 1 tablet by mouth daily 90 tablet 1    pioglitazone (ACTOS) 30 MG tablet Take 1 tablet by mouth daily 90 tablet 1    DULoxetine (CYMBALTA) 30 MG extended release capsule Take 1 capsule by mouth 2 times daily 180 capsule 1    insulin detemir (LEVEMIR FLEXTOUCH) 100 UNIT/ML injection pen Inject 63 Units into the skin nightly 20 Adjustable Dose Pre-filled Pen Syringe 1    celecoxib (CELEBREX) 200 MG capsule Take 1 capsule by mouth daily 90 capsule 1    gabapentin (NEURONTIN) 300 MG capsule Take 1 capsule by mouth 2 times daily for 180 days. Intended supply: 90 days 180 capsule 1    insulin aspart (NOVOLOG) 100 UNIT/ML injection pen Inject 2 Units into the skin 3 times daily (before meals) 0-150 = 0 units, 151-200 = 2 units, 201-250 = 4 units, 251-300 = 6 units, 301-350 = 8 units, 351-400 of greater = 10 units. Max dose 30units per day 3 Adjustable Dose Pre-filled Pen Syringe 5    ondansetron (ZOFRAN) 4 MG tablet Take 1 tablet by mouth daily as needed for Nausea or Vomiting 20 tablet 0    Handicap Placard MISC by Does not apply route Exp: 10/2027 2 each 0    Continuous Blood Gluc Sensor (FREESTYLE ALFONSO 2 SENSOR) MISC 1 applicator by Does not apply route every 14 days 1 each 5    Cholecalciferol (VITAMIN D3 PO) Take by mouth daily      blood glucose monitor strips Test 4 times a day & as needed for symptoms of irregular blood glucose.  Dispense sufficient amount for indicated testing frequency plus additional to accommodate PRN testing needs. 300 strip 1    Insulin Pen Needle 31G X 5 MM MISC 1 each by Does not apply route 3 times daily 100 each 3    Lancets MISC Test twice a day and as needed, Please send lancets for Verio brand meter 180 each 2    multivitamin (THERAGRAN) per tablet Take 1 tablet by mouth daily. albuterol sulfate HFA (PROVENTIL;VENTOLIN;PROAIR) 108 (90 Base) MCG/ACT inhaler Inhale 2 puffs into the lungs every 6 hours as needed for Wheezing or Shortness of Breath (or take 1-2 puffs 30 mins prior to strenous activity) 36 g 1    baclofen (LIORESAL) 10 MG tablet Take 1 tablet by mouth 3 times daily as needed (muscle spasm) (Patient not taking: Reported on 10/11/2022) 30 tablet 0     No current facility-administered medications for this visit.       Social History:     Social History     Socioeconomic History    Marital status:      Spouse name: Not on file    Number of children: Not on file    Years of education: Not on file    Highest education level: Not on file   Occupational History    Not on file   Tobacco Use    Smoking status: Former     Packs/day: 1.00     Years: 40.00     Pack years: 40.00     Types: Cigarettes     Start date: 3/3/1980     Quit date: 2022     Years since quittin.3    Smokeless tobacco: Former   Vaping Use    Vaping Use: Former    Quit date: 3/17/2019   Substance and Sexual Activity    Alcohol use: Not Currently     Comment: occasional    Drug use: No    Sexual activity: Not Currently   Other Topics Concern    Not on file   Social History Narrative    Not on file     Social Determinants of Health     Financial Resource Strain: Not on file   Food Insecurity: Not on file   Transportation Needs: Not on file   Physical Activity: Not on file   Stress: Not on file   Social Connections: Not on file   Intimate Partner Violence: Not on file   Housing Stability: Not on file     Family History:     Family History   Problem Relation Age of Onset    Arthritis Mother     Cancer Father       Allergies:   Iodine     Review of Systems:   Review of Systems   Constitutional: Negative. HENT: Negative. Respiratory: Negative. Cardiovascular: Negative. Gastrointestinal: Negative. Genitourinary: Negative. Musculoskeletal: Negative. Left shoulder pain   Neurological: Negative. Psychiatric/Behavioral: Negative. Physical Examination :   /74 (Site: Right Upper Arm)   Pulse (!) 110   Temp 97.1 °F (36.2 °C)   Ht 6' 2\" (1.88 m)   Wt 183 lb 6.4 oz (83.2 kg)   BMI 23.55 kg/m²     Physical Exam  Constitutional:       Appearance: He is well-developed. HENT:      Head: Normocephalic and atraumatic. Cardiovascular:      Rate and Rhythm: Normal rate. Heart sounds: Normal heart sounds. No friction rub. No gallop. Pulmonary:      Effort: Pulmonary effort is normal.      Breath sounds: Normal breath sounds. No wheezing. Abdominal:      General: Bowel sounds are normal.      Palpations: Abdomen is soft. There is no mass. Tenderness: There is no abdominal tenderness. Musculoskeletal:         General: Swelling (left shoulder, clavicle area) present. Normal range of motion. Cervical back: Normal range of motion and neck supple. Lymphadenopathy:      Cervical: No cervical adenopathy. Skin:     General: Skin is warm and dry. Neurological:      Mental Status: He is alert and oriented to person, place, and time.        Medical Decision Making:   I haveindependently reviewed the following labs:  CBC with Differential:  Lab Results   Component Value Date/Time    WBC 13.0 01/11/2023 11:55 AM    WBC 10.1 12/28/2022 01:30 PM    HGB 11.7 01/11/2023 11:55 AM    HGB 10.9 12/28/2022 01:30 PM    HCT 38.4 01/11/2023 11:55 AM    HCT 36.2 12/28/2022 01:30 PM     01/11/2023 11:55 AM     12/28/2022 01:30 PM    LYMPHOPCT 14 09/15/2022 02:00 PM    LYMPHOPCT 3 08/29/2022 09:59 AM    MONOPCT 10 09/15/2022 02:00 PM    MONOPCT 5 08/29/2022 09:59 AM BMP:  Lab Results   Component Value Date/Time     01/11/2023 11:55 AM     12/28/2022 01:30 PM    K 3.8 01/11/2023 11:55 AM    K 3.6 12/28/2022 01:30 PM    CL 94 01/11/2023 11:55 AM     12/28/2022 01:30 PM    CO2 28 01/11/2023 11:55 AM    CO2 28 12/28/2022 01:30 PM    BUN 19 01/11/2023 11:55 AM    BUN 20 12/28/2022 01:30 PM    CREATININE 0.69 01/11/2023 11:55 AM    CREATININE 0.81 12/28/2022 01:30 PM    MG 1.8 04/23/2022 06:04 AM    MG 1.7 04/22/2022 06:30 PM     Hepatic Function Panel:   Lab Results   Component Value Date/Time    PROT 7.6 09/15/2022 02:00 PM    PROT 6.9 04/22/2022 06:30 PM    LABALBU 3.3 09/15/2022 02:00 PM    LABALBU 3.7 04/22/2022 06:30 PM    BILITOT 0.3 09/15/2022 02:00 PM    BILITOT 0.52 04/22/2022 06:30 PM    ALKPHOS 69 09/15/2022 02:00 PM    ALKPHOS 56 04/22/2022 06:30 PM    ALT 18 09/15/2022 02:00 PM    ALT 15 04/22/2022 06:30 PM    AST 15 09/15/2022 02:00 PM    AST 14 04/22/2022 06:30 PM     Lab Results   Component Value Date    CRP 35.9 (H) 01/11/2023     Component Ref Range & Units 8/29/22 0959    CRP 0.0 - 5.0 mg/L 221.3 High     Resulting [facility]              Specimen Collected: 08/29/22 09:59 EDT Last Resulted: 08/29/22 10:18 EDT             No results found for: SEDRATE    Imaging Studies:   CT OF THE LEFT SHOULDER WITHOUT CONTRAST 1/10/2023 10:47 am  Impression   1. Moderate edema of the rotator cuff musculature. Heterogeneous   attenuation, fluid density, and associated calcifications/ossific densities   involving the proximal triceps and trapezius muscles. Differential   considerations include underlying soft tissue mass/neoplasm/malignancy or   infection/pyomyositis. Given the rapid progression from the plain films and   CT of the left shoulder from 08/29/2022, findings are highly concerning for   infection/septic joint and osteomyelitis. 2. Erosive changes and associated ossific densities involving the   glenohumeral joint.   Moderate glenohumeral joint effusion. Underlying septic   joint/infection versus inflammatory arthropathy are differential   considerations. 3. Mild degenerative change of the left AC joint. 4. No acute fracture or dislocation. 5. Enlarged left axillary lymph nodes. The findings were sent to the Radiology Results [address] at 12:26   pm on 1/10/2023 to be communicated to a licensed caregiver. CT OF THE LEFT SHOULDER WITH CONTRAST 8/22/2022 10:38 am  Impression   1. Retracted full-thickness tear of posterior supraspinatus and   anterosuperior infraspinatus between musculotendinous junction and critical   zone measuring 2.1 x 2.3 cm in greatest AP and transverse dimensions. 2. Mild atrophy and fatty degeneration of supraspinatus. 3. Mild-to-moderate emphysema. Coronary artery disease. 4. Mild dependent atelectasis and respiratory motion. 5. No acute fracture or dislocation. 6. Mild degenerative change of the left AC and glenohumeral joints. Cultures:   No culture data    Thank you for allowing us to participate in the care of this patient. Pleasecall with questions. Kalyani Berumen MD  [employer] messaging: [phone]    This note is created with the assistance of a speech recognition program.  While intending to generate a document that actually reflects the content ofthe visit, the document can still have some errors including those of syntax and sound a like substitutions which may escape proof reading. It such instances, actual meaning can be extrapolated by contextual diversion.

## 2023-01-13 ENCOUNTER — TELEPHONE (OUTPATIENT)
Dept: ORTHOPEDIC SURGERY | Age: 73
End: 2023-01-13

## 2023-01-13 LAB
MYOGLOBIN: 29 NG/ML (ref 28–72)
TOTAL CK: 49 U/L (ref 39–308)

## 2023-01-13 NOTE — TELEPHONE ENCOUNTER
Per Dr. Lottie Lloyd, patient needs to be seen asap to discuss MRI results. MRI shows an infected shoulder along with an abscess that will likely require surgical intervention and possibly a drain and several weeks of antibiotics. Patient was contacted and notified. Patient voiced understanding and appt with Dr. Lottie Lloyd was scheduled for 1/16/23 at [facility]. Regarding: MRI  ----- Message from Emily Julian ATC sent at 1/13/2023  8:07 AM EST -----       ----- Message from Luis A Grajeda \"Miguel\" to Andres Martini MD sent at 1/12/2023  6:19 PM -----   Well again, I dont understand 3/4 of what Im reading. But what little I do understand doesnt look all that good. Is there still a chance that I can get the operation on the 24th. And if so, are you going to have to do some cleaning flooding patching? Curious about the bicep. Anyways, I will be waiting with bated breath for what comes next. Thanks for putting up with me and lets get this operation over with  Luis A call me Miguel.

## 2023-01-16 ENCOUNTER — OFFICE VISIT (OUTPATIENT)
Dept: ORTHOPEDIC SURGERY | Age: 73
End: 2023-01-16
Payer: MEDICARE

## 2023-01-16 ENCOUNTER — ANESTHESIA EVENT (OUTPATIENT)
Dept: OPERATING ROOM | Age: 73
End: 2023-01-16
Payer: MEDICARE

## 2023-01-16 VITALS — WEIGHT: 183 LBS | HEIGHT: 74 IN | BODY MASS INDEX: 23.49 KG/M2 | RESPIRATION RATE: 16 BRPM

## 2023-01-16 DIAGNOSIS — M00.9 PYOGENIC ARTHRITIS OF LEFT SHOULDER REGION, DUE TO UNSPECIFIED ORGANISM (HCC): ICD-10-CM

## 2023-01-16 DIAGNOSIS — L02.414 ABSCESS OF LEFT SHOULDER: Primary | ICD-10-CM

## 2023-01-16 PROCEDURE — G8427 DOCREV CUR MEDS BY ELIG CLIN: HCPCS | Performed by: ORTHOPAEDIC SURGERY

## 2023-01-16 PROCEDURE — 1123F ACP DISCUSS/DSCN MKR DOCD: CPT | Performed by: ORTHOPAEDIC SURGERY

## 2023-01-16 PROCEDURE — 3017F COLORECTAL CA SCREEN DOC REV: CPT | Performed by: ORTHOPAEDIC SURGERY

## 2023-01-16 PROCEDURE — 99214 OFFICE O/P EST MOD 30 MIN: CPT | Performed by: ORTHOPAEDIC SURGERY

## 2023-01-16 PROCEDURE — G8484 FLU IMMUNIZE NO ADMIN: HCPCS | Performed by: ORTHOPAEDIC SURGERY

## 2023-01-16 PROCEDURE — 1036F TOBACCO NON-USER: CPT | Performed by: ORTHOPAEDIC SURGERY

## 2023-01-16 PROCEDURE — G8420 CALC BMI NORM PARAMETERS: HCPCS | Performed by: ORTHOPAEDIC SURGERY

## 2023-01-16 RX ORDER — SODIUM CHLORIDE 0.9 % (FLUSH) 0.9 %
5-40 SYRINGE (ML) INJECTION PRN
Status: CANCELLED | OUTPATIENT
Start: 2023-01-16

## 2023-01-16 RX ORDER — ACETAMINOPHEN 325 MG/1
1000 TABLET ORAL ONCE
Status: CANCELLED | OUTPATIENT
Start: 2023-01-16 | End: 2023-01-16

## 2023-01-16 RX ORDER — SODIUM CHLORIDE 9 MG/ML
INJECTION, SOLUTION INTRAVENOUS PRN
Status: CANCELLED | OUTPATIENT
Start: 2023-01-16

## 2023-01-16 RX ORDER — SODIUM CHLORIDE 0.9 % (FLUSH) 0.9 %
5-40 SYRINGE (ML) INJECTION EVERY 12 HOURS SCHEDULED
Status: CANCELLED | OUTPATIENT
Start: 2023-01-16

## 2023-01-16 NOTE — PROGRESS NOTES
HPI: Mr. Augusta Cash is here today to review the results of his left shoulder MRI study which was completed on 1/12/2023. I did review the images with the patient and his daughter was present with him today and it demonstrates diffuse edema about the left shoulder and glenohumeral joint associated with patchy increased signal within the proximal humerus/humeral head and destruction of the joint surfaces consistent with infection/osteomyelitis. There is also a large collection of fluid at the level of the axilla in the posterior aspect of the upper arm. CRP from 1/11/2023 was reviewed demonstrating elevation at 35.9. I did have a discussion with the patient and his daughter about the MRI findings. This is consistent with an infection involving the joint, possible osteomyelitis and an abscess. We had a discussion about how this may have begun and evolved over time. We also had a discussion about treatment options and I recommended proceeding with surgery by way of an incision and drainage of his abscess as well as an irrigation and debridement of the glenohumeral joint. I believe the debridement of the joint can be accomplished arthroscopically. We discussed the details of the procedure, risks and benefits of surgery, expected outcome and postoperative recovery course. Risks as discussed included but are not limited to risk of persistent infection, wound healing problems, pain, neurovascular injury, DVT/PE, and arthritis. The plan will be to admit him to the hospital after surgery and get the infectious disease service involved. He has been seen already by Dr. Elan Lewis and so he will be consulted while the patient is in-house. He will likely need IV antibiotics for  6 weeks and so a PICC line will be placed. Antibiotics will begin once appropriate cultures have been obtained intraoperatively. The patient demonstrated good understanding of our discussions and would like to proceed with surgery.   All questions were answered. We will schedule him for surgery urgently.

## 2023-01-17 ENCOUNTER — ANESTHESIA (OUTPATIENT)
Dept: OPERATING ROOM | Age: 73
End: 2023-01-17
Payer: MEDICARE

## 2023-01-17 ENCOUNTER — HOSPITAL ENCOUNTER (INPATIENT)
Age: 73
LOS: 6 days | Discharge: HOME HEALTH | DRG: 493 | End: 2023-01-23
Attending: ORTHOPAEDIC SURGERY | Admitting: ORTHOPAEDIC SURGERY
Payer: MEDICARE

## 2023-01-17 DIAGNOSIS — M00.9 PYOGENIC ARTHRITIS OF LEFT SHOULDER REGION, DUE TO UNSPECIFIED ORGANISM (HCC): ICD-10-CM

## 2023-01-17 DIAGNOSIS — L02.414 ABSCESS OF LEFT SHOULDER: ICD-10-CM

## 2023-01-17 LAB
GLUCOSE BLD-MCNC: 158 MG/DL (ref 75–110)
GLUCOSE BLD-MCNC: 164 MG/DL (ref 75–110)
GLUCOSE BLD-MCNC: 215 MG/DL (ref 75–110)

## 2023-01-17 PROCEDURE — 87186 SC STD MICRODIL/AGAR DIL: CPT

## 2023-01-17 PROCEDURE — 6360000002 HC RX W HCPCS: Performed by: ORTHOPAEDIC SURGERY

## 2023-01-17 PROCEDURE — 3600000004 HC SURGERY LEVEL 4 BASE: Performed by: ORTHOPAEDIC SURGERY

## 2023-01-17 PROCEDURE — 3700000001 HC ADD 15 MINUTES (ANESTHESIA): Performed by: ORTHOPAEDIC SURGERY

## 2023-01-17 PROCEDURE — 0PBD4ZZ EXCISION OF LEFT HUMERAL HEAD, PERCUTANEOUS ENDOSCOPIC APPROACH: ICD-10-PCS | Performed by: ORTHOPAEDIC SURGERY

## 2023-01-17 PROCEDURE — 2580000003 HC RX 258: Performed by: ORTHOPAEDIC SURGERY

## 2023-01-17 PROCEDURE — 29823 SHO ARTHRS SRG XTNSV DBRDMT: CPT | Performed by: ORTHOPAEDIC SURGERY

## 2023-01-17 PROCEDURE — 1210000000 HC MED SURG R&B

## 2023-01-17 PROCEDURE — 3700000000 HC ANESTHESIA ATTENDED CARE: Performed by: ORTHOPAEDIC SURGERY

## 2023-01-17 PROCEDURE — 87075 CULTR BACTERIA EXCEPT BLOOD: CPT

## 2023-01-17 PROCEDURE — 87070 CULTURE OTHR SPECIMN AEROBIC: CPT

## 2023-01-17 PROCEDURE — 87205 SMEAR GRAM STAIN: CPT

## 2023-01-17 PROCEDURE — 2500000003 HC RX 250 WO HCPCS

## 2023-01-17 PROCEDURE — 3600000014 HC SURGERY LEVEL 4 ADDTL 15MIN: Performed by: ORTHOPAEDIC SURGERY

## 2023-01-17 PROCEDURE — 7100000001 HC PACU RECOVERY - ADDTL 15 MIN: Performed by: ORTHOPAEDIC SURGERY

## 2023-01-17 PROCEDURE — 2720000010 HC SURG SUPPLY STERILE: Performed by: ORTHOPAEDIC SURGERY

## 2023-01-17 PROCEDURE — 82947 ASSAY GLUCOSE BLOOD QUANT: CPT

## 2023-01-17 PROCEDURE — 6360000002 HC RX W HCPCS

## 2023-01-17 PROCEDURE — 6370000000 HC RX 637 (ALT 250 FOR IP)

## 2023-01-17 PROCEDURE — 7100000000 HC PACU RECOVERY - FIRST 15 MIN: Performed by: ORTHOPAEDIC SURGERY

## 2023-01-17 PROCEDURE — 86403 PARTICLE AGGLUT ANTBDY SCRN: CPT

## 2023-01-17 PROCEDURE — 2709999900 HC NON-CHARGEABLE SUPPLY: Performed by: ORTHOPAEDIC SURGERY

## 2023-01-17 PROCEDURE — 2580000003 HC RX 258: Performed by: ANESTHESIOLOGY

## 2023-01-17 PROCEDURE — 6370000000 HC RX 637 (ALT 250 FOR IP): Performed by: ORTHOPAEDIC SURGERY

## 2023-01-17 PROCEDURE — 6360000002 HC RX W HCPCS: Performed by: ANESTHESIOLOGY

## 2023-01-17 RX ORDER — SODIUM CHLORIDE 0.9 % (FLUSH) 0.9 %
5-40 SYRINGE (ML) INJECTION PRN
Status: DISCONTINUED | OUTPATIENT
Start: 2023-01-17 | End: 2023-01-17 | Stop reason: HOSPADM

## 2023-01-17 RX ORDER — IPRATROPIUM BROMIDE AND ALBUTEROL SULFATE 2.5; .5 MG/3ML; MG/3ML
1 SOLUTION RESPIRATORY (INHALATION)
Status: DISCONTINUED | OUTPATIENT
Start: 2023-01-17 | End: 2023-01-17 | Stop reason: HOSPADM

## 2023-01-17 RX ORDER — SODIUM CHLORIDE 0.9 % (FLUSH) 0.9 %
5-40 SYRINGE (ML) INJECTION EVERY 12 HOURS SCHEDULED
Status: DISCONTINUED | OUTPATIENT
Start: 2023-01-17 | End: 2023-01-17 | Stop reason: HOSPADM

## 2023-01-17 RX ORDER — INSULIN LISPRO 100 [IU]/ML
2 INJECTION, SOLUTION INTRAVENOUS; SUBCUTANEOUS
Status: DISCONTINUED | OUTPATIENT
Start: 2023-01-17 | End: 2023-01-23 | Stop reason: HOSPADM

## 2023-01-17 RX ORDER — LABETALOL HYDROCHLORIDE 5 MG/ML
10 INJECTION, SOLUTION INTRAVENOUS
Status: DISCONTINUED | OUTPATIENT
Start: 2023-01-17 | End: 2023-01-17 | Stop reason: HOSPADM

## 2023-01-17 RX ORDER — INSULIN LISPRO 100 [IU]/ML
0-8 INJECTION, SOLUTION INTRAVENOUS; SUBCUTANEOUS
Status: DISCONTINUED | OUTPATIENT
Start: 2023-01-17 | End: 2023-01-18

## 2023-01-17 RX ORDER — SODIUM CHLORIDE 0.9 % (FLUSH) 0.9 %
5-40 SYRINGE (ML) INJECTION PRN
Status: DISCONTINUED | OUTPATIENT
Start: 2023-01-17 | End: 2023-01-23 | Stop reason: HOSPADM

## 2023-01-17 RX ORDER — SODIUM CHLORIDE, SODIUM LACTATE, POTASSIUM CHLORIDE, CALCIUM CHLORIDE 600; 310; 30; 20 MG/100ML; MG/100ML; MG/100ML; MG/100ML
INJECTION, SOLUTION INTRAVENOUS CONTINUOUS PRN
Status: COMPLETED | OUTPATIENT
Start: 2023-01-17 | End: 2023-01-17

## 2023-01-17 RX ORDER — OXYCODONE HYDROCHLORIDE AND ACETAMINOPHEN 5; 325 MG/1; MG/1
2 TABLET ORAL
Status: DISCONTINUED | OUTPATIENT
Start: 2023-01-17 | End: 2023-01-17 | Stop reason: HOSPADM

## 2023-01-17 RX ORDER — NEOSTIGMINE METHYLSULFATE 5 MG/5 ML
SYRINGE (ML) INTRAVENOUS PRN
Status: DISCONTINUED | OUTPATIENT
Start: 2023-01-17 | End: 2023-01-17 | Stop reason: SDUPTHER

## 2023-01-17 RX ORDER — GABAPENTIN 100 MG/1
300 CAPSULE ORAL 2 TIMES DAILY
Status: DISCONTINUED | OUTPATIENT
Start: 2023-01-17 | End: 2023-01-23 | Stop reason: HOSPADM

## 2023-01-17 RX ORDER — MORPHINE SULFATE 2 MG/ML
INJECTION, SOLUTION INTRAMUSCULAR; INTRAVENOUS
Status: COMPLETED
Start: 2023-01-17 | End: 2023-01-17

## 2023-01-17 RX ORDER — CEFAZOLIN SODIUM 10 G/1
2 INJECTION, POWDER, FOR SOLUTION INTRAVENOUS EVERY 8 HOURS
Status: DISCONTINUED | OUTPATIENT
Start: 2023-01-17 | End: 2023-01-17

## 2023-01-17 RX ORDER — SODIUM CHLORIDE 9 MG/ML
INJECTION, SOLUTION INTRAVENOUS PRN
Status: DISCONTINUED | OUTPATIENT
Start: 2023-01-17 | End: 2023-01-17 | Stop reason: HOSPADM

## 2023-01-17 RX ORDER — GLYCOPYRROLATE 0.2 MG/ML
0.4 INJECTION INTRAMUSCULAR; INTRAVENOUS ONCE
Status: DISCONTINUED | OUTPATIENT
Start: 2023-01-17 | End: 2023-01-17 | Stop reason: HOSPADM

## 2023-01-17 RX ORDER — DEXTROSE MONOHYDRATE 100 MG/ML
INJECTION, SOLUTION INTRAVENOUS CONTINUOUS PRN
Status: DISCONTINUED | OUTPATIENT
Start: 2023-01-17 | End: 2023-01-23 | Stop reason: HOSPADM

## 2023-01-17 RX ORDER — ACETAMINOPHEN 325 MG/1
TABLET ORAL
Status: COMPLETED
Start: 2023-01-17 | End: 2023-01-17

## 2023-01-17 RX ORDER — SODIUM CHLORIDE 9 MG/ML
INJECTION, SOLUTION INTRAVENOUS PRN
Status: DISCONTINUED | OUTPATIENT
Start: 2023-01-17 | End: 2023-01-23 | Stop reason: HOSPADM

## 2023-01-17 RX ORDER — ONDANSETRON 4 MG/1
4 TABLET, ORALLY DISINTEGRATING ORAL EVERY 8 HOURS PRN
Status: DISCONTINUED | OUTPATIENT
Start: 2023-01-17 | End: 2023-01-23 | Stop reason: HOSPADM

## 2023-01-17 RX ORDER — MIDAZOLAM HYDROCHLORIDE 2 MG/2ML
2 INJECTION, SOLUTION INTRAMUSCULAR; INTRAVENOUS
Status: DISCONTINUED | OUTPATIENT
Start: 2023-01-17 | End: 2023-01-17 | Stop reason: HOSPADM

## 2023-01-17 RX ORDER — LIDOCAINE HYDROCHLORIDE 10 MG/ML
INJECTION, SOLUTION INFILTRATION; PERINEURAL PRN
Status: DISCONTINUED | OUTPATIENT
Start: 2023-01-17 | End: 2023-01-17 | Stop reason: SDUPTHER

## 2023-01-17 RX ORDER — FENTANYL CITRATE 50 UG/ML
INJECTION, SOLUTION INTRAMUSCULAR; INTRAVENOUS PRN
Status: DISCONTINUED | OUTPATIENT
Start: 2023-01-17 | End: 2023-01-17 | Stop reason: SDUPTHER

## 2023-01-17 RX ORDER — ONDANSETRON 2 MG/ML
INJECTION INTRAMUSCULAR; INTRAVENOUS
Status: COMPLETED
Start: 2023-01-17 | End: 2023-01-17

## 2023-01-17 RX ORDER — INSULIN LISPRO 100 [IU]/ML
0-4 INJECTION, SOLUTION INTRAVENOUS; SUBCUTANEOUS NIGHTLY
Status: DISCONTINUED | OUTPATIENT
Start: 2023-01-17 | End: 2023-01-18

## 2023-01-17 RX ORDER — CEFAZOLIN 2 G/1
INJECTION, POWDER, FOR SOLUTION INTRAMUSCULAR; INTRAVENOUS
Status: DISPENSED
Start: 2023-01-17 | End: 2023-01-17

## 2023-01-17 RX ORDER — PROPOFOL 10 MG/ML
INJECTION, EMULSION INTRAVENOUS PRN
Status: DISCONTINUED | OUTPATIENT
Start: 2023-01-17 | End: 2023-01-17 | Stop reason: SDUPTHER

## 2023-01-17 RX ORDER — DIPHENHYDRAMINE HYDROCHLORIDE 50 MG/ML
12.5 INJECTION INTRAMUSCULAR; INTRAVENOUS
Status: DISCONTINUED | OUTPATIENT
Start: 2023-01-17 | End: 2023-01-17 | Stop reason: HOSPADM

## 2023-01-17 RX ORDER — MEPERIDINE HYDROCHLORIDE 50 MG/ML
12.5 INJECTION INTRAMUSCULAR; INTRAVENOUS; SUBCUTANEOUS EVERY 5 MIN PRN
Status: DISCONTINUED | OUTPATIENT
Start: 2023-01-17 | End: 2023-01-17 | Stop reason: HOSPADM

## 2023-01-17 RX ORDER — GLYCOPYRROLATE 0.2 MG/ML
INJECTION INTRAMUSCULAR; INTRAVENOUS PRN
Status: DISCONTINUED | OUTPATIENT
Start: 2023-01-17 | End: 2023-01-17 | Stop reason: SDUPTHER

## 2023-01-17 RX ORDER — OXYCODONE HYDROCHLORIDE AND ACETAMINOPHEN 5; 325 MG/1; MG/1
TABLET ORAL
Status: COMPLETED
Start: 2023-01-17 | End: 2023-01-17

## 2023-01-17 RX ORDER — DULOXETIN HYDROCHLORIDE 30 MG/1
30 CAPSULE, DELAYED RELEASE ORAL 2 TIMES DAILY
Status: DISCONTINUED | OUTPATIENT
Start: 2023-01-17 | End: 2023-01-23 | Stop reason: HOSPADM

## 2023-01-17 RX ORDER — INSULIN GLARGINE 100 [IU]/ML
63 INJECTION, SOLUTION SUBCUTANEOUS NIGHTLY
Status: DISCONTINUED | OUTPATIENT
Start: 2023-01-17 | End: 2023-01-23 | Stop reason: HOSPADM

## 2023-01-17 RX ORDER — ACETAMINOPHEN 500 MG
TABLET ORAL
Status: COMPLETED
Start: 2023-01-17 | End: 2023-01-18

## 2023-01-17 RX ORDER — ACETAMINOPHEN 500 MG
1000 TABLET ORAL EVERY 6 HOURS
Status: DISCONTINUED | OUTPATIENT
Start: 2023-01-17 | End: 2023-01-23 | Stop reason: HOSPADM

## 2023-01-17 RX ORDER — OXYCODONE HYDROCHLORIDE 5 MG/1
5 TABLET ORAL EVERY 4 HOURS PRN
Status: DISCONTINUED | OUTPATIENT
Start: 2023-01-17 | End: 2023-01-23 | Stop reason: HOSPADM

## 2023-01-17 RX ORDER — PROMETHAZINE HYDROCHLORIDE 25 MG/ML
6.25 INJECTION, SOLUTION INTRAMUSCULAR; INTRAVENOUS EVERY 5 MIN PRN
Status: DISCONTINUED | OUTPATIENT
Start: 2023-01-17 | End: 2023-01-17 | Stop reason: HOSPADM

## 2023-01-17 RX ORDER — MORPHINE SULFATE 2 MG/ML
2 INJECTION, SOLUTION INTRAMUSCULAR; INTRAVENOUS
Status: DISCONTINUED | OUTPATIENT
Start: 2023-01-17 | End: 2023-01-23 | Stop reason: HOSPADM

## 2023-01-17 RX ORDER — IPRATROPIUM BROMIDE AND ALBUTEROL SULFATE 2.5; .5 MG/3ML; MG/3ML
SOLUTION RESPIRATORY (INHALATION)
Status: COMPLETED
Start: 2023-01-17 | End: 2023-01-17

## 2023-01-17 RX ORDER — ROCURONIUM BROMIDE 10 MG/ML
INJECTION, SOLUTION INTRAVENOUS PRN
Status: DISCONTINUED | OUTPATIENT
Start: 2023-01-17 | End: 2023-01-17 | Stop reason: SDUPTHER

## 2023-01-17 RX ORDER — KETOROLAC TROMETHAMINE 30 MG/ML
30 INJECTION, SOLUTION INTRAMUSCULAR; INTRAVENOUS EVERY 8 HOURS
Status: DISCONTINUED | OUTPATIENT
Start: 2023-01-17 | End: 2023-01-18

## 2023-01-17 RX ORDER — SODIUM CHLORIDE 9 MG/ML
25 INJECTION, SOLUTION INTRAVENOUS PRN
Status: DISCONTINUED | OUTPATIENT
Start: 2023-01-17 | End: 2023-01-17 | Stop reason: HOSPADM

## 2023-01-17 RX ORDER — SODIUM CHLORIDE 0.9 % (FLUSH) 0.9 %
5-40 SYRINGE (ML) INJECTION EVERY 12 HOURS SCHEDULED
Status: DISCONTINUED | OUTPATIENT
Start: 2023-01-17 | End: 2023-01-23 | Stop reason: HOSPADM

## 2023-01-17 RX ORDER — PIOGLITAZONEHYDROCHLORIDE 15 MG/1
30 TABLET ORAL DAILY
Status: DISCONTINUED | OUTPATIENT
Start: 2023-01-17 | End: 2023-01-23 | Stop reason: HOSPADM

## 2023-01-17 RX ORDER — MORPHINE SULFATE 2 MG/ML
2 INJECTION, SOLUTION INTRAMUSCULAR; INTRAVENOUS EVERY 5 MIN PRN
Status: DISCONTINUED | OUTPATIENT
Start: 2023-01-17 | End: 2023-01-17 | Stop reason: HOSPADM

## 2023-01-17 RX ORDER — ONDANSETRON 2 MG/ML
4 INJECTION INTRAMUSCULAR; INTRAVENOUS
Status: COMPLETED | OUTPATIENT
Start: 2023-01-17 | End: 2023-01-17

## 2023-01-17 RX ORDER — SODIUM CHLORIDE 9 MG/ML
INJECTION, SOLUTION INTRAVENOUS CONTINUOUS
Status: DISCONTINUED | OUTPATIENT
Start: 2023-01-17 | End: 2023-01-21

## 2023-01-17 RX ORDER — DEXAMETHASONE SODIUM PHOSPHATE 10 MG/ML
INJECTION, SOLUTION INTRAMUSCULAR; INTRAVENOUS PRN
Status: DISCONTINUED | OUTPATIENT
Start: 2023-01-17 | End: 2023-01-17 | Stop reason: SDUPTHER

## 2023-01-17 RX ORDER — PHENYLEPHRINE HCL IN 0.9% NACL 1 MG/10 ML
SYRINGE (ML) INTRAVENOUS PRN
Status: DISCONTINUED | OUTPATIENT
Start: 2023-01-17 | End: 2023-01-17 | Stop reason: SDUPTHER

## 2023-01-17 RX ORDER — OXYCODONE HYDROCHLORIDE AND ACETAMINOPHEN 5; 325 MG/1; MG/1
1 TABLET ORAL
Status: COMPLETED | OUTPATIENT
Start: 2023-01-17 | End: 2023-01-17

## 2023-01-17 RX ORDER — VANCOMYCIN HYDROCHLORIDE 500 MG/10ML
1 INJECTION, POWDER, LYOPHILIZED, FOR SOLUTION INTRAVENOUS EVERY 12 HOURS
Status: DISCONTINUED | OUTPATIENT
Start: 2023-01-17 | End: 2023-01-17

## 2023-01-17 RX ORDER — ACETAMINOPHEN 500 MG
1000 TABLET ORAL ONCE
Status: COMPLETED | OUTPATIENT
Start: 2023-01-17 | End: 2023-01-17

## 2023-01-17 RX ORDER — ONDANSETRON 2 MG/ML
INJECTION INTRAMUSCULAR; INTRAVENOUS PRN
Status: DISCONTINUED | OUTPATIENT
Start: 2023-01-17 | End: 2023-01-17 | Stop reason: SDUPTHER

## 2023-01-17 RX ORDER — SODIUM CHLORIDE, SODIUM LACTATE, POTASSIUM CHLORIDE, CALCIUM CHLORIDE 600; 310; 30; 20 MG/100ML; MG/100ML; MG/100ML; MG/100ML
INJECTION, SOLUTION INTRAVENOUS CONTINUOUS
Status: DISCONTINUED | OUTPATIENT
Start: 2023-01-17 | End: 2023-01-17

## 2023-01-17 RX ORDER — POLYETHYLENE GLYCOL 3350 17 G/17G
17 POWDER, FOR SOLUTION ORAL DAILY PRN
Status: DISCONTINUED | OUTPATIENT
Start: 2023-01-17 | End: 2023-01-23 | Stop reason: HOSPADM

## 2023-01-17 RX ORDER — CHLORTHALIDONE 25 MG/1
25 TABLET ORAL DAILY
Status: DISCONTINUED | OUTPATIENT
Start: 2023-01-17 | End: 2023-01-18

## 2023-01-17 RX ORDER — IPRATROPIUM BROMIDE AND ALBUTEROL SULFATE 2.5; .5 MG/3ML; MG/3ML
1 SOLUTION RESPIRATORY (INHALATION) ONCE
Status: COMPLETED | OUTPATIENT
Start: 2023-01-17 | End: 2023-01-17

## 2023-01-17 RX ORDER — OMEGA-3S/DHA/EPA/FISH OIL/D3 300MG-1000
800 CAPSULE ORAL DAILY
Status: DISCONTINUED | OUTPATIENT
Start: 2023-01-17 | End: 2023-01-23 | Stop reason: HOSPADM

## 2023-01-17 RX ORDER — ENOXAPARIN SODIUM 100 MG/ML
40 INJECTION SUBCUTANEOUS DAILY
Status: DISCONTINUED | OUTPATIENT
Start: 2023-01-18 | End: 2023-01-23 | Stop reason: HOSPADM

## 2023-01-17 RX ORDER — ONDANSETRON 2 MG/ML
4 INJECTION INTRAMUSCULAR; INTRAVENOUS EVERY 6 HOURS PRN
Status: DISCONTINUED | OUTPATIENT
Start: 2023-01-17 | End: 2023-01-23 | Stop reason: HOSPADM

## 2023-01-17 RX ORDER — ALBUTEROL SULFATE 90 UG/1
2 AEROSOL, METERED RESPIRATORY (INHALATION) EVERY 6 HOURS PRN
Status: DISCONTINUED | OUTPATIENT
Start: 2023-01-17 | End: 2023-01-23 | Stop reason: HOSPADM

## 2023-01-17 RX ADMIN — FENTANYL CITRATE 50 MCG: 50 INJECTION, SOLUTION INTRAMUSCULAR; INTRAVENOUS at 16:17

## 2023-01-17 RX ADMIN — CHLORTHALIDONE 25 MG: 25 TABLET ORAL at 23:06

## 2023-01-17 RX ADMIN — DEXAMETHASONE SODIUM PHOSPHATE 8 MG: 10 INJECTION, SOLUTION INTRAMUSCULAR; INTRAVENOUS at 16:39

## 2023-01-17 RX ADMIN — PROPOFOL 150 MG: 10 INJECTION, EMULSION INTRAVENOUS at 16:17

## 2023-01-17 RX ADMIN — LIDOCAINE HYDROCHLORIDE 40 MG: 10 INJECTION, SOLUTION INFILTRATION; PERINEURAL at 16:17

## 2023-01-17 RX ADMIN — SODIUM CHLORIDE: 9 INJECTION, SOLUTION INTRAVENOUS at 12:54

## 2023-01-17 RX ADMIN — Medication 1000 MG: at 13:02

## 2023-01-17 RX ADMIN — MORPHINE SULFATE 2 MG: 2 INJECTION, SOLUTION INTRAMUSCULAR; INTRAVENOUS at 19:11

## 2023-01-17 RX ADMIN — ONDANSETRON 4 MG: 2 INJECTION INTRAMUSCULAR; INTRAVENOUS at 18:51

## 2023-01-17 RX ADMIN — FENTANYL CITRATE 50 MCG: 50 INJECTION, SOLUTION INTRAMUSCULAR; INTRAVENOUS at 16:15

## 2023-01-17 RX ADMIN — HYDROMORPHONE HYDROCHLORIDE 0.5 MG: 1 INJECTION, SOLUTION INTRAMUSCULAR; INTRAVENOUS; SUBCUTANEOUS at 18:58

## 2023-01-17 RX ADMIN — MORPHINE SULFATE 2 MG: 2 INJECTION, SOLUTION INTRAMUSCULAR; INTRAVENOUS at 19:47

## 2023-01-17 RX ADMIN — CHOLECALCIFEROL (VITAMIN D3) 10 MCG (400 UNIT) TABLET 800 UNITS: at 23:04

## 2023-01-17 RX ADMIN — ONDANSETRON 4 MG: 2 INJECTION INTRAMUSCULAR; INTRAVENOUS at 18:17

## 2023-01-17 RX ADMIN — SODIUM CHLORIDE: 9 INJECTION, SOLUTION INTRAVENOUS at 23:10

## 2023-01-17 RX ADMIN — IPRATROPIUM BROMIDE AND ALBUTEROL SULFATE 1 AMPULE: .5; 2.5 SOLUTION RESPIRATORY (INHALATION) at 13:02

## 2023-01-17 RX ADMIN — INSULIN GLARGINE 63 UNITS: 100 INJECTION, SOLUTION SUBCUTANEOUS at 23:07

## 2023-01-17 RX ADMIN — ACETAMINOPHEN 1000 MG: 500 TABLET ORAL at 23:05

## 2023-01-17 RX ADMIN — HYDROMORPHONE HYDROCHLORIDE 0.5 MG: 1 INJECTION, SOLUTION INTRAMUSCULAR; INTRAVENOUS; SUBCUTANEOUS at 18:52

## 2023-01-17 RX ADMIN — Medication 150 MCG: at 17:29

## 2023-01-17 RX ADMIN — Medication 200 MCG: at 16:55

## 2023-01-17 RX ADMIN — HYDROMORPHONE HYDROCHLORIDE 0.5 MG: 1 INJECTION, SOLUTION INTRAMUSCULAR; INTRAVENOUS; SUBCUTANEOUS at 19:04

## 2023-01-17 RX ADMIN — Medication 2 MG: at 18:19

## 2023-01-17 RX ADMIN — CEFAZOLIN 2000 MG: 2 INJECTION, POWDER, FOR SOLUTION INTRAMUSCULAR; INTRAVENOUS at 16:28

## 2023-01-17 RX ADMIN — ROCURONIUM BROMIDE 40 MG: 50 INJECTION INTRAVENOUS at 16:17

## 2023-01-17 RX ADMIN — IPRATROPIUM BROMIDE AND ALBUTEROL SULFATE 1 AMPULE: 2.5; .5 SOLUTION RESPIRATORY (INHALATION) at 13:02

## 2023-01-17 RX ADMIN — ACETAMINOPHEN 1000 MG: 500 TABLET ORAL at 13:02

## 2023-01-17 RX ADMIN — DULOXETINE 30 MG: 30 CAPSULE, DELAYED RELEASE ORAL at 23:06

## 2023-01-17 RX ADMIN — Medication 200 MCG: at 18:05

## 2023-01-17 RX ADMIN — VANCOMYCIN HYDROCHLORIDE 1750 MG: 1 INJECTION, POWDER, LYOPHILIZED, FOR SOLUTION INTRAVENOUS at 23:30

## 2023-01-17 RX ADMIN — KETOROLAC TROMETHAMINE 30 MG: 30 INJECTION, SOLUTION INTRAMUSCULAR; INTRAVENOUS at 23:06

## 2023-01-17 RX ADMIN — GABAPENTIN 300 MG: 300 CAPSULE ORAL at 23:05

## 2023-01-17 RX ADMIN — Medication 150 MCG: at 17:40

## 2023-01-17 RX ADMIN — OXYCODONE HYDROCHLORIDE AND ACETAMINOPHEN 1 TABLET: 5; 325 TABLET ORAL at 20:05

## 2023-01-17 RX ADMIN — SODIUM CHLORIDE 1000 ML: 9 INJECTION, SOLUTION INTRAVENOUS at 19:11

## 2023-01-17 RX ADMIN — Medication 150 MCG: at 17:45

## 2023-01-17 RX ADMIN — SODIUM CHLORIDE: 9 INJECTION, SOLUTION INTRAVENOUS at 17:38

## 2023-01-17 RX ADMIN — SODIUM CHLORIDE: 9 INJECTION, SOLUTION INTRAVENOUS at 20:47

## 2023-01-17 RX ADMIN — GLYCOPYRROLATE 0.4 MG: 0.2 INJECTION INTRAMUSCULAR; INTRAVENOUS at 18:19

## 2023-01-17 RX ADMIN — PIOGLITAZONE 30 MG: 15 TABLET ORAL at 23:04

## 2023-01-17 ASSESSMENT — PAIN DESCRIPTION - ORIENTATION
ORIENTATION: LEFT

## 2023-01-17 ASSESSMENT — PAIN DESCRIPTION - LOCATION
LOCATION: SHOULDER

## 2023-01-17 ASSESSMENT — PAIN DESCRIPTION - DESCRIPTORS
DESCRIPTORS: STABBING
DESCRIPTORS: ACHING
DESCRIPTORS: ACHING
DESCRIPTORS: STABBING
DESCRIPTORS: ACHING
DESCRIPTORS: ACHING
DESCRIPTORS: STABBING
DESCRIPTORS: STABBING;ACHING

## 2023-01-17 ASSESSMENT — PAIN SCALES - GENERAL
PAINLEVEL_OUTOF10: 10
PAINLEVEL_OUTOF10: 10
PAINLEVEL_OUTOF10: 3
PAINLEVEL_OUTOF10: 9
PAINLEVEL_OUTOF10: 8
PAINLEVEL_OUTOF10: 9
PAINLEVEL_OUTOF10: 10
PAINLEVEL_OUTOF10: 10

## 2023-01-17 ASSESSMENT — PAIN DESCRIPTION - PAIN TYPE: TYPE: SURGICAL PAIN

## 2023-01-17 ASSESSMENT — PAIN - FUNCTIONAL ASSESSMENT
PAIN_FUNCTIONAL_ASSESSMENT: 0-10
PAIN_FUNCTIONAL_ASSESSMENT: PREVENTS OR INTERFERES WITH MANY ACTIVE NOT PASSIVE ACTIVITIES

## 2023-01-17 NOTE — FLOWSHEET NOTE
Informed Patient now taken into surgery. Apologized for long wait. Daughter states she has been updating wife.

## 2023-01-17 NOTE — H&P
ORTHOPEDIC INTERVAL H AND P      HPI / Chief Suzanna Speaker is a 67 y.o. male who presents for an I and D of a septic left shoulder. Past Medical History  Luis A  has a past medical history of Bladder stone, BPH with obstruction/lower urinary tract symptoms, Caffeine use, Cataracts, bilateral, Chronic back pain, COPD (chronic obstructive pulmonary disease) ([address] History of blood transfusion, Hypertension, Irritable bowel syndrome, Kidney stone, Neuropathy, Osteoarthritis, Pancreatitis, and Type II or unspecified type diabetes mellitus without mention of complication, not stated as uncontrolled. Past Surgical History  Luis A  has a past surgical history that includes back surgery; Cholecystectomy; Vasectomy; Spine surgery (05/18/2010); Eye surgery (2015); other surgical history; eye surgery; Bladder surgery; Lithotripsy; Ureteroscopy; Cystocopy (04/06/2022); Cystoscopy (Left, 04/06/2022); Cystocopy (04/20/2022); Cystoscopy (Left, 04/20/2022); Colonoscopy; Leg Tendon Surgery (1962); Tonsillectomy; Shoulder arthroscopy (Left, 10/25/2022); and Shoulder arthroscopy (Left, 10/25/2022).     Current Medications  Current Facility-Administered Medications   Medication Dose Route Frequency Provider Last Rate Last Admin    lactated ringers infusion   IntraVENous Continuous Nilesh Rhodes MD        sodium chloride flush 0.9 % injection 5-40 mL  5-40 mL IntraVENous 2 times per day iNlesh Rhodes MD        sodium chloride flush 0.9 % injection 5-40 mL  5-40 mL IntraVENous PRN Nilesh Rhodes MD        0.9 % sodium chloride infusion   IntraVENous PRN Nilesh Rhodes MD        sodium chloride flush 0.9 % injection 5-40 mL  5-40 mL IntraVENous 2 times per day Maisha Parks MD        sodium chloride flush 0.9 % injection 5-40 mL  5-40 mL IntraVENous PRN Sp Sahni MD        0.9 % sodium chloride infusion   IntraVENous PRN Maisha Parks  mL/hr at 01/17/23 1254 New Bag at 01/17/23 1254    ceFAZolin (ANCEF) 2,000 mg in sodium chloride 0.9 % 50 mL IVPB (mini-bag)  2,000 mg IntraVENous On Call to [facility] MD        ceFAZolin (ANCEF) 2 g injection             acetaminophen (TYLENOL) 500 MG tablet                Allergies  Allergies have been reviewed. Luis A is allergic to iodine. Social History  Luis A  reports that he quit smoking about 4 months ago. His smoking use included cigarettes. He started smoking about 42 years ago. He has a 40.00 pack-year smoking history. He has quit using smokeless tobacco. He reports that he does not currently use alcohol. He reports that he does not use drugs. Family History  Luis A's family history includes Arthritis in his mother; Cancer in his father. Review of Systems   History obtained from the patient. REVIEW OF SYSTEMS:   Constitution: negative for fever, chills, weight loss or malaise   Musculoskeletal: As noted in the HPI   Neurologic: As noted in the HPI    Physical Exam  /64   Pulse 80   Temp 96.8 °F (36 °C) (Infrared)   Resp 14   Ht 6' 2\" (1.88 m)   Wt 183 lb (83 kg)   SpO2 93%   BMI 23.50 kg/m²    General Appearance: alert, well appearing, and in no distress  Mental Status: alert, oriented to person, place, and time  Left shoulder with diffuse swelling. 2+ radial pulse. Sensation is grossly intact to light touch diffusely. Heart: RRR  Lungs: CTA bilaterally    Assessment and Liang Novak is a 67 y.o. old male with a septic left shoulder joint and abscess. Surgery was recommended to effectively treat his infection. The surgical site was confirmed by the patient and me. The risks, benefits, expected outcome, and alternative to the recommended procedure have been discussed with the patient. Patient understands and wants to proceed with the procedure.        This note is created with the assistance of a speech recognition program.  While intending to generate adocument that actually reflects the content of the visit, the document can still have some errors including those of syntax and sound a like substitutions which may escape proof reading. It such instances, actual meaningcan be extrapolated by contextual diversion.

## 2023-01-17 NOTE — BRIEF OP NOTE
Brief Postoperative Note      Patient: Luis A Salinas  YOB: 1950  MRN: [id number]    Date of Procedure: 1/17/2023    Pre-Op Diagnosis: Pyogenic arthritis of left shoulder region, due to unspecified organism (HCC) [M00.9]  Abscess of left shoulder [L02.414]    Post-Op Diagnosis: Same       Procedure(s):  LEFT SHOULDER ARTHROSCOPY  LEFT SHOULDER ABSCESS  INCISION AND DRAINAGE    Surgeon(s):  Kathy Sahni MD    Assistant:  Resident: Andrés Esquivel DO    Anesthesia: General    Estimated Blood Loss (mL): Minimal    Complications: None    Specimens:   ID Type Source Tests Collected by Time Destination   1 : Left Shoulder Abscess  Tissue Tissue CULTURE, ANAEROBIC AND AEROBIC Kathy Sahni MD 1/17/2023 1645        Implants:  * No implants in log *      Drains:   Closed/Suction Drain Left;Posterior Shoulder (Active)   Site Description Clean, dry & intact 01/17/23 1834   Dressing Status Clean, dry & intact 01/17/23 1834   Drainage Appearance Bloody 01/17/23 1834   Drain Status Compressed 01/17/23 1834       Findings: See operative report    Electronically signed by Kathy Sahni MD on 1/17/2023 at 6:55 PM

## 2023-01-17 NOTE — OP NOTE
OPERATIVE REPORT    Date of Surgery: 1/17/2023     Pre-operative Diagnosis: Left shoulder septic arthritis and abscess    Post-operative Diagnosis: Left shoulder septic arthritis and abscess    Procedure:   Left shoulder arthroscopic irrigation and debridement  Left shoulder abscess incision and drainage    Surgeon(s): Hnas Loving MD    Assistant(s): Esdras Ruiz DO (PGY 5)    Anesthesia: General    Fluids: See anesthesia record    Urine output: See anesthesia record    Estimated blood loss: Minimal    Findings: Abscess inferior to the teres minor. Erosive degeneration of the humeral head and glenoid     Specimen: Left shoulder abscess cultures    Implants: None    Surgical Indications:  Luis A Garnica is a 67 y.o. old male who presented with a painful left shoulder. CT scan and MRI demonstrated glenohumeral joint destruction with a sizeable extra-articular abscess. Following a discussion with the patient regarding treatment options, he consented to proceed with an incision and drainage of his abscess as well as irrigation and debridement of his glenohumeral joint. he came to this decision after demonstrating an understanding of our discussion regarding details of the procedure, risks and benefits, expected outcome, and postoperative course. Operative technique: Following appropriate identification of the patient and his operative extremity, consent was reviewed with the patient and his operative extremity was signed. he was wheeled to the operating room where he finished a course of pre-operative antibiotic prophylaxis by way of 2 g of IV Ancef. The anesthesia service administered a general anesthetic and secured his airway using an endotracheal tube. All bony prominences were appropriately padded and the patient was secured to the operative table in a beach chair position.  The patient's operative extremity was prepped and draped in a standard sterile fashion and a time out was performed during which the correct patient, operative extremity, and procedure were verified. Examination of the patient's left shoulder under anesthesia demonstrated significantly limited range of motion. A longitudinal incision over the posterior aspect of the patient's shoulder just above the axilla was made sharply through skin. Dissection was carried down to the level of the inferior border of the deltoid which was elevated approximately revealing the teres minor muscle. Blunt dissection was carried just inferior to this and a large pocket of purulent fluid was encountered. Cultures were obtained and this pocket of fluid was completely drained. It was irrigated with copious amounts of sterile saline and a drain was placed in this pocket. The incision was closed with 3-0 Vicryl and 3-0 Prolene. I then moved to the arthroscopic portion of the case. Through a posterior approach patient's left shoulder glenohumeral joint was insufflated using approximately 30 cc of sterile saline. A standard posterior portal was then established an arthroscopic evaluation of the patient's shoulder revealed diffuse synovitis. I had a difficult time visualizing the joint adequately at this point due to a lot of bleeding in the joint. Consequently moved into the subacromial space. I performed a thorough bursectomy through a standard lateral portal incision. I then performed an irrigation and debridement of this compartment. At this time I moved back to the glenohumeral joint and noted significant erosive bone loss of the humeral head and glenoid. Diffuse synovitis was encountered. The tissue was significantly friable. I performed a thorough debridement through an anterior portal incision. I then switched the camera to the anterior portal and through a posterior portal also performed a debridement. Thorough irrigation of the patient's shoulder was performed using 12 L of sterile saline. Arthroscopic instruments were then withdrawn. Portal incisions were closed using 3-0 Prolene. Sterile dressings were applied using Xeroform, 4 x 4 gauze, and Tegaderm. Patient's arm was placed in a sling. He was awoken, extubated, transferred to his bed and wheeled to recovery in stable condition.     Complications: None    Post-operative Condition: Stable    Plan:   - Follow intraoperative cultures  - PICC line to be placed  - Infectious disease consult  - IV antibiotics for 6 weeks

## 2023-01-17 NOTE — ANESTHESIA PRE PROCEDURE
Department of Anesthesiology  Preprocedure Note       Name:  Melida Bridges   Age:  67 y.o.  :  1950                                          MRN:  [id number]         Date:  2023      Surgeon: Rolan Montiel):  Go Costa MD    Procedure: Procedure(s):  LEFT SHOULDER ARTHROSCOPY  LEFT SHOULDER ABSCESS  INCISION AND DRAINAGE    Medications prior to admission:   Prior to Admission medications    Medication Sig Start Date End Date Taking? Authorizing Provider   traMADol (ULTRAM) 50 MG tablet Take 1 tablet by mouth every 4 hours as needed for Pain for up to 7 days. Intended supply: 7 days. Take lowest dose possible to manage pain Max Daily Amount: 300 mg 23  Kristian Sahni MD   traMADol (ULTRAM) 50 MG tablet Take 1 tablet by mouth every 6 hours as needed for Pain.  22  Go Costa MD   Continuous Blood Gluc Sensor (FREESTYLE ALFONSO 2 SENSOR) MISC 1 each by Does not apply route every 14 days 22   BREANNA Dang NP   Continuous Blood Gluc Sensor (FREESTYLE ALFONSO 2 SENSOR) MISC 1 each by Does not apply route every 14 days 22   BREANNA Dang NP   metFORMIN (GLUCOPHAGE) 1000 MG tablet Take 1 tablet by mouth 2 times daily (with meals) 10/25/22 10/25/23  BREANNA Dang NP   lisinopril (PRINIVIL;ZESTRIL) 10 MG tablet Take 1 tablet by mouth daily 10/25/22 10/25/23  BREANNA Dang NP   chlorthalidone (HYGROTON) 25 MG tablet Take 1 tablet by mouth daily 10/25/22 10/25/23  BREANNA Dang NP   pioglitazone (ACTOS) 30 MG tablet Take 1 tablet by mouth daily 10/25/22 10/20/23  BREANNA Dang NP   DULoxetine (CYMBALTA) 30 MG extended release capsule Take 1 capsule by mouth 2 times daily 10/25/22 10/25/23  BREANNA Dang NP   insulin detemir (LEVEMIR FLEXTOUCH) 100 UNIT/ML injection pen Inject 63 Units into the skin nightly 10/25/22 10/25/23  BREANNA Dang - NP   celecoxib (CELEBREX) 200 MG capsule Take 1 capsule by mouth daily 10/25/22 10/20/23  BREANNA Rios NP   gabapentin (NEURONTIN) 300 MG capsule Take 1 capsule by mouth 2 times daily for 180 days. Intended supply: 90 days 10/25/22 4/23/23  BREANNA Rios NP   insulin aspart (NOVOLOG) 100 UNIT/ML injection pen Inject 2 Units into the skin 3 times daily (before meals) 0-150 = 0 units, 151-200 = 2 units, 201-250 = 4 units, 251-300 = 6 units, 301-350 = 8 units, 351-400 of greater = 10 units. Max dose 30units per day 10/25/22 10/25/23  BREANNA Rios NP   ondansetron (ZOFRAN) 4 MG tablet Take 1 tablet by mouth daily as needed for Nausea or Vomiting 10/21/22   Patricia Burger MD   Handicap Placard MISC by Does not apply route Exp: 10/2027 10/21/22   BREANNA Rios NP   Continuous Blood Gluc Sensor (FREESTYLE ALFONSO 2 SENSOR) MISC 1 applicator by Does not apply route every 14 days 10/13/22 10/13/23  BREANNA Rios NP   Cholecalciferol (VITAMIN D3 PO) Take by mouth daily    Historical Provider, MD   albuterol sulfate HFA (PROVENTIL;VENTOLIN;PROAIR) 108 (90 Base) MCG/ACT inhaler Inhale 2 puffs into the lungs every 6 hours as needed for Wheezing or Shortness of Breath (or take 1-2 puffs 30 mins prior to strenous activity) 10/4/22 1/6/23  BREANNA Rios NP   blood glucose monitor strips Test 4 times a day & as needed for symptoms of irregular blood glucose. Dispense sufficient amount for indicated testing frequency plus additional to accommodate PRN testing needs. 10/4/22   BREANNA Rios NP   Insulin Pen Needle 31G X 5 MM MISC 1 each by Does not apply route 3 times daily 10/4/22 10/4/23  BREANNA Rios NP   Lancets MISC Test twice a day and as needed, Please send lancets for Verio brand meter 1/4/16 9/2/23  Isrrael Cannon APRN - CNP   multivitamin [facility]) per tablet Take 1 tablet by mouth daily.     Historical Provider, MD       Current medications:    Current Facility-Administered Medications   Medication Dose Route Frequency Provider Last Rate Last Admin    lactated ringers infusion   IntraVENous Continuous Emanuel Renee MD        sodium chloride flush 0.9 % injection 5-40 mL  5-40 mL IntraVENous 2 times per day Emanuel Renee MD        sodium chloride flush 0.9 % injection 5-40 mL  5-40 mL IntraVENous PRN Emanuel Renee MD        0.9 % sodium chloride infusion   IntraVENous PRN Emanuel Renee MD        acetaminophen (TYLENOL) tablet 1,000 mg  1,000 mg Oral Once Vilinda Counter, MD        sodium chloride flush 0.9 % injection 5-40 mL  5-40 mL IntraVENous 2 times per day Sirenainda Counter, MD        sodium chloride flush 0.9 % injection 5-40 mL  5-40 mL IntraVENous PRN Lisy Jones MD        0.9 % sodium chloride infusion   IntraVENous PRN Lisy Jones MD        ceFAZolin (ANCEF) 2,000 mg in sodium chloride 0.9 % 50 mL IVPB (mini-bag)  2,000 mg IntraVENous On Call to [facility], MD        ceFAZolin (ANCEF) 2 g injection             acetaminophen (TYLENOL) 325 MG tablet                Allergies:     Allergies   Allergen Reactions    Iodine Other (See Comments)     Possible reaction to arthrogram dye pt suspected- severe pain and swelling to shoulder-arm       Problem List:    Patient Active Problem List   Diagnosis Code    Essential hypertension I10    Osteoarthritis M19.90    Vitamin D deficiency E55.9    Testosterone deficiency E34.9    Difficulty sleeping G47.9    Sacro-iliac pain M53.3    Neuropathy G62.9    Nocturia R35.1    Benign prostatic hyperplasia with urinary obstruction N40.1, N13.8    Type 2 diabetes mellitus with diabetic polyneuropathy, with long-term current use of insulin (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 F33.1    Epiretinal membrane H35.379    Renal cyst, left N28.1    Hypokalemia E87.6    Intractable pain R52       Past Medical History:        Diagnosis Date    Bladder stone     BPH with obstruction/lower urinary tract symptoms     Caffeine use     4 coffee/2 soda per day    Cataracts, bilateral     Chronic back pain     COPD (chronic obstructive pulmonary disease) ([address]     History of blood transfusion 2010    s/p 10 hour back surgery, pt unsure but thinks he had transfusion?  Hypertension     Irritable bowel syndrome     Kidney stone     Neuropathy     feet    Osteoarthritis     Pancreatitis 1988    Type II or unspecified type diabetes mellitus without mention of complication, not stated as uncontrolled        Past Surgical History:        Procedure Laterality Date    BACK SURGERY      BLADDER SURGERY      CHOLECYSTECTOMY      COLONOSCOPY      6-7 polyps    CYSTOSCOPY  04/06/2022    CYSTOSCOPY URETEROSCOPY HOLMIUM LASER LEFT URETERAL STENT INSERTION - Left    CYSTOSCOPY Left 04/06/2022    CYSTOSCOPY URETEROSCOPY HOLMIUM LASER LEFT URETERAL STENT INSERTION performed by Umm Hunt MD at [address] [facility]  04/20/2022    CYSTOSCOPY URETEROSCOPY LEFT STENT PLACEMENT WITH STONE BASKETING AND HOLMIUM LASER (    CYSTOSCOPY Left 04/20/2022    CYSTOSCOPY URETEROSCOPY LEFT STENT PLACEMENT WITH STONE BASKETING AND HOLMIUM LASER performed by Umm Hunt MD [facility] [address] [facility]  2015    macular scrape    EYE SURGERY      LEG TENDON SURGERY  1962    ankle    LITHOTRIPSY      9-21-17    OTHER SURGICAL HISTORY      implanted nerve stimulater in lumbar back    SHOULDER ARTHROSCOPY Left 10/25/2022    LEFT SHOULDER ARTHROSCOPY WITH EXTENSIVE DEBRIDMENT AND PRE-OPERATIVE INTERSCALENE BLOCK WITH EXPAREL (Left: Shoulder)    SHOULDER ARTHROSCOPY Left 10/25/2022    LEFT SHOULDER ARTHROSCOPY WITH EXTENSIVE DEBRIDMENT AND PRE-OPERATIVE INTERSCALENE BLOCK WITH EXPAREL performed by Bert Galvin MD at [facility] [address]  05/18/2010    DR. Calista Carrasquillo (NS) fusion and implantation of hardware    TONSILLECTOMY      URETEROSCOPY      with string stent 17    VASECTOMY         Social History:    Social History     Tobacco Use    Smoking status: Former     Packs/day: 1.00     Years: 40.00     Pack years: 40.00     Types: Cigarettes     Start date: 3/3/1980     Quit date: 2022     Years since quittin.3    Smokeless tobacco: Former   Substance Use Topics    Alcohol use: Not Currently     Comment: occasional                                Counseling given: Not Answered      Vital Signs (Current):   Vitals:    23 1237   BP: 119/64   Pulse: 80   Resp: 14   Temp: 96.8 °F (36 °C)   TempSrc: Infrared   SpO2: 93%   Weight: 183 lb (83 kg)   Height: 6' 2\" (1.88 m)                                              BP Readings from Last 3 Encounters:   23 119/64   23 122/74   10/25/22 120/64       NPO Status:                                                                                 BMI:   Wt Readings from Last 3 Encounters:   23 183 lb (83 kg)   23 183 lb (83 kg)   23 183 lb 6.4 oz (83.2 kg)     Body mass index is 23.5 kg/m².     CBC:   Lab Results   Component Value Date/Time    WBC 13.0 2023 11:55 AM    RBC 4.44 2023 11:55 AM    HGB 11.7 2023 11:55 AM    HCT 38.4 2023 11:55 AM    MCV 86.5 2023 11:55 AM    RDW 15.5 2023 11:55 AM     2023 11:55 AM       CMP:   Lab Results   Component Value Date/Time     2023 11:55 AM    K 3.8 2023 11:55 AM    CL 94 2023 11:55 AM    CO2 28 2023 11:55 AM    BUN 19 2023 11:55 AM    CREATININE 0.69 2023 11:55 AM    GFRAA >60 09/15/2022 02:00 PM    LABGLOM >60 2023 11:55 AM    GLUCOSE 108 2023 11:55 AM    PROT 7.6 09/15/2022 02:00 PM    CALCIUM 9.4 2023 11:55 AM    BILITOT 0.3 09/15/2022 02:00 PM    ALKPHOS 69 09/15/2022 02:00 PM    AST 15 09/15/2022 02:00 PM    ALT 18 09/15/2022 02:00 PM       POC Tests: No results for input(s): POCGLU, POCNA, POCK, POCCL, POCBUN, POCHEMO, POCHCT in the last 72 hours. Coags:   Lab Results   Component Value Date/Time    PROTIME 11.1 04/22/2022 06:30 PM    INR 1.1 04/22/2022 06:30 PM    APTT 27.1 04/22/2022 06:30 PM       HCG (If Applicable): No results found for: PREGTESTUR, PREGSERUM, HCG, HCGQUANT     ABGs: No results found for: PHART, PO2ART, UXA2OSO, VWE0AQZ, BEART, E6RXPFBP     Type & Screen (If Applicable):  No results found for: LABABO, LABRH    Drug/Infectious Status (If Applicable):  Lab Results   Component Value Date/Time    HEPCAB NONREACTIVE 08/23/2017 11:49 AM       COVID-19 Screening (If Applicable): No results found for: COVID19        Anesthesia Evaluation  Patient summary reviewed and Nursing notes reviewed  Airway: Mallampati: I  TM distance: >3 FB   Neck ROM: full  Mouth opening: > = 3 FB   Dental:      Comment: -MISSING SOME UPPER AND LOWER TEETH  -LOWER PERMANENT BRIDGE    Pulmonary:normal exam    (+) COPD:                            ROS comment: -QUIT SMOKING 2022 - 62 PACK YEARS  -ASTHMA WELL CONTROLLED   Cardiovascular:    (+) hypertension:,       ECG reviewed                     ROS comment: -EKG - SR @ 87     Neuro/Psych:                ROS comment: -NUMBNESS TINGLING BILATERAL FEET GI/Hepatic/Renal: Neg GI/Hepatic/Renal ROS            Endo/Other:    (+) Diabetes, : arthritis:., .                  ROS comment: -NPO AFTER MIDNIGHT  -ALLERGIES - IODINE Abdominal:             Vascular: negative vascular ROS. Other Findings:           Anesthesia Plan      general     ASA 3     (GETA)  Induction: intravenous. MIPS: Postoperative opioids intended and Prophylactic antiemetics administered. Anesthetic plan and risks discussed with patient. Plan discussed with CRNA.     Attending anesthesiologist reviewed and agrees with Annalee aMriee MD   1/17/2023

## 2023-01-18 LAB
ABSOLUTE EOS #: 0.03 K/UL (ref 0–0.4)
ABSOLUTE LYMPH #: 1.36 K/UL (ref 1–4.8)
ABSOLUTE MONO #: 1.32 K/UL (ref 0.1–1.2)
ANION GAP SERPL CALCULATED.3IONS-SCNC: 8 MMOL/L (ref 9–17)
BASOPHILS # BLD: 0 % (ref 0–2)
BASOPHILS ABSOLUTE: 0.03 K/UL (ref 0–0.2)
BUN BLDV-MCNC: 20 MG/DL (ref 8–23)
CALCIUM SERPL-MCNC: 8.1 MG/DL (ref 8.6–10.4)
CHLORIDE BLD-SCNC: 97 MMOL/L (ref 98–107)
CO2: 27 MMOL/L (ref 20–31)
CREAT SERPL-MCNC: 0.7 MG/DL (ref 0.7–1.2)
EOSINOPHILS RELATIVE PERCENT: 0 % (ref 1–4)
GFR SERPL CREATININE-BSD FRML MDRD: >60 ML/MIN/1.73M2
GLUCOSE BLD-MCNC: 153 MG/DL (ref 75–110)
GLUCOSE BLD-MCNC: 164 MG/DL (ref 75–110)
GLUCOSE BLD-MCNC: 215 MG/DL (ref 75–110)
GLUCOSE BLD-MCNC: 233 MG/DL (ref 75–110)
GLUCOSE BLD-MCNC: 253 MG/DL (ref 70–99)
HCT VFR BLD CALC: 28.5 % (ref 41–53)
HEMOGLOBIN: 8.8 G/DL (ref 13.5–17.5)
LYMPHOCYTES # BLD: 10 % (ref 24–44)
MCH RBC QN AUTO: 26.6 PG (ref 26–34)
MCHC RBC AUTO-ENTMCNC: 30.9 G/DL (ref 31–37)
MCV RBC AUTO: 86.1 FL (ref 80–100)
MONOCYTES # BLD: 10 % (ref 2–11)
PDW BLD-RTO: 15.5 % (ref 12.5–15.4)
PLATELET # BLD: 327 K/UL (ref 140–450)
PMV BLD AUTO: 9.5 FL (ref 8–14)
POTASSIUM SERPL-SCNC: 3.9 MMOL/L (ref 3.7–5.3)
RBC # BLD: 3.31 M/UL (ref 4.5–5.9)
SEG NEUTROPHILS: 80 % (ref 36–66)
SEGMENTED NEUTROPHILS ABSOLUTE COUNT: 10.66 K/UL (ref 1.8–7.7)
SODIUM BLD-SCNC: 132 MMOL/L (ref 135–144)
WBC # BLD: 13.4 K/UL (ref 3.5–11)

## 2023-01-18 PROCEDURE — 97530 THERAPEUTIC ACTIVITIES: CPT

## 2023-01-18 PROCEDURE — 6370000000 HC RX 637 (ALT 250 FOR IP): Performed by: NURSE PRACTITIONER

## 2023-01-18 PROCEDURE — 6370000000 HC RX 637 (ALT 250 FOR IP): Performed by: ORTHOPAEDIC SURGERY

## 2023-01-18 PROCEDURE — 1210000000 HC MED SURG R&B

## 2023-01-18 PROCEDURE — 6370000000 HC RX 637 (ALT 250 FOR IP): Performed by: HOSPITALIST

## 2023-01-18 PROCEDURE — 99222 1ST HOSP IP/OBS MODERATE 55: CPT | Performed by: HOSPITALIST

## 2023-01-18 PROCEDURE — 97535 SELF CARE MNGMENT TRAINING: CPT

## 2023-01-18 PROCEDURE — 6360000002 HC RX W HCPCS: Performed by: ORTHOPAEDIC SURGERY

## 2023-01-18 PROCEDURE — 97110 THERAPEUTIC EXERCISES: CPT

## 2023-01-18 PROCEDURE — 2580000003 HC RX 258: Performed by: ORTHOPAEDIC SURGERY

## 2023-01-18 PROCEDURE — 97162 PT EVAL MOD COMPLEX 30 MIN: CPT

## 2023-01-18 PROCEDURE — 6370000000 HC RX 637 (ALT 250 FOR IP)

## 2023-01-18 PROCEDURE — 97166 OT EVAL MOD COMPLEX 45 MIN: CPT

## 2023-01-18 PROCEDURE — 36415 COLL VENOUS BLD VENIPUNCTURE: CPT

## 2023-01-18 PROCEDURE — 80048 BASIC METABOLIC PNL TOTAL CA: CPT

## 2023-01-18 PROCEDURE — 82947 ASSAY GLUCOSE BLOOD QUANT: CPT

## 2023-01-18 PROCEDURE — 85025 COMPLETE CBC W/AUTO DIFF WBC: CPT

## 2023-01-18 RX ORDER — MULTIVITAMIN WITH IRON
1 TABLET ORAL DAILY
Status: DISCONTINUED | OUTPATIENT
Start: 2023-01-18 | End: 2023-01-23 | Stop reason: HOSPADM

## 2023-01-18 RX ORDER — SODIUM CHLORIDE 9 MG/ML
25 INJECTION, SOLUTION INTRAVENOUS PRN
Status: DISCONTINUED | OUTPATIENT
Start: 2023-01-18 | End: 2023-01-23 | Stop reason: HOSPADM

## 2023-01-18 RX ORDER — CELECOXIB 100 MG/1
200 CAPSULE ORAL DAILY
Status: DISCONTINUED | OUTPATIENT
Start: 2023-01-18 | End: 2023-01-23 | Stop reason: HOSPADM

## 2023-01-18 RX ORDER — INSULIN LISPRO 100 [IU]/ML
0-16 INJECTION, SOLUTION INTRAVENOUS; SUBCUTANEOUS
Status: DISCONTINUED | OUTPATIENT
Start: 2023-01-18 | End: 2023-01-23 | Stop reason: HOSPADM

## 2023-01-18 RX ORDER — HYDROCODONE BITARTRATE AND ACETAMINOPHEN 5; 325 MG/1; MG/1
1 TABLET ORAL EVERY 4 HOURS PRN
Qty: 42 TABLET | Refills: 0 | Status: SHIPPED | OUTPATIENT
Start: 2023-01-18 | End: 2023-01-25

## 2023-01-18 RX ORDER — LIDOCAINE HYDROCHLORIDE 10 MG/ML
5 INJECTION, SOLUTION EPIDURAL; INFILTRATION; INTRACAUDAL; PERINEURAL ONCE
Status: DISCONTINUED | OUTPATIENT
Start: 2023-01-18 | End: 2023-01-23 | Stop reason: HOSPADM

## 2023-01-18 RX ORDER — LISINOPRIL 10 MG/1
10 TABLET ORAL DAILY
Status: DISCONTINUED | OUTPATIENT
Start: 2023-01-18 | End: 2023-01-23 | Stop reason: HOSPADM

## 2023-01-18 RX ORDER — INSULIN LISPRO 100 [IU]/ML
0-4 INJECTION, SOLUTION INTRAVENOUS; SUBCUTANEOUS NIGHTLY
Status: DISCONTINUED | OUTPATIENT
Start: 2023-01-18 | End: 2023-01-23 | Stop reason: HOSPADM

## 2023-01-18 RX ORDER — SODIUM CHLORIDE 0.9 % (FLUSH) 0.9 %
5-40 SYRINGE (ML) INJECTION EVERY 12 HOURS SCHEDULED
Status: DISCONTINUED | OUTPATIENT
Start: 2023-01-18 | End: 2023-01-23 | Stop reason: HOSPADM

## 2023-01-18 RX ORDER — SODIUM CHLORIDE 0.9 % (FLUSH) 0.9 %
5-40 SYRINGE (ML) INJECTION PRN
Status: DISCONTINUED | OUTPATIENT
Start: 2023-01-18 | End: 2023-01-23 | Stop reason: HOSPADM

## 2023-01-18 RX ADMIN — METFORMIN HYDROCHLORIDE 1000 MG: 500 TABLET ORAL at 17:08

## 2023-01-18 RX ADMIN — METFORMIN HYDROCHLORIDE 1000 MG: 500 TABLET ORAL at 08:34

## 2023-01-18 RX ADMIN — SODIUM CHLORIDE, PRESERVATIVE FREE 10 ML: 5 INJECTION INTRAVENOUS at 20:50

## 2023-01-18 RX ADMIN — CEFAZOLIN 2000 MG: 2 INJECTION, POWDER, FOR SOLUTION INTRAMUSCULAR; INTRAVENOUS at 01:51

## 2023-01-18 RX ADMIN — CELECOXIB 200 MG: 100 CAPSULE ORAL at 09:30

## 2023-01-18 RX ADMIN — SODIUM CHLORIDE: 9 INJECTION, SOLUTION INTRAVENOUS at 06:54

## 2023-01-18 RX ADMIN — ACETAMINOPHEN 1000 MG: 500 TABLET ORAL at 04:46

## 2023-01-18 RX ADMIN — LISINOPRIL 10 MG: 10 TABLET ORAL at 09:32

## 2023-01-18 RX ADMIN — ACETAMINOPHEN 1000 MG: 500 TABLET ORAL at 13:43

## 2023-01-18 RX ADMIN — POLYETHYLENE GLYCOL 3350 17 G: 17 POWDER, FOR SOLUTION ORAL at 17:18

## 2023-01-18 RX ADMIN — GABAPENTIN 300 MG: 300 CAPSULE ORAL at 20:45

## 2023-01-18 RX ADMIN — ENOXAPARIN SODIUM 40 MG: 100 INJECTION SUBCUTANEOUS at 09:31

## 2023-01-18 RX ADMIN — INSULIN LISPRO 2 UNITS: 100 INJECTION, SOLUTION INTRAVENOUS; SUBCUTANEOUS at 08:32

## 2023-01-18 RX ADMIN — THERA TABS 1 TABLET: TAB at 09:32

## 2023-01-18 RX ADMIN — CEFAZOLIN 2000 MG: 2 INJECTION, POWDER, FOR SOLUTION INTRAMUSCULAR; INTRAVENOUS at 17:06

## 2023-01-18 RX ADMIN — PIOGLITAZONE 30 MG: 15 TABLET ORAL at 09:30

## 2023-01-18 RX ADMIN — ACETAMINOPHEN 1000 MG: 500 TABLET ORAL at 19:45

## 2023-01-18 RX ADMIN — CEFAZOLIN 2000 MG: 2 INJECTION, POWDER, FOR SOLUTION INTRAMUSCULAR; INTRAVENOUS at 08:36

## 2023-01-18 RX ADMIN — VANCOMYCIN HYDROCHLORIDE 1000 MG: 1 INJECTION, POWDER, LYOPHILIZED, FOR SOLUTION INTRAVENOUS at 20:54

## 2023-01-18 RX ADMIN — OXYCODONE HYDROCHLORIDE 5 MG: 5 TABLET ORAL at 06:50

## 2023-01-18 RX ADMIN — VANCOMYCIN HYDROCHLORIDE 1000 MG: 1 INJECTION, POWDER, LYOPHILIZED, FOR SOLUTION INTRAVENOUS at 09:30

## 2023-01-18 RX ADMIN — CHLORTHALIDONE 25 MG: 25 TABLET ORAL at 09:30

## 2023-01-18 RX ADMIN — OXYCODONE HYDROCHLORIDE 5 MG: 5 TABLET ORAL at 17:03

## 2023-01-18 RX ADMIN — CHOLECALCIFEROL (VITAMIN D3) 10 MCG (400 UNIT) TABLET 800 UNITS: at 09:30

## 2023-01-18 RX ADMIN — INSULIN GLARGINE 63 UNITS: 100 INJECTION, SOLUTION SUBCUTANEOUS at 20:47

## 2023-01-18 RX ADMIN — DULOXETINE 30 MG: 30 CAPSULE, DELAYED RELEASE ORAL at 09:30

## 2023-01-18 RX ADMIN — OXYCODONE HYDROCHLORIDE 5 MG: 5 TABLET ORAL at 01:48

## 2023-01-18 RX ADMIN — DULOXETINE 30 MG: 30 CAPSULE, DELAYED RELEASE ORAL at 20:45

## 2023-01-18 RX ADMIN — KETOROLAC TROMETHAMINE 30 MG: 30 INJECTION, SOLUTION INTRAMUSCULAR; INTRAVENOUS at 04:45

## 2023-01-18 RX ADMIN — GABAPENTIN 300 MG: 300 CAPSULE ORAL at 09:30

## 2023-01-18 RX ADMIN — INSULIN LISPRO 4 UNITS: 100 INJECTION, SOLUTION INTRAVENOUS; SUBCUTANEOUS at 08:33

## 2023-01-18 ASSESSMENT — PAIN DESCRIPTION - LOCATION
LOCATION: NECK;SHOULDER
LOCATION: NECK;SHOULDER
LOCATION: SHOULDER
LOCATION: NECK;SHOULDER
LOCATION: NECK;SHOULDER
LOCATION: SHOULDER
LOCATION: ARM;BACK;SHOULDER
LOCATION: SHOULDER;ARM;BACK

## 2023-01-18 ASSESSMENT — PAIN SCALES - GENERAL
PAINLEVEL_OUTOF10: 1
PAINLEVEL_OUTOF10: 7
PAINLEVEL_OUTOF10: 6
PAINLEVEL_OUTOF10: 4
PAINLEVEL_OUTOF10: 3
PAINLEVEL_OUTOF10: 4
PAINLEVEL_OUTOF10: 7
PAINLEVEL_OUTOF10: 5
PAINLEVEL_OUTOF10: 3
PAINLEVEL_OUTOF10: 1
PAINLEVEL_OUTOF10: 3
PAINLEVEL_OUTOF10: 4

## 2023-01-18 ASSESSMENT — PAIN DESCRIPTION - ORIENTATION
ORIENTATION: LEFT

## 2023-01-18 ASSESSMENT — PAIN DESCRIPTION - DESCRIPTORS
DESCRIPTORS: ACHING
DESCRIPTORS: ACHING
DESCRIPTORS: THROBBING
DESCRIPTORS: ACHING
DESCRIPTORS: THROBBING
DESCRIPTORS: SHARP

## 2023-01-18 ASSESSMENT — PAIN - FUNCTIONAL ASSESSMENT

## 2023-01-18 ASSESSMENT — PAIN SCALES - WONG BAKER
WONGBAKER_NUMERICALRESPONSE: 0
WONGBAKER_NUMERICALRESPONSE: 0

## 2023-01-18 ASSESSMENT — PAIN DESCRIPTION - ONSET: ONSET: ON-GOING

## 2023-01-18 ASSESSMENT — PAIN DESCRIPTION - FREQUENCY: FREQUENCY: CONTINUOUS

## 2023-01-18 ASSESSMENT — PAIN DESCRIPTION - PAIN TYPE: TYPE: ACUTE PAIN

## 2023-01-18 NOTE — PROGRESS NOTES
No events O/N. Pain appropriately controlled. Blood pressure 113/69, pulse 94, temperature 98.2 °F (36.8 °C), temperature source Axillary, resp. rate 18, height 6' 2\" (1.88 m), weight 194 lb 10.7 oz (88.3 kg), SpO2 98 %. Left shoulder dressing clean, dry, and intact. Drain in place and functioning. Sensation is grossly intact to light touch diffusely. +gallo flexion, extension, abduction and adduction of all fingers. Impression and plan: 66 yo sp I and D of left shoulder abscess and septic joint POD 1  - Pain control  - Cultures showing Gram positive cocci. Continue to follow  - Continue IV antibiotics, for 6 weeks  - Needs PICC line  - ID consulted  - Get some movement going in right shoulder  - Drain put out 50 cc per nursing.  Maintain drain in place

## 2023-01-18 NOTE — PROGRESS NOTES
Physician Progress Note      Christian Babin  [facility] #:                  [id number]  :                       1950  ADMIT DATE:       2023 11:32 AM  DISCH DATE:  RESPONDING  PROVIDER #:        Charamine Sahni MD          QUERY TEXT:    Patient admitted with Left shoulder septic arthritis/abscess. Per Op note   dated 23 documentation of debridement. To accurately reflect the   procedure performed please document if debridement was excisional or   nonexcisional and the deepest depth of tissue removed as down to and   including: The medical record reflects the following:  Risk Factors: septic arthritis/abscess  Clinical Indicators: Per OP note  I performed a thorough bursectomy   through a standard lateral portal incision. I then performed an irrigation   and debridement of this compartment. At this time I moved back to the   glenohumeral joint and noted significant erosive bone loss of the humeral head   and glenoid. Diffuse synovitis was encountered. The tissue was   significantly friable. I performed a thorough debridement through an anterior   portal incision. I then switched the camera to the anterior portal and   through a posterior portal also performed a debridement. .'  Treatment: s/p irrigation/drainage and incision/drainage. IV Abx    Thank you, Eunice Adam CDI  email - [email or web address]  office hours [id number]  Options provided:  -- Nonexcisional debridement of fascia  -- Excisional debridement of fascia  -- Nonexcisional debridement of muscle  -- Excisional debridement of muscle  -- Nonexcisional debridement of bone  -- Excisional debridement of bone  -- Other - I will add my own diagnosis  -- Disagree - Not applicable / Not valid  -- Disagree - Clinically unable to determine / Unknown  -- Refer to Clinical Documentation Reviewer    PROVIDER RESPONSE TEXT:    Excisional debridement of bone of Left shoulder was performed during procedure   on 23.     Query created by: Reece Nichols on 1/18/2023 1:37 PM      Electronically signed by:  Vladimir Sorto MD 1/18/2023 5:18 PM

## 2023-01-18 NOTE — ANESTHESIA POSTPROCEDURE EVALUATION
Department of Anesthesiology  Postprocedure Note    Patient: Luis A Riggins  MRN: [id number]  YOB: 1950  Date of evaluation: 1/17/2023      Procedure Summary     Date: 01/17/23 Room / Location: [facility] OR [address] [place] [address]) [facility]    Anesthesia Start: 2938 Anesthesia Stop: 1837    Procedures:       LEFT SHOULDER ARTHROSCOPY (Left: Shoulder)      LEFT SHOULDER ABSCESS  INCISION AND DRAINAGE (Left: Shoulder) Diagnosis:       Pyogenic arthritis of left shoulder region, due to unspecified organism ([address]      Abscess of left shoulder      (Pyogenic arthritis of left shoulder region, due to unspecified organism ([address] [M00.9])      (Abscess of left shoulder [L02.414])    Surgeons: Shira Reyes MD Responsible Provider: Jordan Ariza MD    Anesthesia Type: general ASA Status: 3          Anesthesia Type: No value filed.     Hina Phase I: Hina Score: 4    Hina Phase II:        Anesthesia Post Evaluation    Patient location during evaluation: PACU  Patient participation: complete - patient participated  Level of consciousness: awake and alert  Airway patency: patent  Nausea & Vomiting: no vomiting and no nausea  Complications: no  Cardiovascular status: blood pressure returned to baseline  Respiratory status: acceptable  Hydration status: euvolemic

## 2023-01-18 NOTE — CONSULTS
[facility]  Office: [address], DO, Vivek Pole, DO, Cheryl [place], DO, Wes Rubi Blood, DO, Naresh Israel MD, Myrtle Martinez MD, Santo Acosta MD, Martha Paul MD,  Yesenia Braxton MD, Elvia Floyd MD, Isaiah Coleman, DO, Jess George MD,  Marsha Ruby MD, Jayson Garcia MD, Desirae Castillo DO, Ronaldo Mishra MD, Milady Jeffrey MD, Jannet Manzanares DO, Cornelius Yu MD, Reece Yanez MD, Kimberlyn Arzola MD, Tito Sullivan MD, Troy Carrion DO, Tony Bang MD, Jyoti Tello MD, Sal Meeks, CNP,  Yaritza Huerta, CNP, Michelle Hunter, CNP, Dequan Scott, CNP,  Davida Valdez, [facility], Holland Camacho, CNP, Jonathan Oliver, CNP, Emma Paiz, CNP, Kermit Ferrer, CNP, Luis Landers, CNP, Ozzy Salinas PA-C, Reshma Brody, CNS, Mari Gramajo, CNP, Annita Cordova, CNP         [address] / HISTORY AND PHYSICAL EXAMINATION            Date:   1/18/2023  Patient name:  Sherrell Mccabe  Date of admission:  1/17/2023 11:32 AM  MRN:   [id number]  Account:  [de-identified]  YOB: 1950  PCP:    BREANNA Crabtree NP  Room:   [address] [place] [address]  Code Status:    Full Code    Physician Requesting Consult: Anid Hall MD    Reason for Consult: Postoperative management including but not limited to diabetes, essential hypertension, anxiety/depression    Chief Complaint:     \"My shoulder is sore\"    History Obtained From:     patient, electronic medical record    History of Present Illness: This very pleasant 57-year-old male underwent washout of a left septic shoulder. He is postop day #1 and doing quite well. He states that his pain is well controlled. He is sore when he moves as expected but otherwise is doing well. The patient has been admitted and initiated on vancomycin. He is also receiving Ancef.   He has a consultation to infectious disease for recommendations regarding antibiotics and discharge. The patient denies any questions regarding PICC line or infectious disease consultation. He understands that he may require IV antibiotics on discharge and denies any questions or concerns regarding this. Past Medical History:     Past Medical History:   Diagnosis Date    Bladder stone     BPH with obstruction/lower urinary tract symptoms     Caffeine use     4 coffee/2 soda per day    Cataracts, bilateral     Chronic back pain     COPD (chronic obstructive pulmonary disease) ([address]     History of blood transfusion 2010    s/p 10 hour back surgery, pt unsure but thinks he had transfusion?     Hypertension     Irritable bowel syndrome     Kidney stone     Neuropathy     feet    Osteoarthritis     Pancreatitis 1988    Type II or unspecified type diabetes mellitus without mention of complication, not stated as uncontrolled         Past Surgical History:     Past Surgical History:   Procedure Laterality Date    BACK SURGERY      BLADDER SURGERY      CHOLECYSTECTOMY      COLONOSCOPY      6-7 polyps    CYSTOSCOPY  04/06/2022    CYSTOSCOPY URETEROSCOPY HOLMIUM LASER LEFT URETERAL STENT INSERTION - Left    CYSTOSCOPY Left 04/06/2022    CYSTOSCOPY URETEROSCOPY HOLMIUM LASER LEFT URETERAL STENT INSERTION performed by Ron Harrell MD at [facility]  04/20/2022    CYSTOSCOPY URETEROSCOPY LEFT STENT PLACEMENT WITH STONE BASKETING AND HOLMIUM LASER (    CYSTOSCOPY Left 04/20/2022    CYSTOSCOPY URETEROSCOPY LEFT STENT PLACEMENT WITH STONE BASKETING AND HOLMIUM LASER performed by Ron Harrell MD at [address] [facility]  2015    macular scrape    EYE SURGERY      LEG TENDON SURGERY  1962    ankle    LITHOTRIPSY      9-21-17    OTHER SURGICAL HISTORY      implanted nerve stimulater in lumbar back    SHOULDER ARTHROSCOPY Left 10/25/2022    LEFT SHOULDER ARTHROSCOPY WITH EXTENSIVE DEBRIDMENT AND PRE-OPERATIVE INTERSCALENE BLOCK WITH EXPAREL (Left: Shoulder) SHOULDER ARTHROSCOPY Left 10/25/2022    LEFT SHOULDER ARTHROSCOPY WITH EXTENSIVE DEBRIDMENT AND PRE-OPERATIVE INTERSCALENE BLOCK WITH EXPAREL performed by Tabby Amaya MD at [facility] [address] ARTHROSCOPY  01/17/2023    LEFT SHOULDER ABSCESS  INCISION AND DRAINAGE    SPINE SURGERY  05/18/2010    DR. Veronica Puckettram (NS) fusion and implantation of hardware    TONSILLECTOMY      URETEROSCOPY      with string stent 9-21-17    VASECTOMY          Medications Prior to Admission:     Prior to Admission medications    Medication Sig Start Date End Date Taking? Authorizing Provider   traMADol (ULTRAM) 50 MG tablet Take 1 tablet by mouth every 4 hours as needed for Pain for up to 7 days. Intended supply: 7 days. Take lowest dose possible to manage pain Max Daily Amount: 300 mg 1/11/23 1/18/23  Melinda Sahni MD   traMADol (ULTRAM) 50 MG tablet Take 1 tablet by mouth every 6 hours as needed for Pain.  12/19/22 12/19/23  Tabby Amaya MD   Continuous Blood Gluc Sensor (FREESTYLE ALFONSO 2 SENSOR) MISC 1 each by Does not apply route every 14 days 11/20/22   BREANNA Salinas NP   Continuous Blood Gluc Sensor (FREESTYLE ALFONSO 2 SENSOR) MISC 1 each by Does not apply route every 14 days 11/20/22   BREANNA Salinas NP   metFORMIN (GLUCOPHAGE) 1000 MG tablet Take 1 tablet by mouth 2 times daily (with meals) 10/25/22 10/25/23  BREANNA Salinas - NP   lisinopril (PRINIVIL;ZESTRIL) 10 MG tablet Take 1 tablet by mouth daily 10/25/22 10/25/23  BREANNA Salinas - NP   chlorthalidone (HYGROTON) 25 MG tablet Take 1 tablet by mouth daily 10/25/22 10/25/23  BREANNA Salinas - NP   pioglitazone (ACTOS) 30 MG tablet Take 1 tablet by mouth daily 10/25/22 10/20/23  BREANNA Salinas - NP   DULoxetine (CYMBALTA) 30 MG extended release capsule Take 1 capsule by mouth 2 times daily 10/25/22 10/25/23  BREANNA Salinas NP   insulin detemir (LEVEMIR FLEXTOUCH) 100 UNIT/ML injection pen Inject 63 Units into the skin nightly 10/25/22 10/25/23  Rose Godwin, APRN - NP   celecoxib (CELEBREX) 200 MG capsule Take 1 capsule by mouth daily 10/25/22 10/20/23  Rose Godwin, APRN - NP   gabapentin (NEURONTIN) 300 MG capsule Take 1 capsule by mouth 2 times daily for 180 days. Intended supply: 90 days 10/25/22 4/23/23  Rose Godwin APRN - NP   insulin aspart (NOVOLOG) 100 UNIT/ML injection pen Inject 2 Units into the skin 3 times daily (before meals) 0-150 = 0 units, 151-200 = 2 units, 201-250 = 4 units, 251-300 = 6 units, 301-350 = 8 units, 351-400 of greater = 10 units. Max dose 30units per day 10/25/22 10/25/23  Rose Godwin, APRN - NP   ondansetron (ZOFRAN) 4 MG tablet Take 1 tablet by mouth daily as needed for Nausea or Vomiting 10/21/22   Gregoria Tsai MD   Handicap Placard MISC by Does not apply route Exp: 10/2027 10/21/22   Rose Godwin APRN - NP   Continuous Blood Gluc Sensor (FREESTYLE ALFONSO 2 SENSOR) MISC 1 applicator by Does not apply route every 14 days 10/13/22 10/13/23  Rose Godwin APRN - NP   Cholecalciferol (VITAMIN D3 PO) Take by mouth daily    Historical Provider, MD   albuterol sulfate HFA (PROVENTIL;VENTOLIN;PROAIR) 108 (90 Base) MCG/ACT inhaler Inhale 2 puffs into the lungs every 6 hours as needed for Wheezing or Shortness of Breath (or take 1-2 puffs 30 mins prior to strenous activity) 10/4/22 1/6/23  Rose Godwin, APRN - NP   blood glucose monitor strips Test 4 times a day & as needed for symptoms of irregular blood glucose. Dispense sufficient amount for indicated testing frequency plus additional to accommodate PRN testing needs.  10/4/22   Rose Godwin, APRN - NP   Insulin Pen Needle 31G X 5 MM MISC 1 each by Does not apply route 3 times daily 10/4/22 10/4/23  Rose Godwin APRN - NP   Lancets MISC Test twice a day and as needed, Please send lancets for Verio brand meter 1/4/16 9/2/23  Anastasiia Nguyen, APRN - CNP   multivitamin [facility]) per tablet Take 1 tablet by mouth daily.    Historical Provider, MD        Allergies:     Iodine    Social History:     Tobacco:    reports that he quit smoking about 4 months ago. His smoking use included cigarettes. He started smoking about 42 years ago. He has a 40.00 pack-year smoking history. He has quit using smokeless tobacco.  Alcohol:      reports that he does not currently use alcohol. Drug Use:  reports no history of drug use. Family History:     Family History   Problem Relation Age of Onset    Arthritis Mother     Cancer Father        Review of Systems:     Positive and Negative as described in HPI. CONSTITUTIONAL:  negative for fevers, chills, sweats, fatigue, weight loss  HEENT:  negative for vision, hearing changes, runny nose, throat pain  RESPIRATORY:  negative for shortness of breath, cough, congestion, wheezing. CARDIOVASCULAR:  negative for chest pain, palpitations.   GASTROINTESTINAL:  negative for nausea, vomiting, diarrhea, constipation, change in bowel habits, abdominal pain   GENITOURINARY:  negative for difficulty of urination, burning with urination, frequency   INTEGUMENT:  negative for rash, skin lesions, easy bruising   HEMATOLOGIC/LYMPHATIC:  negative for swelling/edema   ALLERGIC/IMMUNOLOGIC:  negative for urticaria , itching  ENDOCRINE:  negative increase in drinking, increase in urination, hot or cold intolerance  MUSCULOSKELETAL: Positive for postoperative pain in the left shoulder  NEUROLOGICAL:  negative for headaches, dizziness, lightheadedness, numbness, pain, tingling extremities  BEHAVIOR/PSYCH:  negative for depression, anxiety    Physical Exam:     BP (!) 113/57   Pulse 88   Temp 98.6 °F (37 °C) (Oral)   Resp 17   Ht 6' 2\" (1.88 m)   Wt 194 lb 10.7 oz (88.3 kg)   SpO2 99%   BMI 24.99 kg/m²   Temp (24hrs), Av.9 °F (36.6 °C), Min:96.2 °F (35.7 °C), Max:98.6 °F (37 °C)    Recent Labs     23  0815 23  1159   POCGLU 164* 215* 233* 215* Intake/Output Summary (Last 24 hours) at 1/18/2023 1430  Last data filed at 1/18/2023 0720  Gross per 24 hour   Intake 3060 ml   Output 615 ml   Net 2445 ml       General Appearance:  alert, well appearing, and in no acute distress  Mental status: oriented to person, place, and time with normal affect  Head:  normocephalic, atraumatic. Eye: no icterus, redness, pupils equal and reactive, extraocular eye movements intact, conjunctiva clear  Ear: normal external ear, no discharge, hearing intact  Nose:  no drainage noted  Mouth: mucous membranes moist  Neck: supple, no carotid bruits, thyroid not palpable  Lungs: Bilateral equal air entry, clear to ausculation, no wheezing, rales or rhonchi, normal effort  Cardiovascular: normal rate, regular rhythm, no murmur, gallop, rub. Abdomen: Soft, nontender, nondistended, normal bowel sounds, no hepatomegaly or splenomegaly  Neurologic: There are no new focal motor or sensory deficits, normal muscle tone and bulk, no abnormal sensation, normal speech, cranial nerves II through XII grossly intact  Skin: No gross lesions, rashes, bruising or bleeding on exposed skin area  Extremities:  peripheral pulses palpable, no pedal edema or calf pain with palpation  Psych: normal affect     Investigations:      Laboratory Testing:  Recent Results (from the past 24 hour(s))   POC Glucose Fingerstick    Collection Time: 01/17/23  7:28 PM   Result Value Ref Range    POC Glucose 164 (H) 75 - 110 mg/dL   POC Glucose Fingerstick    Collection Time: 01/17/23  8:50 PM   Result Value Ref Range    POC Glucose 215 (H) 75 - 110 mg/dL   Culture, Anaerobic and Aerobic    Collection Time: 01/17/23 10:29 PM    Specimen: Tissue   Result Value Ref Range    Specimen Description . SHOULDER LEFT     Direct Exam FEW NEUTROPHILS (A)     Direct Exam FEW GRAM POSITIVE COCCI IN CLUSTERS (A)     Culture CULTURE IN PROGRESS    Basic Metabolic Panel w/ Reflex to MG    Collection Time: 01/18/23  5:57 AM Result Value Ref Range    Glucose 253 (H) 70 - 99 mg/dL    BUN 20 8 - 23 mg/dL    Creatinine 0.70 0.70 - 1.20 mg/dL    Est, Glom Filt Rate >60 >60 mL/min/1.73m2    Calcium 8.1 (L) 8.6 - 10.4 mg/dL    Sodium 132 (L) 135 - 144 mmol/L    Potassium 3.9 3.7 - 5.3 mmol/L    Chloride 97 (L) 98 - 107 mmol/L    CO2 27 20 - 31 mmol/L    Anion Gap 8 (L) 9 - 17 mmol/L   CBC with Auto Differential    Collection Time: 01/18/23  5:57 AM   Result Value Ref Range    WBC 13.4 (H) 3.5 - 11.0 k/uL    RBC 3.31 (L) 4.5 - 5.9 m/uL    Hemoglobin 8.8 (L) 13.5 - 17.5 g/dL    Hematocrit 28.5 (L) 41 - 53 %    MCV 86.1 80 - 100 fL    MCH 26.6 26 - 34 pg    MCHC 30.9 (L) 31 - 37 g/dL    RDW 15.5 (H) 12.5 - 15.4 %    Platelets 674 794 - 075 k/uL    MPV 9.5 8.0 - 14.0 fL    Seg Neutrophils 80 (H) 36 - 66 %    Lymphocytes 10 (L) 24 - 44 %    Monocytes 10 2 - 11 %    Eosinophils % 0 (L) 1 - 4 %    Basophils 0 0 - 2 %    Segs Absolute 10.66 (H) 1.8 - 7.7 k/uL    Absolute Lymph # 1.36 1.0 - 4.8 k/uL    Absolute Mono # 1.32 (H) 0.1 - 1.2 k/uL    Absolute Eos # 0.03 0.0 - 0.4 k/uL    Basophils Absolute 0.03 0.0 - 0.2 k/uL   POC Glucose Fingerstick    Collection Time: 01/18/23  8:15 AM   Result Value Ref Range    POC Glucose 233 (H) 75 - 110 mg/dL   POC Glucose Fingerstick    Collection Time: 01/18/23 11:59 AM   Result Value Ref Range    POC Glucose 215 (H) 75 - 110 mg/dL       Imaging/Diagonstics:  MRI SHOULDER LEFT WO CONTRAST    Result Date: 1/12/2023  1. Extensive severe articular surface erosions of the glenohumeral joint with a moderate joint effusion with synovitis. Significant marrow edema in throughout the humeral head extending into the humeral neck as well as the glenoid. Findings likely represent septic arthritis and osteomyelitis. Inflammatory arthritis or neuropathic arthropathy are alternative considerations.  2. Large partially visualized heterogeneous fluid collection inferior to and likely communicating with the glenohumeral joint measuring at least 8.4 x 5.8 x 5 cm likely representing an abscess. Extensive subcutaneous and intramuscular edema compatible with cellulitis and myositis. 3. Severe diffuse supraspinatus tendon thinning and probable full-thickness tearing. 4. Large high-grade partial-thickness articular surface tear of the subscapularis tendon. 5. Mild glenohumeral degenerative changes.        Assessment :      Hospital Problems             Last Modified POA    * (Principal) Septic arthritis ([address] 1/17/2023 Yes    Abscess of left shoulder 1/17/2023 Yes       Plan:     Left septic shoulder arthritis  Status post incision and drainage  Presently on Ancef/vancomycin  Infectious disease consultation pending  Diabetes mellitus  Insulin sliding scale  Continue home medications  Hyponatremia  Likely secondary to SIADH due to pain however will discontinue thiazide diuretic for now  Check morning labs  Essential hypertension  Continue home medications with the exception of a thiazide diuretic  Anxiety/depression  Continue Cymbalta    Consultations:   IP CONSULT TO HOSPITALIST  IP CONSULT TO INFECTIOUS DISEASES  PHARMACY TO DOSE VANCOMYCIN  IP CONSULT TO CASE MANAGEMENT      Eden Brown DO  1/18/2023  2:30 PM    Copy sent to Dr. Harrington Boxer, APRN - NP

## 2023-01-18 NOTE — DISCHARGE INSTR - COC
Continuity of Care Form    Patient Name: Ryan Ware   :  1950  MRN:  [id number]    Admit date:  2023  Discharge date:  23    Code Status Order: Full Code   Advance Directives:   Advance Care Flowsheet Documentation       Date/Time Healthcare Directive Type of Healthcare Directive Copy in [address] Agent's Name Healthcare Agent's Phone Number    [phone] No, patient does not have an advance directive for healthcare treatment -- -- -- -- --            Admitting Physician:  Kamran Krishnamurthy MD  PCP: BREANNA Villaseñor NP    [facility]70[facility]700-01  Discharging Unit Phone Number: [phone]    Emergency Contact:   Extended Emergency Contact Information  Primary Emergency Contact: Sobia Aldridge  Address: [address] [place] [address]  Home Phone: [phone]  Work Phone: [phone]  Mobile Phone: [phone]  Relation: Spouse  Secondary Emergency Contact: [facility] Phone: [phone]  Relation: Child    Past Surgical History:  Past Surgical History:   Procedure Laterality Date    ARM SURGERY Left 2023    LEFT SHOULDER ABSCESS  INC[facility]      6-7 polyps    CYSTOSCOPY  2022    CYSTOSCOPY URETEROSCOPY HOLMIUM LASER LEFT URETERAL STENT INSERTION - Left    CYSTOSCOPY Left 2022    CYSTOSCOPY URETEROSCOPY HOLMIUM LASER LEFT URETERAL STENT INSERTION performed by Renzo Romo MD at [address] [facility]  2022    CYSTOSCOPY URETEROSCOPY LEFT STENT PLACEMENT WITH STONE BASKETING AND HOLMIUM LASER (    CYSTOSCOPY Left 2022    CYSTOSCOPY URETEROSCOPY LEFT STENT PLACEMENT WITH STONE BASKETING AND HOLMIUM LASER performed by Renzo Romo MD [address] EYE [address]GERY      LEG TENDON SURGERY  1962    ankle    LITHOTRIPSY      9-21-17    OTHER SURGICAL HISTORY      implanted nerve stimulater in lumbar back    SHOULDER ARTHROSCOPY Left 10/25/2022    LEFT SHOULDER ARTHROSCOPY WITH EXTENSIVE DEBRIDMENT AND PRE-OPERATIVE INTERSCALENE BLOCK WITH EXPAREL (Left: Shoulder)    SHOULDER ARTHROSCOPY Left 10/25/2022    LEFT SHOULDER ARTHROSCOPY WITH EXTENSIVE DEBRIDMENT AND PRE-OPERATIVE INTERSCALENE BLOCK WITH EXPAREL performed by Deloris Velasquez MD at [facility] [address] [facility]THROS[address]  01/17/2023    LEFT SHOULDER ABSCESS  INCISION AND DRAINAGE    SHOULDER ARTHROSCOPY Left 1/17/2023    LEFT SHOULDER ARTHROSCOPY performed by Deloris Velasquez MD at [facility] [address]  05/18/2010    DR. Nani Fermin Buttram (NS) fusion and implantation of hardware    TONSILLECTOMY      URETEROSCOPY      with string stent 9-21-17    VASECTOMY         Immunization History:   Immunization History   Administered Date(s) Administered    COVID-19, MODERNA BLUE border, Primary or Immunocompromised, (age 12y+), IM, 100 mcg/0.5mL 04/09/2021, 05/07/2021, 01/14/2022    Influenza 11/28/2012, 10/09/2013    Influenza Vaccine, unspecified formulation 11/28/2012, 10/09/2013    Influenza Virus Vaccine 11/28/2012, 10/09/2013, 10/09/2014, 09/16/2015, 10/07/2016, 11/20/2017, 10/29/2018    Influenza, AFLURIA (age 1 yrs+), FLUZONE, (age 10 mo+), MDV, 0.5mL 10/07/2016, 11/20/2017, 10/29/2018    Influenza, FLUAD, (age 72 y+), Adjuvanted, 0.5mL 11/13/2020, 01/06/2022, 10/04/2022    Influenza, FLUARIX, FLULAVAL, FLUZONE (age 10 mo+) AND AFLURIA, (age 1 y+), PF, 0.5mL 09/04/2019    Pneumococcal Conjugate 13-valent ([id number]) 10/07/2016    Pneumococcal Polysaccharide ([id number]) 09/16/2015    Td, unspecified formulation 09/06/2016    Zoster Live (Zostavax) 02/10/2014       Active Problems:  Patient Active Problem List   Diagnosis Code    Essential hypertension I10    Osteoarthritis M19.90    Vitamin D deficiency E55.9    Testosterone deficiency E34.9    Difficulty sleeping G47.9    Sacro-iliac pain M53.3    Neuropathy G62.9    Nocturia R35.1    Benign prostatic hyperplasia with urinary obstruction N40.1, N13.8    Type 2 diabetes mellitus with diabetic polyneuropathy, with long-term current use of insulin ([facility]) E11.42, Z79.4    Kidney stone on left side N20.0    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 Change daily. Nurse Assessment:  Last Vital Signs: BP 95/65   Pulse 88   Temp 97.9 °F (36.6 °C) (Oral)   Resp 16   Ht 6' 2\" (1.88 m)   Wt 194 lb 10.7 oz (88.3 kg)   SpO2 96%   BMI 24.99 kg/m²     Last documented pain score (0-10 scale): Pain Level: 1  Last Weight:   Wt Readings from Last 1 Encounters:   01/17/23 194 lb 10.7 oz (88.3 kg)     Mental Status:  oriented and alert    IV Access:  - PICC - site  R Basilic, insertion date: 1/21/23    Last flushed 1/23/23 normal saline. Heparinized 1/23/23 1230p due to daily infusion therapy. Pt is to have Ceftriaxone through picc. See script/order for dosing and length.      Nursing Mobility/ADLs:  Walking   Independent  Transfer  Independent  Bathing  Assisted  Dressing  Independent  [address]  Med Delivery   whole    Wound Care Documentation and Therapy:  Puncture 01/17/23 Shoulder (Active)   Drainage Amount None 01/17/23 1834   Odor None 01/17/23 1834   Dressing/Treatment Other (comment) 01/17/23 1834   Dressing Status Clean, dry & intact 01/17/23 1834   Number of days: 0       Incision 01/17/23 Shoulder Left;Posterior (Active)   Dressing Status Clean;Dry; Intact 01/18/23 1601   Drainage Amount None 01/17/23 2047   Odor None 01/17/23 1834   Number of days: 1        Elimination:  Continence: Bowel: Yes  Bladder: Yes  Urinary Catheter: None   Colostomy/Ileostomy/Ileal Conduit: No       Date of Last BM: 1/23/23    Intake/Output Summary (Last 24 hours) at 1/18/2023 1650  Last data filed at 1/18/2023 1600  Gross per 24 hour   Intake 3010 ml   Output 1135 ml   Net 1875 ml     I/O last 3 completed shifts: In: 2940 [P.O.:1140; I.V.:1200; IV Piggyback:600]  Out: 15 [Drains:15]    Safety Concerns: At Risk for Falls    Impairments/Disabilities:      None    Nutrition Therapy:  Current Nutrition Therapy:   - Oral Diet:  Carb Control 3 carbs/meal (1500kcals/day)    Routes of Feeding: Oral  Liquids: Thin Liquids  Daily Fluid Restriction: no  Last Modified Barium Swallow with Video (Video Swallowing Test): not done    Treatments at the Time of Hospital Discharge:   Respiratory Treatments:   Oxygen Therapy:  is not on home oxygen therapy.   Ventilator:    - No ventilator support    Rehab Therapies: Physical Therapy and Occupational Therapy, Skilled nursing for IV atb therapy  Weight Bearing Status/Restrictions: No weight bearing restrictions  Other Medical Equipment (for information only, NOT a DME order):  cane  Other Treatments:     Patient's personal belongings (please select all that are sent with patient):  None    RN SIGNATURE:  Electronically signed by Kalli Crump RN on 1/23/23 at 11:58 AM EST    CASE MANAGEMENT/SOCIAL WORK SECTION    Inpatient Status Date: 1/17/23    Readmission Risk Assessment Score:  Readmission Risk              Risk of Unplanned Readmission:  14           Discharging to Facility/ Agency   Name: Venkata Thomas  Address: [address] [place] [address]  Phone: [phone]      / signature: Electronically signed by Fredrick Jenkins RN on 1/23/23 at 11:45 AM EST    PHYSICIAN SECTION    Prognosis: Good    Condition at Discharge: Stable    Rehab Potential (if transferring to Rehab): Good    Recommended Labs or Other Treatments After Discharge: Home care. Continue ceftriaxone through 1/14. Physician Certification: I certify the above information and transfer of Amelia Hurst  is necessary for the continuing treatment of the diagnosis listed and that he requires [address] for greater 30 days.      Update Admission H&P: No change in H&P    PHYSICIAN SIGNATURE:  Electronically signed by Luke Tinoco MD on 1/18/23 at 4:51 PM EST

## 2023-01-18 NOTE — PROGRESS NOTES
[address] [facility] Pharmacokinetic Monitoring Service - Vancomycin     Gene Ariel Kimbrough is a 67 y.o. male starting on vancomycin therapy for bone and joint infection. Pharmacy consulted by Dr. Sharron Sahu for monitoring and adjustment. Target Concentration: Goal AUC/VAHID 400-600 mg*hr/L    Additional Antimicrobials: cefazolin    Pertinent Laboratory Values: Wt Readings from Last 1 Encounters:   01/17/23 183 lb (83 kg)     Temp Readings from Last 1 Encounters:   01/17/23 97.7 °F (36.5 °C) (Oral)     Estimated Creatinine Clearance: 113 mL/min (A) (based on SCr of 0.69 mg/dL (L)). No results for input(s): CREATININE, WBC in the last 72 hours. Invalid input(s):  BUN  Procalcitonin:     Pertinent Cultures:  Culture Date Source Results        MRSA Nasal Swab: N/A. Non-respiratory infection.     Plan:  Dosing recommendations based on Bayesian software  Start vancomycin 1000mg q12  Anticipated AUC of 456 and trough concentration of 14.1 at steady state  Renal labs as indicated   Pharmacy will continue to monitor patient and adjust therapy as indicated    Thank you for the consult,  Librado Bryan, [address] [facility]  1/17/2023 8:49 PM

## 2023-01-18 NOTE — PROGRESS NOTES
Occupational Therapy  Facility/Department: [facility] [address] [place] [address]  Occupational Therapy Initial Assessment      Name: Zonia Quezada  : 1950  MRN: [id number]  Date of Service: 2023    Discharge Recommendations:  Patient would benefit from continued therapy after discharge    Patient Diagnosis(es): Diagnoses of Pyogenic arthritis of left shoulder region, due to unspecified organism [facility]) and Abscess of left shoulder were pertinent to this visit. Past Medical History:  has a past medical history of Bladder stone, BPH with obstruction/lower urinary tract symptoms, Caffeine use, Cataracts, bilateral, Chronic back pain, COPD (chronic obstructive pulmonary disease) ([facility] [address] History of blood transfusion, Hypertension, Irritable bowel syndrome, Kidney stone, Neuropathy, Osteoarthritis, Pancreatitis, and Type II or unspecified type diabetes mellitus without mention of complication, not stated as uncontrolled. Past Surgical History:  has a past surgical history that includes back surgery; Cholecystectomy; Vasectomy; Spine surgery (2010); Eye surgery (); other surgical history; eye surgery; Bladder surgery; Lithotripsy; Ureteroscopy; Cystocopy (2022); Cystoscopy (Left, 2022); Cystocopy (2022); Cystoscopy (Left, 2022); Colonoscopy; Leg Tendon Surgery (); Tonsillectomy; Shoulder arthroscopy (Left, 10/25/2022); Shoulder arthroscopy (Left, 10/25/2022); Shoulder arthroscopy (2023); Shoulder arthroscopy (Left, 2023); and Arm Surgery (Left, 2023). Assessment   Performance deficits / Impairments: Decreased functional mobility ; Decreased endurance;Decreased coordination;Decreased ADL status; Decreased ROM; Decreased balance;Decreased safe awareness;Decreased fine motor control;Decreased cognition;Decreased high-level IADLs  Assessment: Pt has demonstrated deficits at this time with his ability to independently // safely complete daily tasks, impaired balance and functional mobility, decreased endurance and activity tolerance, impaired cognition and poor insight to deficits, decreased carryover of restrictions and precautions (specific to LUE // Left Shoulder // Left [place]oulder Sling), and high levels of pain in Left shoulder with significant Hx of decreased shoulder function (Bilaterally). At this time, the Pt has decreased ability to return to [facility] and prior living situation. He will benefit from continued participation in acute and post-acute OT services to improve independence / to improve functional activity participation / to improve safety and insight to deficits with daily activity tasks. Prognosis: Fair;Good  Decision Making: Medium Complexity  REQUIRES OT FOLLOW-UP: Yes  Activity Tolerance  Activity Tolerance: Patient Tolerated treatment well;Patient limited by fatigue;Patient limited by pain;Treatment limited secondary to decreased cognition          Plan   Occupational Therapy Plan  Times Per Week: 6-7x/week  Times Per Day:  Once a day  Current Treatment Recommendations: Balance training, Functional mobility training, Endurance training, Modalities, Equipment evaluation, education, & procurement, Positioning, Patient/Caregiver education & training, Safety education & training, Pain management, Self-Care / ADL, Home management training, Coordination training, Cognitive/Perceptual training  Additional Comments: PT is working on // completing Left UE and Shoulder Exercises (are not included in OT POC)       Restrictions  Restrictions/Precautions  Required Braces or Orthoses?: Yes  Required Braces or Orthoses  Left Upper Extremity Brace/Splint: Sling (OK for pendulums with LUE, OK to advance to [facility] with L shoulder as tolerated, L elbow ROM OK as well)  Position Activity Restriction  Other position/activity restrictions: Left UE: OK for pendulums with LUE, OK to advance to [facility] with L shoulder as tolerated, L elbow ROM OK as well (PT will complete Left Shoulder Exercise, OT to complete functional tasks / HEP). Subjective   General  Patient assessed for rehabilitation services?: Yes  Family / Caregiver Present: No  Diagnosis: Per EMR and H&P:  Patient is a 67 y.o. Male, who under Washout of Left Septic Shoulder (by Dr. Homa Johns, on 1/17/2023). Patient states that his pain is well controlled. He is sore when he moves as expected but otherwise is doing well. The patient has been admitted and initiated on vancomycin; also Ancef. Patient has a consultation to infectious disease for recommendations regarding antibiotics and discharge. The patient denies any questions regarding PICC line or infectious disease consultation. He understands that he may require IV antibiotics on discharge and denies any questions or concerns regarding this. 2-3/10 in L shoulder      Social/Functional History  Social/Functional History  Lives With: Spouse  Type of Home: House  Home Layout: One level  Home Access: Stairs to enter with rails  Entrance Stairs - Number of Steps: 3  Entrance Stairs - Rails: Left  Bathroom Shower/Tub: Walk-in shower  Bathroom Equipment: Grab bars in shower, Shower chair  Home Equipment: [address] Help From: Family  ADL Assistance: Independent  Homemaking Assistance: Independent  Ambulation Assistance: Independent  Transfer Assistance: Independent  Active : Yes  Occupation: Retired      Objective   Safety Devices  Type of Devices: Call light within reach; Chair alarm in place;Gait belt;Patient at risk for falls; Left in chair  Restraints  Restraints Initially in Place: No    Bed Mobility Training  Bed Mobility Training: Yes  Overall Level of Assistance: Minimum assistance;Assist X2;Assist X1;Additional time; Adaptive equipment (Pt typically sleeps in Recliner - to simulate recliner, HOB elevated (~60-70*), used Bilateral bedrails. Max x2 Assist progressing to Max X1 Assist.)  Interventions: Visual cues; Verbal cues; Safety awareness training; Tactile cues (Mod Cues & Instruction for task initiation and sequencing // safe and accurate technique // maintenance of Left Shoulder restrictions)  Rolling: Minimum assistance;Assist X1  Supine to Sit: Minimum assistance;Assist X2  Scooting: Minimum assistance;Assist X1    Balance  Sitting: Without support  Standing: With support (SPC using Right hand)  Transfer Training  Transfer Training: Yes  Overall Level of Assistance: Contact-guard assistance; Adaptive equipment; Additional time;Minimum assistance (Transfers // Transitional Movements using SPC (Right hand). Min A progressing to [address]  Interventions: Visual cues; Verbal cues; Safety awareness training; Tactile cues (Mod Cues & Instruction for task initiation and sequencing // safe and accurate technique // maintenance of Left Shoulder restrictions)  Sit to Stand: Minimum assistance;Contact-guard assistance  Stand to Sit: Minimum assistance;Contact-guard assistance  Stand Pivot Transfers: Minimum assistance;Contact-guard assistance  Toilet Transfer: Minimum assistance;Contact-guard assistance (Pt became agitated // removed gait belt and did not want Therapists to physically help further (required max education an coaxing // encouragement for ongoing participation). )    Gait  Overall Level of Assistance: Contact-guard assistance; Additional time; Adaptive equipment (Functional Mobility in-room // in-home negotiation using SPC. Integration of intermittent rest breaks (standing). 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   //  AROM LUE not assessed d/t post-surgical precautions.)  Strength:  (Strength Left Shoulder not assessed; Strength Left Elbow / Wrist / Hand (demo'd // presented as [facility] during function and grasp). //  Strength Right Shoulder decreased; Strength Right Elbow, Wrist, Hand (WFL). )  Coordination:  (Pt Right hand dominant, Right hand coordination WFL during function (Left not assessed s/p Surgery))  Tone: Normal    ADL  Feeding: Setup; Independent; Increased time to complete  Feeding Skilled Clinical Factors: Sitting upright in recliner for feeding // beverage mgmt and without AE, using Right dominant hand. Grooming: Stand by assistance;Verbal cueing; Increased time to complete  Grooming Skilled Clinical Factors: Pt maintained dynamic standing at the sink (for hand hygiene) after mod cues // instruction for safe techniques. No UE support at the sink. UE Dressing: Moderate assistance; Increased time to complete;Verbal cueing;Maximum assistance; Adaptive equipment  UE Dressing Skilled Clinical Factors: Pt reported that he has been doing ADLs and Functional tasks for several months now with just 1 UE d/t having injured UEs (one at a time). However, Therapist reviewed importance of retrictions, Left Shoulder restrictions during UBD to don // manage shirt (Mod Assist), strategies for doffing shirt (discussed this, Pt declined to complete). Then discussed and assisted Pt with appropriate fit and techniques (including skin integrity maintenance) for how to wear Sling to LUE. LE Dressing: Minimal assistance; Increased time to complete;Verbal cueing;Stand by assistance; Adaptive equipment  LE Dressing Skilled Clinical Factors: Sitting EOB, Pt was able to don Bilateral socks with increased time with SBA. Required Min Assist for standing balance (with AD // DME) for pants // underwear mgmt.     Activity Tolerance  Activity Tolerance: Patient tolerated evaluation without incident    Vision  Vision: Impaired  Vision Exceptions: Wears glasses at all times  Hearing  Hearing: Within functional limits    Cognition  Overall Cognitive Status: Exceptions  Following Commands: Follows multistep commands with increased time; Follows multistep commands with repitition  Safety Judgement: Decreased awareness of need for assistance;Decreased awareness of need for safety  Problem Solving: Decreased awareness of errors  Insights: Decreased awareness of deficits  Cognition Comment: Pt became irritable when using Gait Belt for toilet t/f (removed gait belt prior to sitting at toilet), would not physically let OT assist Pt in from sit to stand // stand to sit for LB ADLs and Toileting. Orientation  Overall Orientation Status: Within Functional Limits  Orientation Level: Oriented X4    Education Given To: Patient;Staff  Education Provided: Plan of Care;Role of Therapy; Family Education;Equipment;Precautions; ADL Adaptive Strategies;Transfer Training; Fall Prevention Strategies; Energy Conservation  Education Provided Comments: Left Shoulder Restrictions;Maintenance of Left Shoulder Restrictions during UBD and LBD; Pain mgmt strategies and modalities for pain reduction; Sling fit and management of sling; Skin integrity under LUE sling; UB and LB ADL techniques  Education Method: Demonstration;Verbal  Barriers to Learning: Cognition  Education Outcome: Verbalized understanding;Continued education needed  LUE AROM (degrees)  LUE General AROM: AROM Left Shoulder Not Assessed (s/p I&D), PT guided Pt through HEP.  //  AROM Left Elbow, Wrist, Hand not formally assessed (but functional when integrated functionally and placing into // out of Sling). Left Hand AROM (degrees)  Left Hand AROM: WFL  RUE AROM (degrees)  RUE General AROM: AROM Right Shoulder (Pt reports long Hx of Atrophy Right Shoulder (demo decreased ability reach above or across body with RUE, reports several unsuccessful trials of Therapy and Exercsie that have not improved). //  AROM Right Elbow, Wrist, Hand WFL.   Right Hand AROM (degrees)  Right Hand AROM: [facility]      AM-PAC Score  AM-PAC Inpatient Daily Activity Raw Score: 17 (01/18/23 1739)  AM-PAC Inpatient ADL T-Scale Score : 37.26 (01/18/23 1739)  ADL Inpatient CMS 0-100% Score: 50.11 (01/18/23 1739)  ADL Inpatient CMS G-Code Modifier : CK (01/18/23 1739)      Goals  Short Term Goals  Time Frame for Short Term Goals: Within 14 treatment sessions -  Short Term Goal 1: Pt will complete UB ADLs (including don // doff Left Shoulder Sling) with Sup Assist and Fair+ Carryover of L Shoulder Restrictions. Short Term Goal 2: Pt will complete LB ADLs and Toileting with SBA with Fair Integration of AE // DME. Short Term Goal 3: Pt will participate in ADL and Functional Transfers with Fair+ Balance and Safety. Short Term Goal 4: Pt will demo Supervision Assist and Fair+ Integration of EC // Ax pacing during Functional Mobility tasks. Short Term Goal 5: Pt will verbalize 3-4 Non-pharmacological Pain Management strategies (for self-reported pain reduction in Left UE // Shoulder). Patient Goals   Patient goals : Therapist reviewed importance of retrictions, Left Shoulder restrictions during UBD to don // manage shirt (Mod Assist), strategies for doffing shirt (discussed this, Pt declined to complete). Then discussed and assisted Pt with appropriate fit and techniques (including skin integrity maintenance) for how to wear Sling to LUE.        Therapy Time   Individual Concurrent Group Co-treatment   Time In 1003         Time Out 1035         Minutes 32         Timed Code Treatment Minutes: 23 Minutes (ADL + TherAct);;  Co-samanthaal c PT       LURDES Limon, OTR/L

## 2023-01-18 NOTE — CARE COORDINATION
Case Management Assessment  Initial Evaluation    Date/Time of Evaluation: 1/18/2023 12:03 PM  Assessment Completed by: Merrill Estrada RN    If patient is discharged prior to next notation, then this note serves as note for discharge by case management. Patient Name: Sherrell Mccabe                   YOB: 1950  Diagnosis: Pyogenic arthritis of left shoulder region, due to unspecified organism [facility]) [M00.9]  Abscess of left shoulder [L02.414]  Septic arthritis ([address] [M00.9]                   Date / Time: 1/17/2023 11:32 AM    Patient Admission Status: Inpatient   Readmission Risk (Low < 19, Mod (19-27), High > 27): Readmission Risk Score: 13.5    Current PCP: BREANNA Crabtree NP  PCP verified by CM? Yes    Chart Reviewed: Yes      History Provided by: Patient  Patient Orientation: Alert and Oriented    Patient Cognition: Alert    Hospitalization in the last 30 days (Readmission):  No    If yes, Readmission Assessment in  Navigator will be completed. Advance Directives:      Code Status: Full Code   Patient's Primary Decision Maker is: Legal Next of Kin      Discharge Planning:    Patient lives with: Spouse/Significant Other Type of Home: House  Primary Care Giver: Self  Patient Support Systems include: Spouse/Significant Other   Current Financial resources:    Current community resources:    Current services prior to admission: None            Current DME:              Type of Home Care services:  None    ADLS  Prior functional level: Independent in ADLs/IADLs  Current functional level: Independent in ADLs/IADLs    PT AM-PAC:   /24  OT AM-PAC:   /24    Family can provide assistance at DC: Would you like Case Management to discuss the discharge plan with any other family members/significant others, and if so, who?     Plans to Return to Present Housing: Yes  Other Identified Issues/Barriers to RETURNING to current housing: IV therapy, home care  Potential Assistance needed at discharge: N/A            Potential DME:    Patient expects to discharge to: [facility] for transportation at discharge: Family    Financial    Payor: Roby Hopper / Plan: MEDICARE PART A AND B / Product Type: *No Product type* /     Does insurance require precert for SNF: No    Potential assistance Purchasing Medications:    Meds-to-Beds request:        [id number] - [facility]  [place] [address]  Phone: [phone] Fax: [address] [place], [place] - [address] [phone] [facility] [phone]  [address] [address]  Phone: [phone] Fax: [phone]    [address] Mail Delivery - [facility] [address]  [address] [facility] [address]  Phone: [phone] Fax: [phone]    [address] - [address] [facility] [phone] - f [phone]  [address] [facility] [address]  Phone: [phone] Fax: [phone]      Notes:    Factors facilitating achievement of predicted outcomes:     Barriers to discharge: Additional Case Management Notes: d/c home, needs IV abx for home. Awaiting ID consult. Referral to [facility] and [facility]. Daughter is teachable            The Plan for Transition of Care is related to the following treatment goals of Pyogenic arthritis of left shoulder region, due to unspecified organism [facility]) [M00.9]  Abscess of left shoulder [L02.414]  Septic arthritis ([facility] [address] [H14.2]    IF APPLICABLE: The Patient and/or patient representative Gene and his family were provided with a choice of provider and agrees with the discharge plan.  Freedom of choice list with basic dialogue that supports the patient's individualized plan of care/goals and shares the quality data associated with the providers was provided to: Patient   Patient Representative Name:       The Patient and/or Patient Representative Agree with the Discharge Plan?  Yes    Francisco Garcia RN  Case Management Department  Ph: [phone] Fax: [phone]

## 2023-01-18 NOTE — DISCHARGE INSTRUCTIONS
Michael Whitt MD  [facility] in Shoulder and Elbow Surgery      POSTOPERATIVE INSTRUCTIONS    Shoulder abscess and joint infection irrigation and debridement    DIET  Begin with clear liquids and light foods (jellos, soups, etc.). Progress to your normal diet if you are not nauseated. WOUND CARE  Maintain your operative dressing, keeping it clean and dry. It is normal for the shoulder to bleed and swell following surgery - if blood soaks the bandage, do not become alarmed - reinforce with additional dressing. Remove surgical dressing on the second post-operative day - apply clean dressing (gauze and tape) and change daily. To avoid infection, keep surgical incisions clean and dry - you may shower by placing Saran wrap or press and seal over the surgical site with dressings underneath. After your shower take everything off and apply clean gauze dressings. NO immersion of operative arm (i.e. bath). Alternatively, after youve changed your dressing for the first time you can place waterproof band aids over your incisions to take a shower and then a apply a clean new dressing afterwards    MEDICATIONS    Most patients will require some narcotic pain medication for a short period of time - this can be taken as per directions on the bottle. Common side effects of the pain medication are nausea, drowsiness, and constipation - to decrease the side effects, take medication with food - if constipation occurs, consider taking an over-the-counter laxative. If you are having problems with nausea and vomiting, take your prescribed anti-nausea medication if provided, otherwise contact the office to possibly have your medication changed ([phone]). Do not drive a car or operate machinery while taking the narcotic medication.   If you are not allergic, Ibuprofen 200-600mg (i.e. Advil) may be taken in between the narcotic pain medication to help smooth out the post-operative peaks and valleys, reduce overall amount of pain medication required, and increase the time intervals between narcotic pain medication use. Do not take more than 2400mg in a day. Please resume all home medications, unless instructed otherwise. ACTIVITY  When sleeping or resting, inclined positions (i.e. reclining chair or stacked pillows behind you in bed) and a pillow under the forearm for support may provide better comfort. Do not engage in activities which increase pain/swelling (lifting or any repetitive above shoulder-level activities) over the first 14 days following surgery. Avoid long periods of sitting (without arm supported) or long distance travel. IMMOBILIZER  Your may wean out of your sling as pain allows and get your shoulder moving    ICE THERAPY  Begin immediately after surgery. Do not place ice directly on the skin (place over an Ace wrap or thin clothing). Use icing machine continuously or ice packs (if machine not prescribed) every 1-2 hours for 20 minutes each time daily for the first week and then as needed after that for pain and swelling. Remember to keep arm supported while icing. EXERCISE  Perform pendulum exercises (leaning forward and letting the arm hang free and swing in slow circles) and full elbow/wrist/hand range of motion exercises 3 times per day.     [facility] [address] Loly Paige at [phone] if any of the following are present:  Painful swelling or numbness, Unrelenting pain, Fever (over 101 - it is normal to have a low grade fever for the first day or two following surgery) or chills, Redness around incisions, Color change in hand or fingers, Continuous drainage or bleeding from incision (a small amount of drainage is expected), Difficulty breathing, Excessive nausea/vomiting  **If you have an emergency after office hours or on the weekend, contact the same office number ([phone]) and you will be connected to our page service - they will contact Dr. Marco A Yeh or his partner if he is unavailable. **If you have an emergency that requires immediate attention, proceed to the nearest emergency room. FOLLOW-UP CARE / QUESTIONS  If you have any questions/concerns, please call the office [phone]. Contact the office to confirm your post-op visit which has been scheduled for two weeks following the date of surgery.

## 2023-01-18 NOTE — PLAN OF CARE
Problem: Safety - Adult  Goal: Free from fall injury  Outcome: Progressing   No falls/injuries this shift, bed in lowest position, brakes on, bed alarm on, call light in reach, side rails up x2   Problem: Pain  Goal: Verbalizes/displays adequate comfort level or baseline comfort level  Outcome: Progressing  no new signs/symptoms of pain noted, pain rating < 3 on scale 0-10, pain controlled with medication/repositioning   Problem: Skin/Tissue Integrity - Adult  Goal: Incisions, wounds, or drain sites healing without S/S of infection  Outcome: Progressing   No new skin breakdown noted, no new signs/symptoms of infection, continue to monitor labwork including WBC, medications administered per physician orders   Problem: Musculoskeletal - Adult  Goal: Maintain proper alignment of affected body part  Outcome: Progressing   Alignment of L shoulder maintained this shift via sling placed by OR staff with pillow support under elbow  Problem: Infection - Adult  Goal: Absence of infection during hospitalization  Outcome: Progressing   Reinforcement of disease process and treatment plan recommended No new skin breakdown noted, no new signs/symptoms of infection, continue to monitor labwork including WBC, medications administered per physician orders

## 2023-01-18 NOTE — PROGRESS NOTES
Physical Therapy  Facility/Department: [facility] ICU  Physical Therapy Initial Assessment    Name: Boone Peabody  : 1950  MRN: [id number]  Date of Service: 2023    Discharge Recommendations:  Patient would benefit from continued therapy after discharge   PT Equipment Recommendations  Equipment Needed: No (has cane)     Date of Surgery: 2023   Pre-operative Diagnosis: Left shoulder septic arthritis and abscess  Post-operative Diagnosis: Left shoulder septic arthritis and abscess  Procedure:   Left shoulder arthroscopic irrigation and debridement  Left shoulder abscess incision and drainage    Patient Diagnosis(es): Diagnoses of Pyogenic arthritis of left shoulder region, due to unspecified organism ([address] and Abscess of left shoulder were pertinent to this visit. Past Medical History:  has a past medical history of Bladder stone, BPH with obstruction/lower urinary tract symptoms, Caffeine use, Cataracts, bilateral, Chronic back pain, COPD (chronic obstructive pulmonary disease) ([facility] [address] History of blood transfusion, Hypertension, Irritable bowel syndrome, Kidney stone, Neuropathy, Osteoarthritis, Pancreatitis, and Type II or unspecified type diabetes mellitus without mention of complication, not stated as uncontrolled. Past Surgical History:  has a past surgical history that includes back surgery; Cholecystectomy; Vasectomy; Spine surgery (2010); Eye surgery (); other surgical history; eye surgery; Bladder surgery; Lithotripsy; Ureteroscopy; Cystocopy (2022); Cystoscopy (Left, 2022); Cystocopy (2022); Cystoscopy (Left, 2022); Colonoscopy; Leg Tendon Surgery (); Tonsillectomy; Shoulder arthroscopy (Left, 10/25/2022); Shoulder arthroscopy (Left, 10/25/2022); and Shoulder arthroscopy (2023). Assessment   Body Structures, Functions, Activity Limitations Requiring Skilled Therapeutic Intervention: Decreased ROM; Decreased strength;Decreased functional mobility   Assessment: The patient presents with L shoulder pain and decreased mobility post L shoulder I and D and arthroscopy for septic arthritis. The patient mobilized well during evaluation and demonstrated good understanding of exercises provided. Outpatient therpay recommended once OK'd per Dr. Marva Saavedra for L shoulder. The patient has help at home and therefore should be able to return to prior living situtation upon discharge. Therapy Prognosis: Good  Decision Making: Medium Complexity  Requires PT Follow-Up: Yes  Activity Tolerance  Activity Tolerance: Patient tolerated evaluation without incident     Plan   Physcial Therapy Plan  General Plan: 5-7 times per week  Current Treatment Recommendations: Strengthening, ROM, Home exercise program, Therapeutic activities, Gait training, Functional mobility training, Stair training, Transfer training, Patient/Caregiver education & training  Safety Devices  Type of Devices: Call light within reach, Chair alarm in place, Gait belt, Patient at risk for falls, Left in chair  Restraints  Restraints Initially in Place: No     Restrictions  Restrictions/Precautions  Required Braces or Orthoses?: Yes  Required Braces or Orthoses  Left Upper Extremity Brace/Splint: Sling (OK for pendulums with LUE, OK to advance to [facility] with L shoulder as tolerated, L elbow ROM OK as well)     Subjective   Pain: 2-3/10 in L shoulder  General  Patient assessed for rehabilitation services?: Yes  Family / Caregiver Present: No  Diagnosis: L shoulder arthroscopy and I and D due to septic arthritis on 1/17/2023  Subjective  Subjective: The patient reports long history of problems with L shoulder as well as R shoulder and has been in OP therapy intermittently as well. He reports pain is much better than prior to surgery.          Social/Functional History  Social/Functional History  Lives With: Spouse  Type of Home: House  Home Layout: One level  Home Access: Stairs to enter with rails  Entrance Stairs - Number of Steps: 3  Entrance Stairs - Rails: Left  Bathroom Shower/Tub: Walk-in shower  Bathroom Equipment: Grab bars in shower, Shower chair  Home Equipment: [employer] From: Family  ADL Assistance: Independent  Homemaking Assistance: Independent  Ambulation Assistance: Independent  Transfer Assistance: Independent  Active : Yes  Occupation: Retired  Vision/Hearing  Vision  Vision: Impaired  Vision Exceptions: Wears glasses at all times  Hearing  Hearing: Within functional limits    Cognition   Orientation  Overall Orientation Status: Within Normal Limits  Orientation Level: Oriented X4  Cognition  Overall Cognitive Status: WFL     Objective   Observation/Palpation  Observation: L shoulder dressing with DANETTE drain.  wearing LUE sling upon arrival    AROM RLE (degrees)  RLE AROM: WFL  AROM LLE (degrees)  LLE AROM : WFL  AROM RUE (degrees)  RUE General AROM: shoulder flexion and abduction ROM limited to ~90 degrees, which is baseline per the patient; the patient is able to use RUE functionally during evaluation  AROM LUE (degrees)  LUE AROM : Exceptions  LUE General AROM: L elbow, wrist, and hand ROM WFLs; L shoulder NT as the patient was too painful - will progress to AAROM as able  Strength RLE  Strength RLE: WFL  Strength LLE  Strength LLE: WFL  Strength RUE  Strength RUE: WFL  Comment: except L shoulder limited to 3+/5  Strength LUE  Strength LUE: Exception  Comment: did not test LUE due to recent surgery - L elbow, wrist, and hand strength is at least 3/5 based on observed movements     Bed Mobility Training  Bed Mobility Training: Yes  Overall Level of Assistance: Minimum assistance  Transfer Training  Transfer Training: Yes  Overall Level of Assistance: Minimum assistance  Bed mobility  Rolling to Right: Minimal assistance  Supine to Sit: Minimal assistance;2 Person assistance  Scooting: Minimal assistance  Bed Mobility Comments: sleeps in recliner at home  Transfers  Sit to Stand: Minimal Assistance  Stand to Sit: Minimal Assistance  Comment: safety awarness cueing; toilet transfer with [place]  Ambulation  Surface: Level tile  Device: Single point cane (personal wooden cane)  Assistance: Contact guard assistance  Quality of Gait: wide KAREEN and ER of hips - baseline per patient  Gait Deviations: Slow Paola  Distance: 120ft     Balance  Sitting - Static: Good  Sitting - Dynamic: Good  Standing - Static: Fair  Exercise Treatment: The patient was instructed on pendulum exercises with LUE clockwise and counterclockwise and AAROM with L elbow: handouts given and the patient demonstrated good understanding. Goals  Short Term Goals  Time Frame for Short Term Goals: 10 visits  Short Term Goal 1: The patient will be independent with home exercises for L shoulder pendulums and L elbow ROM. Short Term Goal 2: The patinet will perform all transfer independently. Short Term Goal 3: The patient will ambulate 250ft with cane modified independently. Short Term Goal 4: The patient will negotiate 2 steps with cane or railing modified independently. Patient Goals   Patient Goals :  To heal L shoulder       Education  Patient Education  Education Given To: Patient  Education Provided: Role of Therapy;Home Exercise Program;Precautions  Education Method: Demonstration;Verbal;Teach Back;Printed Information/Hand-outs  Barriers to Learning: None  Education Outcome: Verbalized understanding;Demonstrated understanding      Therapy Time   Individual Concurrent Group Co-treatment   Time In 1035 1003       Time Out 1055 1035       Minutes 20 32       Timed Code Treatment Minutes: 25 Minutes       Elvira Perzaa, PT

## 2023-01-19 LAB
ANION GAP SERPL CALCULATED.3IONS-SCNC: 10 MMOL/L (ref 9–17)
BUN BLDV-MCNC: 17 MG/DL (ref 8–23)
CALCIUM SERPL-MCNC: 8.4 MG/DL (ref 8.6–10.4)
CHLORIDE BLD-SCNC: 104 MMOL/L (ref 98–107)
CO2: 27 MMOL/L (ref 20–31)
CREAT SERPL-MCNC: 0.69 MG/DL (ref 0.7–1.2)
GFR SERPL CREATININE-BSD FRML MDRD: >60 ML/MIN/1.73M2
GLUCOSE BLD-MCNC: 124 MG/DL (ref 70–99)
GLUCOSE BLD-MCNC: 134 MG/DL (ref 75–110)
GLUCOSE BLD-MCNC: 139 MG/DL (ref 75–110)
GLUCOSE BLD-MCNC: 142 MG/DL (ref 75–110)
GLUCOSE BLD-MCNC: 78 MG/DL (ref 75–110)
GLUCOSE BLD-MCNC: 97 MG/DL (ref 75–110)
POTASSIUM SERPL-SCNC: 3.6 MMOL/L (ref 3.7–5.3)
SODIUM BLD-SCNC: 141 MMOL/L (ref 135–144)
VANCOMYCIN RANDOM: 12.9 UG/ML

## 2023-01-19 PROCEDURE — 1210000000 HC MED SURG R&B

## 2023-01-19 PROCEDURE — 6370000000 HC RX 637 (ALT 250 FOR IP): Performed by: ORTHOPAEDIC SURGERY

## 2023-01-19 PROCEDURE — 97116 GAIT TRAINING THERAPY: CPT

## 2023-01-19 PROCEDURE — 80202 ASSAY OF VANCOMYCIN: CPT

## 2023-01-19 PROCEDURE — 6370000000 HC RX 637 (ALT 250 FOR IP): Performed by: HOSPITALIST

## 2023-01-19 PROCEDURE — 97110 THERAPEUTIC EXERCISES: CPT

## 2023-01-19 PROCEDURE — 99232 SBSQ HOSP IP/OBS MODERATE 35: CPT | Performed by: HOSPITALIST

## 2023-01-19 PROCEDURE — 6360000002 HC RX W HCPCS: Performed by: ORTHOPAEDIC SURGERY

## 2023-01-19 PROCEDURE — 36415 COLL VENOUS BLD VENIPUNCTURE: CPT

## 2023-01-19 PROCEDURE — 2580000003 HC RX 258: Performed by: ORTHOPAEDIC SURGERY

## 2023-01-19 PROCEDURE — 80048 BASIC METABOLIC PNL TOTAL CA: CPT

## 2023-01-19 PROCEDURE — 82947 ASSAY GLUCOSE BLOOD QUANT: CPT

## 2023-01-19 RX ORDER — MAGNESIUM SULFATE IN WATER 40 MG/ML
2000 INJECTION, SOLUTION INTRAVENOUS PRN
Status: DISCONTINUED | OUTPATIENT
Start: 2023-01-19 | End: 2023-01-23 | Stop reason: HOSPADM

## 2023-01-19 RX ORDER — POTASSIUM CHLORIDE 20 MEQ/1
40 TABLET, EXTENDED RELEASE ORAL PRN
Status: DISCONTINUED | OUTPATIENT
Start: 2023-01-19 | End: 2023-01-23 | Stop reason: HOSPADM

## 2023-01-19 RX ORDER — POTASSIUM CHLORIDE 7.45 MG/ML
10 INJECTION INTRAVENOUS PRN
Status: DISCONTINUED | OUTPATIENT
Start: 2023-01-19 | End: 2023-01-23 | Stop reason: HOSPADM

## 2023-01-19 RX ADMIN — OXYCODONE HYDROCHLORIDE 5 MG: 5 TABLET ORAL at 20:17

## 2023-01-19 RX ADMIN — ACETAMINOPHEN 1000 MG: 500 TABLET ORAL at 13:47

## 2023-01-19 RX ADMIN — CHOLECALCIFEROL (VITAMIN D3) 10 MCG (400 UNIT) TABLET 800 UNITS: at 09:07

## 2023-01-19 RX ADMIN — CEFAZOLIN 2000 MG: 2 INJECTION, POWDER, FOR SOLUTION INTRAMUSCULAR; INTRAVENOUS at 16:34

## 2023-01-19 RX ADMIN — OXYCODONE HYDROCHLORIDE 5 MG: 5 TABLET ORAL at 00:39

## 2023-01-19 RX ADMIN — CEFAZOLIN 2000 MG: 2 INJECTION, POWDER, FOR SOLUTION INTRAMUSCULAR; INTRAVENOUS at 00:38

## 2023-01-19 RX ADMIN — ACETAMINOPHEN 1000 MG: 500 TABLET ORAL at 20:17

## 2023-01-19 RX ADMIN — METFORMIN HYDROCHLORIDE 1000 MG: 500 TABLET ORAL at 16:31

## 2023-01-19 RX ADMIN — SODIUM CHLORIDE: 9 INJECTION, SOLUTION INTRAVENOUS at 13:50

## 2023-01-19 RX ADMIN — LISINOPRIL 10 MG: 10 TABLET ORAL at 09:07

## 2023-01-19 RX ADMIN — PIOGLITAZONE 30 MG: 15 TABLET ORAL at 09:07

## 2023-01-19 RX ADMIN — INSULIN GLARGINE 63 UNITS: 100 INJECTION, SOLUTION SUBCUTANEOUS at 20:25

## 2023-01-19 RX ADMIN — CELECOXIB 200 MG: 100 CAPSULE ORAL at 09:07

## 2023-01-19 RX ADMIN — POLYETHYLENE GLYCOL 3350 17 G: 17 POWDER, FOR SOLUTION ORAL at 17:09

## 2023-01-19 RX ADMIN — VANCOMYCIN HYDROCHLORIDE 1250 MG: 1.25 INJECTION, POWDER, LYOPHILIZED, FOR SOLUTION INTRAVENOUS at 20:25

## 2023-01-19 RX ADMIN — VANCOMYCIN HYDROCHLORIDE 1000 MG: 1 INJECTION, POWDER, LYOPHILIZED, FOR SOLUTION INTRAVENOUS at 09:42

## 2023-01-19 RX ADMIN — DULOXETINE 30 MG: 30 CAPSULE, DELAYED RELEASE ORAL at 20:17

## 2023-01-19 RX ADMIN — GABAPENTIN 300 MG: 300 CAPSULE ORAL at 09:07

## 2023-01-19 RX ADMIN — OXYCODONE HYDROCHLORIDE 5 MG: 5 TABLET ORAL at 15:10

## 2023-01-19 RX ADMIN — METFORMIN HYDROCHLORIDE 1000 MG: 500 TABLET ORAL at 09:07

## 2023-01-19 RX ADMIN — THERA TABS 1 TABLET: TAB at 09:07

## 2023-01-19 RX ADMIN — GABAPENTIN 300 MG: 300 CAPSULE ORAL at 20:17

## 2023-01-19 RX ADMIN — CEFAZOLIN 2000 MG: 2 INJECTION, POWDER, FOR SOLUTION INTRAMUSCULAR; INTRAVENOUS at 09:03

## 2023-01-19 RX ADMIN — ACETAMINOPHEN 1000 MG: 500 TABLET ORAL at 06:42

## 2023-01-19 RX ADMIN — OXYCODONE HYDROCHLORIDE 5 MG: 5 TABLET ORAL at 04:36

## 2023-01-19 RX ADMIN — ACETAMINOPHEN 1000 MG: 500 TABLET ORAL at 00:35

## 2023-01-19 RX ADMIN — DULOXETINE 30 MG: 30 CAPSULE, DELAYED RELEASE ORAL at 09:07

## 2023-01-19 ASSESSMENT — PAIN - FUNCTIONAL ASSESSMENT
PAIN_FUNCTIONAL_ASSESSMENT: PREVENTS OR INTERFERES WITH MANY ACTIVE NOT PASSIVE ACTIVITIES

## 2023-01-19 ASSESSMENT — PAIN DESCRIPTION - DESCRIPTORS
DESCRIPTORS: ACHING

## 2023-01-19 ASSESSMENT — PAIN DESCRIPTION - LOCATION
LOCATION: SHOULDER

## 2023-01-19 ASSESSMENT — PAIN SCALES - GENERAL
PAINLEVEL_OUTOF10: 5
PAINLEVEL_OUTOF10: 6
PAINLEVEL_OUTOF10: 4
PAINLEVEL_OUTOF10: 7
PAINLEVEL_OUTOF10: 6

## 2023-01-19 ASSESSMENT — PAIN DESCRIPTION - ORIENTATION
ORIENTATION: LEFT

## 2023-01-19 ASSESSMENT — PAIN DESCRIPTION - ONSET
ONSET: AWAKENED FROM SLEEP
ONSET: AWAKENED FROM SLEEP
ONSET: ON-GOING

## 2023-01-19 ASSESSMENT — PAIN DESCRIPTION - FREQUENCY
FREQUENCY: CONTINUOUS

## 2023-01-19 ASSESSMENT — PAIN DESCRIPTION - PAIN TYPE
TYPE: ACUTE PAIN
TYPE: ACUTE PAIN

## 2023-01-19 NOTE — PROGRESS NOTES
Patient seen on behalf of Dr. Suzanne Villarreal who is leaving out of town this afternoon. Patient is status post irrigation debridement left shoulder abscess/septic shoulder. Patient states that his pain is much improved. Examination notes the patient is a bandage is clean and dry. He still has a DANETTE drain in which there is still having some drainage. There is no cellulitis to the area he is neurovascular tact and motion of the shoulder is much improved. Cultures are still pending await infectious disease    Plan  Await ID recommendations based on culture sensitivity.   Per Dr. Shiva Santos request that if the patient still having drainage he may be discharged home with the drain and can be removed by him in office on follow-up next week if necessary

## 2023-01-19 NOTE — PROGRESS NOTES
Physical Therapy Cancel Note      DATE: 2023    NAME: Angel Bahena  MRN: [id number]   : 1950      Patient not seen this date for Physical Therapy due to:    Patient Declined: attempted a few times, however the patient was resting and wished to continue doing so as he didn't sleep well last night. The patient reports that he has been ambulating in the room and doing his ROM exercises on own for LUE.        Electronically signed by Betzaida Sigala PT on 2023 at 12:04 PM

## 2023-01-19 NOTE — CARE COORDINATION
Transitional Planning      Spoke with [facility], this morning. She states she has no RX coverage. Spoke with patient- RX coverage is with [employer]. Faxed copy to [facility]. Nam [address] with [facility] again, states they did run [employer] coverage- pt has high deductible. She states she will speak to business office again.

## 2023-01-19 NOTE — PLAN OF CARE
Problem: Safety - Adult  Goal: Free from fall injury  Outcome: Progressing  Fall assessment preformed. Bed in low locked position with call light and tray table within reach. Education given. Will continue to monitor. Problem: Pain  Goal: Verbalizes/displays adequate comfort level or baseline comfort level  Outcome: Progressing  Pain scale preformed per protocol and pt treated for pain as documented. Education given. Problem: Discharge Planning  Goal: Discharge to home or other facility with appropriate resources  Outcome: Progressing   Patients questions and concerns addressed and answered. Problem: Skin/Tissue Integrity - Adult  Goal: Skin integrity remains intact  Outcome: Progressing  Flowsheets  Taken 1/19/2023 1035  Skin Integrity Remains Intact: Monitor for areas of redness and/or skin breakdown  Taken 1/19/2023 0800  Skin Integrity Remains Intact: Monitor for areas of redness and/or skin breakdown  Goal: Incisions, wounds, or drain sites healing without S/S of infection  Outcome: Progressing  Goal: Oral mucous membranes remain intact  Outcome: Progressing  Skin assessment preformed. Will continue to monitor.       Problem: Musculoskeletal - Adult  Goal: Return mobility to safest level of function  Outcome: Progressing  Flowsheets (Taken 1/19/2023 0800)  Return Mobility to Safest Level of Function: Assess patient stability and activity tolerance for standing, transferring and ambulating with or without assistive devices  Goal: Maintain proper alignment of affected body part  Outcome: Progressing  Goal: Return ADL status to a safe level of function  Outcome: Progressing     Problem: Genitourinary - Adult  Goal: Absence of urinary retention  Outcome: Progressing  Flowsheets (Taken 1/19/2023 0800)  Absence of urinary retention: Assess patients ability to void and empty bladder  Goal: Urinary catheter remains patent  Outcome: Progressing     Problem: Infection - Adult  Goal: Absence of infection at discharge  Outcome: Progressing  Goal: Absence of infection during hospitalization  Outcome: Progressing     Problem: Metabolic/Fluid and Electrolytes - Adult  Goal: Electrolytes maintained within normal limits  Outcome: Progressing     Problem: ABCDS Injury Assessment  Goal: Absence of physical injury  Outcome: Progressing  Fall assessment preformed. Bed in low locked position with call light and tray table within reach. Education given. Will continue to monitor.

## 2023-01-19 NOTE — PROGRESS NOTES
Physical Therapy  Facility/Department: [facility] SURG ICU  Daily Treatment Note  NAME: Luis A Coley  : 1950  MRN: [id number]    Date of Service: 2023    Discharge Recommendations:  Patient would benefit from continued therapy after discharge   PT Equipment Recommendations  Equipment Needed: No (has cane and sling)  Other: Dr. Sofie Ramirez entered room at end of treatment. Recommended sling for comfort only. The patinet prefers to not wear the sling when upright at this time - LUE propped on pillows at end of treatment. Patient Diagnosis(es): Diagnoses of Pyogenic arthritis of left shoulder region, due to unspecified organism [facility]) and Abscess of left shoulder were pertinent to this visit. Assessment   Assessment: Mobilizing well, good understanding of home exercises, good home support. Activity Tolerance: Patient tolerated treatment well  Equipment Needed: No (has cane and sling)  Other: Dr. Sofie Ramirez entered room at end of treatment. Recommended sling for comfort only. The patinet prefers to not wear the sling when upright at this time - LUE propped on pillows at end of treatment. Plan    Physcial Therapy Plan  General Plan: 5-7 times per week  Current Treatment Recommendations: Strengthening;ROM; Home exercise program;Therapeutic activities;Gait training;Functional mobility training;Stair training;Transfer training;Patient/Caregiver education & training     Restrictions  Restrictions/Precautions  Required Braces or Orthoses?: Yes  Required Braces or Orthoses  Left Upper Extremity Brace/Splint: Sling (OK for pendulums with LUE, OK to advance to [facility] with L shoulder as tolerated, L elbow ROM OK as well)  Position Activity Restriction  Other position/activity restrictions: Left UE: OK for pendulums with LUE, OK to advance to [facility] with L shoulder as tolerated, L elbow ROM OK as well (PT will complete Left Shoulder Exercise, OT to complete functional tasks / HEP).      Subjective Subjective  Subjective: The patient was sitting comfortably in the chair. He was agreeable to ambulation and gentle shoulder and elbow ROM on L. Pain: 2/10 at rest, 6/10 while ambulating or when upright  Orientation  Overall Orientation Status: Within Functional Limits  Orientation Level: Oriented X4  Cognition  Overall Cognitive Status: WFL     Objective   Bed Mobility Training  Scooting: Contact-guard assistance  Transfer Training  Transfer Training: Yes  Overall Level of Assistance: Contact-guard assistance; Additional time; Adaptive equipment  Interventions: Verbal cues  Sit to Stand: Contact-guard assistance; Additional time; Adaptive equipment  Stand to Sit: Contact-guard assistance;Assist X1;Additional time  Gait Training  Gait Training: Yes  Gait  Overall Level of Assistance: Contact-guard assistance; Additional time; Adaptive equipment  Interventions: Verbal cues  Base of Support: Widened  Speed/Paola: Delayed  Distance (ft): 150 Feet (hip ER bilaterally, decreased push off bilaterally)  Assistive Device: Cane, straight     PT Exercises  A/AROM Exercises: Gentle L shoulder and elbow AAROM:sh and elbow flexion and extension, forearm pronation and supination, sh ER/IR; reviewed pendulums which the patient is independently doing     Safety Devices  Type of Devices: Call light within reach; Chair alarm in place;Gait belt;Patient at risk for falls; Left in chair       Goals  Short Term Goals  Time Frame for Short Term Goals: 10 visits  Short Term Goal 1: The patient will be independent with home exercises for L shoulder pendulums and L elbow ROM. Short Term Goal 2: The patinet will perform all transfer independently. Short Term Goal 3: The patient will ambulate 250ft with cane modified independently. Short Term Goal 4: The patient will negotiate 2 steps with cane or railing modified independently. Patient Goals   Patient Goals :  To heal L shoulder    Education  Patient Education  Education Given To: Patient  Education Provided: Role of Therapy;Home Exercise Program;Precautions  Education Method: Demonstration;Verbal  Barriers to Learning: None  Education Outcome: Verbalized understanding;Demonstrated understanding    Therapy Time   Individual Concurrent Group Co-treatment   Time In 1308         Time Out 1332         Minutes 24         Timed Code Treatment Minutes: 24 Minutes       Tori Camargo, PT

## 2023-01-20 PROBLEM — L02.91 ABSCESS: Status: ACTIVE | Noted: 2023-01-20

## 2023-01-20 PROBLEM — A49.01 MSSA (METHICILLIN SUSCEPTIBLE STAPHYLOCOCCUS AUREUS) INFECTION: Status: ACTIVE | Noted: 2023-01-20

## 2023-01-20 LAB
ANION GAP SERPL CALCULATED.3IONS-SCNC: 12 MMOL/L (ref 9–17)
BUN BLDV-MCNC: 12 MG/DL (ref 8–23)
CALCIUM SERPL-MCNC: 8.8 MG/DL (ref 8.6–10.4)
CHLORIDE BLD-SCNC: 104 MMOL/L (ref 98–107)
CO2: 27 MMOL/L (ref 20–31)
CREAT SERPL-MCNC: 0.71 MG/DL (ref 0.7–1.2)
CULTURE: ABNORMAL
CULTURE: ABNORMAL
DIRECT EXAM: ABNORMAL
DIRECT EXAM: ABNORMAL
GFR SERPL CREATININE-BSD FRML MDRD: >60 ML/MIN/1.73M2
GLUCOSE BLD-MCNC: 111 MG/DL (ref 75–110)
GLUCOSE BLD-MCNC: 147 MG/DL (ref 70–99)
GLUCOSE BLD-MCNC: 158 MG/DL (ref 75–110)
GLUCOSE BLD-MCNC: 92 MG/DL (ref 75–110)
GLUCOSE BLD-MCNC: 95 MG/DL (ref 75–110)
POTASSIUM SERPL-SCNC: 3.6 MMOL/L (ref 3.7–5.3)
SODIUM BLD-SCNC: 143 MMOL/L (ref 135–144)
SPECIMEN DESCRIPTION: ABNORMAL

## 2023-01-20 PROCEDURE — 80048 BASIC METABOLIC PNL TOTAL CA: CPT

## 2023-01-20 PROCEDURE — 36415 COLL VENOUS BLD VENIPUNCTURE: CPT

## 2023-01-20 PROCEDURE — 82947 ASSAY GLUCOSE BLOOD QUANT: CPT

## 2023-01-20 PROCEDURE — 2580000003 HC RX 258: Performed by: ORTHOPAEDIC SURGERY

## 2023-01-20 PROCEDURE — 99222 1ST HOSP IP/OBS MODERATE 55: CPT | Performed by: INTERNAL MEDICINE

## 2023-01-20 PROCEDURE — 6360000002 HC RX W HCPCS: Performed by: ORTHOPAEDIC SURGERY

## 2023-01-20 PROCEDURE — 1210000000 HC MED SURG R&B

## 2023-01-20 PROCEDURE — 99232 SBSQ HOSP IP/OBS MODERATE 35: CPT | Performed by: HOSPITALIST

## 2023-01-20 PROCEDURE — 6370000000 HC RX 637 (ALT 250 FOR IP): Performed by: ORTHOPAEDIC SURGERY

## 2023-01-20 PROCEDURE — 6370000000 HC RX 637 (ALT 250 FOR IP): Performed by: HOSPITALIST

## 2023-01-20 RX ADMIN — OXYCODONE HYDROCHLORIDE 5 MG: 5 TABLET ORAL at 00:53

## 2023-01-20 RX ADMIN — METFORMIN HYDROCHLORIDE 1000 MG: 500 TABLET ORAL at 16:54

## 2023-01-20 RX ADMIN — OXYCODONE HYDROCHLORIDE 5 MG: 5 TABLET ORAL at 13:52

## 2023-01-20 RX ADMIN — OXYCODONE HYDROCHLORIDE 5 MG: 5 TABLET ORAL at 09:06

## 2023-01-20 RX ADMIN — VANCOMYCIN HYDROCHLORIDE 1250 MG: 1.25 INJECTION, POWDER, LYOPHILIZED, FOR SOLUTION INTRAVENOUS at 21:47

## 2023-01-20 RX ADMIN — METFORMIN HYDROCHLORIDE 1000 MG: 500 TABLET ORAL at 09:06

## 2023-01-20 RX ADMIN — OXYCODONE HYDROCHLORIDE 5 MG: 5 TABLET ORAL at 21:37

## 2023-01-20 RX ADMIN — ENOXAPARIN SODIUM 40 MG: 100 INJECTION SUBCUTANEOUS at 09:06

## 2023-01-20 RX ADMIN — VANCOMYCIN HYDROCHLORIDE 1250 MG: 1.25 INJECTION, POWDER, LYOPHILIZED, FOR SOLUTION INTRAVENOUS at 09:40

## 2023-01-20 RX ADMIN — ACETAMINOPHEN 1000 MG: 500 TABLET ORAL at 00:50

## 2023-01-20 RX ADMIN — SODIUM CHLORIDE: 9 INJECTION, SOLUTION INTRAVENOUS at 21:33

## 2023-01-20 RX ADMIN — DULOXETINE 30 MG: 30 CAPSULE, DELAYED RELEASE ORAL at 09:06

## 2023-01-20 RX ADMIN — CHOLECALCIFEROL (VITAMIN D3) 10 MCG (400 UNIT) TABLET 800 UNITS: at 09:05

## 2023-01-20 RX ADMIN — LISINOPRIL 10 MG: 10 TABLET ORAL at 09:06

## 2023-01-20 RX ADMIN — CEFAZOLIN 2000 MG: 2 INJECTION, POWDER, FOR SOLUTION INTRAMUSCULAR; INTRAVENOUS at 00:50

## 2023-01-20 RX ADMIN — SODIUM CHLORIDE: 9 INJECTION, SOLUTION INTRAVENOUS at 05:46

## 2023-01-20 RX ADMIN — CEFAZOLIN 2000 MG: 2 INJECTION, POWDER, FOR SOLUTION INTRAMUSCULAR; INTRAVENOUS at 16:54

## 2023-01-20 RX ADMIN — CEFAZOLIN 2000 MG: 2 INJECTION, POWDER, FOR SOLUTION INTRAMUSCULAR; INTRAVENOUS at 09:05

## 2023-01-20 RX ADMIN — GABAPENTIN 300 MG: 300 CAPSULE ORAL at 21:37

## 2023-01-20 RX ADMIN — PIOGLITAZONE 30 MG: 15 TABLET ORAL at 09:05

## 2023-01-20 RX ADMIN — GABAPENTIN 300 MG: 300 CAPSULE ORAL at 09:06

## 2023-01-20 RX ADMIN — THERA TABS 1 TABLET: TAB at 09:05

## 2023-01-20 RX ADMIN — DULOXETINE 30 MG: 30 CAPSULE, DELAYED RELEASE ORAL at 21:37

## 2023-01-20 ASSESSMENT — PAIN SCALES - GENERAL
PAINLEVEL_OUTOF10: 2
PAINLEVEL_OUTOF10: 4
PAINLEVEL_OUTOF10: 5
PAINLEVEL_OUTOF10: 2
PAINLEVEL_OUTOF10: 7

## 2023-01-20 ASSESSMENT — PAIN DESCRIPTION - LOCATION
LOCATION: SHOULDER
LOCATION: SHOULDER

## 2023-01-20 ASSESSMENT — PAIN DESCRIPTION - ONSET: ONSET: ON-GOING

## 2023-01-20 ASSESSMENT — PAIN DESCRIPTION - PAIN TYPE: TYPE: ACUTE PAIN

## 2023-01-20 ASSESSMENT — PAIN DESCRIPTION - DESCRIPTORS
DESCRIPTORS: ACHING
DESCRIPTORS: THROBBING

## 2023-01-20 ASSESSMENT — PAIN DESCRIPTION - ORIENTATION
ORIENTATION: LEFT
ORIENTATION: LEFT

## 2023-01-20 ASSESSMENT — PAIN DESCRIPTION - FREQUENCY: FREQUENCY: CONTINUOUS

## 2023-01-20 ASSESSMENT — PAIN - FUNCTIONAL ASSESSMENT: PAIN_FUNCTIONAL_ASSESSMENT: PREVENTS OR INTERFERES WITH MANY ACTIVE NOT PASSIVE ACTIVITIES

## 2023-01-20 NOTE — PLAN OF CARE
Problem: Safety - Adult  Goal: Free from fall injury  Outcome: Progressing  Fall assessment preformed. Bed in low locked position with call light and tray table within reach. Education given. Will continue to monitor. Problem: Pain  Goal: Verbalizes/displays adequate comfort level or baseline comfort level  Outcome: Progressing  Pain scale preformed per protocol and pt treated for pain as documented. Education given. Problem: Discharge Planning  Goal: Discharge to home or other facility with appropriate resources  Outcome: Progressing  Flowsheets (Taken 1/20/2023 0800)  Discharge to home or other facility with appropriate resources: Identify barriers to discharge with patient and caregiver   Patients questions and concerns addressed and answered. Problem: Skin/Tissue Integrity - Adult  Goal: Skin integrity remains intact  Outcome: Progressing  Flowsheets  Taken 1/20/2023 1115  Skin Integrity Remains Intact: Monitor for areas of redness and/or skin breakdown  Taken 1/20/2023 0800  Skin Integrity Remains Intact: Monitor for areas of redness and/or skin breakdown  Goal: Incisions, wounds, or drain sites healing without S/S of infection  Outcome: Progressing  Goal: Oral mucous membranes remain intact  Outcome: Progressing  Skin assessment preformed. Will continue to monitor.       Problem: Musculoskeletal - Adult  Goal: Return mobility to safest level of function  Outcome: Progressing  Goal: Maintain proper alignment of affected body part  Outcome: Progressing  Goal: Return ADL status to a safe level of function  Outcome: Progressing     Problem: Genitourinary - Adult  Goal: Absence of urinary retention  Outcome: Progressing  Flowsheets (Taken 1/20/2023 0800)  Absence of urinary retention: Assess patients ability to void and empty bladder  Goal: Urinary catheter remains patent  Outcome: Progressing     Problem: Infection - Adult  Goal: Absence of infection at discharge  Outcome: Progressing  Goal: Absence of infection during hospitalization  Outcome: Progressing     Problem: Metabolic/Fluid and Electrolytes - Adult  Goal: Electrolytes maintained within normal limits  Outcome: Progressing     Problem: ABCDS Injury Assessment  Goal: Absence of physical injury  Outcome: Progressing   Fall assessment preformed. Bed in low locked position with call light and tray table within reach. Education given. Will continue to monitor.

## 2023-01-20 NOTE — CONSULTS
[facility] - Initial Consult Note- Telemedicine  Today's Date and Time: 1/20/2023, 5:52 PM    Impression :   Lt shoulder teres minor abscess with  MSSA on 1-17-23  Erosive degeneration of the humeral head and glenoid   S/P surgical I&D on 1-17-23    Recommendations:   Cefazolin 2 gm IV q 8 hr. Stop date 2-14-23  When ready for outpatient treatment he can be switched to Ceftriaxone 2 gm IV q 24 hr    Medical Decision Making/Summary/Discussion:1/20/2023       Infection Control Recommendations   [place] Precautions    Antimicrobial Stewardship Recommendations     Simplification of therapy  Targeted therapy    Coordination of Outpatient Care:   Estimated Length of IV antimicrobials: 2-14-23  Patient will need Midline Catheter Insertion: Yes  Patient will need PICC line Insertion:no  Patient will need: Home IV , [place],  SNF,  LTAC: TBD  Patient will need outpatient wound care:yes    Chief complaint/reason for consultation:   Lt shoulder septic arthritis, abscess      History of Present Illness:   Luis A Dowling is a 67y.o.-year-old  male who was initially admitted on 1/17/2023. Patient seen at the request of James Huston. INITIAL HISTORY:    Patient known to our service from prior evaluation by Dr Juan Diaz. 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 The patient reports prior remote fall for which he was treated conservatively. Subsequently diagnosed with rotator cuff tear in the left shoulder . On 8-22-22 he had a left shoulder injection with dilute contrast pre-CT without incident. He experienced some pain after the arthrogram.     He then underwent a left shoulder arthroscopy on 10/25/2022.  He was found to have severe degeneration involving the glenoid surface with diffuse grade 4 chronic malacia/glenohumeral joint osteoarthritis and a left shoulder massive irreparable full-thickness rotator cuff tear with diffuse synovitis for which he underwent extensive arthroscopic debridement and left shoulder manipulation     He was advised to undergo reverse shoulder arthroplasty. The patient had a left shoulder CT scan done 1/10/2023 and there was a significant change in his CT scan compared to 8/22/2022 which raised concern for the presence of septic joint/possibly osteomyelitis. The planned reverse total shoulder replacement was put on hold. An aspiration of the left shoulder join was attempted but no fluid was obtained. The patient was then admitted on 1-17-23 for an I&D of the presumptive Lt septic shoulder joint. The findings included an abscess inferior to the teres minor and erosive degeneration of the humeral head and glenoid. Culture yielded MSSA. The patient has received Cefazolin. Will continue same and then switch to ceftriaxone when ready for outpatient treatment. Labs, X rays reviewed: 1/20/2023    BUN: 12  Cr: 0.71    WBC: 13.4  Hb: 8.8  Plat: 327    Cultures:  Urine:    Blood:    Sputum :    Wound:  1-17-23: Lt shoulder joint MSSA    Discussed with BOB PAUL. I have personally reviewed the past medical history, past surgical history, medications, social history, and family history, and I have updated the database accordingly. Past Medical History:     Past Medical History:   Diagnosis Date    Bladder stone     BPH with obstruction/lower urinary tract symptoms     Caffeine use     4 coffee/2 soda per day    Cataracts, bilateral     Chronic back pain     COPD (chronic obstructive pulmonary disease) ([facility] [address]     History of blood transfusion 2010    s/p 10 hour back surgery, pt unsure but thinks he had transfusion?     Hypertension     Irritable bowel syndrome     Kidney stone     Neuropathy     feet    Osteoarthritis     Pancreatitis 1988    Type II or unspecified type diabetes mellitus without mention of complication, not stated as uncontrolled        Past Surgical  History:     Past Surgical History:   Procedure Laterality Date    ARM SURGERY Left 1/17/2023    LEFT SHOULDER ABSCESS  INCISION AND DRAINAGE performed by Tracey Hahn MD at [facility] [address]      COLONOSCOPY      6-7 polyps    CYSTOSCOPY  04/06/2022    CYSTOSCOPY URETEROSCOPY HOLMIUM LASER LEFT URETERAL STENT INSERTION - Left    CYSTOSCOPY Left 04/06/2022    CYSTOSCOPY URETEROSCOPY HOLMIUM LASER LEFT URETERAL STENT INSERTION performed by Ron Harrell MD at [address] [facility]  04/20/2022    CYSTOSCOPY URETEROSCOPY LEFT STENT PLACEMENT WITH STONE BASKETING AND HOLMIUM LASER (    CYSTOSCOPY Left 04/20/2022    CYSTOSCOPY URETEROSCOPY LEFT STENT PLACEMENT WITH STONE BASKETING AND HOLMIUM LASER performed by Ron Harrell MD at [address] [place] [address]  2015    macular scrape    EYE SURGERY      LEG TENDON SURGERY  1962    ankle    LITHOTRIPSY      9-21-17    OTHER SURGICAL HISTORY      implanted nerve stimulater in lumbar back    SHOULDER ARTHROSCOPY Left 10/25/2022    LEFT SHOULDER ARTHROSCOPY WITH EXTENSIVE DEBRIDMENT AND PRE-OPERATIVE INTERSCALENE BLOCK WITH EXPAREL (Left: Shoulder)    SHOULDER ARTHROSCOPY Left 10/25/2022    LEFT SHOULDER ARTHROSCOPY WITH EXTENSIVE DEBRIDMENT AND PRE-OPERATIVE INTERSCALENE BLOCK WITH EXPAREL performed by Tracey Hahn MD at [facility] 1 ARTHROSCOPY  01/17/2023    LEFT SHOULDER ABSCESS  INCISION AND DRAINAGE    SHOULDER ARTHROSCOPY Left 1/17/2023    LEFT SHOULDER ARTHROSCOPY performed by Tracey Hahn MD at [facility] [address]  05/18/2010    DR. Ryan Handy Buttram (NS) fusion and implantation of hardware    TONSILLECTOMY      URETEROSCOPY      with string stent 9-21-17    VASECTOMY         Medications:      vancomycin  1,250 mg IntraVENous Q12H    lidocaine 1 % injection  5 mL IntraDERmal Once    sodium chloride flush  5-40 mL IntraVENous 2 times per day    celecoxib  200 mg Oral Daily    lisinopril  10 mg Oral Daily    metFORMIN  1,000 mg Oral BID WC    multivitamin  1 tablet Oral Daily    insulin lispro  0-16 Units SubCUTAneous TID WC    insulin lispro  0-4 Units SubCUTAneous Nightly    vitamin D3  800 Units Oral Daily    DULoxetine  30 mg Oral BID    gabapentin  300 mg Oral BID    insulin lispro  2 Units SubCUTAneous TID AC    insulin glargine  63 Units SubCUTAneous Nightly    pioglitazone  30 mg Oral Daily    sodium chloride flush  5-40 mL IntraVENous 2 times per day    enoxaparin  40 mg SubCUTAneous Daily    acetaminophen  1,000 mg Oral Q6H    ceFAZolin  2,000 mg IntraVENous Q8H    vancomycin (VANCOCIN) intermittent dosing (placeholder)   Other RX Placeholder       Social History:     Social History     Socioeconomic History    Marital status:      Spouse name: Not on file    Number of children: Not on file    Years of education: Not on file    Highest education level: Not on file   Occupational History    Not on file   Tobacco Use    Smoking status: Former     Packs/day: 1.00     Years: 40.00     Pack years: 40.00     Types: Cigarettes     Start date: 3/3/1980     Quit date: 2022     Years since quittin.4    Smokeless tobacco: Former   Vaping Use    Vaping Use: Former    Quit date: 3/17/2019   Substance and Sexual Activity    Alcohol use: Not Currently     Comment: occasional    Drug use: No    Sexual activity: Not Currently   Other Topics Concern    Not on file   Social History Narrative    Not on file     Social Determinants of Health     Financial Resource Strain: Not on file   Food Insecurity: Not on file   Transportation Needs: Not on file   Physical Activity: Not on file   Stress: Not on file   Social Connections: Not on file   Intimate Partner Violence: Not on file   Housing Stability: Not on file       Family History:     Family History   Problem Relation Age of Onset    Arthritis Mother     Cancer Father         Allergies:   Iodine     Review of Systems:   Constitutional: No fevers or chills. No systemic complaints  Head: No headaches  Eyes: No double vision or blurry vision. No conjunctival inflammation. ENT: No sore throat or runny nose. . No hearing loss, tinnitus or vertigo. Cardiovascular: No chest pain or palpitations. No shortness of breath. No CASTLE  Lung: No shortness of breath or cough. No sputum production  Abdomen: No nausea, vomiting, diarrhea, or abdominal pain. Farida Stands No cramps. Genitourinary: No increased urinary frequency, or dysuria. No hematuria. No suprapubic or CVA pain  Musculoskeletal: Lt shoulder pain and limitation of motion  Hematologic: No bleeding or bruising. Neurologic: No headache, weakness, numbness, or tingling. Integument: No rash, no ulcers. Psychiatric: No depression. Endocrine: No polyuria, no polydipsia, no polyphagia. Physical Examination :   Patient Vitals for the past 8 hrs:   BP Temp Temp src Pulse Resp SpO2   01/20/23 1532 (!) 140/82 97.5 °F (36.4 °C) Oral 91 12 97 %   01/20/23 1422 -- -- -- -- 16 --   01/20/23 1352 -- -- -- -- 18 --   01/20/23 1149 (!) 145/89 97.7 °F (36.5 °C) Oral 81 18 95 %     General Appearance: Awake, alert, and in no apparent distress  Head:  Normocephalic, no trauma  Eyes: Pupils equal, round, reactive to light and accommodation; extraocular movements intact; sclera anicteric; conjunctivae pink. No embolic phenomena. ENT: Oropharynx clear, without erythema, exudate, or thrush. No tenderness of sinuses. Mouth/throat: mucosa pink and moist. No lesions. Dentition in good repair. Neck:Supple, without lymphadenopathy. Thyroid normal, No bruits. Pulmonary/Chest: Clear to auscultation, without wheezes, rales, or rhonchi. No dullness to percussion. Cardiovascular: Regular rate and rhythm without murmurs, rubs, or gallops. Abdomen: Soft, non tender. Bowel sounds normal. No organomegaly  All four Extremities: No cyanosis, clubbing, edema, or effusions. Lt shoulder S/P surgical I&D  Neurologic: No gross sensory or motor deficits. Skin: Warm and dry with good turgor. No signs of peripheral arterial or venous insufficiency. No ulcerations. No open wounds. Medical Decision Making -Laboratory:   I have independently reviewed/ordered the following labs:    CBC with Differential:   Recent Labs     01/18/23  0557   WBC 13.4*   HGB 8.8*   HCT 28.5*      LYMPHOPCT 10*   MONOPCT 10     BMP:   Recent Labs     01/19/23  0509 01/20/23  0922    143   K 3.6* 3.6*    104   CO2 27 27   BUN 17 12   CREATININE 0.69* 0.71     Hepatic Function Panel: No results for input(s): PROT, LABALBU, BILIDIR, IBILI, BILITOT, ALKPHOS, ALT, AST in the last 72 hours. No results for input(s): RPR in the last 72 hours. No results for input(s): HIV in the last 72 hours. No results for input(s): BC in the last 72 hours. Lab Results   Component Value Date/Time    MUCUS 1+ 12/28/2022 01:35 PM    RBC 3.31 01/18/2023 05:57 AM    WBC 13.4 01/18/2023 05:57 AM    TURBIDITY Cloudy 12/28/2022 01:35 PM     Lab Results   Component Value Date/Time    CREATININE 0.71 01/20/2023 09:22 AM    GLUCOSE 147 01/20/2023 09:22 AM       Medical Decision Making-Imaging:     EXAMINATION:   CT OF THE LEFT SHOULDER WITHOUT CONTRAST 1/10/2023 10:47 am       TECHNIQUE:   CT of the left shoulder was performed without the administration of   intravenous contrast.  Multiplanar reformatted images are provided for   review. Automated exposure control, iterative reconstruction, and/or weight   based adjustment of the mA/kV was utilized to reduce the radiation dose to as   low as reasonably achievable.        COMPARISON:   CT left shoulder and left shoulder plain films from 08/22/2022       HISTORY   ORDERING SYSTEM PROVIDED HISTORY: Tear of left rotator cuff, unspecified tear   extent, unspecified whether traumatic   TECHNOLOGIST PROVIDED HISTORY:   Using matchpoint   Reason for Exam: Tear of left rotator cuff, unspecified tear extent,   unspecified whether traumatic, Osteoarthritis of left glenohumeral joint       70-year-old male with left-sided rotator cuff tear;? Osteoarthritis of the   glenohumeral joint       FINDINGS:   Bones: Visualized left-sided ribs appear intact. No acute fracture or dislocation. Soft Tissue: Enlarged left axillary lymph nodes. Moderate edema involving the rotator cuff musculature. Heterogeneous   attenuation and fluid density within the proximal triceps muscle with   associated calcifications or ossific densities as well as the trapezius   muscle on images 150-260, series 2. Mild dependent atelectasis and respiratory motion. Joint: Moderate glenohumeral joint effusion. Erosive changes and tiny   ossific densities involving the glenoid and medial humeral head/articular   surface of the humeral head. These findings are rapidly progressed/new when   compared with 08/22/2022. Tiny ossific densities are seen in the inferior glenohumeral joint/axillary   pouch. Mild degenerative changes of the left glenohumeral joint. Impression   1. Moderate edema of the rotator cuff musculature. Heterogeneous   attenuation, fluid density, and associated calcifications/ossific densities   involving the proximal triceps and trapezius muscles. Differential   considerations include underlying soft tissue mass/neoplasm/malignancy or   infection/pyomyositis. Given the rapid progression from the plain films and   CT of the left shoulder from 08/29/2022, findings are highly concerning for   infection/septic joint and osteomyelitis. 2. Erosive changes and associated ossific densities involving the   glenohumeral joint. Moderate glenohumeral joint effusion.   Underlying septic   joint/infection versus inflammatory arthropathy are differential considerations. 3. Mild degenerative change of the left AC joint. 4. No acute fracture or dislocation. 5. Enlarged left axillary lymph nodes. The findings were sent to the Radiology Results [address] at 12:26   pm on 1/10/2023 to be communicated to a licensed caregiver. EXAMINATION:   MRI OF THE LEFT SHOULDER WITHOUT CONTRAST   1/12/2023 3:49 pm       TECHNIQUE:   Multiplanar multisequence MRI of the left shoulder was performed without the   administration of intravenous contrast.       COMPARISON:   Left shoulder radiographs 08/29/2022. CT left shoulder 01/10/2023. HISTORY:   ORDERING SYSTEM PROVIDED HISTORY: Chronic left shoulder pain   TECHNOLOGIST PROVIDED HISTORY:   Reason for Exam: Chronic left shoulder pain. Pt C/O pain with a history of   arthritis. No RTC surgery. FINDINGS:   ROTATOR CUFF: Fatty atrophy and edema the supraspinatus muscle. There is   severe diffuse supraspinatus tendon thinning and probable full-thickness   tearing. The infraspinatus and teres minor tendons are intact. A large   high-grade partial-thickness articular surface tear of the subscapularis   tendon footprint and crescent is seen. No definite full-thickness component   or retraction. BICEPS TENDON: Suspected full-thickness tearing of the proximal long head   biceps tendon. LABRUM: Circumferential complex labral tearing. GLENOHUMERAL JOINT: Moderate glenohumeral effusion with extensive synovitis. Severe extensive articular surface erosions on both sides of the joint. AC JOINT AND ACROMIOCLAVICULAR ARCH: Type 1 acromion. No os acromiale. Mild   to moderate degenerative changes. No subacromial narrowing. Small volume of   fluid in the subacromial bursa. The coracoacromial and coracoclavicular   ligaments are intact. BONE MARROW: Patchy marrow edema with significantly decreased T1 signal   throughout the humeral head and glenoid. No fracture or dislocation. No   suspicious marrow space-occupying lesion. OUTLET SPACES: The suprascapular and spinoglenoid notches are normal in   appearance. There is a large partially visualized heterogeneous fluid   collection inferior to the glenohumeral joint likely communicating with the   joint space measuring at least 8.4 x 5.8 x 5 cm lateral to the quadrilateral   space. This extends inferior to the field of view. Extensive subcutaneous   and intramuscular edema. Impression   1. Extensive severe articular surface erosions of the glenohumeral joint with   a moderate joint effusion with synovitis. Significant marrow edema in   throughout the humeral head extending into the humeral neck as well as the   glenoid. Findings likely represent septic arthritis and osteomyelitis. Inflammatory arthritis or neuropathic arthropathy are alternative   considerations. 2. Large partially visualized heterogeneous fluid collection inferior to and   likely communicating with the glenohumeral joint measuring at least 8.4 x 5.8   x 5 cm likely representing an abscess. Extensive subcutaneous and   intramuscular edema compatible with cellulitis and myositis. 3. Severe diffuse supraspinatus tendon thinning and probable full-thickness   tearing. 4. Large high-grade partial-thickness articular surface tear of the   subscapularis tendon. 5. Mild glenohumeral degenerative changes. Medical Decision [id number]:       Medical Decision Making-Other:     Note:  Labs, medications, radiologic studies were reviewed with personal review of films  Large amounts of data were reviewed  Discussed with nursing Staff, Discharge planner  Infection Control and Prevention measures reviewed  All prior entries were reviewed  Administer medications as ordered  Prognosis: [address]  Discharge planning reviewed  Follow up as outpatient. Thank you for allowing us to participate in the care of this patient.  Please call with questions.     Saima Grajeda MD  Pager: [phone] - Office: [phone]

## 2023-01-20 NOTE — PROGRESS NOTES
Physical Therapy        Physical Therapy Cancel Note      DATE: 2023    NAME: Thalia Chambers  MRN: [id number]   : 1950      Patient not seen this date for Physical Therapy due to:    Patient Declined: Pt declined therapy; stated he has been ambulating in room and performing UE/shoulder exercises as instructed. Will recheck tomorrow.       Electronically signed by Gonzalo Gallo PT on 2023 at 3:25 PM

## 2023-01-20 NOTE — PROGRESS NOTES
[facility]  Office: [address], DO, Lorraine Lal, DO, Judith Lopez, DO, [facility], DO, Chalo Trammell MD, Damari Patton MD, Elizabeth Brooks MD, Bryan Owens MD,  Guillermo Anna MD, Brooke Travis MD, Donna Moseley, DO, Tay Vann MD,  Silas Rincon MD, Irma Renee MD, Kate Diaz, DO, Dolly Rojas MD, Marti Tolliver MD, Vicky Abraham, DO, Gordon Martinez MD, Karoline Dakin, MD, Sheldon Causey MD, Leanne Kay MD, Michael Garnett DO, Chantelle Bowers MD, Antonio Grajeda MD, Abbey Walters, CNP,  Felipa Isabel, CNP, Mariana Butler, CNP, Arlet Arguello, CNP,  Samuel Stanford, [facility], Misti Snowden, CNP, Maximus Castro, CNP, Ridge Carvalho, CNP, Gabby Coates, CNP, Garry Vergara, CNP, Maria Elena Quan PA-C, Yolie Foster, CNS, Vivian Nelson, CNP, Laith Gomes [id number]    Progress Note    1/20/2023    3:30 PM    Name:   Ashley Herron  MRN:     [id number]     [place]:      [de-identified]   Room:   [address] [place] [address] Day:  3  Admit Date:  1/17/2023 11:32 AM    PCP:   BREANNA Perez NP  Code Status:  Full Code    Subjective:     C/C: \" My shoulder is sore, but I feel better\"    Interval History Status: improved. Patient is feeling better overall. I reviewed his blood cultures with him and explained to him that he has a Staphylococcus species. He does have a MSSA infection and at this point in time we are awaiting infectious disease recommendations regarding antibiotics on discharge. Regarding his shoulder his pain is under good control. He denies any questions or concerns at this point in time. We will work on disposition home pending infectious disease evaluation. Brief History: This very pleasant 66-year-old male underwent elective incision and drainage of the left shoulder. He has a septic joint and cultures reveal Staph aureus.   Final sensitivity has revealed an MSSA.  Awaiting infectious disease evaluation for recommendations regarding antibiotics on discharge. Review of Systems:     Constitutional:  negative for chills, fevers, sweats  Respiratory:  negative for cough, dyspnea on exertion, shortness of breath, wheezing  Cardiovascular:  negative for chest pain, chest pressure/discomfort, lower extremity edema, palpitations  Gastrointestinal:  negative for abdominal pain, constipation, diarrhea, nausea, vomiting  Neurological:  negative for dizziness, headache    Medications: Allergies:     Allergies   Allergen Reactions    Iodine Other (See Comments)     Possible reaction to arthrogram dye pt suspected- severe pain and swelling to shoulder-arm       Current Meds:   Scheduled Meds:    vancomycin  1,250 mg IntraVENous Q12H    lidocaine 1 % injection  5 mL IntraDERmal Once    sodium chloride flush  5-40 mL IntraVENous 2 times per day    celecoxib  200 mg Oral Daily    lisinopril  10 mg Oral Daily    metFORMIN  1,000 mg Oral BID WC    multivitamin  1 tablet Oral Daily    insulin lispro  0-16 Units SubCUTAneous TID WC    insulin lispro  0-4 Units SubCUTAneous Nightly    vitamin D3  800 Units Oral Daily    DULoxetine  30 mg Oral BID    gabapentin  300 mg Oral BID    insulin lispro  2 Units SubCUTAneous TID AC    insulin glargine  63 Units SubCUTAneous Nightly    pioglitazone  30 mg Oral Daily    sodium chloride flush  5-40 mL IntraVENous 2 times per day    enoxaparin  40 mg SubCUTAneous Daily    acetaminophen  1,000 mg Oral Q6H    ceFAZolin  2,000 mg IntraVENous Q8H    vancomycin (VANCOCIN) intermittent dosing (placeholder)   Other RX Placeholder     Continuous Infusions:    sodium chloride      sodium chloride Stopped (01/18/23 0652)    sodium chloride 75 mL/hr at 01/20/23 0546    dextrose       PRN Meds: potassium chloride **OR** potassium alternative oral replacement **OR** potassium chloride, magnesium sulfate, sodium chloride flush, sodium chloride, albuterol sulfate HFA, sodium chloride flush, sodium chloride, polyethylene glycol, oxyCODONE, morphine, ondansetron **OR** ondansetron, glucose, dextrose bolus **OR** dextrose bolus, glucagon (rDNA), dextrose    Data:     Past Medical History:   has a past medical history of Bladder stone, BPH with obstruction/lower urinary tract symptoms, Caffeine use, Cataracts, bilateral, Chronic back pain, COPD (chronic obstructive pulmonary disease) ([address] History of blood transfusion, Hypertension, Irritable bowel syndrome, Kidney stone, Neuropathy, Osteoarthritis, Pancreatitis, and Type II or unspecified type diabetes mellitus without mention of complication, not stated as uncontrolled. Social History:   reports that he quit smoking about 4 months ago. His smoking use included cigarettes. He started smoking about 42 years ago. He has a 40.00 pack-year smoking history. He has quit using smokeless tobacco. He reports that he does not currently use alcohol. He reports that he does not use drugs. Family History:   Family History   Problem Relation Age of Onset    Arthritis Mother     Cancer Father        Vitals:  BP (!) 145/89   Pulse 81   Temp 97.7 °F (36.5 °C) (Oral)   Resp 18   Ht 6' 2\" (1.88 m)   Wt 194 lb 10.7 oz (88.3 kg)   SpO2 95%   BMI 24.99 kg/m²   Temp (24hrs), Av.9 °F (36.6 °C), Min:97.7 °F (36.5 °C), Max:98.1 °F (36.7 °C)    Recent Labs     23  2019 23  2334 23  0812 23  1150   POCGLU 142* 97 92 158*       I/O (24Hr):     Intake/Output Summary (Last 24 hours) at 2023 1530  Last data filed at 2023 1600  Gross per 24 hour   Intake --   Output 20 ml   Net -20 ml       Labs:  Hematology:  Recent Labs     23  0557   WBC 13.4*   RBC 3.31*   HGB 8.8*   HCT 28.5*   MCV 86.1   MCH 26.6   MCHC 30.9*   RDW 15.5*      MPV 9.5     Chemistry:  Recent Labs     23  0557 23  0509 23  0922   * 141 143   K 3.9 3.6* 3.6*   CL 97* 104 104   CO2 27 27 27   GLUCOSE 253* 124* 147*   BUN 20 17 12   CREATININE 0.70 0.69* 0.71   ANIONGAP 8* 10 12   LABGLOM >60 >60 >60   CALCIUM 8.1* 8.4* 8.8     Recent Labs     01/19/23  1205 01/19/23  1553 01/19/23 2019 01/19/23  2334 01/20/23  0812 01/20/23  1150   POCGLU 134* 139* 142* 97 92 158*     ABG:No results found for: POCPH, PHART, PH, POCPCO2, YGY7AXL, PCO2, POCPO2, PO2ART, PO2, POCHCO3, SDW9EXP, HCO3, NBEA, PBEA, BEART, BE, THGBART, THB, NMT0LSE, CKWY5QII, E1FYBWBV, O2SAT, FIO2  Lab Results   Component Value Date/Time    SPECIAL 20ML RIGHT HAND 04/22/2022 09:52 PM    SPECIAL 20ML RIGHT ARM 04/22/2022 09:52 PM     Lab Results   Component Value Date/Time    CULTURE (A) 01/17/2023 10:29 PM     STAPHYLOCOCCUS AUREUS MODERATE GROWTH This isolate is methicillin susceptible. CULTURE No anaerobic organisms isolated at 3 days. 01/17/2023 10:29 PM       Radiology:  No results found.     Physical Examination:       General appearance:  alert, cooperative and no distress  Mental Status:  oriented to person, place and time and normal affect  Lungs:  clear to auscultation bilaterally, normal effort  Heart:  regular rate and rhythm, no murmur  Abdomen:  soft, nontender, nondistended, normal bowel sounds, no masses, hepatomegaly, splenomegaly  Extremities:  no edema, redness, tenderness in the calves  Skin:  no gross lesions, rashes, induration    Assessment:     Hospital Problems             Last Modified POA    * (Principal) Septic arthritis ([facility] [address] 1/17/2023 Yes    Abscess of left shoulder 1/17/2023 Yes       Plan:     Left septic shoulder arthritis with MSSA  Cultures revealing MSSA, awaiting infectious disease recommendations for antibiotics and discharge  Diabetes mellitus  Blood sugar stable, glycemic trend reviewed  Hypothyroidism  Continue Synthroid  Hypokalemia  Electrolyte replacement  Thiazide diuretic discontinued secondary to hyponatremia  Essential hypertension  Stable off thiazide  Would recommend discontinuing thiazide for now  Minimally elevated blood pressure likely reactive secondary to pain  Anxiety/depression  Continue Cymbalta    Discharge pending infectious disease recommendations    Brielle Johnson DO  1/20/2023  3:30 PM

## 2023-01-21 LAB
GLUCOSE BLD-MCNC: 129 MG/DL (ref 75–110)
GLUCOSE BLD-MCNC: 173 MG/DL (ref 75–110)
GLUCOSE BLD-MCNC: 98 MG/DL (ref 75–110)
VANCOMYCIN RANDOM: 20.1 UG/ML

## 2023-01-21 PROCEDURE — 1210000000 HC MED SURG R&B

## 2023-01-21 PROCEDURE — 6360000002 HC RX W HCPCS: Performed by: INTERNAL MEDICINE

## 2023-01-21 PROCEDURE — 94760 N-INVAS EAR/PLS OXIMETRY 1: CPT

## 2023-01-21 PROCEDURE — 2580000003 HC RX 258: Performed by: INTERNAL MEDICINE

## 2023-01-21 PROCEDURE — 36415 COLL VENOUS BLD VENIPUNCTURE: CPT

## 2023-01-21 PROCEDURE — 6370000000 HC RX 637 (ALT 250 FOR IP): Performed by: ORTHOPAEDIC SURGERY

## 2023-01-21 PROCEDURE — 99231 SBSQ HOSP IP/OBS SF/LOW 25: CPT | Performed by: STUDENT IN AN ORGANIZED HEALTH CARE EDUCATION/TRAINING PROGRAM

## 2023-01-21 PROCEDURE — 2580000003 HC RX 258: Performed by: ORTHOPAEDIC SURGERY

## 2023-01-21 PROCEDURE — 6370000000 HC RX 637 (ALT 250 FOR IP): Performed by: HOSPITALIST

## 2023-01-21 PROCEDURE — 99232 SBSQ HOSP IP/OBS MODERATE 35: CPT | Performed by: INTERNAL MEDICINE

## 2023-01-21 PROCEDURE — 80202 ASSAY OF VANCOMYCIN: CPT

## 2023-01-21 PROCEDURE — 6370000000 HC RX 637 (ALT 250 FOR IP): Performed by: STUDENT IN AN ORGANIZED HEALTH CARE EDUCATION/TRAINING PROGRAM

## 2023-01-21 PROCEDURE — 6360000002 HC RX W HCPCS: Performed by: ORTHOPAEDIC SURGERY

## 2023-01-21 PROCEDURE — 82947 ASSAY GLUCOSE BLOOD QUANT: CPT

## 2023-01-21 RX ORDER — PROCHLORPERAZINE MALEATE 10 MG
5 TABLET ORAL ONCE
Status: COMPLETED | OUTPATIENT
Start: 2023-01-21 | End: 2023-01-21

## 2023-01-21 RX ORDER — DIPHENHYDRAMINE HCL 25 MG
25 TABLET ORAL ONCE
Status: COMPLETED | OUTPATIENT
Start: 2023-01-21 | End: 2023-01-21

## 2023-01-21 RX ADMIN — GABAPENTIN 300 MG: 300 CAPSULE ORAL at 22:08

## 2023-01-21 RX ADMIN — ACETAMINOPHEN 1000 MG: 500 TABLET ORAL at 06:24

## 2023-01-21 RX ADMIN — CHOLECALCIFEROL (VITAMIN D3) 10 MCG (400 UNIT) TABLET 800 UNITS: at 08:42

## 2023-01-21 RX ADMIN — VANCOMYCIN HYDROCHLORIDE 1250 MG: 1.25 INJECTION, POWDER, LYOPHILIZED, FOR SOLUTION INTRAVENOUS at 09:24

## 2023-01-21 RX ADMIN — SODIUM CHLORIDE, PRESERVATIVE FREE 10 ML: 5 INJECTION INTRAVENOUS at 21:00

## 2023-01-21 RX ADMIN — ACETAMINOPHEN 1000 MG: 500 TABLET ORAL at 00:24

## 2023-01-21 RX ADMIN — THERA TABS 1 TABLET: TAB at 08:43

## 2023-01-21 RX ADMIN — SODIUM CHLORIDE, PRESERVATIVE FREE 10 ML: 5 INJECTION INTRAVENOUS at 17:06

## 2023-01-21 RX ADMIN — CEFAZOLIN 2000 MG: 2 INJECTION, POWDER, FOR SOLUTION INTRAMUSCULAR; INTRAVENOUS at 00:27

## 2023-01-21 RX ADMIN — PIOGLITAZONE 30 MG: 15 TABLET ORAL at 08:42

## 2023-01-21 RX ADMIN — DULOXETINE 30 MG: 30 CAPSULE, DELAYED RELEASE ORAL at 22:08

## 2023-01-21 RX ADMIN — ACETAMINOPHEN 1000 MG: 500 TABLET ORAL at 14:06

## 2023-01-21 RX ADMIN — OXYCODONE HYDROCHLORIDE 5 MG: 5 TABLET ORAL at 20:06

## 2023-01-21 RX ADMIN — OXYCODONE HYDROCHLORIDE 5 MG: 5 TABLET ORAL at 05:27

## 2023-01-21 RX ADMIN — METFORMIN HYDROCHLORIDE 1000 MG: 500 TABLET ORAL at 08:42

## 2023-01-21 RX ADMIN — PROCHLORPERAZINE MALEATE 5 MG: 10 TABLET ORAL at 09:19

## 2023-01-21 RX ADMIN — CEFAZOLIN 2000 MG: 2 INJECTION, POWDER, FOR SOLUTION INTRAMUSCULAR; INTRAVENOUS at 08:51

## 2023-01-21 RX ADMIN — ENOXAPARIN SODIUM 40 MG: 100 INJECTION SUBCUTANEOUS at 08:43

## 2023-01-21 RX ADMIN — METFORMIN HYDROCHLORIDE 1000 MG: 500 TABLET ORAL at 17:11

## 2023-01-21 RX ADMIN — GABAPENTIN 300 MG: 300 CAPSULE ORAL at 08:42

## 2023-01-21 RX ADMIN — LISINOPRIL 10 MG: 10 TABLET ORAL at 08:43

## 2023-01-21 RX ADMIN — ACETAMINOPHEN 1000 MG: 500 TABLET ORAL at 20:00

## 2023-01-21 RX ADMIN — DULOXETINE 30 MG: 30 CAPSULE, DELAYED RELEASE ORAL at 08:42

## 2023-01-21 RX ADMIN — DIPHENHYDRAMINE HYDROCHLORIDE 25 MG: 25 TABLET ORAL at 09:19

## 2023-01-21 RX ADMIN — CEFAZOLIN 2000 MG: 2 INJECTION, POWDER, FOR SOLUTION INTRAMUSCULAR; INTRAVENOUS at 17:14

## 2023-01-21 RX ADMIN — OXYCODONE HYDROCHLORIDE 5 MG: 5 TABLET ORAL at 14:06

## 2023-01-21 RX ADMIN — CELECOXIB 200 MG: 100 CAPSULE ORAL at 08:46

## 2023-01-21 ASSESSMENT — PAIN SCALES - GENERAL
PAINLEVEL_OUTOF10: 5
PAINLEVEL_OUTOF10: 7
PAINLEVEL_OUTOF10: 3
PAINLEVEL_OUTOF10: 5
PAINLEVEL_OUTOF10: 8
PAINLEVEL_OUTOF10: 5
PAINLEVEL_OUTOF10: 7

## 2023-01-21 ASSESSMENT — PAIN DESCRIPTION - LOCATION
LOCATION: HEAD
LOCATION: SHOULDER
LOCATION: HEAD;SHOULDER

## 2023-01-21 ASSESSMENT — PAIN DESCRIPTION - ORIENTATION
ORIENTATION: LEFT

## 2023-01-21 ASSESSMENT — PAIN DESCRIPTION - DESCRIPTORS
DESCRIPTORS: SORE
DESCRIPTORS: SORE
DESCRIPTORS: POUNDING

## 2023-01-21 NOTE — PROGRESS NOTES
Physical Therapy        Physical Therapy Cancel Note      DATE: 2023    NAME: Abbey Ramirez  MRN: [id number]   : 1950      Patient not seen this date for Physical Therapy due to:    Patient Declined: Pt declined treatment this day with report of being up all night and having a significant headache which he rates a 6/10. Re attempt treatment next rehab day.       Electronically signed by Collette Diaz PT on 2023 at 10:38 AM

## 2023-01-21 NOTE — PLAN OF CARE
Problem: Safety - Adult  Goal: Free from fall injury  Outcome: Progressing     Problem: Pain  Goal: Verbalizes/displays adequate comfort level or baseline comfort level  Outcome: Progressing     Problem: Discharge Planning  Goal: Discharge to home or other facility with appropriate resources  Outcome: Progressing     Problem: Skin/Tissue Integrity - Adult  Goal: Skin integrity remains intact  Outcome: Progressing     Problem: Skin/Tissue Integrity - Adult  Goal: Incisions, wounds, or drain sites healing without S/S of infection  Outcome: Progressing     Problem: Skin/Tissue Integrity - Adult  Goal: Oral mucous membranes remain intact  Outcome: Progressing     Problem: Musculoskeletal - Adult  Goal: Return mobility to safest level of function  Outcome: Progressing     Problem: Musculoskeletal - Adult  Goal: Maintain proper alignment of affected body part  Outcome: Progressing     Problem: Musculoskeletal - Adult  Goal: Return ADL status to a safe level of function  Outcome: Progressing     Problem: Genitourinary - Adult  Goal: Absence of urinary retention  Outcome: Progressing     Problem: ABCDS Injury Assessment  Goal: Absence of physical injury  Outcome: Progressing

## 2023-01-21 NOTE — RT PROTOCOL NOTE
RT Inhaler-Nebulizer Bronchodilator Protocol Note    There is a bronchodilator order in the chart from a provider indicating to follow the RT Bronchodilator Protocol and there is an Initiate RT Inhaler-Nebulizer Bronchodilator Protocol order as well (see protocol at bottom of note). CXR Findings:  No results found. The findings from the last RT Protocol Assessment were as follows:   History Pulmonary Disease: Chronic pulmonary disease  Respiratory Pattern: Regular pattern and RR 12-20 bpm  Breath Sounds: Clear breath sounds  Cough: Strong, spontaneous, non-productive  Indication for Bronchodilator Therapy: On home bronchodilators  Bronchodilator Assessment Score: 2    Aerosolized bronchodilator medication orders have been revised according to the RT Inhaler-Nebulizer Bronchodilator Protocol below. Respiratory Therapist to perform RT Therapy Protocol Assessment initially then follow the protocol. Repeat RT Therapy Protocol Assessment PRN for score 0-3 or on second treatment, BID, and PRN for scores above 3. No Indications - adjust the frequency to every 6 hours PRN wheezing or bronchospasm, if no treatments needed after 48 hours then discontinue using Per Protocol order mode. If indication present, adjust the RT bronchodilator orders based on the Bronchodilator Assessment Score as indicated below. Use Inhaler orders unless patient has one or more of the following: on home nebulizer, not able to hold breath for 10 seconds, is not alert and oriented, cannot activate and use MDI correctly, or respiratory rate 25 breaths per minute or more, then use the equivalent nebulizer order(s) with same Frequency and PRN reasons based on the score. If a patient is on this medication at home then do not decrease Frequency below that used at home.     0-3 - enter or revise RT bronchodilator order(s) to equivalent RT Bronchodilator order with Frequency of every 4 hours PRN for wheezing or increased work of breathing using Per Protocol order mode. 4-6 - enter or revise RT Bronchodilator order(s) to two equivalent RT bronchodilator orders with one order with BID Frequency and one order with Frequency of every 4 hours PRN wheezing or increased work of breathing using Per Protocol order mode. 7-10 - enter or revise RT Bronchodilator order(s) to two equivalent RT bronchodilator orders with one order with TID Frequency and one order with Frequency of every 4 hours PRN wheezing or increased work of breathing using Per Protocol order mode. 11-13 - enter or revise RT Bronchodilator order(s) to one equivalent RT bronchodilator order with QID Frequency and an Albuterol order with Frequency of every 4 hours PRN wheezing or increased work of breathing using Per Protocol order mode. Greater than 13 - enter or revise RT Bronchodilator order(s) to one equivalent RT bronchodilator order with every 4 hours Frequency and an Albuterol order with Frequency of every 2 hours PRN wheezing or increased work of breathing using Per Protocol order mode. RT to enter RT Home Evaluation for COPD & MDI Assessment order using Per Protocol order mode.     Electronically signed by Qi Chaudhari RCP on 1/21/2023 at 9:40 AM

## 2023-01-21 NOTE — PROGRESS NOTES
[facility] - Progress Note    - Telemedicine  Today's Date and Time: 1/21/2023, 8:35 AM    Impression :   Lt shoulder teres minor abscess with  MSSA on 1-17-23  Erosive degeneration of the humeral head and glenoid   S/P surgical I&D on 1-17-23    Recommendations:   Cefazolin 2 gm IV q 8 hr. Stop date 2-14-23  When ready for outpatient treatment he can be switched to Ceftriaxone 2 gm IV q 24 hr    Medical Decision Making/Summary/Discussion:1/21/2023       Infection Control Recommendations   [place] Precautions    Antimicrobial Stewardship Recommendations     Simplification of therapy  Targeted therapy    Coordination of Outpatient Care:   Estimated Length of IV antimicrobials: 2-14-23  Patient will need Midline Catheter Insertion: Yes  Patient will need PICC line Insertion:no  Patient will need: Home IV , [place],  SNF,  LTAC: TBD  Patient will need outpatient wound care:yes    Chief complaint/reason for consultation:   Lt shoulder septic arthritis, abscess      History of Present Illness:   Luis A Aggarwal is a 67y.o.-year-old  male who was initially admitted on 1/17/2023. Patient seen at the request of Josefa Johnson. INITIAL HISTORY:    Patient known to our service from prior evaluation by Dr Livia Willson. 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 The patient reports prior remote fall for which he was treated conservatively. Subsequently diagnosed with rotator cuff tear in the left shoulder . On 8-22-22 he had a left shoulder injection with dilute contrast pre-CT without incident. He experienced some pain after the arthrogram.     He then underwent a left shoulder arthroscopy on 10/25/2022.  He was found to have severe degeneration involving the glenoid surface with diffuse grade 4 chronic malacia/glenohumeral joint osteoarthritis and a left shoulder massive irreparable full-thickness rotator cuff tear with diffuse synovitis for which he underwent extensive arthroscopic debridement and left shoulder manipulation     He was advised to undergo reverse shoulder arthroplasty. The patient had a left shoulder CT scan done 1/10/2023 and there was a significant change in his CT scan compared to 8/22/2022 which raised concern for the presence of septic joint/possibly osteomyelitis. The planned reverse total shoulder replacement was put on hold. An aspiration of the left shoulder join was attempted but no fluid was obtained. The patient was then admitted on 1-17-23 for an I&D of the presumptive Lt septic shoulder joint. The findings included an abscess inferior to the teres minor and erosive degeneration of the humeral head and glenoid. Culture yielded MSSA. The patient has received Cefazolin. Will continue same and then switch to ceftriaxone when ready for outpatient treatment. CURRENT EVALUATION : 1/21/2023    Afebrile  VS stable, mild HTN    Patient stable  No complaints  No new issues per RN    Medications reviewed  On Cefazolin  Cefazolin can be switched to ceftriaxone when ready for outpatient treatment. Labs, X rays reviewed: 1/21/2023    BUN: 12  Cr: 0.71    WBC: 13.4  Hb: 8.8  Plat: 327    Cultures:  Urine:    Blood:    Sputum :    Wound:  1-17-23: Lt shoulder joint MSSA    Discussed with BOB PAUL. I have personally reviewed the past medical history, past surgical history, medications, social history, and family history, and I have updated the database accordingly.   Past Medical History:     Past Medical History:   Diagnosis Date    Bladder stone     BPH with obstruction/lower urinary tract symptoms     Caffeine use     4 coffee/2 soda per day    Cataracts, bilateral     Chronic back pain     COPD (chronic obstructive pulmonary disease) ([facility] [address]     History of blood transfusion 2010    s/p 10 hour back surgery, pt unsure but thinks he had transfusion?     Hypertension     Irritable bowel syndrome     Kidney stone     Neuropathy     feet    Osteoarthritis     Pancreatitis 1988    Type II or unspecified type diabetes mellitus without mention of complication, not stated as uncontrolled        Past Surgical  History:     Past Surgical History:   Procedure Laterality Date    ARM SURGERY Left 1/17/2023    LEFT SHOULDER ABSCESS  INCISION AND DRAINAGE performed by Cora Zuñiga MD at [facility] [address]      COLONOSCOPY      6-7 polyps    CYSTOSCOPY  04/06/2022    CYSTOSCOPY URETEROSCOPY HOLMIUM LASER LEFT URETERAL STENT INSERTION - Left    CYSTOSCOPY Left 04/06/2022    CYSTOSCOPY URETEROSCOPY HOLMIUM LASER LEFT URETERAL STENT INSERTION performed by Bindu Gutierrez MD at [address] [facility]  04/20/2022    CYSTOSCOPY URETEROSCOPY LEFT STENT PLACEMENT WITH STONE BASKETING AND HOLMIUM LASER (    CYSTOSCOPY Left 04/20/2022    CYSTOSCOPY URETEROSCOPY LEFT STENT PLACEMENT WITH STONE BASKETING AND HOLMIUM LASER performed by Bindu Gutierrez MD at [address] [place] [address]  2015    macular scrape    EYE SURGERY      LEG TENDON SURGERY  1962    ankle    LITHOTRIPSY      9-21-17    OTHER SURGICAL HISTORY      implanted nerve stimulater in lumbar back    SHOULDER ARTHROSCOPY Left 10/25/2022    LEFT SHOULDER ARTHROSCOPY WITH EXTENSIVE DEBRIDMENT AND PRE-OPERATIVE INTERSCALENE BLOCK WITH EXPAREL (Left: Shoulder)    SHOULDER ARTHROSCOPY Left 10/25/2022    LEFT SHOULDER ARTHROSCOPY WITH EXTENSIVE DEBRIDMENT AND PRE-OPERATIVE INTERSCALENE BLOCK WITH EXPAREL performed by Cora Zuñiga MD at [facility] 1 ARTHROSCOPY  01/17/2023    LEFT SHOULDER ABSCESS  INCISION AND DRAINAGE    SHOULDER ARTHROSCOPY Left 1/17/2023    LEFT SHOULDER ARTHROSCOPY performed by Cora Zuñiga MD at [facility] OR    SPINE SURGERY  2010    DR. Cornell Luque Buttram (NS) fusion and implantation of hardware    TONSILLECTOMY      URETEROSCOPY      with string stent 17    VASECTOMY         Medications:      vancomycin  1,250 mg IntraVENous Q12H    lidocaine 1 % injection  5 mL IntraDERmal Once    sodium chloride flush  5-40 mL IntraVENous 2 times per day    celecoxib  200 mg Oral Daily    lisinopril  10 mg Oral Daily    metFORMIN  1,000 mg Oral BID WC    multivitamin  1 tablet Oral Daily    insulin lispro  0-16 Units SubCUTAneous TID WC    insulin lispro  0-4 Units SubCUTAneous Nightly    vitamin D3  800 Units Oral Daily    DULoxetine  30 mg Oral BID    gabapentin  300 mg Oral BID    insulin lispro  2 Units SubCUTAneous TID AC    insulin glargine  63 Units SubCUTAneous Nightly    pioglitazone  30 mg Oral Daily    sodium chloride flush  5-40 mL IntraVENous 2 times per day    enoxaparin  40 mg SubCUTAneous Daily    acetaminophen  1,000 mg Oral Q6H    ceFAZolin  2,000 mg IntraVENous Q8H    vancomycin (VANCOCIN) intermittent dosing (placeholder)   Other RX Placeholder       Social History:     Social History     Socioeconomic History    Marital status:      Spouse name: Not on file    Number of children: Not on file    Years of education: Not on file    Highest education level: Not on file   Occupational History    Not on file   Tobacco Use    Smoking status: Former     Packs/day: 1.00     Years: 40.00     Pack years: 40.00     Types: Cigarettes     Start date: 3/3/1980     Quit date: 2022     Years since quittin.4    Smokeless tobacco: Former   Vaping Use    Vaping Use: Former    Quit date: 3/17/2019   Substance and Sexual Activity    Alcohol use: Not Currently     Comment: occasional    Drug use: No    Sexual activity: Not Currently   Other Topics Concern    Not on file   Social History Narrative    Not on file     Social Determinants of Health     Financial Resource Strain: Not on file   Food Insecurity: Not on file   Transportation Needs: Not on file   Physical Activity: Not on file   Stress: Not on file   Social Connections: Not on file   Intimate Partner Violence: Not on file   Housing Stability: Not on file       Family History:     Family History   Problem Relation Age of Onset    Arthritis Mother     Cancer Father         Allergies:   Iodine     Review of Systems:   Constitutional: No fevers or chills. No systemic complaints  Head: No headaches  Eyes: No double vision or blurry vision. No conjunctival inflammation. ENT: No sore throat or runny nose. . No hearing loss, tinnitus or vertigo. Cardiovascular: No chest pain or palpitations. No shortness of breath. No CASTLE  Lung: No shortness of breath or cough. No sputum production  Abdomen: No nausea, vomiting, diarrhea, or abdominal pain. Farida Stands No cramps. Genitourinary: No increased urinary frequency, or dysuria. No hematuria. No suprapubic or CVA pain  Musculoskeletal: Lt shoulder pain and limitation of motion  Hematologic: No bleeding or bruising. Neurologic: No headache, weakness, numbness, or tingling. Integument: No rash, no ulcers. Psychiatric: No depression. Endocrine: No polyuria, no polydipsia, no polyphagia. Physical Examination :   Patient Vitals for the past 8 hrs:   BP Temp Temp src Pulse Resp SpO2   01/21/23 0835 (!) 140/85 97.5 °F (36.4 °C) Oral 81 17 97 %   01/21/23 0557 -- -- -- -- 20 --   01/21/23 0521 137/83 97.8 °F (36.6 °C) Temporal 79 18 94 %       General Appearance: Awake, alert, and in no apparent distress  Head:  Normocephalic, no trauma  Eyes: Pupils equal, round, reactive to light and accommodation; extraocular movements intact; sclera anicteric; conjunctivae pink. No embolic phenomena. ENT: Oropharynx clear, without erythema, exudate, or thrush. No tenderness of sinuses. Mouth/throat: mucosa pink and moist. No lesions. Dentition in good repair. Neck:Supple, without lymphadenopathy. Thyroid normal, No bruits.   Pulmonary/Chest: Clear to auscultation, without wheezes, rales, or rhonchi. No dullness to percussion. Cardiovascular: Regular rate and rhythm without murmurs, rubs, or gallops. Abdomen: Soft, non tender. Bowel sounds normal. No organomegaly  All four Extremities: No cyanosis, clubbing, edema, or effusions. Lt shoulder S/P surgical I&D  Neurologic: No gross sensory or motor deficits. Skin: Warm and dry with good turgor. No signs of peripheral arterial or venous insufficiency. No ulcerations. No open wounds. Medical Decision Making -Laboratory:   I have independently reviewed/ordered the following labs:    CBC with Differential:   No results for input(s): WBC, HGB, HCT, PLT, SEGSPCT, BANDSPCT, LYMPHOPCT, MONOPCT, EOSPCT in the last 72 hours. BMP:   Recent Labs     01/19/23  0509 01/20/23  0922    143   K 3.6* 3.6*    104   CO2 27 27   BUN 17 12   CREATININE 0.69* 0.71       Hepatic Function Panel: No results for input(s): PROT, LABALBU, BILIDIR, IBILI, BILITOT, ALKPHOS, ALT, AST in the last 72 hours. No results for input(s): RPR in the last 72 hours. No results for input(s): HIV in the last 72 hours. No results for input(s): BC in the last 72 hours. Lab Results   Component Value Date/Time    MUCUS 1+ 12/28/2022 01:35 PM    RBC 3.31 01/18/2023 05:57 AM    WBC 13.4 01/18/2023 05:57 AM    TURBIDITY Cloudy 12/28/2022 01:35 PM     Lab Results   Component Value Date/Time    CREATININE 0.71 01/20/2023 09:22 AM    GLUCOSE 147 01/20/2023 09:22 AM       Medical Decision Making-Imaging:     EXAMINATION:   CT OF THE LEFT SHOULDER WITHOUT CONTRAST 1/10/2023 10:47 am       TECHNIQUE:   CT of the left shoulder was performed without the administration of   intravenous contrast.  Multiplanar reformatted images are provided for   review. Automated exposure control, iterative reconstruction, and/or weight   based adjustment of the mA/kV was utilized to reduce the radiation dose to as   low as reasonably achievable. COMPARISON:   CT left shoulder and left shoulder plain films from 08/22/2022       HISTORY   ORDERING SYSTEM PROVIDED HISTORY: Tear of left rotator cuff, unspecified tear   extent, unspecified whether traumatic   TECHNOLOGIST PROVIDED HISTORY:   Using matchpoint   Reason for Exam: Tear of left rotator cuff, unspecified tear extent,   unspecified whether traumatic, Osteoarthritis of left glenohumeral joint       68-year-old male with left-sided rotator cuff tear;? Osteoarthritis of the   glenohumeral joint       FINDINGS:   Bones: Visualized left-sided ribs appear intact. No acute fracture or dislocation. Soft Tissue: Enlarged left axillary lymph nodes. Moderate edema involving the rotator cuff musculature. Heterogeneous   attenuation and fluid density within the proximal triceps muscle with   associated calcifications or ossific densities as well as the trapezius   muscle on images 150-260, series 2. Mild dependent atelectasis and respiratory motion. Joint: Moderate glenohumeral joint effusion. Erosive changes and tiny   ossific densities involving the glenoid and medial humeral head/articular   surface of the humeral head. These findings are rapidly progressed/new when   compared with 08/22/2022. Tiny ossific densities are seen in the inferior glenohumeral joint/axillary   pouch. Mild degenerative changes of the left glenohumeral joint. Impression   1. Moderate edema of the rotator cuff musculature. Heterogeneous   attenuation, fluid density, and associated calcifications/ossific densities   involving the proximal triceps and trapezius muscles. Differential   considerations include underlying soft tissue mass/neoplasm/malignancy or   infection/pyomyositis. Given the rapid progression from the plain films and   CT of the left shoulder from 08/29/2022, findings are highly concerning for   infection/septic joint and osteomyelitis.    2. Erosive changes and associated ossific densities involving the   glenohumeral joint. Moderate glenohumeral joint effusion. Underlying septic   joint/infection versus inflammatory arthropathy are differential   considerations. 3. Mild degenerative change of the left AC joint. 4. No acute fracture or dislocation. 5. Enlarged left axillary lymph nodes. The findings were sent to the Radiology Results [address] at 12:26   pm on 1/10/2023 to be communicated to a licensed caregiver. EXAMINATION:   MRI OF THE LEFT SHOULDER WITHOUT CONTRAST   1/12/2023 3:49 pm       TECHNIQUE:   Multiplanar multisequence MRI of the left shoulder was performed without the   administration of intravenous contrast.       COMPARISON:   Left shoulder radiographs 08/29/2022. CT left shoulder 01/10/2023. HISTORY:   ORDERING SYSTEM PROVIDED HISTORY: Chronic left shoulder pain   TECHNOLOGIST PROVIDED HISTORY:   Reason for Exam: Chronic left shoulder pain. Pt C/O pain with a history of   arthritis. No RTC surgery. FINDINGS:   ROTATOR CUFF: Fatty atrophy and edema the supraspinatus muscle. There is   severe diffuse supraspinatus tendon thinning and probable full-thickness   tearing. The infraspinatus and teres minor tendons are intact. A large   high-grade partial-thickness articular surface tear of the subscapularis   tendon footprint and crescent is seen. No definite full-thickness component   or retraction. BICEPS TENDON: Suspected full-thickness tearing of the proximal long head   biceps tendon. LABRUM: Circumferential complex labral tearing. GLENOHUMERAL JOINT: Moderate glenohumeral effusion with extensive synovitis. Severe extensive articular surface erosions on both sides of the joint. AC JOINT AND ACROMIOCLAVICULAR ARCH: Type 1 acromion. No os acromiale. Mild   to moderate degenerative changes. No subacromial narrowing. Small volume of   fluid in the subacromial bursa.   The coracoacromial and coracoclavicular   ligaments are intact. BONE MARROW: Patchy marrow edema with significantly decreased T1 signal   throughout the humeral head and glenoid. No fracture or dislocation. No   suspicious marrow space-occupying lesion. OUTLET SPACES: The suprascapular and spinoglenoid notches are normal in   appearance. There is a large partially visualized heterogeneous fluid   collection inferior to the glenohumeral joint likely communicating with the   joint space measuring at least 8.4 x 5.8 x 5 cm lateral to the quadrilateral   space. This extends inferior to the field of view. Extensive subcutaneous   and intramuscular edema. Impression   1. Extensive severe articular surface erosions of the glenohumeral joint with   a moderate joint effusion with synovitis. Significant marrow edema in   throughout the humeral head extending into the humeral neck as well as the   glenoid. Findings likely represent septic arthritis and osteomyelitis. Inflammatory arthritis or neuropathic arthropathy are alternative   considerations. 2. Large partially visualized heterogeneous fluid collection inferior to and   likely communicating with the glenohumeral joint measuring at least 8.4 x 5.8   x 5 cm likely representing an abscess. Extensive subcutaneous and   intramuscular edema compatible with cellulitis and myositis. 3. Severe diffuse supraspinatus tendon thinning and probable full-thickness   tearing. 4. Large high-grade partial-thickness articular surface tear of the   subscapularis tendon. 5. Mild glenohumeral degenerative changes.        Medical Decision [id number]:       Medical Decision Making-Other:     Note:  Labs, medications, radiologic studies were reviewed with personal review of films  Large amounts of data were reviewed  Discussed with nursing Staff, Discharge planner  Infection Control and Prevention measures reviewed  All prior entries were reviewed  Administer medications as ordered  Prognosis: Fair  Discharge planning reviewed  Follow up as outpatient. Thank you for allowing us to participate in the care of this patient. Please call with questions.     Ryan Lee MD  Pager: [phone] - Office: [phone]

## 2023-01-21 NOTE — PROGRESS NOTES
[facility]up[facility] [address]  Occupational Therapy Not Seen Note    DATE: 2023    NAME: Марина Watkins  MRN: [id number]   : 1950      Patient not seen this date for Occupational Therapy due to:    Patient Declined: Pt declined participation in therapy services on 2023 reporting independent with functional mobility within hospital room and stating he is performing L shoulder pendulums and LUE exercises. Pt agreeable for therapy to check back on 2023 to demo L shoulder pendulums and LUE exercises however pt declined participation in therapy this date reporting significant headache.  Pt with multiple ice packs in place and states RN has provided pain medication; pt states needing to continue to rest.     Next Scheduled Treatment: Will check back 2023 as able     Electronically signed by Yari Pink OT on 2023 at 4:38 PM

## 2023-01-21 NOTE — PROGRESS NOTES
[facility]  Office: [address], DO, Ammon Youssef, DO, Crystal Piper, DO, Cornelius Wilhelm, DO, Lida Gómez MD, Sheng Abebe MD, Bette Morton MD, Ranjith Duque MD,  Nicole De Los Santos MD, Yves Loyd MD, Amaury Ruiz DO, Pascual Tyler MD,  Sol Campbell MD, Sofi Sidhu MD, Jorge Nunez DO, Yulisa Lal MD, Marian Cervantes MD, Demetrius Payne DO, Crow Moura MD, Mango Neville MD, Azam Daly MD, Judy Rao MD, Shayan Ortiz DO, Darryl Meneses MD, Jazzy Escobar MD, June Baeur, Oma Mendoza, CNP, Abdirashid Mason, CNP, Sofia Ba, CNP,  Earlene Bearden, [facility] ([place], [place]), Ivy Pennington, CNP, Wolf Hoyt, CNP, June Santos, [facility], Minor Gu, CNP, Gabrielle Perea, CNP, Adele Manning PA-C, Buzz West, CNS, Poly Wong, CNP, Laith Rivera 9656    Progress Note    1/21/2023    10:56 AM    Name:   Trina Brower  MRN:     [id number]     [place]:      [de-identified]   Room:   [address] [place] [address] Day:  4  Admit Date:  1/17/2023 11:32 AM    PCP:   BREANNA Pena NP  Code Status:  Full Code    Subjective:     C/C: Left shoulder pain     Interval History Status: not changed. Patient was seen and examined at bedside this AM. He reports feeling \"not good\" today and states he has a severe headache from not sleeping last night. Awaiting PICC line placement and home antibiotic arrangements. Brief History:     Patient is a 66-year-old male with a past medical history of DM2, HTN and HLD who presented to this facility on 1/17/2023 as an orthopedic surgery direct admission for left shoulder septic arthritis. The patient was taken to the OR for incision/drainage and left shoulder washout. The patient improved following the procedure. The patient remained on broad spectrum antibiotics throughout admission.  Infectious disease was consulted and recommended ceftriaxone 2 grams daily through 2/14. The patient is currently awaiting PICC line placement and home antibiotic arrangements. Review of Systems:     Constitutional:  negative for chills, fevers, sweats  Respiratory:  negative for cough, dyspnea on exertion, shortness of breath, wheezing  Cardiovascular:  negative for chest pain, chest pressure/discomfort, lower extremity edema, palpitations  Gastrointestinal:  negative for abdominal pain, constipation, diarrhea, nausea, vomiting  Neurological:  Positive for headache. Medications: Allergies:     Allergies   Allergen Reactions    Iodine Other (See Comments)     Possible reaction to arthrogram dye pt suspected- severe pain and swelling to shoulder-arm       Current Meds:   Scheduled Meds:    vancomycin  1,250 mg IntraVENous Q12H    lidocaine 1 % injection  5 mL IntraDERmal Once    sodium chloride flush  5-40 mL IntraVENous 2 times per day    celecoxib  200 mg Oral Daily    lisinopril  10 mg Oral Daily    metFORMIN  1,000 mg Oral BID WC    multivitamin  1 tablet Oral Daily    insulin lispro  0-16 Units SubCUTAneous TID WC    insulin lispro  0-4 Units SubCUTAneous Nightly    vitamin D3  800 Units Oral Daily    DULoxetine  30 mg Oral BID    gabapentin  300 mg Oral BID    insulin lispro  2 Units SubCUTAneous TID AC    insulin glargine  63 Units SubCUTAneous Nightly    pioglitazone  30 mg Oral Daily    sodium chloride flush  5-40 mL IntraVENous 2 times per day    enoxaparin  40 mg SubCUTAneous Daily    acetaminophen  1,000 mg Oral Q6H    ceFAZolin  2,000 mg IntraVENous Q8H    vancomycin (VANCOCIN) intermittent dosing (placeholder)   Other RX Placeholder     Continuous Infusions:    sodium chloride      sodium chloride Stopped (01/18/23 0652)    sodium chloride 75 mL/hr at 01/20/23 2133    dextrose       PRN Meds: potassium chloride **OR** potassium alternative oral replacement **OR** potassium chloride, magnesium sulfate, sodium chloride flush, sodium chloride, albuterol sulfate HFA, sodium chloride flush, sodium chloride, polyethylene glycol, oxyCODONE, morphine, ondansetron **OR** ondansetron, glucose, dextrose bolus **OR** dextrose bolus, glucagon (rDNA), dextrose    Data:     Past Medical History:   has a past medical history of Bladder stone, BPH with obstruction/lower urinary tract symptoms, Caffeine use, Cataracts, bilateral, Chronic back pain, COPD (chronic obstructive pulmonary disease) ([address] History of blood transfusion, Hypertension, Irritable bowel syndrome, Kidney stone, Neuropathy, Osteoarthritis, Pancreatitis, and Type II or unspecified type diabetes mellitus without mention of complication, not stated as uncontrolled. Social History:   reports that he quit smoking about 4 months ago. His smoking use included cigarettes. He started smoking about 42 years ago. He has a 40.00 pack-year smoking history. He has quit using smokeless tobacco. He reports that he does not currently use alcohol. He reports that he does not use drugs. Family History:   Family History   Problem Relation Age of Onset    Arthritis Mother     Cancer Father        Vitals:  BP (!) 140/85   Pulse 81   Temp 97.5 °F (36.4 °C) (Oral)   Resp 17   Ht 6' 2\" (1.88 m)   Wt 194 lb 10.7 oz (88.3 kg)   SpO2 99%   BMI 24.99 kg/m²   Temp (24hrs), Av.8 °F (36.6 °C), Min:97.5 °F (36.4 °C), Max:98.6 °F (37 °C)    Recent Labs     23  0812 23  1150 23  1644 23  2116   POCGLU 92 158* 111* 95       I/O (24Hr): No intake or output data in the 24 hours ending 23 1056    Labs:  Hematology:No results for input(s): WBC, RBC, HGB, HCT, MCV, MCH, MCHC, RDW, PLT, MPV, SEDRATE, CRP, INR, DDIMER, OR2ROZKO, LABABSO in the last 72 hours.     Invalid input(s): PT  Chemistry:  Recent Labs     23  0509 23  0922    143   K 3.6* 3.6*    104   CO2 27 27   GLUCOSE 124* 147*   BUN 17 12   CREATININE 0.69* 0.71   ANIONGAP 10 12   LABGLOM >60 >60   CALCIUM 8.4* 8.8     Recent Labs     01/19/23 2019 01/19/23  2334 01/20/23  0812 01/20/23  1150 01/20/23  1644 01/20/23  2116   POCGLU 142* 97 92 158* 111* 95     ABG:No results found for: POCPH, PHART, PH, POCPCO2, VAV6PEV, PCO2, POCPO2, PO2ART, PO2, POCHCO3, WCO6WXQ, HCO3, NBEA, PBEA, BEART, BE, THGBART, THB, KAU3OXJ, XAQO3OUK, H1BJQLES, O2SAT, FIO2  Lab Results   Component Value Date/Time    SPECIAL 20ML RIGHT HAND 04/22/2022 09:52 PM    SPECIAL 20ML RIGHT ARM 04/22/2022 09:52 PM     Lab Results   Component Value Date/Time    CULTURE (A) 01/17/2023 10:29 PM     STAPHYLOCOCCUS AUREUS MODERATE GROWTH This isolate is methicillin susceptible. CULTURE No anaerobic organisms isolated at 4 days. 01/17/2023 10:29 PM       Radiology:  No results found.     Physical Examination:     General appearance:  alert, cooperative and no distress  Mental Status:  oriented to person, place and time and normal affect  Lungs:  clear to auscultation bilaterally, normal effort  Heart:  regular rate and rhythm, no murmur  Abdomen:  soft, nontender, nondistended, normal bowel sounds, no masses, hepatomegaly, splenomegaly  Extremities: Left shoulder wrapped   Skin:  no gross lesions, rashes, induration    Assessment:     Hospital Problems             Last Modified POA    * (Principal) Septic arthritis ([address] 1/17/2023 Yes    Abscess of left shoulder 1/17/2023 Yes    MSSA (methicillin susceptible Staphylococcus aureus) infection 1/20/2023 Yes    Abscess 1/20/2023 Yes       Plan:     Septic arthritis of left shoulder   -S/P debridement, incision and drainage on 1/17 per orthopedic surgery   -Infectious disease following; recommend ceftriaxone 2 grams daily through 2/14   -Awaiting PICC line placement and home antibiotic arrangements     DM2   -HDSSI  -Hypoglycemia protocol   -Continue home Lantus     HTN   -Continue home lisinopril     Carolyn Coe MD  1/21/2023  10:56 AM

## 2023-01-21 NOTE — PROGRESS NOTES
Pt at this time does not want to take his lantus, he said he might want to take it later in the evening.

## 2023-01-21 NOTE — CARE COORDINATION
[address] [place] [address] to have PICC line inserted per order for extended home antibiotics.

## 2023-01-22 LAB
GLUCOSE BLD-MCNC: 123 MG/DL (ref 75–110)
GLUCOSE BLD-MCNC: 145 MG/DL (ref 75–110)
GLUCOSE BLD-MCNC: 147 MG/DL (ref 75–110)
GLUCOSE BLD-MCNC: 159 MG/DL (ref 75–110)
GLUCOSE BLD-MCNC: 187 MG/DL (ref 75–110)

## 2023-01-22 PROCEDURE — 1210000000 HC MED SURG R&B

## 2023-01-22 PROCEDURE — 2580000003 HC RX 258: Performed by: INTERNAL MEDICINE

## 2023-01-22 PROCEDURE — 6360000002 HC RX W HCPCS: Performed by: INTERNAL MEDICINE

## 2023-01-22 PROCEDURE — 6370000000 HC RX 637 (ALT 250 FOR IP): Performed by: ORTHOPAEDIC SURGERY

## 2023-01-22 PROCEDURE — 99231 SBSQ HOSP IP/OBS SF/LOW 25: CPT | Performed by: STUDENT IN AN ORGANIZED HEALTH CARE EDUCATION/TRAINING PROGRAM

## 2023-01-22 PROCEDURE — 99232 SBSQ HOSP IP/OBS MODERATE 35: CPT | Performed by: INTERNAL MEDICINE

## 2023-01-22 PROCEDURE — 6370000000 HC RX 637 (ALT 250 FOR IP): Performed by: HOSPITALIST

## 2023-01-22 PROCEDURE — 2580000003 HC RX 258: Performed by: ORTHOPAEDIC SURGERY

## 2023-01-22 PROCEDURE — 82947 ASSAY GLUCOSE BLOOD QUANT: CPT

## 2023-01-22 PROCEDURE — 6360000002 HC RX W HCPCS: Performed by: ORTHOPAEDIC SURGERY

## 2023-01-22 RX ADMIN — DULOXETINE 30 MG: 30 CAPSULE, DELAYED RELEASE ORAL at 20:24

## 2023-01-22 RX ADMIN — GABAPENTIN 300 MG: 300 CAPSULE ORAL at 08:30

## 2023-01-22 RX ADMIN — GABAPENTIN 300 MG: 300 CAPSULE ORAL at 20:24

## 2023-01-22 RX ADMIN — DULOXETINE 30 MG: 30 CAPSULE, DELAYED RELEASE ORAL at 08:30

## 2023-01-22 RX ADMIN — CEFAZOLIN 2000 MG: 2 INJECTION, POWDER, FOR SOLUTION INTRAMUSCULAR; INTRAVENOUS at 08:35

## 2023-01-22 RX ADMIN — OXYCODONE HYDROCHLORIDE 5 MG: 5 TABLET ORAL at 14:27

## 2023-01-22 RX ADMIN — SODIUM CHLORIDE, PRESERVATIVE FREE 10 ML: 5 INJECTION INTRAVENOUS at 20:24

## 2023-01-22 RX ADMIN — CHOLECALCIFEROL (VITAMIN D3) 10 MCG (400 UNIT) TABLET 800 UNITS: at 08:28

## 2023-01-22 RX ADMIN — ACETAMINOPHEN 1000 MG: 500 TABLET ORAL at 14:18

## 2023-01-22 RX ADMIN — INSULIN GLARGINE 63 UNITS: 100 INJECTION, SOLUTION SUBCUTANEOUS at 01:48

## 2023-01-22 RX ADMIN — METFORMIN HYDROCHLORIDE 1000 MG: 500 TABLET ORAL at 08:29

## 2023-01-22 RX ADMIN — THERA TABS 1 TABLET: TAB at 08:30

## 2023-01-22 RX ADMIN — ENOXAPARIN SODIUM 40 MG: 100 INJECTION SUBCUTANEOUS at 08:30

## 2023-01-22 RX ADMIN — PIOGLITAZONE 30 MG: 15 TABLET ORAL at 08:29

## 2023-01-22 RX ADMIN — OXYCODONE HYDROCHLORIDE 5 MG: 5 TABLET ORAL at 08:29

## 2023-01-22 RX ADMIN — METFORMIN HYDROCHLORIDE 1000 MG: 500 TABLET ORAL at 17:15

## 2023-01-22 RX ADMIN — SODIUM CHLORIDE, PRESERVATIVE FREE 10 ML: 5 INJECTION INTRAVENOUS at 08:31

## 2023-01-22 RX ADMIN — OXYCODONE HYDROCHLORIDE 5 MG: 5 TABLET ORAL at 01:36

## 2023-01-22 RX ADMIN — OXYCODONE HYDROCHLORIDE 5 MG: 5 TABLET ORAL at 20:23

## 2023-01-22 RX ADMIN — CEFAZOLIN 2000 MG: 2 INJECTION, POWDER, FOR SOLUTION INTRAMUSCULAR; INTRAVENOUS at 17:18

## 2023-01-22 RX ADMIN — ACETAMINOPHEN 1000 MG: 500 TABLET ORAL at 06:26

## 2023-01-22 RX ADMIN — CELECOXIB 200 MG: 100 CAPSULE ORAL at 10:48

## 2023-01-22 RX ADMIN — CEFAZOLIN 2000 MG: 2 INJECTION, POWDER, FOR SOLUTION INTRAMUSCULAR; INTRAVENOUS at 01:41

## 2023-01-22 RX ADMIN — ACETAMINOPHEN 1000 MG: 500 TABLET ORAL at 20:23

## 2023-01-22 RX ADMIN — LISINOPRIL 10 MG: 10 TABLET ORAL at 08:30

## 2023-01-22 RX ADMIN — ACETAMINOPHEN 1000 MG: 500 TABLET ORAL at 01:36

## 2023-01-22 ASSESSMENT — PAIN DESCRIPTION - ORIENTATION
ORIENTATION: LEFT;UPPER
ORIENTATION: MID;LEFT
ORIENTATION: LEFT
ORIENTATION: LEFT

## 2023-01-22 ASSESSMENT — PAIN SCALES - GENERAL
PAINLEVEL_OUTOF10: 4
PAINLEVEL_OUTOF10: 6
PAINLEVEL_OUTOF10: 6
PAINLEVEL_OUTOF10: 5
PAINLEVEL_OUTOF10: 0
PAINLEVEL_OUTOF10: 5
PAINLEVEL_OUTOF10: 4

## 2023-01-22 ASSESSMENT — PAIN DESCRIPTION - FREQUENCY: FREQUENCY: INTERMITTENT

## 2023-01-22 ASSESSMENT — PAIN DESCRIPTION - PAIN TYPE: TYPE: SURGICAL PAIN

## 2023-01-22 ASSESSMENT — PAIN DESCRIPTION - DESCRIPTORS
DESCRIPTORS: DISCOMFORT
DESCRIPTORS: THROBBING

## 2023-01-22 ASSESSMENT — PAIN DESCRIPTION - LOCATION
LOCATION: SHOULDER
LOCATION: SHOULDER;ARM
LOCATION: HEAD;NECK;SHOULDER
LOCATION: HEAD;NECK;SHOULDER

## 2023-01-22 ASSESSMENT — PAIN DESCRIPTION - ONSET: ONSET: GRADUAL

## 2023-01-22 ASSESSMENT — PAIN - FUNCTIONAL ASSESSMENT: PAIN_FUNCTIONAL_ASSESSMENT: PREVENTS OR INTERFERES SOME ACTIVE ACTIVITIES AND ADLS

## 2023-01-22 NOTE — PROGRESS NOTES
[facility]  Office: [address], DO, Ariana Din, DO, Sony Acron, DO, Radha Potts Blood, DO, Tam Dwyer MD, Talha Kaur MD, Nicole Gunter MD, Jaki Griffith MD,  Jovon Asher MD, Eliceo Rodriguez MD, Max Mckeon, DO, Ingrid Rashid MD,  Becca Srivastava MD, John Rojas MD, Alana Sweeney, DO, Gail Chiu MD, Bharati Brar MD, Pramod Walter DO, iAdee Caban MD, Ruth Mejía MD, Carla Hubbard MD, Tay Alford MD, Delano Maynard DO, Demetrio De La Rosa MD, Retia Bosworth, MD, Alexandria Herron, CNP,  Adelia Alpers, CNP, Rema Victoria, CNP, Louise Andre, CNP,  Lili Bill, [facility], Anders Garduno, CNP, Kaylee Villafuerte, CNP, Silva Patricio, CNP, Anh Denis, CNP, Yocasta Candelario, CNP, Miguel Rai PA-C, Carmen Carney, CNS, Sravanthi Michaels, CNP, Laith Stephenson [address]    Progress Note    1/22/2023    7:57 AM    Name:   Zonia Quezada  MRN:     [id number]     [place]:      [de-identified]   Room:   [address] [place] [address] Day:  5  Admit Date:  1/17/2023 11:32 AM    PCP:   BREANNA Howard NP  Code Status:  Full Code    Subjective:     C/C: Left shoulder pain     Interval History Status: not changed. Patient was seen and examined at bedside this AM. He continues to complain of headache and left shoulder pain but reports feeling well otherwise. PICC line was placed yesterday. Awaiting home antibiotic arrangements. Brief History:     Patient is a 80-year-old male with a past medical history of DM2, HTN and HLD who presented to this facility on 1/17/2023 as an orthopedic surgery direct admission for left shoulder septic arthritis. The patient was taken to the OR for incision/drainage and left shoulder washout. The patient improved following the procedure. The patient remained on broad spectrum antibiotics throughout admission.  Infectious disease was consulted and recommended ceftriaxone 2 grams daily through 2/14. The patient is currently awaiting PICC line placement and home antibiotic arrangements. Review of Systems:     Constitutional:  negative for chills, fevers, sweats  Respiratory:  negative for cough, dyspnea on exertion, shortness of breath, wheezing  Cardiovascular:  negative for chest pain, chest pressure/discomfort, lower extremity edema, palpitations  Gastrointestinal:  negative for abdominal pain, constipation, diarrhea, nausea, vomiting  Neurological:  Positive for headache. Medications: Allergies:     Allergies   Allergen Reactions    Iodine Other (See Comments)     Possible reaction to arthrogram dye pt suspected- severe pain and swelling to shoulder-arm       Current Meds:   Scheduled Meds:    lidocaine 1 % injection  5 mL IntraDERmal Once    sodium chloride flush  5-40 mL IntraVENous 2 times per day    celecoxib  200 mg Oral Daily    lisinopril  10 mg Oral Daily    metFORMIN  1,000 mg Oral BID WC    multivitamin  1 tablet Oral Daily    insulin lispro  0-16 Units SubCUTAneous TID WC    insulin lispro  0-4 Units SubCUTAneous Nightly    vitamin D3  800 Units Oral Daily    DULoxetine  30 mg Oral BID    gabapentin  300 mg Oral BID    insulin lispro  2 Units SubCUTAneous TID AC    insulin glargine  63 Units SubCUTAneous Nightly    pioglitazone  30 mg Oral Daily    sodium chloride flush  5-40 mL IntraVENous 2 times per day    enoxaparin  40 mg SubCUTAneous Daily    acetaminophen  1,000 mg Oral Q6H    ceFAZolin  2,000 mg IntraVENous Q8H     Continuous Infusions:    sodium chloride      sodium chloride Stopped (01/18/23 0652)    dextrose       PRN Meds: potassium chloride **OR** potassium alternative oral replacement **OR** potassium chloride, magnesium sulfate, sodium chloride flush, sodium chloride, albuterol sulfate HFA, sodium chloride flush, sodium chloride, polyethylene glycol, oxyCODONE, morphine, ondansetron **OR** ondansetron, glucose, dextrose bolus **OR** dextrose bolus, glucagon (rDNA), dextrose    Data:     Past Medical History:   has a past medical history of Bladder stone, BPH with obstruction/lower urinary tract symptoms, Caffeine use, Cataracts, bilateral, Chronic back pain, COPD (chronic obstructive pulmonary disease) ([address] History of blood transfusion, Hypertension, Irritable bowel syndrome, Kidney stone, Neuropathy, Osteoarthritis, Pancreatitis, and Type II or unspecified type diabetes mellitus without mention of complication, not stated as uncontrolled. Social History:   reports that he quit smoking about 4 months ago. His smoking use included cigarettes. He started smoking about 42 years ago. He has a 40.00 pack-year smoking history. He has quit using smokeless tobacco. He reports that he does not currently use alcohol. He reports that he does not use drugs. Family History:   Family History   Problem Relation Age of Onset    Arthritis Mother     Cancer Father        Vitals:  BP (!) 158/83   Pulse 81   Temp 97.7 °F (36.5 °C) (Oral)   Resp 16   Ht 6' 2\" (1.88 m)   Wt 199 lb 15.3 oz (90.7 kg)   SpO2 97%   BMI 25.67 kg/m²   Temp (24hrs), Av.8 °F (36.6 °C), Min:97 °F (36.1 °C), Max:98.4 °F (36.9 °C)    Recent Labs     23  1707 23  2120 23  0143 23  0744   POCGLU 129* 98 187* 147*       I/O (24Hr): Intake/Output Summary (Last 24 hours) at 2023 0757  Last data filed at 2023 0211  Gross per 24 hour   Intake 50 ml   Output 25 ml   Net 25 ml       Labs:  Hematology:No results for input(s): WBC, RBC, HGB, HCT, MCV, MCH, MCHC, RDW, PLT, MPV, SEDRATE, CRP, INR, DDIMER, RR2OMYFV, LABABSO in the last 72 hours.     Invalid input(s): PT  Chemistry:  Recent Labs     23  0922      K 3.6*      CO2 27   GLUCOSE 147*   BUN 12   CREATININE 0.71   ANIONGAP 12   LABGLOM >60   CALCIUM 8.8     Recent Labs     01/6 23  1220 23  1707 23  2120 23  0143 23  0744   POCGLU 95 173* 129* 98 187* 147*     ABG:No results found for: POCPH, PHART, PH, POCPCO2, PWT4MHA, PCO2, POCPO2, PO2ART, PO2, POCHCO3, ILY3LAB, HCO3, NBEA, PBEA, BEART, BE, THGBART, THB, HPF4CNB, JIHQ0BTF, F5XXYULD, O2SAT, FIO2  Lab Results   Component Value Date/Time    SPECIAL 20ML RIGHT HAND 04/22/2022 09:52 PM    SPECIAL 20ML RIGHT ARM 04/22/2022 09:52 PM     Lab Results   Component Value Date/Time    CULTURE (A) 01/17/2023 10:29 PM     STAPHYLOCOCCUS AUREUS MODERATE GROWTH This isolate is methicillin susceptible. CULTURE No anaerobic organisms isolated at 4 days. 01/17/2023 10:29 PM       Radiology:  No results found.     Physical Examination:     General appearance:  alert, cooperative and no distress  Mental Status:  oriented to person, place and time and normal affect  Lungs:  clear to auscultation bilaterally, normal effort  Heart:  regular rate and rhythm, no murmur  Abdomen:  soft, nontender, nondistended, normal bowel sounds, no masses, hepatomegaly, splenomegaly  Extremities: Left shoulder wrapped   Skin:  no gross lesions, rashes, induration    Assessment:     Hospital Problems             Last Modified POA    * (Principal) Septic arthritis ([address] 1/17/2023 Yes    Abscess of left shoulder 1/17/2023 Yes    MSSA (methicillin susceptible Staphylococcus aureus) infection 1/20/2023 Yes    Abscess 1/20/2023 Yes    Essential hypertension (Chronic) 1/21/2023 Yes    Type 2 diabetes mellitus with diabetic polyneuropathy, with long-term current use of insulin ([address] 1/21/2023 Yes       Plan:     Septic arthritis of left shoulder   -S/P debridement, incision and drainage on 1/17 per orthopedic surgery   -Infectious disease following; recommend ceftriaxone 2 grams daily through 2/14   -PICC line was placed yesterday   -Awaiting home antibiotic arrangements     DM2   -HDSSI  -Hypoglycemia protocol   -Continue home Lantus     HTN   -Continue home lisinopril     Sofi Sidhu MD  1/22/2023  7:57 AM

## 2023-01-22 NOTE — PLAN OF CARE
Problem: Safety - Adult  Goal: Free from fall injury  Outcome: Progressing     Problem: Pain  Goal: Verbalizes/displays adequate comfort level or baseline comfort level  Outcome: Progressing     Problem: Discharge Planning  Goal: Discharge to home or other facility with appropriate resources  Outcome: Progressing     Problem: Skin/Tissue Integrity - Adult  Goal: Skin integrity remains intact  Outcome: Progressing     Problem: Skin/Tissue Integrity - Adult  Goal: Incisions, wounds, or drain sites healing without S/S of infection  Outcome: Progressing     Problem: Skin/Tissue Integrity - Adult  Goal: Oral mucous membranes remain intact  Outcome: Progressing     Problem: Musculoskeletal - Adult  Goal: Return mobility to safest level of function  Outcome: Progressing     Problem: ABCDS Injury Assessment  Goal: Absence of physical injury  Outcome: Progressing

## 2023-01-22 NOTE — PROGRESS NOTES
No events O/N. Having some headaches. Still with shoulder pain, but appropriately controlled. Blood pressure (!) 149/82, pulse 85, temperature 98.1 °F (36.7 °C), temperature source Oral, resp. rate 16, height 6' 2\" (1.88 m), weight 199 lb 15.3 oz (90.7 kg), SpO2 95 %. Left shoulder dressing clean, dry, and intact. Drain in place and functioning. Sensation is grossly intact to light touch diffusely. +gallo flexion, extension, abduction and adduction of all fingers. Impression and plan: 68 yo sp I and D of left shoulder abscess and septic joint POD 5  - Pain control  - Cultures growing MSSA  - Appreciate ID consult who've recommended 4 weeks of IV antibiotics. I would prefer a full 6 week course as the patient will require a shoulder replacement after being off all antibiotics for 6 weeks afterward. PICC line in place  - Dressing changed and drain pulled today after putting out ~10 cc in past 12 hours.  Change dressings daily moving forward  - Dispo: home with home health and 6 weeks of IV antibiotics to end on 2/28/2023 (per ID service, can be discharged on Ceftriaxone 2 G IV q 24 hours)

## 2023-01-22 NOTE — PLAN OF CARE
Problem: Safety - Adult  Goal: Free from fall injury  1/22/2023 0045 by Matthew Maynard RN  Outcome: Progressing   No falls/injuries this shift, bed in lowest position, breaks on, bed alarm on, call light within reach, side rails up X2     Problem: Pain  Goal: Verbalizes/displays adequate comfort level or baseline comfort level  1/22/2023 0045 by Matthew Maynard RN  Outcome: Progressing   No new signs/symptoms of pain noted, pain rating < 3 on scale 0-10, pain controlled with medication/ repositioning     Problem: Discharge Planning  Goal: Discharge to home or other facility with appropriate resources  1/22/2023 0045 by Matthew Maynard RN  Outcome: Progressing   Patient actively participates in ADLs and decision making regarding plan of care     Problem: Skin/Tissue Integrity - Adult  Goal: Skin integrity remains intact  1/22/2023 0045 by Matthew Maynard RN  Outcome: Progressing   No new skin breakdown noted, no new signs/symptoms of infection, continue to monitor lab work including WBC, and medications administered per physicians orders     Problem: Infection - Adult  Goal: Absence of infection during hospitalization  1/22/2023 0045 by Matthew Maynard RN  Outcome: Progressing     Problem: ABCDS Injury Assessment  Goal: Absence of physical injury  1/22/2023 0045 by Matthew Maynard RN  Outcome: Progressing   Fall assessment preformed. Bed in low locked position with call light and tray table within reach. Education given.

## 2023-01-22 NOTE — PROGRESS NOTES
[facility] - Progress Note    - Telemedicine  Today's Date and Time: 1/22/2023, 7:18 AM    Impression :   Lt shoulder teres minor abscess with  MSSA on 1-17-23  Erosive degeneration of the humeral head and glenoid   S/P surgical I&D on 1-17-23    Recommendations:   Cefazolin 2 gm IV q 8 hr. Stop date 2-14-23  When ready for outpatient treatment he can be switched to Ceftriaxone 2 gm IV q 24 hr. This medication could be infused through a midline catheter. Office f/up in 1 week with Dr Darrel Vergara for infection. Please call [phone] for appointment at [facility]. Medical Decision Making/Summary/Discussion:1/22/2023       Infection Control Recommendations   [place] Precautions    Antimicrobial Stewardship Recommendations     Simplification of therapy  Targeted therapy    Coordination of Outpatient Care:   Estimated Length of IV antimicrobials: 2-14-23  Patient will need Midline Catheter Insertion: Yes  Patient will need PICC line Insertion:no  Patient will need: Home IV , [place],  SNF,  LTAC: TBD  Patient will need outpatient wound care:yes    Chief complaint/reason for consultation:   Lt shoulder septic arthritis, abscess      History of Present Illness:   Luis A West is a 67y.o.-year-old  male who was initially admitted on 1/17/2023. Patient seen at the request of Dave Presley. INITIAL HISTORY:    Patient known to our service from prior evaluation by Dr Darrel Vergara. 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 The patient reports prior remote fall for which he was treated conservatively. Subsequently diagnosed with rotator cuff tear in the left shoulder . On 8-22-22 he had a left shoulder injection with dilute contrast pre-CT without incident.  He experienced some pain after the arthrogram.     He then underwent a left shoulder arthroscopy on 10/25/2022. He was found to have severe degeneration involving the glenoid surface with diffuse grade 4 chronic malacia/glenohumeral joint osteoarthritis and a left shoulder massive irreparable full-thickness rotator cuff tear with diffuse synovitis for which he underwent extensive arthroscopic debridement and left shoulder manipulation     He was advised to undergo reverse shoulder arthroplasty. The patient had a left shoulder CT scan done 1/10/2023 and there was a significant change in his CT scan compared to 8/22/2022 which raised concern for the presence of septic joint/possibly osteomyelitis. The planned reverse total shoulder replacement was put on hold. An aspiration of the left shoulder join was attempted but no fluid was obtained. The patient was then admitted on 1-17-23 for an I&D of the presumptive Lt septic shoulder joint. The findings included an abscess inferior to the teres minor and erosive degeneration of the humeral head and glenoid. Culture yielded MSSA. The patient has received Cefazolin. Will continue same and then switch to ceftriaxone when ready for outpatient treatment. CURRENT EVALUATION : 1/22/2023    Afebrile  VS stable but BP in hypertensive range    Patient stable  No complaints  No new issues per RN    Medications reviewed  On Cefazolin  Cefazolin can be switched to ceftriaxone when ready for outpatient treatment. Ceftriaxone can be infused through a midline catheter or a PICC    If arrangements can be completed today for outpatient Ceftriaxone treatment, will request follow up with Dr Sergo Paige in 1 week at the [facility] clinic. Labs, X rays reviewed: 1/22/2023    BUN: 12  Cr: 0.71    WBC: 13.4  Hb: 8.8  Plat: 327    Cultures:  Urine:    Blood:    Sputum :    Wound:  1-17-23: Lt shoulder joint MSSA    Discussed with BOB PAUL.     I have personally reviewed the past medical history, past surgical history, medications, social history, and family history, and I have updated the database accordingly. Past Medical History:     Past Medical History:   Diagnosis Date    Bladder stone     BPH with obstruction/lower urinary tract symptoms     Caffeine use     4 coffee/2 soda per day    Cataracts, bilateral     Chronic back pain     COPD (chronic obstructive pulmonary disease) ([facility] [address]     History of blood transfusion 2010    s/p 10 hour back surgery, pt unsure but thinks he had transfusion?     Hypertension     Irritable bowel syndrome     Kidney stone     Neuropathy     feet    Osteoarthritis     Pancreatitis 1988    Type II or unspecified type diabetes mellitus without mention of complication, not stated as uncontrolled        Past Surgical  History:     Past Surgical History:   Procedure Laterality Date    ARM SURGERY Left 1/17/2023    LEFT SHOULDER ABSCESS  INCISION AND DRAINAGE performed by Renan Field MD at [facility] OR    BACK SURGERY      BLADDER SURGERY      CHOLECYSTECTOMY      COLONOSCOPY      6-7 polyps    CYSTOSCOPY  04/06/2022    CYSTOSCOPY URETEROSCOPY HOLMIUM LASER LEFT URETERAL STENT INSERTION - Left    CYSTOSCOPY Left 04/06/2022    CYSTOSCOPY URETEROSCOPY HOLMIUM LASER LEFT URETERAL STENT INSERTION performed by Darryl Jaquez MD at [address] [place] [address]  04/20/2022    CYSTOSCOPY URETEROSCOPY LEFT STENT PLACEMENT WITH STONE BASKETING AND HOLMIUM LASER (    CYSTOSCOPY Left 04/20/2022    CYSTOSCOPY URETEROSCOPY LEFT STENT PLACEMENT WITH STONE BASKETING AND HOLMIUM LASER performed by Darryl Jaquez MD at [address] [place] [address]  2015    macular scrape    EYE SURGERY      LEG TENDON SURGERY  1962    ankle    LITHOTRIPSY      9-21-17    OTHER SURGICAL HISTORY      implanted nerve stimulater in lumbar back    SHOULDER ARTHROSCOPY Left 10/25/2022    LEFT SHOULDER ARTHROSCOPY WITH EXTENSIVE DEBRIDMENT AND PRE-OPERATIVE INTERSCALENE BLOCK WITH EXPAREL (Left: Shoulder)    SHOULDER ARTHROSCOPY Left 10/25/2022    LEFT SHOULDER ARTHROSCOPY WITH EXTENSIVE DEBRIDMENT AND PRE-OPERATIVE INTERSCALENE BLOCK WITH EXPAREL performed by Kamran Krishnamurthy MD at [facility] ARTHROSCOPY  2023    LEFT SHOULDER ABSCESS  INCISION AND DRAINAGE    SHOULDER ARTHROSCOPY Left 2023    LEFT SHOULDER ARTHROSCOPY performed by Kamran Krishnamurthy MD at [facility] [address]  2010    DR. Priyanka Souza Buttram (NS) fusion and implantation of hardware    TONSILLECTOMY      URETEROSCOPY      with string stent 17    VASECTOMY         Medications:      lidocaine 1 % injection  5 mL IntraDERmal Once    sodium chloride flush  5-40 mL IntraVENous 2 times per day    celecoxib  200 mg Oral Daily    lisinopril  10 mg Oral Daily    metFORMIN  1,000 mg Oral BID WC    multivitamin  1 tablet Oral Daily    insulin lispro  0-16 Units SubCUTAneous TID WC    insulin lispro  0-4 Units SubCUTAneous Nightly    vitamin D3  800 Units Oral Daily    DULoxetine  30 mg Oral BID    gabapentin  300 mg Oral BID    insulin lispro  2 Units SubCUTAneous TID AC    insulin glargine  63 Units SubCUTAneous Nightly    pioglitazone  30 mg Oral Daily    sodium chloride flush  5-40 mL IntraVENous 2 times per day    enoxaparin  40 mg SubCUTAneous Daily    acetaminophen  1,000 mg Oral Q6H    ceFAZolin  2,000 mg IntraVENous Q8H       Social History:     Social History     Socioeconomic History    Marital status:      Spouse name: Not on file    Number of children: Not on file    Years of education: Not on file    Highest education level: Not on file   Occupational History    Not on file   Tobacco Use    Smoking status: Former     Packs/day: 1.00     Years: 40.00     Pack years: 40.00     Types: Cigarettes     Start date: 3/3/1980     Quit date: 2022     Years since quittin.4    Smokeless tobacco: Former   Vaping Use    Vaping Use: Former    Quit date: 3/17/2019 Substance and Sexual Activity    Alcohol use: Not Currently     Comment: occasional    Drug use: No    Sexual activity: Not Currently   Other Topics Concern    Not on file   Social History Narrative    Not on file     Social Determinants of Health     Financial Resource Strain: Not on file   Food Insecurity: Not on file   Transportation Needs: Not on file   Physical Activity: Not on file   Stress: Not on file   Social Connections: Not on file   Intimate Partner Violence: Not on file   Housing Stability: Not on file       Family History:     Family History   Problem Relation Age of Onset    Arthritis Mother     Cancer Father         Allergies:   Iodine     Review of Systems:   Constitutional: No fevers or chills. No systemic complaints  Head: No headaches  Eyes: No double vision or blurry vision. No conjunctival inflammation. ENT: No sore throat or runny nose. . No hearing loss, tinnitus or vertigo. Cardiovascular: No chest pain or palpitations. No shortness of breath. No CASTLE  Lung: No shortness of breath or cough. No sputum production  Abdomen: No nausea, vomiting, diarrhea, or abdominal pain. Jamal Power No cramps. Genitourinary: No increased urinary frequency, or dysuria. No hematuria. No suprapubic or CVA pain  Musculoskeletal: Lt shoulder pain and limitation of motion  Hematologic: No bleeding or bruising. Neurologic: No headache, weakness, numbness, or tingling. Integument: No rash, no ulcers. Psychiatric: No depression. Endocrine: No polyuria, no polydipsia, no polyphagia. Physical Examination :   Patient Vitals for the past 8 hrs:   BP Temp Temp src Pulse Resp SpO2   01/21/23 2328 (!) 144/83 98.4 °F (36.9 °C) Oral 87 15 96 %       General Appearance: Awake, alert, and in no apparent distress  Head:  Normocephalic, no trauma  Eyes: Pupils equal, round, reactive to light and accommodation; extraocular movements intact; sclera anicteric; conjunctivae pink. No embolic phenomena.   ENT: Oropharynx clear, without erythema, exudate, or thrush. No tenderness of sinuses. Mouth/throat: mucosa pink and moist. No lesions. Dentition in good repair. Neck:Supple, without lymphadenopathy. Thyroid normal, No bruits. Pulmonary/Chest: Clear to auscultation, without wheezes, rales, or rhonchi. No dullness to percussion. Cardiovascular: Regular rate and rhythm without murmurs, rubs, or gallops. Abdomen: Soft, non tender. Bowel sounds normal. No organomegaly  All four Extremities: No cyanosis, clubbing, edema, or effusions. Lt shoulder S/P surgical I&D  Neurologic: No gross sensory or motor deficits. Skin: Warm and dry with good turgor. No signs of peripheral arterial or venous insufficiency. No ulcerations. No open wounds. Medical Decision Making -Laboratory:   I have independently reviewed/ordered the following labs:    CBC with Differential:   No results for input(s): WBC, HGB, HCT, PLT, SEGSPCT, BANDSPCT, LYMPHOPCT, MONOPCT, EOSPCT in the last 72 hours. BMP:   Recent Labs     01/20/23  0922      K 3.6*      CO2 27   BUN 12   CREATININE 0.71       Hepatic Function Panel: No results for input(s): PROT, LABALBU, BILIDIR, IBILI, BILITOT, ALKPHOS, ALT, AST in the last 72 hours. No results for input(s): RPR in the last 72 hours. No results for input(s): HIV in the last 72 hours. No results for input(s): BC in the last 72 hours. Lab Results   Component Value Date/Time    MUCUS 1+ 12/28/2022 01:35 PM    RBC 3.31 01/18/2023 05:57 AM    WBC 13.4 01/18/2023 05:57 AM    TURBIDITY Cloudy 12/28/2022 01:35 PM     Lab Results   Component Value Date/Time    CREATININE 0.71 01/20/2023 09:22 AM    GLUCOSE 147 01/20/2023 09:22 AM       Medical Decision Making-Imaging:     EXAMINATION:   CT OF THE LEFT SHOULDER WITHOUT CONTRAST 1/10/2023 10:47 am       TECHNIQUE:   CT of the left shoulder was performed without the administration of   intravenous contrast.  Multiplanar reformatted images are provided for   review.  Automated exposure control, iterative reconstruction, and/or weight   based adjustment of the mA/kV was utilized to reduce the radiation dose to as   low as reasonably achievable. COMPARISON:   CT left shoulder and left shoulder plain films from 08/22/2022       HISTORY   ORDERING SYSTEM PROVIDED HISTORY: Tear of left rotator cuff, unspecified tear   extent, unspecified whether traumatic   TECHNOLOGIST PROVIDED HISTORY:   Using matchpoint   Reason for Exam: Tear of left rotator cuff, unspecified tear extent,   unspecified whether traumatic, Osteoarthritis of left glenohumeral joint       35-year-old male with left-sided rotator cuff tear;? Osteoarthritis of the   glenohumeral joint       FINDINGS:   Bones: Visualized left-sided ribs appear intact. No acute fracture or dislocation. Soft Tissue: Enlarged left axillary lymph nodes. Moderate edema involving the rotator cuff musculature. Heterogeneous   attenuation and fluid density within the proximal triceps muscle with   associated calcifications or ossific densities as well as the trapezius   muscle on images 150-260, series 2. Mild dependent atelectasis and respiratory motion. Joint: Moderate glenohumeral joint effusion. Erosive changes and tiny   ossific densities involving the glenoid and medial humeral head/articular   surface of the humeral head. These findings are rapidly progressed/new when   compared with 08/22/2022. Tiny ossific densities are seen in the inferior glenohumeral joint/axillary   pouch. Mild degenerative changes of the left glenohumeral joint. Impression   1. Moderate edema of the rotator cuff musculature. Heterogeneous   attenuation, fluid density, and associated calcifications/ossific densities   involving the proximal triceps and trapezius muscles. Differential   considerations include underlying soft tissue mass/neoplasm/malignancy or   infection/pyomyositis.   Given the rapid progression from the plain films and   CT of the left shoulder from 08/29/2022, findings are highly concerning for   infection/septic joint and osteomyelitis. 2. Erosive changes and associated ossific densities involving the   glenohumeral joint. Moderate glenohumeral joint effusion. Underlying septic   joint/infection versus inflammatory arthropathy are differential   considerations. 3. Mild degenerative change of the left AC joint. 4. No acute fracture or dislocation. 5. Enlarged left axillary lymph nodes. The findings were sent to the Radiology Results [address] at 12:26   pm on 1/10/2023 to be communicated to a licensed caregiver. EXAMINATION:   MRI OF THE LEFT SHOULDER WITHOUT CONTRAST   1/12/2023 3:49 pm       TECHNIQUE:   Multiplanar multisequence MRI of the left shoulder was performed without the   administration of intravenous contrast.       COMPARISON:   Left shoulder radiographs 08/29/2022. CT left shoulder 01/10/2023. HISTORY:   ORDERING SYSTEM PROVIDED HISTORY: Chronic left shoulder pain   TECHNOLOGIST PROVIDED HISTORY:   Reason for Exam: Chronic left shoulder pain. Pt C/O pain with a history of   arthritis. No RTC surgery. FINDINGS:   ROTATOR CUFF: Fatty atrophy and edema the supraspinatus muscle. There is   severe diffuse supraspinatus tendon thinning and probable full-thickness   tearing. The infraspinatus and teres minor tendons are intact. A large   high-grade partial-thickness articular surface tear of the subscapularis   tendon footprint and crescent is seen. No definite full-thickness component   or retraction. BICEPS TENDON: Suspected full-thickness tearing of the proximal long head   biceps tendon. LABRUM: Circumferential complex labral tearing. GLENOHUMERAL JOINT: Moderate glenohumeral effusion with extensive synovitis. Severe extensive articular surface erosions on both sides of the joint.        AC JOINT AND ACROMIOCLAVICULAR ARCH: Type 1 acromion. No os acromiale. Mild   to moderate degenerative changes. No subacromial narrowing. Small volume of   fluid in the subacromial bursa. The coracoacromial and coracoclavicular   ligaments are intact. BONE MARROW: Patchy marrow edema with significantly decreased T1 signal   throughout the humeral head and glenoid. No fracture or dislocation. No   suspicious marrow space-occupying lesion. OUTLET SPACES: The suprascapular and spinoglenoid notches are normal in   appearance. There is a large partially visualized heterogeneous fluid   collection inferior to the glenohumeral joint likely communicating with the   joint space measuring at least 8.4 x 5.8 x 5 cm lateral to the quadrilateral   space. This extends inferior to the field of view. Extensive subcutaneous   and intramuscular edema. Impression   1. Extensive severe articular surface erosions of the glenohumeral joint with   a moderate joint effusion with synovitis. Significant marrow edema in   throughout the humeral head extending into the humeral neck as well as the   glenoid. Findings likely represent septic arthritis and osteomyelitis. Inflammatory arthritis or neuropathic arthropathy are alternative   considerations. 2. Large partially visualized heterogeneous fluid collection inferior to and   likely communicating with the glenohumeral joint measuring at least 8.4 x 5.8   x 5 cm likely representing an abscess. Extensive subcutaneous and   intramuscular edema compatible with cellulitis and myositis. 3. Severe diffuse supraspinatus tendon thinning and probable full-thickness   tearing. 4. Large high-grade partial-thickness articular surface tear of the   subscapularis tendon. 5. Mild glenohumeral degenerative changes.        Medical Decision Apifce-Emzbcank-Samxi:       Medical Decision Making-Other:     Note:  Labs, medications, radiologic studies were reviewed with personal review of films  Large amounts of data were reviewed  Discussed with nursing Staff, Discharge planner  Infection Control and Prevention measures reviewed  All prior entries were reviewed  Administer medications as ordered  Prognosis: [address]  Discharge planning reviewed  Follow up as outpatient. Thank you for allowing us to participate in the care of this patient. Please call with questions.     Petrona Snowden MD  Pager: [phone] - Office: [phone]

## 2023-01-22 NOTE — PROGRESS NOTES
Nutrition Note    LOS nutrition assessment 1/23/2023    Electronically signed by Patrica Ayala RD on 1/22/23 at 1:32 PM EST    Patrica Ayala, MARCIN, RDN, LD  Registered [address] [place] [address]  [phone]

## 2023-01-23 ENCOUNTER — TELEPHONE (OUTPATIENT)
Dept: ONCOLOGY | Age: 73
End: 2023-01-23

## 2023-01-23 ENCOUNTER — APPOINTMENT (OUTPATIENT)
Dept: CT IMAGING | Age: 73
DRG: 493 | End: 2023-01-23
Attending: ORTHOPAEDIC SURGERY
Payer: MEDICARE

## 2023-01-23 VITALS
HEIGHT: 74 IN | HEART RATE: 85 BPM | OXYGEN SATURATION: 99 % | DIASTOLIC BLOOD PRESSURE: 82 MMHG | SYSTOLIC BLOOD PRESSURE: 141 MMHG | WEIGHT: 197.97 LBS | BODY MASS INDEX: 25.41 KG/M2 | RESPIRATION RATE: 16 BRPM | TEMPERATURE: 97.7 F

## 2023-01-23 LAB — GLUCOSE BLD-MCNC: 103 MG/DL (ref 75–110)

## 2023-01-23 PROCEDURE — 70450 CT HEAD/BRAIN W/O DYE: CPT

## 2023-01-23 PROCEDURE — 99238 HOSP IP/OBS DSCHRG MGMT 30/<: CPT | Performed by: STUDENT IN AN ORGANIZED HEALTH CARE EDUCATION/TRAINING PROGRAM

## 2023-01-23 PROCEDURE — 82947 ASSAY GLUCOSE BLOOD QUANT: CPT

## 2023-01-23 PROCEDURE — 6370000000 HC RX 637 (ALT 250 FOR IP): Performed by: ORTHOPAEDIC SURGERY

## 2023-01-23 PROCEDURE — 6360000002 HC RX W HCPCS: Performed by: STUDENT IN AN ORGANIZED HEALTH CARE EDUCATION/TRAINING PROGRAM

## 2023-01-23 PROCEDURE — 6360000002 HC RX W HCPCS: Performed by: ORTHOPAEDIC SURGERY

## 2023-01-23 PROCEDURE — 2580000003 HC RX 258: Performed by: INTERNAL MEDICINE

## 2023-01-23 PROCEDURE — 2580000003 HC RX 258: Performed by: ORTHOPAEDIC SURGERY

## 2023-01-23 PROCEDURE — 2580000003 HC RX 258: Performed by: STUDENT IN AN ORGANIZED HEALTH CARE EDUCATION/TRAINING PROGRAM

## 2023-01-23 PROCEDURE — 6360000002 HC RX W HCPCS: Performed by: INTERNAL MEDICINE

## 2023-01-23 PROCEDURE — 6370000000 HC RX 637 (ALT 250 FOR IP): Performed by: HOSPITALIST

## 2023-01-23 RX ORDER — HEPARIN SODIUM (PORCINE) LOCK FLUSH IV SOLN 100 UNIT/ML 100 UNIT/ML
500 SOLUTION INTRAVENOUS PRN
Status: DISCONTINUED | OUTPATIENT
Start: 2023-01-23 | End: 2023-01-23 | Stop reason: HOSPADM

## 2023-01-23 RX ADMIN — INSULIN LISPRO 2 UNITS: 100 INJECTION, SOLUTION INTRAVENOUS; SUBCUTANEOUS at 10:09

## 2023-01-23 RX ADMIN — SODIUM CHLORIDE, PRESERVATIVE FREE 10 ML: 5 INJECTION INTRAVENOUS at 10:09

## 2023-01-23 RX ADMIN — ACETAMINOPHEN 1000 MG: 500 TABLET ORAL at 10:08

## 2023-01-23 RX ADMIN — Medication 500 UNITS: at 12:19

## 2023-01-23 RX ADMIN — CELECOXIB 200 MG: 100 CAPSULE ORAL at 10:25

## 2023-01-23 RX ADMIN — DULOXETINE 30 MG: 30 CAPSULE, DELAYED RELEASE ORAL at 10:09

## 2023-01-23 RX ADMIN — CEFTRIAXONE SODIUM 1000 MG: 1 INJECTION, POWDER, FOR SOLUTION INTRAMUSCULAR; INTRAVENOUS at 10:29

## 2023-01-23 RX ADMIN — ACETAMINOPHEN 1000 MG: 500 TABLET ORAL at 01:40

## 2023-01-23 RX ADMIN — CHOLECALCIFEROL (VITAMIN D3) 10 MCG (400 UNIT) TABLET 800 UNITS: at 10:08

## 2023-01-23 RX ADMIN — METFORMIN HYDROCHLORIDE 1000 MG: 500 TABLET ORAL at 10:08

## 2023-01-23 RX ADMIN — ENOXAPARIN SODIUM 40 MG: 100 INJECTION SUBCUTANEOUS at 10:09

## 2023-01-23 RX ADMIN — CEFAZOLIN 2000 MG: 2 INJECTION, POWDER, FOR SOLUTION INTRAMUSCULAR; INTRAVENOUS at 00:33

## 2023-01-23 RX ADMIN — OXYCODONE HYDROCHLORIDE 5 MG: 5 TABLET ORAL at 00:29

## 2023-01-23 RX ADMIN — SODIUM CHLORIDE, PRESERVATIVE FREE 10 ML: 5 INJECTION INTRAVENOUS at 10:13

## 2023-01-23 RX ADMIN — PIOGLITAZONE 30 MG: 15 TABLET ORAL at 10:08

## 2023-01-23 RX ADMIN — GABAPENTIN 300 MG: 300 CAPSULE ORAL at 10:08

## 2023-01-23 RX ADMIN — LISINOPRIL 10 MG: 10 TABLET ORAL at 10:08

## 2023-01-23 RX ADMIN — OXYCODONE HYDROCHLORIDE 5 MG: 5 TABLET ORAL at 10:08

## 2023-01-23 RX ADMIN — THERA TABS 1 TABLET: TAB at 10:09

## 2023-01-23 ASSESSMENT — PAIN DESCRIPTION - LOCATION
LOCATION: HEAD;SHOULDER
LOCATION: SHOULDER

## 2023-01-23 ASSESSMENT — PAIN DESCRIPTION - PAIN TYPE: TYPE: SURGICAL PAIN

## 2023-01-23 ASSESSMENT — PAIN DESCRIPTION - ORIENTATION
ORIENTATION: LEFT
ORIENTATION: LEFT

## 2023-01-23 ASSESSMENT — PAIN - FUNCTIONAL ASSESSMENT
PAIN_FUNCTIONAL_ASSESSMENT: PREVENTS OR INTERFERES SOME ACTIVE ACTIVITIES AND ADLS
PAIN_FUNCTIONAL_ASSESSMENT: ACTIVITIES ARE NOT PREVENTED

## 2023-01-23 ASSESSMENT — PAIN SCALES - GENERAL
PAINLEVEL_OUTOF10: 7
PAINLEVEL_OUTOF10: 6
PAINLEVEL_OUTOF10: 3
PAINLEVEL_OUTOF10: 2
PAINLEVEL_OUTOF10: 4

## 2023-01-23 ASSESSMENT — PAIN DESCRIPTION - DESCRIPTORS
DESCRIPTORS: ACHING
DESCRIPTORS: DISCOMFORT;ACHING;SORE

## 2023-01-23 NOTE — PLAN OF CARE
Problem: Safety - Adult  Goal: Free from fall injury  1/22/2023 2302 by Andrea Sebastian RN  Outcome: Progressing     Problem: Pain  Goal: Verbalizes/displays adequate comfort level or baseline comfort level  1/22/2023 2302 by Andrea Sebastian RN  Outcome: Progressing  Flowsheets (Taken 1/22/2023 2302)  Verbalizes/displays adequate comfort level or baseline comfort level:   Encourage patient to monitor pain and request assistance   Assess pain using appropriate pain scale   Administer analgesics based on type and severity of pain and evaluate response   Implement non-pharmacological measures as appropriate and evaluate response   Consider cultural and social influences on pain and pain management   Notify Licensed Independent Practitioner if interventions unsuccessful or patient reports new pain     Problem: Discharge Planning  Goal: Discharge to home or other facility with appropriate resources  1/22/2023 2302 by Andrea Sebastian RN  Flowsheets (Taken 1/22/2023 2302)  Discharge to home or other facility with appropriate resources:   Identify barriers to discharge with patient and caregiver   Identify discharge learning needs (meds, wound care, etc)     Problem: Skin/Tissue Integrity - Adult  Goal: Incisions, wounds, or drain sites healing without S/S of infection  1/22/2023 2302 by Andrea Sebastian RN  Outcome: Progressing     Problem: Infection - Adult  Goal: Absence of infection during hospitalization  1/22/2023 2302 by Andrea Sebastian RN  Outcome: Progressing     Problem: ABCDS Injury Assessment  Goal: Absence of physical injury  1/22/2023 2302 by Andrea Sebastian RN  Outcome: Progressing  Flowsheets (Taken 1/22/2023 2302)  Absence of Physical Injury: Implement safety measures based on patient assessment

## 2023-01-23 NOTE — DISCHARGE SUMMARY
[facility]  Office: [address], DO, Halina Chappell, DO, Kerrie Fleming, DO, Alizahanna Cervantes Blood, DO, Shante Winters MD, Sin Marin MD, Reginaldo Lloyd MD, Zulay Luevano MD,  Kevin Sanders MD, Pooja Green MD, London Lee, DO, Laura Henry MD,  Geoff Waller DO, Jenni Madison MD, Ayanna Garnica MD, Donald Leon DO, Chon Villarreal MD, Tenisha Bello MD, Shaji Marie DO, Alejandra Mo MD, Gerardo Min MD, Barbara Juarez MD, Yuniel Dugan MD, Sanam Meehan DO, Rosa Pierce MD, Smith Kiran MD, Gretchen Martinez, CNP,  Aleksandra Silva, CNP, Roby Angry, CNP, Ryan Lamar, CNP,  Cleone Denver, [facility], Maria Esther Smith, CNP, Denton Clemente, CNP, Flako Aguilar, CNP, Daniel Keith, CNP, Radha Perkins, CNP, [place], PA-C, Yohana Marks, CNS, Blossom Baltazar, CNP, Rachell Rosado, CNP         [facility]    Discharge Summary     Patient ID: Sherren Caul Kristina Mandes  :  1950   MRN: [id number]     ACCOUNT:  [de-identified]   Patient's PCP: BREANNA Lovett NP  Admit Date: 2023   Discharge Date: 2023     Length of Stay: 6  Code Status:  Full Code  Admitting Physician: Sofi Raya MD  Discharge Physician: Ayanna Garnica MD     Active Discharge Diagnoses:     Hospital Problem Lists:  Principal Problem:    Septic arthritis [facility])  Active Problems:    Abscess of left shoulder    MSSA (methicillin susceptible Staphylococcus aureus) infection    Abscess    Essential hypertension    Type 2 diabetes mellitus with diabetic polyneuropathy, with long-term current use of insulin ([facility] [address]  Resolved Problems:    * No resolved hospital problems.  *      Admission Condition:  poor     Discharged Condition: good    Hospital Stay:     Hospital Course:  Patient is a 70-year-old male with a past medical history of DM2, HTN and HLD who presented to this facility on 2023 as an orthopedic surgery direct admission for left shoulder septic arthritis. The patient was taken to the OR for incision/drainage and left shoulder washout. The patient improved following the procedure. The patient remained on broad spectrum antibiotics throughout admission. Infectious disease was consulted and recommended ceftriaxone 2 grams daily through 2/28. The patient is discharged home today (1/23) in stable condition. He is instructed to follow-up with orthopedic surgery and infectious disease as scheduled. Significant therapeutic interventions: Orthopedic surgery and infectious disease consultation; left shoulder washout     Significant Diagnostic Studies:   Labs / Micro:  BMP:    Lab Results   Component Value Date/Time    GLUCOSE 147 01/20/2023 09:22 AM     01/20/2023 09:22 AM    K 3.6 01/20/2023 09:22 AM     01/20/2023 09:22 AM    CO2 27 01/20/2023 09:22 AM    ANIONGAP 12 01/20/2023 09:22 AM    BUN 12 01/20/2023 09:22 AM    CREATININE 0.71 01/20/2023 09:22 AM    BUNCRER NOT REPORTED 01/06/2022 09:20 AM    CALCIUM 8.8 01/20/2023 09:22 AM    LABGLOM >60 01/20/2023 09:22 AM    GFRAA >60 09/15/2022 02:00 PM    GFR      09/15/2022 02:00 PM     Radiology:  No results found. Consultations:    Consults:     Final Specialist Recommendations/Findings:   IP CONSULT TO HOSPITALIST  IP CONSULT TO INFECTIOUS DISEASES  PHARMACY TO DOSE VANCOMYCIN  IP CONSULT TO CASE MANAGEMENT  IP CONSULT TO HOME CARE NEEDS      The patient was seen and examined on day of discharge and this discharge summary is in conjunction with any daily progress note from day of discharge. Discharge plan:     Disposition: Home    Physician Follow Up: Mitchel Bowman MD  [address] [facility]  [phone]    Call  Office f/up in 1 week with Dr Doug Aguilar for infection. Please call [phone] for appointment at [facility] ID clinic       Requiring Further Evaluation/Follow Up POST HOSPITALIZATION/Incidental Findings: None.      Diet: regular diet    Activity: As tolerated    Instructions to Patient: Follow-up with infectious disease and orthopedic surgery as scheduled. Discharge Medications:      Medication List        START taking these medications      cefTRIAXone  infusion  Commonly known as: ROCEPHIN  Infuse 2,000 mg intravenously every 24 hours Compound per protocol     HYDROcodone-acetaminophen 5-325 MG per tablet  Commonly known as: Norco  Take 1 tablet by mouth every 4 hours as needed for Pain for up to 7 days. Intended supply: 7 days. Take lowest dose possible to manage pain Max Daily Amount: 6 tablets            CONTINUE taking these medications      albuterol sulfate  (90 Base) MCG/ACT inhaler  Commonly known as: PROVENTIL;VENTOLIN;PROAIR  Inhale 2 puffs into the lungs every 6 hours as needed for Wheezing or Shortness of Breath (or take 1-2 puffs 30 mins prior to strenous activity)     blood glucose test strips  Test 4 times a day & as needed for symptoms of irregular blood glucose. Dispense sufficient amount for indicated testing frequency plus additional to accommodate PRN testing needs. celecoxib 200 MG capsule  Commonly known as: CELEBREX  Take 1 capsule by mouth daily     chlorthalidone 25 MG tablet  Commonly known as: HYGROTON  Take 1 tablet by mouth daily     DULoxetine 30 MG extended release capsule  Commonly known as: CYMBALTA  Take 1 capsule by mouth 2 times daily     * FreeStyle Mauricio 2 Sensor Misc  1 applicator by Does not apply route every 14 days     * FreeStyle Mauricio 2 Sensor Misc  1 each by Does not apply route every 14 days     * FreeStyle Mauricio 2 Sensor Misc  1 each by Does not apply route every 14 days     gabapentin 300 MG capsule  Commonly known as: NEURONTIN  Take 1 capsule by mouth 2 times daily for 180 days.  Intended supply: 90 days     Handicap Placard Misc  by Does not apply route Exp: 10/2027     insulin aspart 100 UNIT/ML injection pen  Commonly known as: NovoLOG  Inject 2 Units into the skin 3 times daily (before meals) 0-150 = 0 units, 151-200 = 2 units, 201-250 = 4 units, 251-300 = 6 units, 301-350 = 8 units, 351-400 of greater = 10 units. Max dose 30units per day     Insulin Pen Needle 31G X 5 MM Misc  1 each by Does not apply route 3 times daily     Lancets Misc  Test twice a day and as needed, Please send lancets for Verio brand meter     Levemir FlexTouch 100 UNIT/ML injection pen  Generic drug: insulin detemir  Inject 63 Units into the skin nightly     lisinopril 10 MG tablet  Commonly known as: PRINIVIL;ZESTRIL  Take 1 tablet by mouth daily     metFORMIN 1000 MG tablet  Commonly known as: GLUCOPHAGE  Take 1 tablet by mouth 2 times daily (with meals)     multivitamin per tablet     ondansetron 4 MG tablet  Commonly known as: ZOFRAN  Take 1 tablet by mouth daily as needed for Nausea or Vomiting     pioglitazone 30 MG tablet  Commonly known as: Actos  Take 1 tablet by mouth daily     VITAMIN D3 PO           * This list has 3 medication(s) that are the same as other medications prescribed for you. Read the directions carefully, and ask your doctor or other care provider to review them with you. STOP taking these medications      traMADol 50 MG tablet  Commonly known as: Ultram               Where to Get Your Medications        You can get these medications from any pharmacy    Bring a paper prescription for each of these medications  cefTRIAXone  infusion  HYDROcodone-acetaminophen 5-325 MG per tablet         No discharge procedures on file. Time Spent on discharge is  20 mins in patient examination, evaluation, counseling as well as medication reconciliation, prescriptions for required medications, discharge plan and follow up. Electronically signed by   Rai Moyer MD  1/23/2023  8:50 AM      Thank you Dr. Rose Godwin, APRN - NP for the opportunity to be involved in this patient's care.

## 2023-01-23 NOTE — PROGRESS NOTES
Pt alert, oriented and medically stable for discharge. Pt PICC line care completed and new dressing applied. Pt reviewed discharge instructions, new medications and their side effects as well as follow up appointments and PICC line care. Pt wheeled down to main entrance via [facility] with all belongings including cell phone,  x2, tablet, clothing, and bag.

## 2023-01-23 NOTE — CARE COORDINATION
Transitional Planning    Spoke with Mira @ [facility]. They will run benefits for Ceftriaxone, pt has PICC line. Will need signed script faxed to [facility].      3278 Spoke with Brooke @ [facility], updated that patient is discharging today. She will have someone scheduled for [id number] tomorrow.     1032 faxed script to [facility]    1149 Confirmed with Mira at [facility] that patient is able to discharge, no other needs for them.

## 2023-01-23 NOTE — LETTER
1/23/2023        Luis A Sarah  [address] [place] [id number]    Dear Juan Miguel Thornton:    Your healthcare provider has ordered a low dose CT scan of the chest for lung cancer screening. Enclosed you will find information about CT lung screening and smoking cessation resources. If you are unable to keep you appointment for you CT lung screening, please call our scheduling department at [phone]. Keep in mind that CT lung screening does not take the place of smoking cessation. Please do not hesitate to contact me if you have any questions or concerns.     [address] [place] [address],      Winnie [facility] Lung Screening Program  [phone]

## 2023-01-23 NOTE — DISCHARGE INSTR - DIET
Good nutrition is important when healing from an illness, injury, or surgery. Follow any nutrition recommendations given to you during your hospital stay. If you were given an oral nutrition supplement while in the hospital, continue to take this supplement at home. You can take it with meals, in-between meals, and/or before bedtime. These supplements can be purchased at most local grocery stores, pharmacies, and chain [employer]-stores. If you have any questions about your diet or nutrition, call the hospital and ask for the dietitian.     [employer]

## 2023-01-24 ENCOUNTER — CARE COORDINATION (OUTPATIENT)
Dept: CASE MANAGEMENT | Age: 73
End: 2023-01-24

## 2023-01-24 ENCOUNTER — TELEPHONE (OUTPATIENT)
Dept: FAMILY MEDICINE CLINIC | Age: 73
End: 2023-01-24

## 2023-01-24 DIAGNOSIS — L02.414 ABSCESS OF LEFT SHOULDER: Primary | ICD-10-CM

## 2023-01-24 PROCEDURE — 1111F DSCHRG MED/CURRENT MED MERGE: CPT | Performed by: NURSE PRACTITIONER

## 2023-01-24 NOTE — TELEPHONE ENCOUNTER
Care Transitions Initial Follow Up Call    Outreach made within 2 business days of discharge: Yes    Patient: Luis A Colby Patient : 1950   MRN: [id number]  Reason for Admission: Septic Arthritis   Discharge Date: 23       Spoke with: Gene     Discharge department/facility: [facility] Interactive Patient Contact:  Was patient able to fill all prescriptions: Yes  Was patient instructed to bring all medications to the follow-up visit: Yes  Is patient taking all medications as directed in the discharge summary?  Yes  Does patient understand their discharge instructions: Yes  Does patient have questions or concerns that need addressed prior to 7-14 day follow up office visit: no    Scheduled appointment with PCP within 7-14 days    Follow Up  Future Appointments   Date Time Provider Bari Cervantes   2023  1:15 PM STV [place] CT RM 2 [facility] PB CT STV [place]   2023  2:45 PM Mohan Sahni MD PBURG ORT SP T[facility]PP   2023 10:30 AM Jacoby Deleon MD Resp [place] T[facility]PP   3/1/2023  9:15 AM MD MALIK Gonzalez ORT SP [facility]   2023  9:15 AM MD MALIK Gonzalez ORT SP Thai Handy LPN

## 2023-01-24 NOTE — CARE COORDINATION
[facility] Care Transitions Initial Follow Up Call    Call within 2 business days of discharge: Yes    Care Transition Nurse contacted the patient by telephone to perform post hospital discharge assessment. Verified name and  with patient as identifiers. Provided introduction to self, and explanation of the Care Transition Nurse role. Patient: Mirian Cardozo Patient : 1950   MRN: [id number]  Reason for Admission: Pyogenic arthritis of left shoulder region, due to unspecified organism  Discharge Date: 23 RARS: Readmission Risk Score: 8.8      Last Discharge  Street       Date Complaint Diagnosis Description Type Department Provider    23  Pyogenic arthritis of left shoulder region, due to unspecified organism ([facility] [address] . .. Admission (Discharged) [facility] PB MS Britni Sargent MD            Was this an external facility discharge? No Discharge Facility: [facility]    Challenges to be reviewed by the provider   Additional needs identified to be addressed with provider: No  none               Method of communication with provider: none. Was able to contact Miguel for initial transitional outreach. He stated that he was doing \"good so far\". He said that his pain range is 4-8 depending on activity, swelling is the same, no increased drainage on the dressing, no discoloration of his fingers and only has numbness/tingling with certain positions. He said that his blood sugars are starting stabilize. He stated that he had all his medications except the Norco.  He said that they will be picking that up today. He said that he did receive the IV supplies and antibiotic last night. [place] to visit today around noon. He has his post op visit scheduled and will be calling ID for appointment. He said that the PCP office is to call back with an appointment. He had no further questions or concerns. Care Transition Nurse reviewed discharge instructions with patient who verbalized understanding.  The patient was given an opportunity to ask questions and does not have any further questions or concerns at this time. Were discharge instructions available to patient? Yes. Reviewed appropriate site of care based on symptoms and resources available to patient including: PCP  Specialist  Home health  When to call 911. The patient agrees to contact the PCP office for questions related to their healthcare. Advance Care Planning:   Does patient have an Advance Directive:  did not know if he had ACP doc . Medication reconciliation was performed with patient, who verbalizes understanding of administration of home medications. Medications reviewed, 1111F entered: yes    Was patient discharged with a pulse oximeter? yes, discussed and confirmed pulse oximeter discharge instructions and when to notify provider or seek emergency care. Non-face-to-face services provided:  Obtained and reviewed discharge summary and/or continuity of care documents    Offered patient enrollment in the Remote Patient Monitoring (RPM) program for in-home monitoring: NA.    Care Transitions 24 Hour Call    Do you have a copy of your discharge instructions?: Yes  Do you have all of your prescriptions and are they filled?: No  Have you been contacted by a [address] Pharmacist?: No  Have you scheduled your follow up appointment?: Yes  How are you going to get to your appointment?: Car - family or friend to transport  [address] (Comment:  [address])  Do you feel like you have everything you need to keep you well at home?: Yes  Care Transitions Interventions         Follow Up  Future Appointments   Date Time Provider Bari Cervantes   1/30/2023  1:15 PM STV [place] RM 2 [facility] PB CT STV [place]   2/1/2023  2:45 PM [facility]   2/2/2023  3:45 PM BREANNA Pena - GABRIEL Choudhary   2/7/2023 10:30 AM Humza Reeves MD Resp Lucy [facility]PP   3/1/2023  9:15 AM MD MALIK Dupree MAMTA ADAN MHTOLPP   4/12/2023  9:15 AM Kamran Krishnamurthy MD [facility] Transition Nurse provided contact information. Plan for follow-up call in 3-5 days based on severity of symptoms and risk factors.   Plan for next call: symptom management-follow up on any complications from [address], RN

## 2023-01-27 ENCOUNTER — CARE COORDINATION (OUTPATIENT)
Dept: CASE MANAGEMENT | Age: 73
End: 2023-01-27

## 2023-01-27 NOTE — CARE COORDINATION
[facility] Care Transitions Follow Up Call    Care Transition Nurse contacted the patient by telephone to follow up after admission on 23. Verified name and  with patient as identifiers. Patient: Ahmet Gallegos  Patient : 1950   MRN: [id number]  Reason for Admission: Pyogenic arthritis of left shoulder region, due to unspecified organism/ S/P debridement/wash out. Discharge Date: 23 RARS: Readmission Risk Score: 8.8      Needs to be reviewed by the provider   Additional needs identified to be addressed with provider: No  none             Method of communication with provider: none. Was able to contact Miguel for transitional outreach. He stated that everything seemed to be going \"pretty well\". He said that he daughter is administering the IV antibiotic. Nurse to out today to change dressing. He said the pain is about the same and is dependant on position/movement. He said that he still has the swelling at the Lt shoulder and some in his hand. He denied redness and drainage from and around the incision. He denied fever/chills and no discoloration to his Lt hand. He will be seeing the surgeon . He had no questions or concerns. Addressed changes since last contact:  none  Discussed follow-up appointments. If no appointment was previously scheduled, appointment scheduling offered: No.   Is follow up appointment scheduled within 7 days of discharge?  No.    Follow Up  Future Appointments   Date Time Provider Bari Cervantes   2023  1:15 PM STV [place] CT RM 2 [facility] PB CT STV [place]   2023  2:45 PM Emile Cutler MD PBURG ORT SP M[facility]   2023  3:45 PM BREANNA Hernandez - GABRIEL Kamara   2023 10:30 AM Vonne Epley, MD Resp [place] MHTOLPP   2023  3:30 PM Orin Orellana MD PB INF Dina Mulligan   3/1/2023  9:15 AM MD MORAIMA PerezURG ORT SP MHTOLPP   2023  9:15 AM MD MORAIMA PerezURG ORT SP MHTOLPP     [facility] follow up appointment(s):     Care Transition Nurse reviewed medical action plan and red flags with patient and discussed any barriers to care and/or understanding of plan of care after discharge. Discussed appropriate site of care based on symptoms and resources available to patient including: PCP  Specialist  Home health  When to call 911. The patient agrees to contact the PCP office for questions related to their healthcare. Patients top risk factors for readmission: functional physical ability and medical condition-septic arthritis  Interventions to address risk factors: Obtained and reviewed discharge summary and/or continuity of care documents    Offered patient enrollment in the Remote Patient Monitoring (RPM) program for in-home monitoring: NA.     Care Transitions Subsequent and Final Call    Subsequent and Final Calls  Do you have any ongoing symptoms?: No  Have your medications changed?: No  Do you have any questions related to your medications?: No  Do you currently have any active services?: Yes  Are you currently active with any services?: Home Health  Do you have any needs or concerns that I can assist you with?: No  Care Transitions Interventions  Other Interventions:             Care Transition Nurse provided contact information for future needs. Plan for follow-up call in 7-10 days based on severity of symptoms and risk factors.   Plan for next call: symptom management-follow up on healing Lt shoulder, appointment with charly Romeo RN

## 2023-01-30 ENCOUNTER — HOSPITAL ENCOUNTER (OUTPATIENT)
Dept: CT IMAGING | Age: 73
Discharge: HOME | End: 2023-02-01
Payer: MEDICARE

## 2023-01-30 DIAGNOSIS — Z87.891 PERSONAL HISTORY OF TOBACCO USE: ICD-10-CM

## 2023-01-30 PROCEDURE — 71271 CT THORAX LUNG CANCER SCR C-: CPT

## 2023-02-01 ENCOUNTER — OFFICE VISIT (OUTPATIENT)
Dept: ORTHOPEDIC SURGERY | Age: 73
End: 2023-02-01

## 2023-02-01 VITALS — BODY MASS INDEX: 24.38 KG/M2 | RESPIRATION RATE: 14 BRPM | HEIGHT: 74 IN | WEIGHT: 190 LBS

## 2023-02-01 DIAGNOSIS — M00.9 PYOGENIC ARTHRITIS OF LEFT SHOULDER REGION, DUE TO UNSPECIFIED ORGANISM (HCC): Primary | ICD-10-CM

## 2023-02-01 PROCEDURE — 99024 POSTOP FOLLOW-UP VISIT: CPT | Performed by: ORTHOPAEDIC SURGERY

## 2023-02-01 SDOH — ECONOMIC STABILITY: HOUSING INSECURITY
IN THE LAST 12 MONTHS, WAS THERE A TIME WHEN YOU DID NOT HAVE A STEADY PLACE TO SLEEP OR SLEPT IN A SHELTER (INCLUDING NOW)?: NO

## 2023-02-01 SDOH — ECONOMIC STABILITY: FOOD INSECURITY: WITHIN THE PAST 12 MONTHS, THE FOOD YOU BOUGHT JUST DIDN'T LAST AND YOU DIDN'T HAVE MONEY TO GET MORE.: NEVER TRUE

## 2023-02-01 SDOH — ECONOMIC STABILITY: FOOD INSECURITY: WITHIN THE PAST 12 MONTHS, YOU WORRIED THAT YOUR FOOD WOULD RUN OUT BEFORE YOU GOT MONEY TO BUY MORE.: NEVER TRUE

## 2023-02-01 SDOH — ECONOMIC STABILITY: INCOME INSECURITY: HOW HARD IS IT FOR YOU TO PAY FOR THE VERY BASICS LIKE FOOD, HOUSING, MEDICAL CARE, AND HEATING?: NOT HARD AT ALL

## 2023-02-01 NOTE — PROGRESS NOTES
Procedure: Left glenohumeral joint arthroscopic irrigation and debridement in addition to open incision and drainage of the left shoulder abscess  Date of procedure: 1/17/2023    HPI: Mr. Rafael Olmos is a 27-year-old approximately 2 weeks status post the aforementioned procedure. His cultures grew out MSSA. He has been on IV Rocephin per the infectious disease service receiving 2 g every 24 hours. He states that he is doing relatively well. For the first time he seen a little bit of improvement in his pain. He denies having any fevers, chills, sweats or constitutional symptoms. Physical examination:  Evaluation of patient's left shoulder and upper extremity demonstrates his incisions to be clean, dry, intact. No wound dehiscence or drainage. Mild to moderate diffuse swelling still present. No warmth or erythema. Impression and plan: Mr. Rafael Olmos is a 27-year-old male with a septic left shoulder associated with an abscess. Cultures have grown out MSSA as outlined above. He is currently on IV antibiotics and will remain on IV antibiotics for a total of 6 weeks. Today his sutures were taken out and Steri-Strips and clean dressings were applied. He can continue to remain out of his sling and use this arm for light activities as he feels comfortable. I will see him back for reevaluation in 4 weeks but he may return or call earlier with any questions or concerns.

## 2023-02-02 ENCOUNTER — OFFICE VISIT (OUTPATIENT)
Dept: FAMILY MEDICINE CLINIC | Age: 73
End: 2023-02-02

## 2023-02-02 ENCOUNTER — CARE COORDINATION (OUTPATIENT)
Dept: CASE MANAGEMENT | Age: 73
End: 2023-02-02

## 2023-02-02 VITALS
TEMPERATURE: 97.3 F | OXYGEN SATURATION: 99 % | HEART RATE: 67 BPM | HEIGHT: 74 IN | BODY MASS INDEX: 23.49 KG/M2 | DIASTOLIC BLOOD PRESSURE: 78 MMHG | WEIGHT: 183 LBS | SYSTOLIC BLOOD PRESSURE: 132 MMHG

## 2023-02-02 DIAGNOSIS — Z13.220 LIPID SCREENING: ICD-10-CM

## 2023-02-02 DIAGNOSIS — F32.9 REACTIVE DEPRESSION: ICD-10-CM

## 2023-02-02 DIAGNOSIS — Z09 HOSPITAL DISCHARGE FOLLOW-UP: Primary | ICD-10-CM

## 2023-02-02 DIAGNOSIS — F33.1 MODERATE EPISODE OF RECURRENT MAJOR DEPRESSIVE DISORDER (HCC): ICD-10-CM

## 2023-02-02 DIAGNOSIS — Z79.4 TYPE 2 DIABETES MELLITUS WITH DIABETIC POLYNEUROPATHY, WITH LONG-TERM CURRENT USE OF INSULIN (HCC): ICD-10-CM

## 2023-02-02 DIAGNOSIS — F41.9 ANXIETY: ICD-10-CM

## 2023-02-02 DIAGNOSIS — I65.23 OCCLUSION AND STENOSIS OF BILATERAL CAROTID ARTERIES: ICD-10-CM

## 2023-02-02 DIAGNOSIS — L98.9 SKIN LESION: ICD-10-CM

## 2023-02-02 DIAGNOSIS — M00.9 PYOGENIC ARTHRITIS OF LEFT SHOULDER REGION, DUE TO UNSPECIFIED ORGANISM (HCC): ICD-10-CM

## 2023-02-02 DIAGNOSIS — J43.2 CENTRILOBULAR EMPHYSEMA (HCC): ICD-10-CM

## 2023-02-02 DIAGNOSIS — I65.29 CAROTID ATHEROSCLEROSIS, UNSPECIFIED LATERALITY: ICD-10-CM

## 2023-02-02 DIAGNOSIS — E11.42 TYPE 2 DIABETES MELLITUS WITH DIABETIC POLYNEUROPATHY, WITH LONG-TERM CURRENT USE OF INSULIN (HCC): ICD-10-CM

## 2023-02-02 RX ORDER — ESCITALOPRAM OXALATE 5 MG/1
5 TABLET ORAL DAILY
Qty: 30 TABLET | Refills: 1 | Status: SHIPPED | OUTPATIENT
Start: 2023-02-02 | End: 2024-02-02

## 2023-02-02 ASSESSMENT — PATIENT HEALTH QUESTIONNAIRE - PHQ9
3. TROUBLE FALLING OR STAYING ASLEEP: 1
SUM OF ALL RESPONSES TO PHQ QUESTIONS 1-9: 11
SUM OF ALL RESPONSES TO PHQ QUESTIONS 1-9: 11
6. FEELING BAD ABOUT YOURSELF - OR THAT YOU ARE A FAILURE OR HAVE LET YOURSELF OR YOUR FAMILY DOWN: 0
2. FEELING DOWN, DEPRESSED OR HOPELESS: 1
8. MOVING OR SPEAKING SO SLOWLY THAT OTHER PEOPLE COULD HAVE NOTICED. OR THE OPPOSITE, BEING SO FIGETY OR RESTLESS THAT YOU HAVE BEEN MOVING AROUND A LOT MORE THAN USUAL: 0
SUM OF ALL RESPONSES TO PHQ9 QUESTIONS 1 & 2: 1
SUM OF ALL RESPONSES TO PHQ QUESTIONS 1-9: 11
SUM OF ALL RESPONSES TO PHQ QUESTIONS 1-9: 11
4. FEELING TIRED OR HAVING LITTLE ENERGY: 3
1. LITTLE INTEREST OR PLEASURE IN DOING THINGS: 0
7. TROUBLE CONCENTRATING ON THINGS, SUCH AS READING THE NEWSPAPER OR WATCHING TELEVISION: 3
5. POOR APPETITE OR OVEREATING: 3
9. THOUGHTS THAT YOU WOULD BE BETTER OFF DEAD, OR OF HURTING YOURSELF: 0
10. IF YOU CHECKED OFF ANY PROBLEMS, HOW DIFFICULT HAVE THESE PROBLEMS MADE IT FOR YOU TO DO YOUR WORK, TAKE CARE OF THINGS AT HOME, OR GET ALONG WITH OTHER PEOPLE: 1

## 2023-02-02 NOTE — PROGRESS NOTES
Post-Discharge Transitional Care  Follow Up      Luis A Multani   YOB: 1950    Date of Office Visit:  2/2/2023  Date of Hospital Admission: 1/17/23  Date of Hospital Discharge: 1/23/23  Risk of hospital readmission (high >=14%. Medium >=10%) :Readmission Risk Score: 8.8      Care management risk score Rising risk (score 2-5) and Complex Care (Scores >=6): No Risk Score On File     Non face to face  following discharge, date last encounter closed (first attempt may have been earlier): *No documented post hospital discharge outreach found in the last 14 days    Call initiated 2 business days of discharge: *No response recorded in the last 14 days    ASSESSMENT/PLAN:   Hospital discharge follow-up  -     [place] DISCHARGE MEDS RECONCILED W/ CURRENT OUTPATIENT MED LIST  Pyogenic arthritis of left shoulder region, due to unspecified organism ([facility] [address]  Type 2 diabetes mellitus with diabetic polyneuropathy, with long-term current use of insulin (HCC)  -     Microalbumin, Ur; Future  -     Hemoglobin A1C; Future  Lipid screening  -     Lipid Panel; Future  Centrilobular emphysema (HCC)  Moderate episode of recurrent major depressive disorder (HCC)  Carotid atherosclerosis, unspecified laterality  -     US CAROTID ARTERY BILATERAL; Future  Occlusion and stenosis of bilateral carotid arteries   -     US CAROTID ARTERY BILATERAL; Future  Skin lesion  -     External Referral To Dermatology  Reactive depression  -     escitalopram (LEXAPRO) 5 MG tablet; Take 1 tablet by mouth daily, Disp-30 tablet, R-1Normal  Anxiety  -     escitalopram (LEXAPRO) 5 MG tablet; Take 1 tablet by mouth daily, Disp-30 tablet, R-1Normal    Medical Decision Making: straightforward  Return in about 4 weeks (around 3/2/2023), or if symptoms worsen or fail to improve, for medication follow up. Subjective:   HPI:  Follow up of Hospital problems/diagnosis(es): spetic atrhtis. Now has picc line RUE antibiotic therapy for 6 weeks.  Will then do nothing and repeat MRI and see if things have improved. Soonest he will be able to have surgery is May. ID is following him as well. Will see in office next week in [place]. [facility] is visiting weekly. Was able to have MRI with spinal stimularot. The rep met him outside of the hospital and told him the device just needed to be turned off for the imaging. It was then turned back on. Discused lots of issues today  Depresion and anxiety      Inpatient course: Discharge summary reviewed- see chart. Interval history/Current status: stable    Patient Active Problem List   Diagnosis    Essential hypertension    Osteoarthritis    Vitamin D deficiency    Testosterone deficiency    Difficulty sleeping    Sacro-iliac pain    Neuropathy    Nocturia    Benign prostatic hyperplasia with urinary obstruction    Type 2 diabetes mellitus with diabetic polyneuropathy, with long-term current use of insulin (HCC)    Kidney stone on left side    Hypercalciuria    Personal history of kidney stones    Moderate episode of recurrent major depressive disorder ([address]    Epiretinal membrane    Renal cyst, left    Hypokalemia    Intractable pain    Septic arthritis ([facility])    Abscess of left shoulder    MSSA (methicillin susceptible Staphylococcus aureus) infection    Abscess    Centrilobular emphysema ([address]       Medications listed as ordered at the time of discharge from hospital     Medication List            Accurate as of February 2, 2023 11:59 PM. If you have any questions, ask your nurse or doctor.                 START taking these medications      escitalopram 5 MG tablet  Commonly known as: LEXAPRO  Take 1 tablet by mouth daily  Started by: BREANNA Wu NP            CHANGE how you take these medications      Levemir FlexTouch 100 UNIT/ML injection pen  Generic drug: insulin detemir  Inject 63 Units into the skin nightly  What changed: how much to take            CONTINUE taking these medications albuterol sulfate  (90 Base) MCG/ACT inhaler  Commonly known as: PROVENTIL;VENTOLIN;PROAIR  Inhale 2 puffs into the lungs every 6 hours as needed for Wheezing or Shortness of Breath (or take 1-2 puffs 30 mins prior to strenous activity)     blood glucose test strips  Test 4 times a day & as needed for symptoms of irregular blood glucose. Dispense sufficient amount for indicated testing frequency plus additional to accommodate PRN testing needs. celecoxib 200 MG capsule  Commonly known as: CELEBREX  Take 1 capsule by mouth daily     chlorthalidone 25 MG tablet  Commonly known as: HYGROTON  Take 1 tablet by mouth daily     DULoxetine 30 MG extended release capsule  Commonly known as: CYMBALTA  Take 1 capsule by mouth 2 times daily     * FreeStyle Mauricio 2 Sensor Misc  1 applicator by Does not apply route every 14 days     * FreeStyle Mauricio 2 Sensor Misc  1 each by Does not apply route every 14 days     * FreeStyle Mauricio 2 Sensor Misc  1 each by Does not apply route every 14 days     gabapentin 300 MG capsule  Commonly known as: NEURONTIN  Take 1 capsule by mouth 2 times daily for 180 days. Intended supply: 90 days     Handicap Placard Misc  by Does not apply route Exp: 10/2027     insulin aspart 100 UNIT/ML injection pen  Commonly known as: NovoLOG  Inject 2 Units into the skin 3 times daily (before meals) 0-150 = 0 units, 151-200 = 2 units, 201-250 = 4 units, 251-300 = 6 units, 301-350 = 8 units, 351-400 of greater = 10 units.  Max dose 30units per day     Insulin Pen Needle 31G X 5 MM Misc  1 each by Does not apply route 3 times daily     Lancets Misc  Test twice a day and as needed, Please send lancets for Verio brand meter     lisinopril 10 MG tablet  Commonly known as: PRINIVIL;ZESTRIL  Take 1 tablet by mouth daily     metFORMIN 1000 MG tablet  Commonly known as: GLUCOPHAGE  Take 1 tablet by mouth 2 times daily (with meals)     multivitamin per tablet     ondansetron 4 MG tablet  Commonly known as: ZOFRAN  Take 1 tablet by mouth daily as needed for Nausea or Vomiting     pioglitazone 30 MG tablet  Commonly known as: Actos  Take 1 tablet by mouth daily     VITAMIN D3 PO           * This list has 3 medication(s) that are the same as other medications prescribed for you. Read the directions carefully, and ask your doctor or other care provider to review them with you.                    Where to Get Your Medications        These medications were sent to [address] [place], [place] [address]on[facility] [phone]  [address] [place] [id number]      Phone: [phone]   escitalopram 5 MG tablet           Medications marked \"taking\" at this time  Outpatient Medications Marked as Taking for the 2/2/23 encounter (Office Visit) with BREANNA Elizabeth - NP   Medication Sig Dispense Refill    escitalopram (LEXAPRO) 5 MG tablet Take 1 tablet by mouth daily 30 tablet 1    [DISCONTINUED] cefTRIAXone (ROCEPHIN) infusion Infuse 2,000 mg intravenously every 24 hours Compound per protocol 74 g 0    Continuous Blood Gluc Sensor (FREESTYLE ALFONSO 2 SENSOR) MISC 1 each by Does not apply route every 14 days 1 each 0    Continuous Blood Gluc Sensor (FREESTYLE ALFONSO 2 SENSOR) MISC 1 each by Does not apply route every 14 days 1 each 0    metFORMIN (GLUCOPHAGE) 1000 MG tablet Take 1 tablet by mouth 2 times daily (with meals) 180 tablet 1    lisinopril (PRINIVIL;ZESTRIL) 10 MG tablet Take 1 tablet by mouth daily 90 tablet 1    chlorthalidone (HYGROTON) 25 MG tablet Take 1 tablet by mouth daily 90 tablet 1    pioglitazone (ACTOS) 30 MG tablet Take 1 tablet by mouth daily 90 tablet 1    DULoxetine (CYMBALTA) 30 MG extended release capsule Take 1 capsule by mouth 2 times daily 180 capsule 1    insulin detemir (LEVEMIR FLEXTOUCH) 100 UNIT/ML injection pen Inject 63 Units into the skin nightly (Patient taking differently: Inject 60 Units into the skin nightly) 20 Adjustable Dose Pre-filled Pen Syringe 1    celecoxib (CELEBREX) 200 MG capsule Take 1 capsule by mouth daily 90 capsule 1    gabapentin (NEURONTIN) 300 MG capsule Take 1 capsule by mouth 2 times daily for 180 days. Intended supply: 90 days 180 capsule 1    insulin aspart (NOVOLOG) 100 UNIT/ML injection pen Inject 2 Units into the skin 3 times daily (before meals) 0-150 = 0 units, 151-200 = 2 units, 201-250 = 4 units, 251-300 = 6 units, 301-350 = 8 units, 351-400 of greater = 10 units. Max dose 30units per day 3 Adjustable Dose Pre-filled Pen Syringe 5    Continuous Blood Gluc Sensor (FREESTYLE ALFONSO 2 SENSOR) MISC 1 applicator by Does not apply route every 14 days 1 each 5    Cholecalciferol (VITAMIN D3 PO) Take by mouth daily      Insulin Pen Needle 31G X 5 MM MISC 1 each by Does not apply route 3 times daily 100 each 3    Lancets MISC Test twice a day and as needed, Please send lancets for Verio brand meter 180 each 2    multivitamin (THERAGRAN) per tablet Take 1 tablet by mouth daily. Medications patient taking as of now reconciled against medications ordered at time of hospital discharge: Yes    A comprehensive review of systems was negative except for what was noted in the HPI. Objective:    /78   Pulse 67   Temp 97.3 °F (36.3 °C)   Ht 6' 2\" (1.88 m)   Wt 183 lb (83 kg)   SpO2 99%   BMI 23.50 kg/m²         An electronic signature was used to authenticate this note.   --Harrington Boxer, APRN - NP

## 2023-02-02 NOTE — CARE COORDINATION
[facility] Care Transitions Follow Up Call    Care Transition Nurse contacted the patient by telephone to follow up after admission on 23. Verified name and  with patient as identifiers. Patient: Jhonny Peña  Patient : 1950   MRN: [id number]  Reason for Admission: Pyogenic arthritis of left shoulder region, due to unspecified organism/ S/P debridement/wash out. Discharge Date: 23 RARS: Readmission Risk Score: 8.8      Needs to be reviewed by the provider   Additional needs identified to be addressed with provider: No  none             Method of communication with provider: none. Was able to contact Miguel for transitional outreach. He stated that he has \"good days and bad days\". He said that his pain was decreasing (4-6), swelling was going down some and he was not having any fever/chills. He said that his hand had some swelling still, but it was not as bad; tingling continues with position of arm and is able to do some light exercises. He said that the plan is to continue with the antibiotic for the 6 weeks, then be off the antibiotic for 6 weeks, lab work and if everything is fine, he will have the surgery. He will be seeing PCP today and ID next week. Home care continues for PICC . No further questions/concerns. Addressed changes since last contact:  none  Discussed follow-up appointments. If no appointment was previously scheduled, appointment scheduling offered: No.   Is follow up appointment scheduled within 7 days of discharge?  No.    Follow Up  Future Appointments   Date Time Provider Bari Cervantes   2023  3:45 PM BREANNA Cruz - G[facility]RIEL MONSIVAIS [facility]   2023 10:30 AM Kendall Herzog MD Resp Lucy [facility]   2023  3:30 PM Yissel Handy MD [facility]   3/1/2023  3:15 PM MD MALIK Kc [facility]   2023  9:15 AM MD MALIK Kc [facility]     Non-[facility] follow up appointment(s):     Care Transition Nurse reviewed medical action plan with patient and discussed any barriers to care and/or understanding of plan of care after discharge. Discussed appropriate site of care based on symptoms and resources available to patient including: PCP  Specialist  When to call 911. The patient agrees to contact the PCP office for questions related to their healthcare. Patients top risk factors for readmission: functional physical ability and medical condition-septic arthritis  Interventions to address risk factors: Obtained and reviewed discharge summary and/or continuity of care documents    Offered patient enrollment in the Remote Patient Monitoring (RPM) program for in-home monitoring: NA.     Care Transitions Subsequent and Final Call    Subsequent and Final Calls  Do you have any ongoing symptoms?: Yes  Onset of Patient-reported symptoms: In the past 7 days  Patient-reported symptoms: Pain  Have your medications changed?: No  Do you have any questions related to your medications?: No  Do you currently have any active services?: Yes  Are you currently active with any services?: Home Health  Do you have any needs or concerns that I can assist you with?: No  Care Transitions Interventions  Other Interventions:             Care Transition Nurse provided contact information for future needs. Plan for follow-up call in 7-10 days based on severity of symptoms and risk factors.   Plan for next call:  if doing well final outreach  follow up on IV antibiotics and any s/s of infection to shoulder    Billy Brody RN

## 2023-02-07 ENCOUNTER — TELEMEDICINE (OUTPATIENT)
Dept: PULMONOLOGY | Age: 73
End: 2023-02-07
Payer: MEDICARE

## 2023-02-07 ENCOUNTER — OFFICE VISIT (OUTPATIENT)
Dept: INFECTIOUS DISEASES | Age: 73
End: 2023-02-07
Payer: MEDICARE

## 2023-02-07 VITALS
RESPIRATION RATE: 15 BRPM | BODY MASS INDEX: 23.74 KG/M2 | TEMPERATURE: 97.4 F | HEIGHT: 74 IN | WEIGHT: 185 LBS | OXYGEN SATURATION: 98 % | DIASTOLIC BLOOD PRESSURE: 80 MMHG | SYSTOLIC BLOOD PRESSURE: 138 MMHG | HEART RATE: 101 BPM

## 2023-02-07 DIAGNOSIS — J43.2 CENTRILOBULAR EMPHYSEMA (HCC): ICD-10-CM

## 2023-02-07 DIAGNOSIS — M00.012 STAPHYLOCOCCAL ARTHRITIS OF LEFT SHOULDER (HCC): Primary | ICD-10-CM

## 2023-02-07 DIAGNOSIS — R91.8 MULTIPLE LUNG NODULES ON CT: ICD-10-CM

## 2023-02-07 DIAGNOSIS — J44.9 STAGE 1 MILD COPD BY GOLD CLASSIFICATION (HCC): Primary | ICD-10-CM

## 2023-02-07 DIAGNOSIS — Z87.891 PERSONAL HISTORY OF TOBACCO USE: ICD-10-CM

## 2023-02-07 PROCEDURE — 1036F TOBACCO NON-USER: CPT | Performed by: INTERNAL MEDICINE

## 2023-02-07 PROCEDURE — 1123F ACP DISCUSS/DSCN MKR DOCD: CPT | Performed by: INTERNAL MEDICINE

## 2023-02-07 PROCEDURE — 1111F DSCHRG MED/CURRENT MED MERGE: CPT | Performed by: INTERNAL MEDICINE

## 2023-02-07 PROCEDURE — 3017F COLORECTAL CA SCREEN DOC REV: CPT | Performed by: INTERNAL MEDICINE

## 2023-02-07 PROCEDURE — G8484 FLU IMMUNIZE NO ADMIN: HCPCS | Performed by: INTERNAL MEDICINE

## 2023-02-07 PROCEDURE — G8420 CALC BMI NORM PARAMETERS: HCPCS | Performed by: INTERNAL MEDICINE

## 2023-02-07 PROCEDURE — 99214 OFFICE O/P EST MOD 30 MIN: CPT | Performed by: INTERNAL MEDICINE

## 2023-02-07 PROCEDURE — 3075F SYST BP GE 130 - 139MM HG: CPT | Performed by: INTERNAL MEDICINE

## 2023-02-07 PROCEDURE — 3079F DIAST BP 80-89 MM HG: CPT | Performed by: INTERNAL MEDICINE

## 2023-02-07 PROCEDURE — 99213 OFFICE O/P EST LOW 20 MIN: CPT | Performed by: INTERNAL MEDICINE

## 2023-02-07 PROCEDURE — G8427 DOCREV CUR MEDS BY ELIG CLIN: HCPCS | Performed by: INTERNAL MEDICINE

## 2023-02-07 PROCEDURE — G8428 CUR MEDS NOT DOCUMENT: HCPCS | Performed by: INTERNAL MEDICINE

## 2023-02-07 ASSESSMENT — ENCOUNTER SYMPTOMS
RESPIRATORY NEGATIVE: 1
GASTROINTESTINAL NEGATIVE: 1

## 2023-02-07 NOTE — PROGRESS NOTES
[facility]  Office Progress Note  Today's Date and Time: 2/7/2023, 3:56 PM    Impression:     1.  Staphylococcal arthritis of left shoulder (HCC)         Recommendations   The patient underwent surgical debridement and is currently on intravenous antimicrobial therapy with Rocephin 2 g IV daily  He is tolerating the antibiotic therapy well and I would agree with extending his antibiotic therapy to complete a 6-week course of treatment through 2/28/2023  I will also order CRP, BMP, CBC weekly while on antibiotic therapy  The plan is to wait at least 6 weeks post antibiotic therapy before considering the reverse shoulder arthroplasty and I did discuss with the patient that the longer that he waits the better it is  The patient was here with his daughter and they had multiple questions which I answered to their satisfaction  Plan is for him to follow-up with me in 4 to 5 weeks and at this point in time he should be completed with antibiotic therapy    I have ordered the following medications/ labs:  Orders Placed This Encounter   Procedures    CBC     Standing Status:   Standing     Number of Occurrences:   4     Standing Expiration Date:   3/7/4404    Basic Metabolic Panel     Standing Status:   Standing     Number of Occurrences:   4     Standing Expiration Date:   2/7/2024    C-Reactive Protein     Standing Status:   Standing     Number of Occurrences:   4     Standing Expiration Date:   2/7/2024      Orders Placed This Encounter   Medications    cefTRIAXone (ROCEPHIN) infusion     Sig: Infuse 2,000 mg intravenously every 24 hours for 21 days Compound per protocol     Dispense:  42 g     Refill:  0       Chief complaint/reason for consultation:     Chief Complaint   Patient presents with    Frequent Infections    Follow-up        History of Present Illness:   Luis A Escobar is a 67y.o.-year-old male who has a history of COPD, hypertension, irritable bowel syndrome, 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 The patient comes in for follow-up and the summary from the prior evaluation:   The patient reports prior remote fall for which he was treated conservatively. Subsequently diagnosed with rotator cuff tear in the left shoulder . On 8-22-22 he had a left shoulder injection with dilute contrast pre-CT without incident. He experienced some pain after the arthrogram.     He then underwent a left shoulder arthroscopy on 10/25/2022. He was found to have severe degeneration involving the glenoid surface with diffuse grade 4 chronic malacia/glenohumeral joint osteoarthritis and a left shoulder massive irreparable full-thickness rotator cuff tear with diffuse synovitis for which he underwent extensive arthroscopic debridement and left shoulder manipulation     He was advised to undergo reverse shoulder arthroplasty. The patient had a left shoulder CT scan done 1/10/2023 and there was a significant change in his CT scan compared to 8/22/2022 which raised concern for the presence of septic joint/possibly osteomyelitis. The planned reverse total shoulder replacement was put on hold. An aspiration of the left shoulder join was attempted but no fluid was obtained. Patient is seen in the office today and he was admitted to [facility] seen by my partner Dr. Doris Bennett and on 17 2023 he was admitted for an I&D of the left shoulder and was found to have an abscess inferior to the teres minor and erosive degenerative changes of the humeral head as well as glenoid fossa and surgical cultures did grow out methicillin susceptible Staphylococcus aureus.     Patient did have a PICC line placed and was discharged on IV antimicrobial therapy currently receiving ceftriaxone 2 g IV daily and it is tentatively scheduled to complete on 2/14/2023    The patient is here for follow-up today is with his daughter and he is otherwise doing well does still report significant pain in the left shoulder especially when he moves it he feels that it \"snags\" and typically he has to drop his arm and he feels a pop and the pain does relieved somewhat. He does not report any subjective fevers or chills and he is tolerating the antimicrobial therapy well with no nausea vomiting or diarrhea. I have personally reviewedthe past medical history, medications, social history, and I have updated the database accordingly. Past Medical History:     Past Medical History:   Diagnosis Date    Bladder stone     BPH with obstruction/lower urinary tract symptoms     Caffeine use     4 coffee/2 soda per day    Cataracts, bilateral     Chronic back pain     COPD (chronic obstructive pulmonary disease) ([facility])     Emphysema of lung ([address]     Headache     History of blood transfusion 2010    s/p 10 hour back surgery, pt unsure but thinks he had transfusion?     Hypertension     Irritable bowel syndrome     Kidney stone     Neuropathy     feet    Osteoarthritis     Pancreatitis 1988    Type II or unspecified type diabetes mellitus without mention of complication, not stated as uncontrolled      Medications:     Current Outpatient Medications   Medication Sig Dispense Refill    cefTRIAXone (ROCEPHIN) infusion Infuse 2,000 mg intravenously every 24 hours for 21 days Compound per protocol 42 g 0    escitalopram (LEXAPRO) 5 MG tablet Take 1 tablet by mouth daily 30 tablet 1    Continuous Blood Gluc Sensor (FREESTYLE ALFONSO 2 SENSOR) MISC 1 each by Does not apply route every 14 days 1 each 0    Continuous Blood Gluc Sensor (FREESTYLE ALFONSO 2 SENSOR) MISC 1 each by Does not apply route every 14 days 1 each 0    metFORMIN (GLUCOPHAGE) 1000 MG tablet Take 1 tablet by mouth 2 times daily (with meals) 180 tablet 1    lisinopril (PRINIVIL;ZESTRIL) 10 MG tablet Take 1 tablet by mouth daily 90 tablet 1    chlorthalidone (HYGROTON) 25 MG tablet Take 1 tablet by mouth daily 90 tablet 1    pioglitazone (ACTOS) 30 MG tablet Take 1 tablet by mouth daily 90 tablet 1    DULoxetine (CYMBALTA) 30 MG extended release capsule Take 1 capsule by mouth 2 times daily 180 capsule 1    insulin detemir (LEVEMIR FLEXTOUCH) 100 UNIT/ML injection pen Inject 63 Units into the skin nightly (Patient taking differently: Inject 60 Units into the skin nightly) 20 Adjustable Dose Pre-filled Pen Syringe 1    celecoxib (CELEBREX) 200 MG capsule Take 1 capsule by mouth daily 90 capsule 1    gabapentin (NEURONTIN) 300 MG capsule Take 1 capsule by mouth 2 times daily for 180 days. Intended supply: 90 days 180 capsule 1    insulin aspart (NOVOLOG) 100 UNIT/ML injection pen Inject 2 Units into the skin 3 times daily (before meals) 0-150 = 0 units, 151-200 = 2 units, 201-250 = 4 units, 251-300 = 6 units, 301-350 = 8 units, 351-400 of greater = 10 units. Max dose 30units per day 3 Adjustable Dose Pre-filled Pen Syringe 5    ondansetron (ZOFRAN) 4 MG tablet Take 1 tablet by mouth daily as needed for Nausea or Vomiting 20 tablet 0    Handicap Placard MISC by Does not apply route Exp: 10/2027 2 each 0    Continuous Blood Gluc Sensor (FREESTYLE ALFONSO 2 SENSOR) MISC 1 applicator by Does not apply route every 14 days 1 each 5    Cholecalciferol (VITAMIN D3 PO) Take by mouth daily      blood glucose monitor strips Test 4 times a day & as needed for symptoms of irregular blood glucose. Dispense sufficient amount for indicated testing frequency plus additional to accommodate PRN testing needs. 300 strip 1    Insulin Pen Needle 31G X 5 MM MISC 1 each by Does not apply route 3 times daily 100 each 3    Lancets MISC Test twice a day and as needed, Please send lancets for Verio brand meter 180 each 2    multivitamin (THERAGRAN) per tablet Take 1 tablet by mouth daily.       albuterol sulfate HFA (PROVENTIL;VENTOLIN;PROAIR) 108 (90 Base) MCG/ACT inhaler Inhale 2 puffs into the lungs every 6 hours as needed for Wheezing or Shortness of Breath (or take 1-2 puffs 30 mins prior to strenous activity) 36 g 1     No current facility-administered medications for this visit. Allergies:   Iodine     Review of Systems:   Review of Systems   Constitutional: Negative. HENT: Negative. Respiratory: Negative. Cardiovascular: Negative. Gastrointestinal: Negative. Genitourinary: Negative. Musculoskeletal: Negative. Neurological: Negative. Psychiatric/Behavioral: Negative. Physical Examination :   /80 (Site: Right Lower Arm, Position: Sitting, Cuff Size: Large Adult)   Pulse (!) 101   Temp 97.4 °F (36.3 °C) (Temporal)   Resp 15   Ht 6' 2\" (1.88 m)   Wt 185 lb (83.9 kg)   SpO2 98%   BMI 23.75 kg/m²     Physical Exam  Constitutional:       Appearance: He is well-developed. HENT:      Head: Normocephalic and atraumatic. Cardiovascular:      Rate and Rhythm: Normal rate. Heart sounds: Normal heart sounds. No friction rub. No gallop. Pulmonary:      Effort: Pulmonary effort is normal.      Breath sounds: Normal breath sounds. No wheezing. Abdominal:      General: Bowel sounds are normal.      Palpations: Abdomen is soft. There is no mass. Tenderness: There is no abdominal tenderness. Musculoskeletal:      Cervical back: Normal range of motion and neck supple. Comments: Patient has decreased range of motion in the left shoulder and has pain with palpation but there is no erythema, soft tissue swelling noted. Lymphadenopathy:      Cervical: No cervical adenopathy. Skin:     General: Skin is warm and dry. Comments: Right upper extremity PICC line in place   Neurological:      Mental Status: He is alert and oriented to person, place, and time.        Laboratory studies :  Medical Decision Making:   I have independently reviewed the following labs:  CBC with Differential:  Lab Results   Component Value Date/Time    WBC 13.4 01/18/2023 05:57 AM    WBC 13.0 01/11/2023 11:55 AM    HGB 8.8 01/18/2023 05:57 AM    HGB 11.7 01/11/2023 11:55 AM    HCT 28.5 01/18/2023 05:57 AM    HCT 38.4 01/11/2023 11:55 AM     01/18/2023 05:57 AM     01/11/2023 11:55 AM    LYMPHOPCT 10 01/18/2023 05:57 AM    LYMPHOPCT 14 09/15/2022 02:00 PM    MONOPCT 10 01/18/2023 05:57 AM    MONOPCT 10 09/15/2022 02:00 PM       BMP:  Lab Results   Component Value Date/Time     01/20/2023 09:22 AM     01/19/2023 05:09 AM    K 3.6 01/20/2023 09:22 AM    K 3.6 01/19/2023 05:09 AM     01/20/2023 09:22 AM     01/19/2023 05:09 AM    CO2 27 01/20/2023 09:22 AM    CO2 27 01/19/2023 05:09 AM    BUN 12 01/20/2023 09:22 AM    BUN 17 01/19/2023 05:09 AM    CREATININE 0.71 01/20/2023 09:22 AM    CREATININE 0.69 01/19/2023 05:09 AM    MG 1.8 04/23/2022 06:04 AM    MG 1.7 04/22/2022 06:30 PM       Hepatic Function Panel:   Lab Results   Component Value Date/Time    PROT 7.6 09/15/2022 02:00 PM    PROT 6.9 04/22/2022 06:30 PM    LABALBU 3.3 09/15/2022 02:00 PM    LABALBU 3.7 04/22/2022 06:30 PM    BILITOT 0.3 09/15/2022 02:00 PM    BILITOT 0.52 04/22/2022 06:30 PM    ALKPHOS 69 09/15/2022 02:00 PM    ALKPHOS 56 04/22/2022 06:30 PM    ALT 18 09/15/2022 02:00 PM    ALT 15 04/22/2022 06:30 PM    AST 15 09/15/2022 02:00 PM    AST 14 04/22/2022 06:30 PM       Lab Results   Component Value Date/Time    CRP 35.9 01/11/2023 11:55 AM    .3 08/29/2022 09:59 AM     No results found for: SEDRATE      Imaging Studies:   No new imaging    Cultures:     Component 1/17/23 2229    Specimen Description . SHOULDER LEFT    Direct Exam FEW NEUTROPHILS Abnormal     Direct Exam FEW GRAM POSITIVE COCCI IN CLUSTERS Abnormal     Culture STAPHYLOCOCCUS AUREUS MODERATE GROWTH This isolate is methicillin susceptible. Abnormal     Culture No anaerobic organisms isolated at 5 days.     Resulting Agency [employer] [facility] [employer]    Staphylococcus aureus (1)    Antibiotic Interpretation Microscan Method Status    penicillin Resistant >=0.5 BACTERIAL SUSCEPTIBILITY PANEL VAHID Final    clindamycin Sensitive <=0.25 BACTERIAL SUSCEPTIBILITY PANEL VAHID Final    erythromycin Sensitive <=0.25 BACTERIAL SUSCEPTIBILITY PANEL VAHID Final    gentamicin Sensitive <=0.5 BACTERIAL SUSCEPTIBILITY PANEL VAHID Final     Gentamicin is used only in combination with other active agents that test susceptible. levofloxacin Sensitive 0.25 BACTERIAL SUSCEPTIBILITY PANEL VAHID Final    oxacillin Sensitive 0.5 BACTERIAL SUSCEPTIBILITY PANEL VAHID Final    tetracycline Sensitive <=1 BACTERIAL SUSCEPTIBILITY PANEL VAHID Final    trimethoprim-sulfamethoxazole Sensitive <=10 BACTERIAL SUSCEPTIBILITY PANEL VAHID Final          Specimen Collected: 01/17/23 22:29 EST Last Resulted: 01/20/23 12:17 EST               Thank you for allowing us to participate in the care of this patient. Pleasecall with questions. Hipolito Colon MD  [employer] messaging: [phone]    This note is created with the assistance of a speech recognition program.  While intending to generate a document that actually reflects the content ofthe visit, the document can still have some errors including those of syntax and sound a like substitutions which may escape proof reading. It such instances, actual meaning can be extrapolated by contextual diversion.

## 2023-02-08 ENCOUNTER — HOSPITAL ENCOUNTER (OUTPATIENT)
Age: 73
Setting detail: SPECIMEN
Discharge: HOME | End: 2023-02-08

## 2023-02-08 ENCOUNTER — CARE COORDINATION (OUTPATIENT)
Dept: CASE MANAGEMENT | Age: 73
End: 2023-02-08

## 2023-02-08 DIAGNOSIS — M00.012 STAPHYLOCOCCAL ARTHRITIS OF LEFT SHOULDER (HCC): ICD-10-CM

## 2023-02-08 DIAGNOSIS — Z79.4 TYPE 2 DIABETES MELLITUS WITH DIABETIC POLYNEUROPATHY, WITH LONG-TERM CURRENT USE OF INSULIN (HCC): ICD-10-CM

## 2023-02-08 DIAGNOSIS — E11.42 TYPE 2 DIABETES MELLITUS WITH DIABETIC POLYNEUROPATHY, WITH LONG-TERM CURRENT USE OF INSULIN (HCC): ICD-10-CM

## 2023-02-08 LAB
ANION GAP SERPL CALCULATED.3IONS-SCNC: 13 MMOL/L (ref 9–17)
BUN SERPL-MCNC: 35 MG/DL (ref 8–23)
CALCIUM SERPL-MCNC: 9.4 MG/DL (ref 8.6–10.4)
CHLORIDE SERPL-SCNC: 99 MMOL/L (ref 98–107)
CO2 SERPL-SCNC: 27 MMOL/L (ref 20–31)
CREAT SERPL-MCNC: 0.73 MG/DL (ref 0.7–1.2)
CRP SERPL HS-MCNC: <3 MG/L (ref 0–5)
GFR SERPL CREATININE-BSD FRML MDRD: >60 ML/MIN/1.73M2
GLUCOSE SERPL-MCNC: 123 MG/DL (ref 70–99)
HCT VFR BLD AUTO: 38.8 % (ref 40.7–50.3)
HGB BLD-MCNC: 12.1 G/DL (ref 13–17)
MCH RBC QN AUTO: 27.4 PG (ref 25.2–33.5)
MCHC RBC AUTO-ENTMCNC: 31.2 G/DL (ref 28.4–34.8)
MCV RBC AUTO: 87.8 FL (ref 82.6–102.9)
NRBC AUTOMATED: 0 PER 100 WBC
PDW BLD-RTO: 17.8 % (ref 11.8–14.4)
PLATELET # BLD AUTO: 244 K/UL (ref 138–453)
PMV BLD AUTO: 11.5 FL (ref 8.1–13.5)
POTASSIUM SERPL-SCNC: 4.4 MMOL/L (ref 3.7–5.3)
RBC # BLD: 4.42 M/UL (ref 4.21–5.77)
SODIUM SERPL-SCNC: 139 MMOL/L (ref 135–144)
WBC # BLD AUTO: 7.5 K/UL (ref 3.5–11.3)

## 2023-02-08 ASSESSMENT — ENCOUNTER SYMPTOMS: GASTROINTESTINAL NEGATIVE: 1

## 2023-02-08 NOTE — CARE COORDINATION
[facility] Care Transitions Follow Up Call    Patient Current Location:  Home: [address] [place] [address] Dr Hetal Harrison [id number]    Care Transition Nurse contacted the patient by telephone to follow up after admission on 23. Verified name and  with patient as identifiers. Patient: Yessi Herrera  Patient : 1950   MRN: [id number]  Reason for Admission: Pyogenic arthritis of left shoulder region, due to unspecified organism/ S/P debridement/wash out. Discharge Date: 23 RARS: Readmission Risk Score: 8.8      Needs to be reviewed by the provider   Additional needs identified to be addressed with provider: No  none             Method of communication with provider: none. Was able to contact Miguel for final outreach. He stated that everything is about the same with his shoulder. He did go see the ID physician and weekly lab work was ordered. ID still wanted to continue with the IV antibiotic until the end of Feb and Micki Manjarrez is hoping to have surgery in May/ if tests and labs are ok. He did see his PCP and was started on Lexapro. He did say that after his shower, he went to cut the cellophane from his arm and he accidentally cut the extension tubing on his PICC line. He replaced the tubing. He had no other concerns or questions. Informed of final outreach. Addressed changes since last contact:  none  Discussed follow-up appointments. If no appointment was previously scheduled, appointment scheduling offered: No.   Is follow up appointment scheduled within 7 days of discharge?  No.    Follow Up  Future Appointments   Date Time Provider Bari Cervantes   3/1/2023  3:15 PM Jayant Mcnamara MD [facility]   3/7/2023  2:30 PM Sylvie Kinney MD [facility]   3/9/2023 11:45 AM Celestino Grande APRN - NP [facility]   2023  9:15 AM Ceasar Sahni MD [facility]   2024  2:30 PM [facility] [place] [facility] RM 2 [facility] CT [facility] [place]   2024 10:30 AM Jody Mason MD Resp [place] [facility]     [facility] follow up appointment(s):     Care Transition Nurse reviewed medical action plan and red flags with patient and discussed any barriers to care and/or understanding of plan of care after discharge. Discussed appropriate site of care based on symptoms and resources available to patient including: PCP  Specialist  Home health  When to call 911. The patient agrees to contact the PCP office for questions related to their healthcare. Patients top risk factors for readmission: functional physical ability and medical condition-septic arthritis  Interventions to address risk factors: Obtained and reviewed discharge summary and/or continuity of care documents    Offered patient enrollment in the Remote Patient Monitoring (RPM) program for in-home monitoring: NA.     Care Transitions Subsequent and Final Call    Subsequent and Final Calls  Do you have any ongoing symptoms?: No  Have your medications changed?: Yes  Patient Reports: added Lexapro  Do you have any questions related to your medications?: No  Do you currently have any active services?: Yes  Are you currently active with any services?: Home Health  Do you have any needs or concerns that I can assist you with?: No  Care Transitions Interventions  Other Interventions:             Care Transition Nurse provided contact information for future needs. No further follow-up call indicated based on severity of symptoms and risk factors. Plan for next call:  final call episode ended.     Scott Springer RN

## 2023-02-08 NOTE — PROGRESS NOTES
2023    TELEHEALTH EVALUATION -- Audio/Visual     Patient and physician are located in their individual locations. This is visit is completed via [employer] application / [employer] / Telephone     Chief Complaint   Patient presents with    COPD    Follow-up        HPI:    Luis A Sierra (:  1950) has requested an audio/video evaluation for the following concern(s):    Patient is doing well shortness of breath is stable with exertion he did not have any hospital visits for COPD exacerbation or need steroids. Sputum is nonpurulent no hemoptysis. Review of Systems   Constitutional: Negative. HENT: Negative. Cardiovascular: Negative. Gastrointestinal: Negative.      LUNG CANCER SCREENING     CRITERIA MET    [x]     CT ORDERED  []      CRITERIA NOT MET   []      REFUSED                    []        REASON CRITERIA NOT MET     SMOKING LESS THAN 30 PY  []      AGE LESS THAN 55 or GREATER 77 YEARS  []      QUIT SMOKING 15 YEARS OR GREATER   []      RECENT CT WITH IN 11 MONTHS    []      LIFE EXPECTANCY < 5 YEARS   []      SIGNS  AND SYMPTOMS OF LUNG CANCER   []         Immunization   Immunization History   Administered Date(s) Administered    COVID-19, MODERNA BLUE border, Primary or Immunocompromised, (age 12y+), IM, 100 mcg/0.5mL 2021, 2021, 2022    Influenza 2012, 10/09/2013    Influenza Vaccine, unspecified formulation 2012, 10/09/2013    Influenza Virus Vaccine 2012, 10/09/2013, 10/09/2014, 2015, 10/07/2016, 2017, 10/29/2018    Influenza, AFLURIA (age 1 yrs+), FLUZONE, (age 10 mo+), MDV, 0.5mL 10/07/2016, 2017, 10/29/2018    Influenza, FLUAD, (age 72 y+), Adjuvanted, 0.5mL 2020, 2022, 10/04/2022    Influenza, FLUARIX, FLULAVAL, FLUZONE (age 10 mo+) AND AFLURIA, (age 1 y+), PF, 0.5mL 2019    Pneumococcal Conjugate 13-valent ([id number]) 10/07/2016    Pneumococcal Polysaccharide ([id number]) 2015    Td, unspecified formulation 09/06/2016    Zoster Live (Zostavax) 02/10/2014        Pneumococcal Vaccine     [] Up to date    [] Indicated   [] Refused  [] Contraindicated       Influenza Vaccine   [] Up to date    [] Indicated   [] Refused  [] Contraindicated        Pulmonary Rehab   [] Completed   [] Indicated   [] Refused  [] Contraindicated   PAST MEDICAL HISTORY:       Diagnosis Date    Bladder stone     BPH with obstruction/lower urinary tract symptoms     Caffeine use     4 coffee/2 soda per day    Cataracts, bilateral     Chronic back pain     COPD (chronic obstructive pulmonary disease) ([facility]     Emphysema of lung ([facility]     Headache     History of blood transfusion 2010    s/p 10 hour back surgery, pt unsure but thinks he had transfusion?     Hypertension     Irritable bowel syndrome     Kidney stone     Neuropathy     feet    Osteoarthritis     Pancreatitis 1988    Type II or unspecified type diabetes mellitus without mention of complication, not stated as uncontrolled          Family History:       Problem Relation Age of Onset    Arthritis Mother     Cancer Father         Dead       SURGICAL HISTORY:   Past Surgical History:   Procedure Laterality Date    ARM SURGERY Left 1/17/2023    LEFT SHOULDER ABSCESS  INCISION AND DRAINAGE performed by Estuardo Vigil MD at [facility] [address]      COLONOSCOPY      6-7 polyps    CYSTOSCOPY  04/06/2022    CYSTOSCOPY URETEROSCOPY HOLMIUM LASER LEFT URETERAL STENT INSERTION - Left    CYSTOSCOPY Left 04/06/2022    CYSTOSCOPY URETEROSCOPY HOLMIUM LASER LEFT URETERAL STENT INSERTION performed by John Mcguire MD at [address]  04/20/2022    CYSTOSCOPY URETEROSCOPY LEFT STENT PLACEMENT WITH STONE BASKETING AND HOLMIUM LASER (    CYSTOSCOPY Left 04/20/2022    CYSTOSCOPY URETEROSCOPY LEFT STENT PLACEMENT WITH STONE BASKETING AND HOLMIUM LASER performed by John Mcguire MD at [address] [facility] SURGERY  2015    macular scrape    EYE SURGERY      LEG TENDON SURGERY      ankle    LITHOTRIPSY      17    OTHER SURGICAL HISTORY      implanted nerve stimulater in lumbar back    SHOULDER ARTHROSCOPY Left 10/25/2022    LEFT SHOULDER ARTHROSCOPY WITH EXTENSIVE DEBRIDMENT AND PRE-OPERATIVE INTERSCALENE BLOCK WITH EXPAREL (Left: Shoulder)    SHOULDER ARTHROSCOPY Left 10/25/2022    LEFT SHOULDER ARTHROSCOPY WITH EXTENSIVE DEBRIDMENT AND PRE-OPERATIVE INTERSCALENE BLOCK WITH EXPAREL performed by Katie Jha MD at [facility] AR[facility]ROS[facility]  2023    LEFT SHOULDER ABSCESS  INCISION AND DRAINAGE    SHOULDER ARTHROSCOPY Left 2023    LEFT SHOULDER ARTHROSCOPY performed by Katie Jha MD at [facility] [address]  2010    DR. Bharati Carrasquillo (NS) fusion and implantation of hardware    TONSILLECTOMY      URETEROSCOPY      with string stent 17    VASECTOMY             SOCIAL AND OCCUPATIONAL HEALTH:       Social History     Socioeconomic History    Marital status:      Spouse name: None    Number of children: None    Years of education: None    Highest education level: None   Tobacco Use    Smoking status: Former     Packs/day: 0.00     Years: 60.00     Pack years: 0.00     Types: Cigarettes     Start date: 3/3/1966     Quit date: 7/15/2022     Years since quittin.5    Smokeless tobacco: Former    Tobacco comments:     From  to date; I was a non smoker for 7 to 10 years   Vaping Use    Vaping Use: Former    Quit date: 3/17/2019   Substance and Sexual Activity    Alcohol use:  Yes     Alcohol/week: 1.0 standard drink     Types: 1 Cans of beer per week     Comment: Ma be one bottle per monthk    Drug use: No    Sexual activity: Not Currently     Partners: Female     Social Determinants of Health     Financial Resource Strain: Low Risk     Difficulty of Paying Living Expenses: Not hard at all   Food Insecurity: No Food Insecurity    Worried About Running Out of Food in the Last Year: Never true    Ran Out of Food in the Last Year: Never true   Transportation Needs: Unknown    Lack of Transportation (Non-Medical): No   Housing Stability: Unknown    Unstable Housing in the Last Year: No        TOBACCO:   reports that he quit smoking about 6 months ago. His smoking use included cigarettes. He started smoking about 56 years ago. He has quit using smokeless tobacco.  ETOH:   reports current alcohol use of about 1.0 standard drink per week. ALLERGIES:      Allergies   Allergen Reactions    Iodine Other (See Comments)     Possible reaction to arthrogram dye pt suspected- severe pain and swelling to shoulder-arm         Home Meds:   Prior to Admission medications    Medication Sig Start Date End Date Taking?  Authorizing Provider   escitalopram (LEXAPRO) 5 MG tablet Take 1 tablet by mouth daily 2/2/23 2/2/24 Yes Savannah Grief, APRN - NP   Continuous Blood Gluc Sensor (FREESTYLE ALFONSO 2 SENSOR) MISC 1 each by Does not apply route every 14 days 11/20/22  Yes Elenita Grief, APRN - NP   Continuous Blood Gluc Sensor (FREESTYLE ALFONSO 2 SENSOR) MISC 1 each by Does not apply route every 14 days 11/20/22  Yes Elenita Grief, APRN - NP   metFORMIN (GLUCOPHAGE) 1000 MG tablet Take 1 tablet by mouth 2 times daily (with meals) 10/25/22 10/25/23 Yes Elenita Grief, APRN - NP   lisinopril (PRINIVIL;ZESTRIL) 10 MG tablet Take 1 tablet by mouth daily 10/25/22 10/25/23 Yes Savannah Grief, APRN - NP   chlorthalidone (HYGROTON) 25 MG tablet Take 1 tablet by mouth daily 10/25/22 10/25/23 Yes Savannah Grief, APRN - NP   pioglitazone (ACTOS) 30 MG tablet Take 1 tablet by mouth daily 10/25/22 10/20/23 Yes Savannah Grief, APRN - NP   DULoxetine (CYMBALTA) 30 MG extended release capsule Take 1 capsule by mouth 2 times daily 10/25/22 10/25/23 Yes Elenita Grief, APRN - NP   insulin detemir (LEVEMIR FLEXTOUCH) 100 UNIT/ML injection pen Inject 63 Units into the skin nightly  Patient taking differently: Inject 60 Units into the skin nightly 10/25/22 10/25/23 Yes BREANNA Wright NP   celecoxib (CELEBREX) 200 MG capsule Take 1 capsule by mouth daily 10/25/22 10/20/23 Yes BREANNA Wright NP   gabapentin (NEURONTIN) 300 MG capsule Take 1 capsule by mouth 2 times daily for 180 days. Intended supply: 90 days 10/25/22 4/23/23 Yes BREANNA Wright NP   insulin aspart (NOVOLOG) 100 UNIT/ML injection pen Inject 2 Units into the skin 3 times daily (before meals) 0-150 = 0 units, 151-200 = 2 units, 201-250 = 4 units, 251-300 = 6 units, 301-350 = 8 units, 351-400 of greater = 10 units. Max dose 30units per day 10/25/22 10/25/23 Yes BREANNA Wright NP   ondansetron (ZOFRAN) 4 MG tablet Take 1 tablet by mouth daily as needed for Nausea or Vomiting 10/21/22  Yes MD Radha Mccall MISC by Does not apply route Exp: 10/2027 10/21/22  Yes BREANNA Wright NP   Continuous Blood Gluc Sensor (FREESTYLE ALFONSO 2 SENSOR) MISC 1 applicator by Does not apply route every 14 days 10/13/22 10/13/23 Yes BREANNA Wright NP   Cholecalciferol (VITAMIN D3 PO) Take by mouth daily   Yes Historical Provider, MD   blood glucose monitor strips Test 4 times a day & as needed for symptoms of irregular blood glucose. Dispense sufficient amount for indicated testing frequency plus additional to accommodate PRN testing needs. 10/4/22  Yes BREANNA Wright NP   Insulin Pen Needle 31G X 5 MM MISC 1 each by Does not apply route 3 times daily 10/4/22 10/4/23 Yes BREANNA Wright NP   Lancets MISC Test twice a day and as needed, Please send lancets for Verio brand meter 1/4/16 9/2/23 Yes BREANNA Otoole CNP   multivitamin [facility]) per tablet Take 1 tablet by mouth daily.    Yes Historical Provider, MD   cefTRIAXone (ROCEPHIN) infusion Infuse 2,000 mg intravenously every 24 hours for 21 days Compound per protocol 2/7/23 2/28/23  Martha Kilpatrick MD   albuterol sulfate HFA (PROVENTIL;VENTOLIN;PROAIR) 108 (90 Base) MCG/ACT inhaler Inhale 2 puffs into the lungs every 6 hours as needed for Wheezing or Shortness of Breath (or take 1-2 puffs 30 mins prior to strenous activity) 10/4/22 1/24/23  Viveca [facility], APRN - NP              Wt Readings from Last 3 Encounters:   23 185 lb (83.9 kg)   23 183 lb (83 kg)   23 190 lb (86.2 kg)               CT Lung Screen (Annual)    Result Date: 2023  EXAMINATION: LOW DOSE SCREENING CT OF THE CHEST WITHOUT CONTRAST 2023 1:22 pm TECHNIQUE: Low dose lung cancer screening CT of the chest was performed without the administration of intravenous contrast.  Multiplanar reformatted images are provided for review. Automated exposure control, iterative reconstruction, and/or weight based adjustment of the mA/kV was utilized to reduce the radiation dose to as low as reasonably achievable. Field-of-view: 32 cm Dose Length Product: 79.72 mGy CTDlvol: 2.03 mGy COMPARISON: 2022 HISTORY: Screening. Patient Age: 66 y/o Number of pack years of smokin pack years If no longer smoking, number of years since cessation:  Current, everyday smoker Other symptoms:  None. Additional history: ORDERING SYSTEM PROVIDED HISTORY: Personal history of tobacco use TECHNOLOGIST PROVIDED HISTORY: Age: Patient is 67 y.o. Smoking History: Social History Tobacco Use Smoking status: Current Every Day Smoker Packs/day: 1.00 Years: 40.00 Pack years: 40 Types: Cigarettes Start date: 3/3/1980 Smokeless tobacco: Former User Tobacco comment: now smoking 1-3 cigarettes on and off Vaping Use Vaping Use: Former Quit date: 3/17/2019 Alcohol use: Not Currently Comment: occasional Drug use: No Pack years: 40 Date of last lung cancer screenin2022 Is there documentation of shared decision making? ->Yes Is this a low dose CT or a routine CT?->Low Dose CT Is this the first (baseline) CT or an annual exam?->Annual Does the patient show any signs or symptoms of lung cancer? ->No Smoking Status? ->Every Day Smoking packs per day?->1 Years smoking?->40 Reason for Exam: personal hx of tobacco use FINDINGS: Mediastinum:  Visualized thyroid gland grossly unremarkable in appearance. Atherosclerotic calcification of the aorta and branch vasculature. Coronary artery disease. No pericardial or pleural effusions. No periaortic or mediastinal hemorrhage. No axillary, mediastinal, or hilar lymphadenopathy. Lungs/Pleura:  Trachea and proximal central airways appear patent. Moderate emphysema. No pneumothorax. Mild dependent atelectasis and respiratory motion. Scarring and pulmonary nodule in the anterior left upper lobe measuring 4 mm on image 63, series 602. This is also well seen on image 57, series 3. Stable 2 mm right major fissure pulmonary nodule on image 71, series 3.  2 mm stable pulmonary nodule in the right minor fissure on image 73, series 3. Stable 4 mm subpleural left lower lobe pulmonary nodule on image 88, series 3. Upper Abdomen:  Atherosclerotic calcification of the aorta and branch vasculature. Mild colonic diverticulosis. Stable probable renal cyst at the upper pole left kidney on image 131, series 2. Soft Tissues/Bones: Mild diffuse degenerative changes throughout the spine. Spinal fusion hardware at the thoracolumbar junction. Spinal stimulator device with lead wires in the lower thoracic spine. 1. Stable pulmonary nodules throughout the lungs measuring between 2 and 4 mm. Moderate emphysema. Mild dependent atelectasis and respiratory motion. 2. Atherosclerotic calcification of the aorta and branch vasculature. Coronary artery disease. 3. Mild colonic diverticulosis. 4. Mild dependent atelectasis and respiratory motion. LUNG RADS: Per ACR Lung-RADS Version 1.1 Category 2, Benign appearance or behavior. Management:  Continue annual lung screening with LDCT in 12 months.  RECOMMENDATIONS: If you would like to register your patient with the [facility] Lung Nodule/Lung Cancer Screening Program, please contact the Nurse Navigator at [phone]). PHYSICAL EXAMINATION:  Due to this being a TeleHealth encounter, evaluation of the following organ systems is limited: Vitals/Constitutional/EENT/Resp/CV/GI//MS/Neuro/Skin/Heme-Lymph-Imm. Constitutional: [x] Appears well-developed and well-nourished. [] Abnormal  Mental status  [x] Alert and awake  [x] Oriented to person/place/time [x]Able to follow commands    [x] No apparent distress      Eyes:  EOM    [x]  Normal  [] Abnormal-  Sclera  [x]  Normal  [] Abnormal -         Discharge [x]  None visible  [] Abnormal -    HENT:   [x] Normocephalic, atraumatic. [] Abnormal shaped head   [x] Mouth/Throat: Mucous membranes are moist.     Ears [x] Normal  [] Abnormal-    Neck: [x] Normal range of motion [x] Supple [x] No visualized mass. Pulmonary/Chest: [x] Respiratory effort normal.  [x] No visualized signs of difficulty breathing or respiratory distress        [] Abnormal      Musculoskeletal:   [x] Normal range of motion. [x] Normal gait with no signs of ataxia. [x]  No signs of cyanosis of the peripheral portions of extremities. [] Abnormal       Neurological:        [x] Normal cranial nerve (limited exam to video visit) [x] No focal weakness observed       [] Abnormal          Speech       [x] Normal   [] Abnormal               ASSESSMENT:   Diagnosis Orders   1. Stage 1 mild COPD by GOLD classification ([address]        2. Centrilobular emphysema (HCC) mild         3. Multiple lung nodules on CT 4 mm        4. Personal history of tobacco use  CT LUNG SCREENING            Plan:   Reviewed CT lung screening with patient and his daughter  Patient does not feel he needs long-acting bronchodilator and wants to use albuterol as needed.   I discussed with the daughter-if patient is using albuterol on a regular basis daily then will benefit from adding long-acting bronchodilator in the form of Spiriva or Incruse. Follow-up in 1 year with CT lung screening. Requested Prescriptions      No prescriptions requested or ordered in this encounter       There are no discontinued medications. Gene received counseling on the following healthy behaviors: nutrition, exercise and medication adherence    Patient given educational materials : see patient instruction       Discussed use, benefit, and side effects of prescribed medications. Barriers to medication compliance addressed. All patient questions answered. Pt voiced understanding. I will  An  electronic signature was used to authenticate this note. --Dario Garcia MD on 2/8/2023 at 3:20 PM      Please note that this chart was generated using voice recognition Dragon dictation software. Although every effort was made to ensure the accuracy of this automated transcription, some errors in transcription may have occurred. Luis A Pierre , was evaluated through a synchronous (real-time) audio-video encounter. The patient (or guardian if applicable) is aware that this is a billable service, which includes applicable co-pays. This Virtual Visit was conducted with patient's (and/or legal guardian's) consent. The visit was conducted pursuant to the emergency declaration under the [facility], [address] [place] [address] waiver authority and the [employer] General Act. Patient identification was verified, and a caregiver was present when appropriate. The patient was located in a state where the provider was licensed to provide care.

## 2023-02-09 LAB
EST. AVERAGE GLUCOSE BLD GHB EST-MCNC: 123 MG/DL
HBA1C MFR BLD: 5.9 % (ref 4–6)

## 2023-02-10 ENCOUNTER — TELEPHONE (OUTPATIENT)
Dept: FAMILY MEDICINE CLINIC | Age: 73
End: 2023-02-10

## 2023-02-10 NOTE — TELEPHONE ENCOUNTER
----- Message from Lida Peterson sent at 2/10/2023  2:44 PM EST -----  Subject: Message to Provider    QUESTIONS  Information for Provider? please fax over the script signed and patient   chart notes signed and fax back to Saira from [facility] from diabetic shoes,   please fax information to them Fax #[phone]  ---------------------------------------------------------------------------  --------------  [address]  [phone]; OK to leave message on voicemail  ---------------------------------------------------------------------------  --------------  SCRIPT ANSWERS  Relationship to Patient? Third Party  Third Party Type? Durable Medical Equipment? Representative Name?  Irvin Garcia

## 2023-02-10 NOTE — TELEPHONE ENCOUNTER
Clinical Staff: Can we check on status of paperwork?  Diabetic shoe will need signed off by Dr. Marielena Francois

## 2023-02-14 NOTE — TELEPHONE ENCOUNTER
Form has been signed by Alexandra Esquivel, been trying to fax for a week not going through. Needs Dr SHORT signature as well placed in folder for her to sign.

## 2023-02-15 ENCOUNTER — HOSPITAL ENCOUNTER (OUTPATIENT)
Age: 73
Setting detail: SPECIMEN
Discharge: HOME | End: 2023-02-15

## 2023-02-15 ENCOUNTER — PATIENT MESSAGE (OUTPATIENT)
Dept: FAMILY MEDICINE CLINIC | Age: 73
End: 2023-02-15

## 2023-02-15 DIAGNOSIS — Z13.220 LIPID SCREENING: ICD-10-CM

## 2023-02-15 DIAGNOSIS — Z79.4 TYPE 2 DIABETES MELLITUS WITH DIABETIC POLYNEUROPATHY, WITH LONG-TERM CURRENT USE OF INSULIN (HCC): ICD-10-CM

## 2023-02-15 DIAGNOSIS — E11.42 TYPE 2 DIABETES MELLITUS WITH DIABETIC POLYNEUROPATHY, WITH LONG-TERM CURRENT USE OF INSULIN (HCC): ICD-10-CM

## 2023-02-15 DIAGNOSIS — M00.012 STAPHYLOCOCCAL ARTHRITIS OF LEFT SHOULDER (HCC): ICD-10-CM

## 2023-02-15 LAB
ANION GAP SERPL CALCULATED.3IONS-SCNC: 22 MMOL/L (ref 9–17)
BUN SERPL-MCNC: 43 MG/DL (ref 8–23)
CALCIUM SERPL-MCNC: 10 MG/DL (ref 8.6–10.4)
CHLORIDE SERPL-SCNC: 106 MMOL/L (ref 98–107)
CHOLEST SERPL-MCNC: 224 MG/DL
CHOLESTEROL/HDL RATIO: 3.8
CO2 SERPL-SCNC: 19 MMOL/L (ref 20–31)
CREAT SERPL-MCNC: 0.94 MG/DL (ref 0.7–1.2)
CREATININE URINE: 102.4 MG/DL (ref 39–259)
CRP SERPL HS-MCNC: <3 MG/L (ref 0–5)
GFR SERPL CREATININE-BSD FRML MDRD: >60 ML/MIN/1.73M2
GLUCOSE SERPL-MCNC: 96 MG/DL (ref 70–99)
HCT VFR BLD AUTO: 40.1 % (ref 40.7–50.3)
HDLC SERPL-MCNC: 59 MG/DL
HGB BLD-MCNC: 12.2 G/DL (ref 13–17)
LDLC SERPL CALC-MCNC: 146 MG/DL (ref 0–130)
MCH RBC QN AUTO: 27.4 PG (ref 25.2–33.5)
MCHC RBC AUTO-ENTMCNC: 30.4 G/DL (ref 28.4–34.8)
MCV RBC AUTO: 89.9 FL (ref 82.6–102.9)
MICROALBUMIN/CREAT 24H UR: <12 MG/L
MICROALBUMIN/CREAT UR-RTO: NORMAL MCG/MG CREAT
NRBC AUTOMATED: 0 PER 100 WBC
PDW BLD-RTO: 17.7 % (ref 11.8–14.4)
PLATELET # BLD AUTO: 221 K/UL (ref 138–453)
PMV BLD AUTO: 11.4 FL (ref 8.1–13.5)
POTASSIUM SERPL-SCNC: 4.4 MMOL/L (ref 3.7–5.3)
RBC # BLD: 4.46 M/UL (ref 4.21–5.77)
SODIUM SERPL-SCNC: 147 MMOL/L (ref 135–144)
TRIGL SERPL-MCNC: 96 MG/DL
WBC # BLD AUTO: 8 K/UL (ref 3.5–11.3)

## 2023-02-22 ENCOUNTER — HOSPITAL ENCOUNTER (OUTPATIENT)
Age: 73
Setting detail: SPECIMEN
Discharge: HOME | End: 2023-02-22

## 2023-02-22 ENCOUNTER — TELEPHONE (OUTPATIENT)
Dept: FAMILY MEDICINE CLINIC | Age: 73
End: 2023-02-22

## 2023-02-22 DIAGNOSIS — M00.012 STAPHYLOCOCCAL ARTHRITIS OF LEFT SHOULDER (HCC): ICD-10-CM

## 2023-02-22 LAB
ANION GAP SERPL CALCULATED.3IONS-SCNC: 14 MMOL/L (ref 9–17)
BUN SERPL-MCNC: 38 MG/DL (ref 8–23)
CALCIUM SERPL-MCNC: 9.8 MG/DL (ref 8.6–10.4)
CHLORIDE SERPL-SCNC: 102 MMOL/L (ref 98–107)
CO2 SERPL-SCNC: 26 MMOL/L (ref 20–31)
CREAT SERPL-MCNC: 0.93 MG/DL (ref 0.7–1.2)
CRP SERPL HS-MCNC: <3 MG/L (ref 0–5)
GFR SERPL CREATININE-BSD FRML MDRD: >60 ML/MIN/1.73M2
GLUCOSE SERPL-MCNC: 180 MG/DL (ref 70–99)
HCT VFR BLD AUTO: 40.5 % (ref 40.7–50.3)
HGB BLD-MCNC: 12.2 G/DL (ref 13–17)
MCH RBC QN AUTO: 27.5 PG (ref 25.2–33.5)
MCHC RBC AUTO-ENTMCNC: 30.1 G/DL (ref 28.4–34.8)
MCV RBC AUTO: 91.4 FL (ref 82.6–102.9)
NRBC AUTOMATED: 0 PER 100 WBC
PDW BLD-RTO: 17.7 % (ref 11.8–14.4)
PLATELET # BLD AUTO: 216 K/UL (ref 138–453)
PMV BLD AUTO: 10.6 FL (ref 8.1–13.5)
POTASSIUM SERPL-SCNC: 4.1 MMOL/L (ref 3.7–5.3)
RBC # BLD: 4.43 M/UL (ref 4.21–5.77)
SODIUM SERPL-SCNC: 142 MMOL/L (ref 135–144)
WBC # BLD AUTO: 8 K/UL (ref 3.5–11.3)

## 2023-02-22 NOTE — TELEPHONE ENCOUNTER
Patient stopped in for lab work and stated that he was put on lexapro at his last office visit and was having some trouble with some dizziness since beginning it. Patient states he fell last night after taking it. Patient asking if you would like for him to stop it and if something else would be appropriate. Patient states that there is some discomfort in his hip but it isn't too bad.  Rolan Mcbride

## 2023-02-23 NOTE — TELEPHONE ENCOUNTER
Patient is aware of instructions. Patient expressed understanding and denies any other concerns at this time.

## 2023-02-24 ENCOUNTER — TELEPHONE (OUTPATIENT)
Dept: INFECTIOUS DISEASES | Age: 73
End: 2023-02-24

## 2023-02-24 NOTE — TELEPHONE ENCOUNTER
Home care nurse called, reports last dose for IV antbx are 2/28/23. She is asking if the line can be pulled?

## 2023-02-28 ENCOUNTER — TELEPHONE (OUTPATIENT)
Dept: ORTHOPEDIC SURGERY | Age: 73
End: 2023-02-28

## 2023-02-28 NOTE — TELEPHONE ENCOUNTER
Kala Chawla from the [address] [place] [address] called to make Dr. Mabel Quinn aware that pt is being discharged from their care. Pt's IV antibiotics are done and infectious disease approved pic line to be pulled. Just an FYI.

## 2023-03-01 ENCOUNTER — HOSPITAL ENCOUNTER (OUTPATIENT)
Age: 73
Setting detail: SPECIMEN
Discharge: HOME | End: 2023-03-01

## 2023-03-01 ENCOUNTER — OFFICE VISIT (OUTPATIENT)
Dept: ORTHOPEDIC SURGERY | Age: 73
End: 2023-03-01

## 2023-03-01 VITALS — WEIGHT: 185 LBS | RESPIRATION RATE: 14 BRPM | HEIGHT: 74 IN | BODY MASS INDEX: 23.74 KG/M2

## 2023-03-01 DIAGNOSIS — E78.2 MIXED HYPERLIPIDEMIA: Primary | ICD-10-CM

## 2023-03-01 DIAGNOSIS — M00.9 PYOGENIC ARTHRITIS OF LEFT SHOULDER REGION, DUE TO UNSPECIFIED ORGANISM (HCC): Primary | ICD-10-CM

## 2023-03-01 LAB
ANION GAP SERPL CALCULATED.3IONS-SCNC: 18 MMOL/L (ref 9–17)
BUN SERPL-MCNC: 41 MG/DL (ref 8–23)
CALCIUM SERPL-MCNC: 11.1 MG/DL (ref 8.6–10.4)
CHLORIDE SERPL-SCNC: 103 MMOL/L (ref 98–107)
CO2 SERPL-SCNC: 22 MMOL/L (ref 20–31)
CREAT SERPL-MCNC: 1.02 MG/DL (ref 0.7–1.2)
GFR SERPL CREATININE-BSD FRML MDRD: >60 ML/MIN/1.73M2
GLUCOSE SERPL-MCNC: 137 MG/DL (ref 70–99)
HCT VFR BLD AUTO: 41 % (ref 40.7–50.3)
HGB BLD-MCNC: 12.7 G/DL (ref 13–17)
MCH RBC QN AUTO: 27.9 PG (ref 25.2–33.5)
MCHC RBC AUTO-ENTMCNC: 31 G/DL (ref 28.4–34.8)
MCV RBC AUTO: 89.9 FL (ref 82.6–102.9)
NRBC AUTOMATED: 0 PER 100 WBC
PDW BLD-RTO: 17.7 % (ref 11.8–14.4)
PLATELET # BLD AUTO: 231 K/UL (ref 138–453)
PMV BLD AUTO: 10.9 FL (ref 8.1–13.5)
POTASSIUM SERPL-SCNC: 4.4 MMOL/L (ref 3.7–5.3)
RBC # BLD: 4.56 M/UL (ref 4.21–5.77)
SODIUM SERPL-SCNC: 143 MMOL/L (ref 135–144)
WBC # BLD AUTO: 8.3 K/UL (ref 3.5–11.3)

## 2023-03-01 PROCEDURE — 99024 POSTOP FOLLOW-UP VISIT: CPT | Performed by: ORTHOPAEDIC SURGERY

## 2023-03-01 RX ORDER — ATORVASTATIN CALCIUM 20 MG/1
20 TABLET, FILM COATED ORAL DAILY
Qty: 90 TABLET | Refills: 1 | Status: SHIPPED | OUTPATIENT
Start: 2023-03-01

## 2023-03-02 LAB — CRP SERPL HS-MCNC: <3 MG/L (ref 0–5)

## 2023-03-02 NOTE — PROGRESS NOTES
Procedure: Left glenohumeral joint arthroscopic irrigation and debridement in addition to open incision and drainage of the left shoulder abscess  Date of procedure: 1/17/2023    HPI: Mr. Chaparrita Benson is a 77-year-old approximately 6 weeks status post the aforementioned procedure. He has completed a 6 week course of IV antibiotics. He had some labs drawn earlier on this morning. Overall he states that his pain has improved and is different from what he was experiencing prior to surgery. He rates his pain as a 2-3/10. Physical examination:  Evaluation of patient's left shoulder and upper extremity demonstrates his incisions to be appropriately healed. No wound dehiscence or drainage. Mild to moderate diffuse swelling still present. No warmth or erythema or induration noted. Impression and plan: Mr. Chaparrita Benson is a 77-year-old male approximately 6 weeks status post left shoulder irrigation and debridement of an abscess and septic joint. Cultures grew out MSSA and he just recently completed his 6-week course of IV antibiotics. Blood was drawn this morning and labs sent for among other things a CRP. He does have an upcoming appointment with the infectious disease service. At this time he may continue using this arm for light activities of daily living. He was provided a home stretching program today. I will see him back for reevaluation in 6 weeks. As long as the infection is cleared we can begin at that time discussions about proceeding with a reverse shoulder replacement if needed.

## 2023-03-07 ENCOUNTER — OFFICE VISIT (OUTPATIENT)
Dept: INFECTIOUS DISEASES | Age: 73
End: 2023-03-07
Payer: MEDICARE

## 2023-03-07 VITALS
DIASTOLIC BLOOD PRESSURE: 68 MMHG | RESPIRATION RATE: 15 BRPM | TEMPERATURE: 98.2 F | BODY MASS INDEX: 24.38 KG/M2 | SYSTOLIC BLOOD PRESSURE: 125 MMHG | WEIGHT: 190 LBS | OXYGEN SATURATION: 97 % | HEART RATE: 92 BPM | HEIGHT: 74 IN

## 2023-03-07 DIAGNOSIS — M00.012 STAPHYLOCOCCAL ARTHRITIS OF LEFT SHOULDER (HCC): Primary | ICD-10-CM

## 2023-03-07 DIAGNOSIS — M60.012 INFECTIVE MYOSITIS OF LEFT SHOULDER: ICD-10-CM

## 2023-03-07 PROCEDURE — 1036F TOBACCO NON-USER: CPT | Performed by: INTERNAL MEDICINE

## 2023-03-07 PROCEDURE — 1123F ACP DISCUSS/DSCN MKR DOCD: CPT | Performed by: INTERNAL MEDICINE

## 2023-03-07 PROCEDURE — G8420 CALC BMI NORM PARAMETERS: HCPCS | Performed by: INTERNAL MEDICINE

## 2023-03-07 PROCEDURE — G8427 DOCREV CUR MEDS BY ELIG CLIN: HCPCS | Performed by: INTERNAL MEDICINE

## 2023-03-07 PROCEDURE — 99213 OFFICE O/P EST LOW 20 MIN: CPT | Performed by: INTERNAL MEDICINE

## 2023-03-07 PROCEDURE — G8484 FLU IMMUNIZE NO ADMIN: HCPCS | Performed by: INTERNAL MEDICINE

## 2023-03-07 PROCEDURE — 3078F DIAST BP <80 MM HG: CPT | Performed by: INTERNAL MEDICINE

## 2023-03-07 PROCEDURE — 3017F COLORECTAL CA SCREEN DOC REV: CPT | Performed by: INTERNAL MEDICINE

## 2023-03-07 PROCEDURE — 3074F SYST BP LT 130 MM HG: CPT | Performed by: INTERNAL MEDICINE

## 2023-03-07 ASSESSMENT — ENCOUNTER SYMPTOMS
GASTROINTESTINAL NEGATIVE: 1
RESPIRATORY NEGATIVE: 1

## 2023-03-09 ENCOUNTER — OFFICE VISIT (OUTPATIENT)
Dept: FAMILY MEDICINE CLINIC | Age: 73
End: 2023-03-09

## 2023-03-09 VITALS
WEIGHT: 197 LBS | HEIGHT: 74 IN | BODY MASS INDEX: 25.28 KG/M2 | OXYGEN SATURATION: 98 % | TEMPERATURE: 96.9 F | HEART RATE: 91 BPM | SYSTOLIC BLOOD PRESSURE: 118 MMHG | DIASTOLIC BLOOD PRESSURE: 68 MMHG

## 2023-03-09 DIAGNOSIS — F32.9 REACTIVE DEPRESSION: ICD-10-CM

## 2023-03-09 DIAGNOSIS — E11.42 TYPE 2 DIABETES MELLITUS WITH DIABETIC POLYNEUROPATHY, WITH LONG-TERM CURRENT USE OF INSULIN (HCC): Primary | ICD-10-CM

## 2023-03-09 DIAGNOSIS — Z79.4 TYPE 2 DIABETES MELLITUS WITH DIABETIC POLYNEUROPATHY, WITH LONG-TERM CURRENT USE OF INSULIN (HCC): Primary | ICD-10-CM

## 2023-03-09 DIAGNOSIS — F33.1 MODERATE EPISODE OF RECURRENT MAJOR DEPRESSIVE DISORDER (HCC): ICD-10-CM

## 2023-03-09 DIAGNOSIS — F41.9 ANXIETY: ICD-10-CM

## 2023-03-09 DIAGNOSIS — I65.29 CAROTID ATHEROSCLEROSIS, UNSPECIFIED LATERALITY: ICD-10-CM

## 2023-03-09 ASSESSMENT — PATIENT HEALTH QUESTIONNAIRE - PHQ9
1. LITTLE INTEREST OR PLEASURE IN DOING THINGS: 0
2. FEELING DOWN, DEPRESSED OR HOPELESS: 0
3. TROUBLE FALLING OR STAYING ASLEEP: 0
SUM OF ALL RESPONSES TO PHQ QUESTIONS 1-9: 0
8. MOVING OR SPEAKING SO SLOWLY THAT OTHER PEOPLE COULD HAVE NOTICED. OR THE OPPOSITE, BEING SO FIGETY OR RESTLESS THAT YOU HAVE BEEN MOVING AROUND A LOT MORE THAN USUAL: 0
SUM OF ALL RESPONSES TO PHQ QUESTIONS 1-9: 0
7. TROUBLE CONCENTRATING ON THINGS, SUCH AS READING THE NEWSPAPER OR WATCHING TELEVISION: 0
9. THOUGHTS THAT YOU WOULD BE BETTER OFF DEAD, OR OF HURTING YOURSELF: 0
4. FEELING TIRED OR HAVING LITTLE ENERGY: 0
10. IF YOU CHECKED OFF ANY PROBLEMS, HOW DIFFICULT HAVE THESE PROBLEMS MADE IT FOR YOU TO DO YOUR WORK, TAKE CARE OF THINGS AT HOME, OR GET ALONG WITH OTHER PEOPLE: 0
SUM OF ALL RESPONSES TO PHQ9 QUESTIONS 1 & 2: 0
5. POOR APPETITE OR OVEREATING: 0
6. FEELING BAD ABOUT YOURSELF - OR THAT YOU ARE A FAILURE OR HAVE LET YOURSELF OR YOUR FAMILY DOWN: 0
SUM OF ALL RESPONSES TO PHQ QUESTIONS 1-9: 0
SUM OF ALL RESPONSES TO PHQ QUESTIONS 1-9: 0

## 2023-03-09 NOTE — PROGRESS NOTES
Luis A Trevino (:  1950) is a 68 y.o. male,Established patient, here for evaluation of the following chief complaint(s):  1 Month Follow-Up and Diabetes (Needs foot exam for paper work, )         ASSESSMENT/PLAN:  1. Type 2 diabetes mellitus with diabetic polyneuropathy, with long-term current use of insulin ([facility])  -      DIABETES FOOT EXAM  2. Moderate episode of recurrent major depressive disorder ([facility])  3. Reactive depression  4. Anxiety  5. Carotid atherosclerosis, unspecified laterality    Return in about 3 months (around 2023), or if symptoms worsen or fail to improve. Subjective   SUBJECTIVE/OBJECTIVE:  Routine follow up as well as med check. Stopped lexapro. Made him dizzy. Will re-address when he returns from [place]. Going to [place] in a week. Will be gone for a month  Dr. Paty Woodruff again on the . He found a basal cell on shoulder  Continues to follow with ID and ortho. Has follow up in April  Continues to monitor glucose at home. Uses continues glucose monitor. Glucose has been fairly well controlled. Is using whey protein powder daily. Trying to get more protein in his diet. Follow up with adriano in august  DM foot exam completed today. 10 sites tested; 2 felt. He has poor circulation in both feet. Both are also cool to touch. Known neuropathy as well as PVD. Review of Systems       Objective   Physical Exam  Vitals and nursing note reviewed. Constitutional:       General: He is not in acute distress. Appearance: He is not ill-appearing. HENT:      Head: Normocephalic. Nose: Nose normal.      Mouth/Throat:      Lips: Pink. Pulmonary:      Effort: Pulmonary effort is normal. No respiratory distress. Feet:      Right foot:      Protective Sensation: 10 sites tested. 2 sites sensed. Skin integrity: Dry skin present. Toenail Condition: Right toenails are abnormally thick. Left foot:      Protective Sensation: 10 sites tested.   2 sites sensed. Skin integrity: Dry skin present. Toenail Condition: Left toenails are abnormally thick. Comments: Bilateral feet cool to touch  Neurological:      Mental Status: He is alert and oriented to person, place, and time. Psychiatric:         Attention and Perception: Attention normal.         Speech: Speech normal.         Behavior: Behavior is cooperative. An electronic signature was used to authenticate this note.     --BREANNA Jennings NP

## 2023-04-03 ENCOUNTER — PATIENT MESSAGE (OUTPATIENT)
Dept: ORTHOPEDIC SURGERY | Age: 73
End: 2023-04-03

## 2023-04-03 DIAGNOSIS — M60.012 INFECTIVE MYOSITIS OF LEFT SHOULDER: Primary | ICD-10-CM

## 2023-04-03 DIAGNOSIS — M00.9 PYOGENIC ARTHRITIS OF LEFT SHOULDER REGION, DUE TO UNSPECIFIED ORGANISM (HCC): ICD-10-CM

## 2023-04-03 NOTE — TELEPHONE ENCOUNTER
From: Luis A Perry  To: Dr. Janet Seaman: 4/3/2023 1:30 PM EDT  Subject: Appointment & lab tests     Hello hello. Im in [place] right now and will be coming back up [facility] in a couple weeks. I have an appointment with infectious disease doctor on the 25th and I have an appointment with you on the 26th. Before the appointment do I need to have any lab tests or bloodwork to check on infections so we can set up the surgery for the reverse shoulder replacement? Also Should I cancel the appointment with infectious disease doctor or schedule it after I see you. At this time I dont see the point in seeing him but thats up to you. Let me know. For your information I did have some skin cancer on my right shoulder that they cut out about three weeks ago. Shoulder is a little sore and naturally heals slowly. Hope to hear from you soon to give me some direction. Thanks.

## 2023-04-03 NOTE — TELEPHONE ENCOUNTER
I would keep the appointment with the ID physician.  I would also recommend checking an ESR and CRP just before his appointments

## 2023-04-19 DIAGNOSIS — G89.21 CHRONIC PAIN DUE TO TRAUMA: ICD-10-CM

## 2023-04-19 DIAGNOSIS — E78.2 MIXED HYPERLIPIDEMIA: ICD-10-CM

## 2023-04-19 DIAGNOSIS — E11.42 TYPE 2 DIABETES MELLITUS WITH DIABETIC POLYNEUROPATHY, WITH LONG-TERM CURRENT USE OF INSULIN (HCC): ICD-10-CM

## 2023-04-19 DIAGNOSIS — I10 ESSENTIAL HYPERTENSION: Chronic | ICD-10-CM

## 2023-04-19 DIAGNOSIS — F32.A DEPRESSION, UNSPECIFIED DEPRESSION TYPE: ICD-10-CM

## 2023-04-19 DIAGNOSIS — Z79.4 TYPE 2 DIABETES MELLITUS WITH DIABETIC POLYNEUROPATHY, WITH LONG-TERM CURRENT USE OF INSULIN (HCC): ICD-10-CM

## 2023-04-19 DIAGNOSIS — G62.9 NEUROPATHY: ICD-10-CM

## 2023-04-19 DIAGNOSIS — M15.9 OSTEOARTHRITIS OF MULTIPLE JOINTS, UNSPECIFIED OSTEOARTHRITIS TYPE: ICD-10-CM

## 2023-04-20 RX ORDER — GABAPENTIN 300 MG/1
300 CAPSULE ORAL 2 TIMES DAILY
Qty: 180 CAPSULE | Refills: 1 | Status: SHIPPED | OUTPATIENT
Start: 2023-04-20 | End: 2023-10-17

## 2023-04-20 RX ORDER — DULOXETIN HYDROCHLORIDE 30 MG/1
30 CAPSULE, DELAYED RELEASE ORAL 2 TIMES DAILY
Qty: 180 CAPSULE | Refills: 1 | Status: SHIPPED | OUTPATIENT
Start: 2023-04-20 | End: 2024-04-19

## 2023-04-20 RX ORDER — LISINOPRIL 10 MG/1
10 TABLET ORAL DAILY
Qty: 90 TABLET | Refills: 1 | Status: SHIPPED | OUTPATIENT
Start: 2023-04-20 | End: 2024-04-19

## 2023-04-20 RX ORDER — ATORVASTATIN CALCIUM 20 MG/1
20 TABLET, FILM COATED ORAL DAILY
Qty: 90 TABLET | Refills: 1 | Status: SHIPPED | OUTPATIENT
Start: 2023-04-20

## 2023-04-20 RX ORDER — CHLORTHALIDONE 25 MG/1
25 TABLET ORAL DAILY
Qty: 90 TABLET | Refills: 1 | Status: SHIPPED | OUTPATIENT
Start: 2023-04-20 | End: 2024-04-19

## 2023-04-20 RX ORDER — PIOGLITAZONEHYDROCHLORIDE 30 MG/1
30 TABLET ORAL DAILY
Qty: 90 TABLET | Refills: 1 | Status: SHIPPED | OUTPATIENT
Start: 2023-04-20 | End: 2024-04-14

## 2023-04-20 RX ORDER — CELECOXIB 200 MG/1
200 CAPSULE ORAL DAILY
Qty: 90 CAPSULE | Refills: 1 | Status: SHIPPED | OUTPATIENT
Start: 2023-04-20 | End: 2024-04-14

## 2023-04-20 NOTE — TELEPHONE ENCOUNTER
Last visit: 3/9/2023  Last Med refill: 3/1/2023 lipitor, 10/25/2022 for all others  Does patient have enough medication for 72 hours: Yes    Next Visit Date:  Future Appointments   Date Time Provider Bari Cervantes   4/26/2023  2:45 PM Christiane Garcia MD [facility]   5/2/2023  2:45 PM Eh Orourke MD PB INF Valentino Seller   1/31/2024  2:30 PM [facility] [place] [facility] RM 2 [facility] PB CT [facility]   2/13/2024 10:30 AM Yasemin Davis MD Resp [facility] Maintenance   Topic Date Due    Diabetic retinal exam  05/05/2022    Annual Wellness Visit (AWV)  01/17/2023    DTaP/Tdap/Td vaccine (1 - Tdap) 02/02/2024 (Originally 9/7/2016)    Shingles vaccine (2 of 3) 02/02/2024 (Originally 4/7/2014)    COVID-19 Vaccine (4 - Booster for Shanon Petties series) 02/02/2024 (Originally 3/11/2022)    Colorectal Cancer Screen  06/26/2023    A1C test (Diabetic or Prediabetic)  02/08/2024    Diabetic Alb to Cr ratio (uACR) test  02/15/2024    Lipids  02/15/2024    GFR test (Diabetes, CKD 3-4, OR last GFR 15-59)  03/01/2024    Diabetic foot exam  03/09/2024    Depression Monitoring  03/09/2024    Flu vaccine  Completed    Pneumococcal 65+ years Vaccine  Completed    AAA screen  Completed    Hepatitis C screen  Completed    Hepatitis A vaccine  Aged Out    Hib vaccine  Aged Out    Meningococcal (ACWY) vaccine  Aged Out    Low dose CT lung screening  Discontinued       Hemoglobin A1C (%)   Date Value   02/08/2023 5.9   10/18/2022 6.6 (H)   09/15/2022 7.6 (H)             ( goal A1C is < 7)   Microalb/Crt.  Ratio (mcg/mg creat)   Date Value   02/15/2023 Can not be calculated     LDL Cholesterol (mg/dL)   Date Value   02/15/2023 146 (H)   01/06/2022 85       (goal LDL is <100)   AST (U/L)   Date Value   09/15/2022 15     ALT (U/L)   Date Value   09/15/2022 18     BUN (mg/dL)   Date Value   03/01/2023 41 (H)     BP Readings from Last 3 Encounters:   03/09/23 118/68   03/07/23 125/68   02/07/23 138/80          (goal

## 2023-04-24 ENCOUNTER — HOSPITAL ENCOUNTER (OUTPATIENT)
Age: 73
Setting detail: SPECIMEN
Discharge: HOME | End: 2023-04-24

## 2023-04-24 DIAGNOSIS — M60.012 INFECTIVE MYOSITIS OF LEFT SHOULDER: ICD-10-CM

## 2023-04-24 DIAGNOSIS — M00.9 PYOGENIC ARTHRITIS OF LEFT SHOULDER REGION, DUE TO UNSPECIFIED ORGANISM (HCC): ICD-10-CM

## 2023-04-24 LAB
CRP SERPL HS-MCNC: <3 MG/L (ref 0–5)
ERYTHROCYTE [SEDIMENTATION RATE] IN BLOOD BY WESTERGREN METHOD: 5 MM/HR (ref 0–20)

## 2023-04-25 DIAGNOSIS — E11.42 TYPE 2 DIABETES MELLITUS WITH DIABETIC POLYNEUROPATHY, WITH LONG-TERM CURRENT USE OF INSULIN (HCC): ICD-10-CM

## 2023-04-25 DIAGNOSIS — Z79.4 TYPE 2 DIABETES MELLITUS WITH DIABETIC POLYNEUROPATHY, WITH LONG-TERM CURRENT USE OF INSULIN (HCC): ICD-10-CM

## 2023-04-26 ENCOUNTER — OFFICE VISIT (OUTPATIENT)
Dept: ORTHOPEDIC SURGERY | Age: 73
End: 2023-04-26
Payer: MEDICARE

## 2023-04-26 ENCOUNTER — TELEPHONE (OUTPATIENT)
Dept: ORTHOPEDIC SURGERY | Age: 73
End: 2023-04-26

## 2023-04-26 VITALS — WEIGHT: 201 LBS | BODY MASS INDEX: 25.8 KG/M2 | RESPIRATION RATE: 14 BRPM | HEIGHT: 74 IN

## 2023-04-26 DIAGNOSIS — E11.42 TYPE 2 DIABETES MELLITUS WITH DIABETIC POLYNEUROPATHY, WITH LONG-TERM CURRENT USE OF INSULIN (HCC): ICD-10-CM

## 2023-04-26 DIAGNOSIS — M19.012 OSTEOARTHRITIS OF LEFT GLENOHUMERAL JOINT: Primary | ICD-10-CM

## 2023-04-26 DIAGNOSIS — Z79.4 TYPE 2 DIABETES MELLITUS WITH DIABETIC POLYNEUROPATHY, WITH LONG-TERM CURRENT USE OF INSULIN (HCC): ICD-10-CM

## 2023-04-26 PROCEDURE — G8427 DOCREV CUR MEDS BY ELIG CLIN: HCPCS | Performed by: ORTHOPAEDIC SURGERY

## 2023-04-26 PROCEDURE — G8417 CALC BMI ABV UP PARAM F/U: HCPCS | Performed by: ORTHOPAEDIC SURGERY

## 2023-04-26 PROCEDURE — 3017F COLORECTAL CA SCREEN DOC REV: CPT | Performed by: ORTHOPAEDIC SURGERY

## 2023-04-26 PROCEDURE — 1036F TOBACCO NON-USER: CPT | Performed by: ORTHOPAEDIC SURGERY

## 2023-04-26 PROCEDURE — 99214 OFFICE O/P EST MOD 30 MIN: CPT | Performed by: ORTHOPAEDIC SURGERY

## 2023-04-26 PROCEDURE — 1123F ACP DISCUSS/DSCN MKR DOCD: CPT | Performed by: ORTHOPAEDIC SURGERY

## 2023-04-26 RX ORDER — PEN NEEDLE, DIABETIC 31 GX3/16"
NEEDLE, DISPOSABLE MISCELLANEOUS
Qty: 300 EACH | OUTPATIENT
Start: 2023-04-26

## 2023-04-26 NOTE — PROGRESS NOTES
Procedure: Left glenohumeral joint arthroscopic irrigation and debridement in addition to open incision and drainage of the left shoulder abscess  Date of procedure: 1/17/2023    HPI: Mr. Rocky Boykin is a 70-year-old approximately 3 months status post the aforementioned procedure. He indicates that he is doing fairly well at this time. He typically does not have pain at rest but has pain with movement in his shoulder. He denies having any fevers, chills, sweats or any constitutional symptoms. .    Physical examination:  Evaluation of patient's left shoulder and upper extremity demonstrates his incisions to be appropriately healed. No wound dehiscence or drainage. Minimal swelling noted at this time. No warmth or erythema or induration noted. Shoulder elevation is limited to about 90 degrees. Crepitation with range of motion is appreciated. Impression and plan: Mr. Rocky Boykin is a 70-year-old male approximately 3 months status post left shoulder irrigation and debridement of an abscess and septic joint. He had an MSSA infection which was treated with 6 weeks of IV antibiotics. He is doing fairly well at this time. His ESR and CRP levels completed on 4/24/2023 were both within normal limits. At this time he would like to move ahead with surgery by way of a shoulder replacement. I believe it is reasonable to proceed at this time as the infection appears to be cleared. He has been off antibiotics for about 6 weeks at this juncture. Consequently we had a discussion about surgery by way of a reverse shoulder replacement. I explained to the patient that I will intraoperatively obtain frozen sections in addition to multiple cultures. If the frozen sections demonstrated a significant number of white blood cells that I would proceed with placing a functional antibiotic spacer and resume IV antibiotics for 6 weeks.   If however the frozen sections are normal we can certainly proceed with a reverse shoulder

## 2023-04-27 ENCOUNTER — TELEPHONE (OUTPATIENT)
Dept: FAMILY MEDICINE CLINIC | Age: 73
End: 2023-04-27

## 2023-04-27 ENCOUNTER — HOSPITAL ENCOUNTER (OUTPATIENT)
Age: 73
Setting detail: SPECIMEN
Discharge: HOME | End: 2023-04-27

## 2023-04-27 LAB
ANION GAP SERPL CALCULATED.3IONS-SCNC: 14 MMOL/L (ref 9–17)
BILIRUBIN URINE: NEGATIVE
BUN SERPL-MCNC: 38 MG/DL (ref 8–23)
CALCIUM SERPL-MCNC: 9.4 MG/DL (ref 8.6–10.4)
CASTS UA: NORMAL /LPF (ref 0–8)
CHLORIDE SERPL-SCNC: 102 MMOL/L (ref 98–107)
CO2 SERPL-SCNC: 26 MMOL/L (ref 20–31)
COLOR: YELLOW
CREAT SERPL-MCNC: 1.08 MG/DL (ref 0.7–1.2)
EPITHELIAL CELLS UA: NORMAL /HPF (ref 0–5)
GFR SERPL CREATININE-BSD FRML MDRD: >60 ML/MIN/1.73M2
GLUCOSE SERPL-MCNC: 197 MG/DL (ref 70–99)
GLUCOSE UR STRIP.AUTO-MCNC: NEGATIVE MG/DL
HCT VFR BLD AUTO: 41.8 % (ref 40.7–50.3)
HGB BLD-MCNC: 13.4 G/DL (ref 13–17)
KETONES UR STRIP.AUTO-MCNC: NEGATIVE MG/DL
LEUKOCYTE ESTERASE UR QL STRIP.AUTO: ABNORMAL
MCH RBC QN AUTO: 29.2 PG (ref 25.2–33.5)
MCHC RBC AUTO-ENTMCNC: 32.1 G/DL (ref 28.4–34.8)
MCV RBC AUTO: 91.1 FL (ref 82.6–102.9)
NITRITE UR QL STRIP.AUTO: NEGATIVE
NRBC AUTOMATED: 0 PER 100 WBC
PDW BLD-RTO: 15.2 % (ref 11.8–14.4)
PLATELET # BLD AUTO: 197 K/UL (ref 138–453)
PMV BLD AUTO: 10.9 FL (ref 8.1–13.5)
POTASSIUM SERPL-SCNC: 4.3 MMOL/L (ref 3.7–5.3)
PROT UR STRIP.AUTO-MCNC: 5 MG/DL (ref 5–8)
PROT UR STRIP.AUTO-MCNC: NEGATIVE MG/DL
RBC # BLD: 4.59 M/UL (ref 4.21–5.77)
RBC CLUMPS #/AREA URNS AUTO: NORMAL /HPF (ref 0–4)
SODIUM SERPL-SCNC: 142 MMOL/L (ref 135–144)
SPECIFIC GRAVITY UA: 1.02 (ref 1–1.03)
TURBIDITY: CLEAR
URINE HGB: NEGATIVE
UROBILINOGEN, URINE: NORMAL
WBC # BLD AUTO: 8.7 K/UL (ref 3.5–11.3)
WBC UA: NORMAL /HPF (ref 0–5)

## 2023-05-09 ENCOUNTER — HOSPITAL ENCOUNTER (OUTPATIENT)
Dept: CT IMAGING | Age: 73
Discharge: HOME | End: 2023-05-11
Payer: MEDICARE

## 2023-05-09 ENCOUNTER — HOSPITAL ENCOUNTER (OUTPATIENT)
Dept: PREADMISSION TESTING | Age: 73
Discharge: HOME | End: 2023-05-13

## 2023-05-09 VITALS
OXYGEN SATURATION: 96 % | HEART RATE: 96 BPM | WEIGHT: 198 LBS | RESPIRATION RATE: 16 BRPM | HEIGHT: 74 IN | DIASTOLIC BLOOD PRESSURE: 56 MMHG | BODY MASS INDEX: 25.41 KG/M2 | SYSTOLIC BLOOD PRESSURE: 110 MMHG

## 2023-05-09 DIAGNOSIS — M19.012 OSTEOARTHRITIS OF LEFT GLENOHUMERAL JOINT: ICD-10-CM

## 2023-05-09 PROCEDURE — 73200 CT UPPER EXTREMITY W/O DYE: CPT

## 2023-05-09 ASSESSMENT — PAIN DESCRIPTION - LOCATION: LOCATION: SHOULDER

## 2023-05-09 ASSESSMENT — PAIN DESCRIPTION - PAIN TYPE: TYPE: CHRONIC PAIN

## 2023-05-09 ASSESSMENT — PAIN DESCRIPTION - ONSET: ONSET: AWAKENED FROM SLEEP

## 2023-05-09 ASSESSMENT — PAIN DESCRIPTION - ORIENTATION: ORIENTATION: LEFT

## 2023-05-09 ASSESSMENT — PAIN DESCRIPTION - FREQUENCY: FREQUENCY: INTERMITTENT

## 2023-05-09 ASSESSMENT — PAIN - FUNCTIONAL ASSESSMENT: PAIN_FUNCTIONAL_ASSESSMENT: PREVENTS OR INTERFERES WITH MANY ACTIVE NOT PASSIVE ACTIVITIES

## 2023-05-09 ASSESSMENT — PAIN SCALES - GENERAL: PAINLEVEL_OUTOF10: 3

## 2023-05-09 NOTE — DISCHARGE INSTRUCTIONS
Preoperative Instructions:    Stop eating solid foods at midnight the night prior to your surgery. Stop drinking clear liquids at midnight the night prior to your surgery. Arrive at the surgery center (3rd entrance) on _____[id number]__________ by ______0530am_________. Please stop any blood thinning medications as directed by your surgeon or prescribing physician. Failure to stop certain medications may interfere with your scheduled surgery. These may include: Aspirin, Coumadin, Plavix, NSAIDS (Motrin, Aleve, Advil, Mobic, Celebrex), Eliquis, Pradaxa, Xarelto, Fish oil, and herbal supplements. Hold Multivitamins as directed. You may continue the rest of your medications through the night before surgery unless instructed otherwise. Day of surgery please take only the following medication(s) with a small sip of water: ( Hold morning oral diabetic pills and morning  Novolog insulin dose- hold morning Lisinopril dose)  Please take only half of evening insulin dose the night before surgery. Please use and bring inhalers the day of surgery. ALbuterol  Please shower with provided CHG soap the night before and the morning of surgery,    Reminders:  -If you are going home the day of your procedure, you will need a family member or friend to stay during the procedure and drive you home after your procedure. Your  must be 25years of age or older and able to sign off on your discharge instructions.    -If you are going home the same day of your surgery, someone must remain with you for the first 24 hours after your surgery if you receive sedation or anesthesia.      -Please do not wear any jewelery or body piercing the day of surgery

## 2023-05-10 ENCOUNTER — OFFICE VISIT (OUTPATIENT)
Dept: FAMILY MEDICINE CLINIC | Age: 73
End: 2023-05-10
Payer: MEDICARE

## 2023-05-10 VITALS
WEIGHT: 196 LBS | HEART RATE: 89 BPM | TEMPERATURE: 97.1 F | OXYGEN SATURATION: 98 % | BODY MASS INDEX: 25.15 KG/M2 | HEIGHT: 74 IN | DIASTOLIC BLOOD PRESSURE: 62 MMHG | SYSTOLIC BLOOD PRESSURE: 110 MMHG

## 2023-05-10 DIAGNOSIS — Z01.818 PRE-OPERATIVE CLEARANCE: Primary | ICD-10-CM

## 2023-05-10 DIAGNOSIS — M00.9 PYOGENIC ARTHRITIS OF LEFT SHOULDER REGION, DUE TO UNSPECIFIED ORGANISM (HCC): ICD-10-CM

## 2023-05-10 LAB
EKG ATRIAL RATE: 91 BPM
EKG P AXIS: 58 DEGREES
EKG P-R INTERVAL: 162 MS
EKG Q-T INTERVAL: 376 MS
EKG QRS DURATION: 100 MS
EKG QTC CALCULATION (BAZETT): 462 MS
EKG R AXIS: 51 DEGREES
EKG T AXIS: 46 DEGREES
EKG VENTRICULAR RATE: 91 BPM

## 2023-05-10 PROCEDURE — 3017F COLORECTAL CA SCREEN DOC REV: CPT | Performed by: NURSE PRACTITIONER

## 2023-05-10 PROCEDURE — 3078F DIAST BP <80 MM HG: CPT | Performed by: NURSE PRACTITIONER

## 2023-05-10 PROCEDURE — 1036F TOBACCO NON-USER: CPT | Performed by: NURSE PRACTITIONER

## 2023-05-10 PROCEDURE — 99213 OFFICE O/P EST LOW 20 MIN: CPT | Performed by: NURSE PRACTITIONER

## 2023-05-10 PROCEDURE — G8417 CALC BMI ABV UP PARAM F/U: HCPCS | Performed by: NURSE PRACTITIONER

## 2023-05-10 PROCEDURE — 3074F SYST BP LT 130 MM HG: CPT | Performed by: NURSE PRACTITIONER

## 2023-05-10 PROCEDURE — 1123F ACP DISCUSS/DSCN MKR DOCD: CPT | Performed by: NURSE PRACTITIONER

## 2023-05-10 PROCEDURE — G8427 DOCREV CUR MEDS BY ELIG CLIN: HCPCS | Performed by: NURSE PRACTITIONER

## 2023-05-10 ASSESSMENT — ANXIETY QUESTIONNAIRES
IF YOU CHECKED OFF ANY PROBLEMS ON THIS QUESTIONNAIRE, HOW DIFFICULT HAVE THESE PROBLEMS MADE IT FOR YOU TO DO YOUR WORK, TAKE CARE OF THINGS AT HOME, OR GET ALONG WITH OTHER PEOPLE: NOT DIFFICULT AT ALL
2. NOT BEING ABLE TO STOP OR CONTROL WORRYING: 0
GAD7 TOTAL SCORE: 0
6. BECOMING EASILY ANNOYED OR IRRITABLE: 0
5. BEING SO RESTLESS THAT IT IS HARD TO SIT STILL: 0
7. FEELING AFRAID AS IF SOMETHING AWFUL MIGHT HAPPEN: 0
4. TROUBLE RELAXING: 0
3. WORRYING TOO MUCH ABOUT DIFFERENT THINGS: 0
1. FEELING NERVOUS, ANXIOUS, OR ON EDGE: 0

## 2023-05-10 ASSESSMENT — ENCOUNTER SYMPTOMS
SINUS PAIN: 0
RHINORRHEA: 0
VOMITING: 0
ALLERGIC/IMMUNOLOGIC COMMENTS: NO KNOWN ALLERGIES
DIARRHEA: 0
NAUSEA: 0
COUGH: 0
CONSTIPATION: 0
TROUBLE SWALLOWING: 0
WHEEZING: 0
SINUS PRESSURE: 0
SHORTNESS OF BREATH: 0
CHEST TIGHTNESS: 0

## 2023-05-10 ASSESSMENT — PATIENT HEALTH QUESTIONNAIRE - PHQ9
10. IF YOU CHECKED OFF ANY PROBLEMS, HOW DIFFICULT HAVE THESE PROBLEMS MADE IT FOR YOU TO DO YOUR WORK, TAKE CARE OF THINGS AT HOME, OR GET ALONG WITH OTHER PEOPLE: 1
SUM OF ALL RESPONSES TO PHQ QUESTIONS 1-9: 6
SUM OF ALL RESPONSES TO PHQ QUESTIONS 1-9: 6
SUM OF ALL RESPONSES TO PHQ9 QUESTIONS 1 & 2: 1
1. LITTLE INTEREST OR PLEASURE IN DOING THINGS: 0
9. THOUGHTS THAT YOU WOULD BE BETTER OFF DEAD, OR OF HURTING YOURSELF: 0
3. TROUBLE FALLING OR STAYING ASLEEP: 1
4. FEELING TIRED OR HAVING LITTLE ENERGY: 1
SUM OF ALL RESPONSES TO PHQ QUESTIONS 1-9: 6
SUM OF ALL RESPONSES TO PHQ QUESTIONS 1-9: 6
7. TROUBLE CONCENTRATING ON THINGS, SUCH AS READING THE NEWSPAPER OR WATCHING TELEVISION: 3
2. FEELING DOWN, DEPRESSED OR HOPELESS: 1
8. MOVING OR SPEAKING SO SLOWLY THAT OTHER PEOPLE COULD HAVE NOTICED. OR THE OPPOSITE, BEING SO FIGETY OR RESTLESS THAT YOU HAVE BEEN MOVING AROUND A LOT MORE THAN USUAL: 0
5. POOR APPETITE OR OVEREATING: 0
6. FEELING BAD ABOUT YOURSELF - OR THAT YOU ARE A FAILURE OR HAVE LET YOURSELF OR YOUR FAMILY DOWN: 0

## 2023-05-12 ENCOUNTER — OFFICE VISIT (OUTPATIENT)
Dept: ORTHOPEDIC SURGERY | Age: 73
End: 2023-05-12

## 2023-05-12 VITALS — BODY MASS INDEX: 25.15 KG/M2 | WEIGHT: 196 LBS | HEIGHT: 74 IN | RESPIRATION RATE: 14 BRPM

## 2023-05-12 DIAGNOSIS — M19.012 OSTEOARTHRITIS OF LEFT GLENOHUMERAL JOINT: Primary | ICD-10-CM

## 2023-05-12 RX ORDER — HYDROCODONE BITARTRATE AND ACETAMINOPHEN 5; 325 MG/1; MG/1
1 TABLET ORAL EVERY 4 HOURS PRN
Qty: 42 TABLET | Refills: 0 | Status: SHIPPED | OUTPATIENT
Start: 2023-05-12 | End: 2023-05-19

## 2023-05-12 RX ORDER — ONDANSETRON 4 MG/1
4 TABLET, FILM COATED ORAL DAILY PRN
Qty: 20 TABLET | Refills: 0 | Status: SHIPPED | OUTPATIENT
Start: 2023-05-12

## 2023-05-12 RX ORDER — DOXYCYCLINE HYCLATE 100 MG
100 TABLET ORAL 2 TIMES DAILY
Qty: 14 TABLET | Refills: 0 | Status: SHIPPED | OUTPATIENT
Start: 2023-05-12 | End: 2023-05-19

## 2023-05-14 RX ORDER — SODIUM CHLORIDE 9 MG/ML
INJECTION, SOLUTION INTRAVENOUS PRN
OUTPATIENT
Start: 2023-05-14

## 2023-05-14 RX ORDER — SODIUM CHLORIDE 0.9 % (FLUSH) 0.9 %
5-40 SYRINGE (ML) INJECTION PRN
OUTPATIENT
Start: 2023-05-14

## 2023-05-14 RX ORDER — SODIUM CHLORIDE 0.9 % (FLUSH) 0.9 %
5-40 SYRINGE (ML) INJECTION EVERY 12 HOURS SCHEDULED
OUTPATIENT
Start: 2023-05-14

## 2023-05-14 RX ORDER — ACETAMINOPHEN 325 MG/1
1000 TABLET ORAL ONCE
OUTPATIENT
Start: 2023-05-14 | End: 2023-05-14

## 2023-05-15 ENCOUNTER — ANESTHESIA EVENT (OUTPATIENT)
Dept: OPERATING ROOM | Age: 73
End: 2023-05-15
Payer: MEDICARE

## 2023-05-16 ENCOUNTER — ANESTHESIA (OUTPATIENT)
Dept: OPERATING ROOM | Age: 73
End: 2023-05-16
Payer: MEDICARE

## 2023-05-16 ENCOUNTER — HOSPITAL ENCOUNTER (OUTPATIENT)
Age: 73
Setting detail: SURGERY ADMIT
Discharge: HOME | End: 2023-05-16
Attending: ORTHOPAEDIC SURGERY | Admitting: ORTHOPAEDIC SURGERY
Payer: MEDICARE

## 2023-05-16 VITALS
RESPIRATION RATE: 14 BRPM | BODY MASS INDEX: 25.67 KG/M2 | HEART RATE: 90 BPM | TEMPERATURE: 96.9 F | OXYGEN SATURATION: 92 % | DIASTOLIC BLOOD PRESSURE: 54 MMHG | SYSTOLIC BLOOD PRESSURE: 100 MMHG | WEIGHT: 200 LBS | HEIGHT: 74 IN

## 2023-05-16 DIAGNOSIS — M19.012 OSTEOARTHRITIS OF GLENOHUMERAL JOINT, LEFT: ICD-10-CM

## 2023-05-16 LAB
GLUCOSE BLD-MCNC: 159 MG/DL (ref 75–110)
GLUCOSE BLD-MCNC: 91 MG/DL (ref 75–110)

## 2023-05-16 PROCEDURE — 7100000011 HC PHASE II RECOVERY - ADDTL 15 MIN: Performed by: ORTHOPAEDIC SURGERY

## 2023-05-16 PROCEDURE — 3700000000 HC ANESTHESIA ATTENDED CARE: Performed by: ORTHOPAEDIC SURGERY

## 2023-05-16 PROCEDURE — 6360000002 HC RX W HCPCS: Performed by: ANESTHESIOLOGY

## 2023-05-16 PROCEDURE — 2580000003 HC RX 258: Performed by: ORTHOPAEDIC SURGERY

## 2023-05-16 PROCEDURE — 87075 CULTR BACTERIA EXCEPT BLOOD: CPT

## 2023-05-16 PROCEDURE — 6360000002 HC RX W HCPCS

## 2023-05-16 PROCEDURE — 2580000003 HC RX 258: Performed by: ANESTHESIOLOGY

## 2023-05-16 PROCEDURE — 7100000010 HC PHASE II RECOVERY - FIRST 15 MIN: Performed by: ORTHOPAEDIC SURGERY

## 2023-05-16 PROCEDURE — 3600000004 HC SURGERY LEVEL 4 BASE: Performed by: ORTHOPAEDIC SURGERY

## 2023-05-16 PROCEDURE — 3700000001 HC ADD 15 MINUTES (ANESTHESIA): Performed by: ORTHOPAEDIC SURGERY

## 2023-05-16 PROCEDURE — 87070 CULTURE OTHR SPECIMN AEROBIC: CPT

## 2023-05-16 PROCEDURE — 87205 SMEAR GRAM STAIN: CPT

## 2023-05-16 PROCEDURE — 82947 ASSAY GLUCOSE BLOOD QUANT: CPT

## 2023-05-16 PROCEDURE — 88331 PATH CONSLTJ SURG 1 BLK 1SPC: CPT

## 2023-05-16 PROCEDURE — 64415 NJX AA&/STRD BRCH PLXS IMG: CPT | Performed by: ANESTHESIOLOGY

## 2023-05-16 PROCEDURE — 6370000000 HC RX 637 (ALT 250 FOR IP)

## 2023-05-16 PROCEDURE — C9290 INJ, BUPIVACAINE LIPOSOME: HCPCS | Performed by: ANESTHESIOLOGY

## 2023-05-16 PROCEDURE — 2720000010 HC SURG SUPPLY STERILE: Performed by: ORTHOPAEDIC SURGERY

## 2023-05-16 PROCEDURE — 3600000014 HC SURGERY LEVEL 4 ADDTL 15MIN: Performed by: ORTHOPAEDIC SURGERY

## 2023-05-16 PROCEDURE — C1776 JOINT DEVICE (IMPLANTABLE): HCPCS | Performed by: ORTHOPAEDIC SURGERY

## 2023-05-16 PROCEDURE — 7100000001 HC PACU RECOVERY - ADDTL 15 MIN: Performed by: ORTHOPAEDIC SURGERY

## 2023-05-16 PROCEDURE — 88305 TISSUE EXAM BY PATHOLOGIST: CPT

## 2023-05-16 PROCEDURE — 7100000000 HC PACU RECOVERY - FIRST 15 MIN: Performed by: ORTHOPAEDIC SURGERY

## 2023-05-16 PROCEDURE — 2709999900 HC NON-CHARGEABLE SUPPLY: Performed by: ORTHOPAEDIC SURGERY

## 2023-05-16 PROCEDURE — 2500000003 HC RX 250 WO HCPCS: Performed by: ANESTHESIOLOGY

## 2023-05-16 DEVICE — SCREW, LOCKING BONE, RSP, 5X26
Type: IMPLANTABLE DEVICE | Site: SHOULDER | Status: FUNCTIONAL
Brand: DJO SURGICAL

## 2023-05-16 DEVICE — RSP BASEPLATE, 30MM, W/P2 COATING
Type: IMPLANTABLE DEVICE | Site: SHOULDER | Status: FUNCTIONAL
Brand: DJO SURGICAL

## 2023-05-16 DEVICE — SCREW, LOCKING BONE, RSP, 5X22
Type: IMPLANTABLE DEVICE | Site: SHOULDER | Status: FUNCTIONAL
Brand: DJO SURGICAL

## 2023-05-16 DEVICE — IMPLANTABLE DEVICE: Type: IMPLANTABLE DEVICE | Site: SHOULDER | Status: FUNCTIONAL

## 2023-05-16 DEVICE — AR, SMALL SOCKET INSERT, 32MM NEUTRAL EPLUS
Type: IMPLANTABLE DEVICE | Site: SHOULDER | Status: FUNCTIONAL
Brand: DJO SURGICAL

## 2023-05-16 DEVICE — GLENOID, HEAD W/RETAINING SCREW, RSP, 32MM/NEUTRAL
Type: IMPLANTABLE DEVICE | Site: SHOULDER | Status: FUNCTIONAL
Brand: DJO SURGICAL

## 2023-05-16 DEVICE — SCREW, LOCKING BONE, RSP, 5X14
Type: IMPLANTABLE DEVICE | Site: SHOULDER | Status: FUNCTIONAL
Brand: DJO SURGICAL

## 2023-05-16 RX ORDER — SODIUM CHLORIDE, SODIUM LACTATE, POTASSIUM CHLORIDE, CALCIUM CHLORIDE 600; 310; 30; 20 MG/100ML; MG/100ML; MG/100ML; MG/100ML
INJECTION, SOLUTION INTRAVENOUS CONTINUOUS
Status: DISCONTINUED | OUTPATIENT
Start: 2023-05-16 | End: 2023-05-16 | Stop reason: HOSPADM

## 2023-05-16 RX ORDER — PROPOFOL 10 MG/ML
INJECTION, EMULSION INTRAVENOUS PRN
Status: DISCONTINUED | OUTPATIENT
Start: 2023-05-16 | End: 2023-05-16 | Stop reason: SDUPTHER

## 2023-05-16 RX ORDER — MIDAZOLAM HYDROCHLORIDE 1 MG/ML
INJECTION INTRAMUSCULAR; INTRAVENOUS
Status: COMPLETED
Start: 2023-05-16 | End: 2023-05-16

## 2023-05-16 RX ORDER — IPRATROPIUM BROMIDE AND ALBUTEROL SULFATE 2.5; .5 MG/3ML; MG/3ML
1 SOLUTION RESPIRATORY (INHALATION)
Status: DISCONTINUED | OUTPATIENT
Start: 2023-05-16 | End: 2023-05-16 | Stop reason: HOSPADM

## 2023-05-16 RX ORDER — VANCOMYCIN HYDROCHLORIDE 1 G/20ML
INJECTION, POWDER, LYOPHILIZED, FOR SOLUTION INTRAVENOUS
Status: DISCONTINUED
Start: 2023-05-16 | End: 2023-05-16 | Stop reason: WASHOUT

## 2023-05-16 RX ORDER — SODIUM CHLORIDE 0.9 % (FLUSH) 0.9 %
5-40 SYRINGE (ML) INJECTION PRN
Status: DISCONTINUED | OUTPATIENT
Start: 2023-05-16 | End: 2023-05-16 | Stop reason: HOSPADM

## 2023-05-16 RX ORDER — SODIUM CHLORIDE 0.9 % (FLUSH) 0.9 %
5-40 SYRINGE (ML) INJECTION EVERY 12 HOURS SCHEDULED
Status: DISCONTINUED | OUTPATIENT
Start: 2023-05-16 | End: 2023-05-16 | Stop reason: HOSPADM

## 2023-05-16 RX ORDER — CEFAZOLIN SODIUM 1 G/3ML
INJECTION, POWDER, FOR SOLUTION INTRAMUSCULAR; INTRAVENOUS PRN
Status: DISCONTINUED | OUTPATIENT
Start: 2023-05-16 | End: 2023-05-16 | Stop reason: SDUPTHER

## 2023-05-16 RX ORDER — MEPERIDINE HYDROCHLORIDE 50 MG/ML
12.5 INJECTION INTRAMUSCULAR; INTRAVENOUS; SUBCUTANEOUS EVERY 5 MIN PRN
Status: DISCONTINUED | OUTPATIENT
Start: 2023-05-16 | End: 2023-05-16 | Stop reason: HOSPADM

## 2023-05-16 RX ORDER — ACETAMINOPHEN 500 MG
1000 TABLET ORAL ONCE
Status: COMPLETED | OUTPATIENT
Start: 2023-05-16 | End: 2023-05-16

## 2023-05-16 RX ORDER — MIDAZOLAM HYDROCHLORIDE 2 MG/2ML
2 INJECTION, SOLUTION INTRAMUSCULAR; INTRAVENOUS
Status: DISCONTINUED | OUTPATIENT
Start: 2023-05-16 | End: 2023-05-16 | Stop reason: HOSPADM

## 2023-05-16 RX ORDER — PHENYLEPHRINE HCL IN 0.9% NACL 1 MG/10 ML
SYRINGE (ML) INTRAVENOUS PRN
Status: DISCONTINUED | OUTPATIENT
Start: 2023-05-16 | End: 2023-05-16 | Stop reason: SDUPTHER

## 2023-05-16 RX ORDER — BUPIVACAINE HYDROCHLORIDE 5 MG/ML
INJECTION, SOLUTION EPIDURAL; INTRACAUDAL
Status: COMPLETED | OUTPATIENT
Start: 2023-05-16 | End: 2023-05-16

## 2023-05-16 RX ORDER — DROPERIDOL 2.5 MG/ML
0.62 INJECTION, SOLUTION INTRAMUSCULAR; INTRAVENOUS
Status: DISCONTINUED | OUTPATIENT
Start: 2023-05-16 | End: 2023-05-16 | Stop reason: HOSPADM

## 2023-05-16 RX ORDER — CEFAZOLIN 2 G/1
INJECTION, POWDER, FOR SOLUTION INTRAMUSCULAR; INTRAVENOUS
Status: COMPLETED
Start: 2023-05-16 | End: 2023-05-16

## 2023-05-16 RX ORDER — TRANEXAMIC ACID 10 MG/ML
INJECTION, SOLUTION INTRAVENOUS PRN
Status: DISCONTINUED | OUTPATIENT
Start: 2023-05-16 | End: 2023-05-16 | Stop reason: SDUPTHER

## 2023-05-16 RX ORDER — DEXAMETHASONE SODIUM PHOSPHATE 10 MG/ML
10 INJECTION, SOLUTION INTRAMUSCULAR; INTRAVENOUS ONCE
Status: DISCONTINUED | OUTPATIENT
Start: 2023-05-16 | End: 2023-05-16 | Stop reason: HOSPADM

## 2023-05-16 RX ORDER — DEXMEDETOMIDINE HYDROCHLORIDE 100 UG/ML
INJECTION, SOLUTION INTRAVENOUS PRN
Status: DISCONTINUED | OUTPATIENT
Start: 2023-05-16 | End: 2023-05-16 | Stop reason: SDUPTHER

## 2023-05-16 RX ORDER — PROMETHAZINE HYDROCHLORIDE 25 MG/ML
6.25 INJECTION, SOLUTION INTRAMUSCULAR; INTRAVENOUS EVERY 5 MIN PRN
Status: DISCONTINUED | OUTPATIENT
Start: 2023-05-16 | End: 2023-05-16 | Stop reason: HOSPADM

## 2023-05-16 RX ORDER — ONDANSETRON 2 MG/ML
INJECTION INTRAMUSCULAR; INTRAVENOUS PRN
Status: DISCONTINUED | OUTPATIENT
Start: 2023-05-16 | End: 2023-05-16 | Stop reason: SDUPTHER

## 2023-05-16 RX ORDER — MIDAZOLAM HYDROCHLORIDE 2 MG/2ML
2 INJECTION, SOLUTION INTRAMUSCULAR; INTRAVENOUS ONCE
Status: DISCONTINUED | OUTPATIENT
Start: 2023-05-16 | End: 2023-05-16 | Stop reason: HOSPADM

## 2023-05-16 RX ORDER — FENTANYL CITRATE 50 UG/ML
INJECTION, SOLUTION INTRAMUSCULAR; INTRAVENOUS
Status: COMPLETED
Start: 2023-05-16 | End: 2023-05-16

## 2023-05-16 RX ORDER — OXYCODONE HYDROCHLORIDE AND ACETAMINOPHEN 5; 325 MG/1; MG/1
1 TABLET ORAL
Status: DISCONTINUED | OUTPATIENT
Start: 2023-05-16 | End: 2023-05-16 | Stop reason: HOSPADM

## 2023-05-16 RX ORDER — ONDANSETRON 2 MG/ML
4 INJECTION INTRAMUSCULAR; INTRAVENOUS
Status: DISCONTINUED | OUTPATIENT
Start: 2023-05-16 | End: 2023-05-16 | Stop reason: HOSPADM

## 2023-05-16 RX ORDER — ACETAMINOPHEN 500 MG
TABLET ORAL
Status: COMPLETED
Start: 2023-05-16 | End: 2023-05-16

## 2023-05-16 RX ORDER — EPHEDRINE SULFATE/0.9% NACL/PF 50 MG/5 ML
SYRINGE (ML) INTRAVENOUS PRN
Status: DISCONTINUED | OUTPATIENT
Start: 2023-05-16 | End: 2023-05-16 | Stop reason: SDUPTHER

## 2023-05-16 RX ORDER — DEXMEDETOMIDINE HYDROCHLORIDE 4 UG/ML
INJECTION, SOLUTION INTRAVENOUS
Status: DISCONTINUED
Start: 2023-05-16 | End: 2023-05-16 | Stop reason: HOSPADM

## 2023-05-16 RX ORDER — FENTANYL CITRATE 50 UG/ML
100 INJECTION, SOLUTION INTRAMUSCULAR; INTRAVENOUS ONCE
Status: DISCONTINUED | OUTPATIENT
Start: 2023-05-16 | End: 2023-05-16 | Stop reason: HOSPADM

## 2023-05-16 RX ORDER — BUPIVACAINE HYDROCHLORIDE 5 MG/ML
INJECTION, SOLUTION EPIDURAL; INTRACAUDAL
Status: COMPLETED
Start: 2023-05-16 | End: 2023-05-16

## 2023-05-16 RX ORDER — GLYCOPYRROLATE 0.2 MG/ML
0.4 INJECTION INTRAMUSCULAR; INTRAVENOUS ONCE
Status: DISCONTINUED | OUTPATIENT
Start: 2023-05-16 | End: 2023-05-16 | Stop reason: HOSPADM

## 2023-05-16 RX ORDER — KETOROLAC TROMETHAMINE 30 MG/ML
INJECTION, SOLUTION INTRAMUSCULAR; INTRAVENOUS PRN
Status: DISCONTINUED | OUTPATIENT
Start: 2023-05-16 | End: 2023-05-16 | Stop reason: SDUPTHER

## 2023-05-16 RX ORDER — DEXAMETHASONE SODIUM PHOSPHATE 10 MG/ML
INJECTION, SOLUTION INTRAMUSCULAR; INTRAVENOUS PRN
Status: DISCONTINUED | OUTPATIENT
Start: 2023-05-16 | End: 2023-05-16 | Stop reason: SDUPTHER

## 2023-05-16 RX ORDER — ROCURONIUM BROMIDE 10 MG/ML
INJECTION, SOLUTION INTRAVENOUS PRN
Status: DISCONTINUED | OUTPATIENT
Start: 2023-05-16 | End: 2023-05-16 | Stop reason: SDUPTHER

## 2023-05-16 RX ORDER — LIDOCAINE HYDROCHLORIDE 10 MG/ML
INJECTION, SOLUTION INFILTRATION; PERINEURAL PRN
Status: DISCONTINUED | OUTPATIENT
Start: 2023-05-16 | End: 2023-05-16 | Stop reason: SDUPTHER

## 2023-05-16 RX ORDER — LABETALOL HYDROCHLORIDE 5 MG/ML
10 INJECTION, SOLUTION INTRAVENOUS
Status: DISCONTINUED | OUTPATIENT
Start: 2023-05-16 | End: 2023-05-16 | Stop reason: HOSPADM

## 2023-05-16 RX ORDER — SODIUM CHLORIDE 9 MG/ML
25 INJECTION, SOLUTION INTRAVENOUS PRN
Status: DISCONTINUED | OUTPATIENT
Start: 2023-05-16 | End: 2023-05-16 | Stop reason: HOSPADM

## 2023-05-16 RX ORDER — OXYCODONE HYDROCHLORIDE AND ACETAMINOPHEN 5; 325 MG/1; MG/1
2 TABLET ORAL
Status: DISCONTINUED | OUTPATIENT
Start: 2023-05-16 | End: 2023-05-16 | Stop reason: HOSPADM

## 2023-05-16 RX ORDER — SODIUM CHLORIDE 9 MG/ML
INJECTION, SOLUTION INTRAVENOUS PRN
Status: DISCONTINUED | OUTPATIENT
Start: 2023-05-16 | End: 2023-05-16 | Stop reason: HOSPADM

## 2023-05-16 RX ORDER — HYDRALAZINE HYDROCHLORIDE 20 MG/ML
10 INJECTION INTRAMUSCULAR; INTRAVENOUS
Status: DISCONTINUED | OUTPATIENT
Start: 2023-05-16 | End: 2023-05-16 | Stop reason: HOSPADM

## 2023-05-16 RX ORDER — DIPHENHYDRAMINE HYDROCHLORIDE 50 MG/ML
12.5 INJECTION INTRAMUSCULAR; INTRAVENOUS
Status: DISCONTINUED | OUTPATIENT
Start: 2023-05-16 | End: 2023-05-16 | Stop reason: HOSPADM

## 2023-05-16 RX ORDER — MORPHINE SULFATE 2 MG/ML
2 INJECTION, SOLUTION INTRAMUSCULAR; INTRAVENOUS EVERY 5 MIN PRN
Status: DISCONTINUED | OUTPATIENT
Start: 2023-05-16 | End: 2023-05-16 | Stop reason: HOSPADM

## 2023-05-16 RX ADMIN — DEXMEDETOMIDINE HCL 4 MCG: 100 INJECTION INTRAVENOUS at 07:40

## 2023-05-16 RX ADMIN — ROCURONIUM BROMIDE 20 MG: 10 INJECTION, SOLUTION INTRAVENOUS at 08:04

## 2023-05-16 RX ADMIN — Medication 5 MG: at 08:29

## 2023-05-16 RX ADMIN — Medication 100 MCG: at 08:18

## 2023-05-16 RX ADMIN — SODIUM CHLORIDE: 9 INJECTION, SOLUTION INTRAVENOUS at 06:35

## 2023-05-16 RX ADMIN — SUGAMMADEX 200 MG: 100 INJECTION, SOLUTION INTRAVENOUS at 10:05

## 2023-05-16 RX ADMIN — ROCURONIUM BROMIDE 20 MG: 10 INJECTION, SOLUTION INTRAVENOUS at 09:14

## 2023-05-16 RX ADMIN — Medication 5 MG: at 08:43

## 2023-05-16 RX ADMIN — PHENYLEPHRINE HYDROCHLORIDE 50 MCG/MIN: 10 INJECTION INTRAVENOUS at 07:52

## 2023-05-16 RX ADMIN — Medication 1000 MG: at 06:19

## 2023-05-16 RX ADMIN — PROPOFOL 30 MG: 10 INJECTION, EMULSION INTRAVENOUS at 09:14

## 2023-05-16 RX ADMIN — CEFAZOLIN 2 G: 1 INJECTION, POWDER, FOR SOLUTION INTRAMUSCULAR; INTRAVENOUS at 07:20

## 2023-05-16 RX ADMIN — Medication 100 MCG: at 07:40

## 2023-05-16 RX ADMIN — ACETAMINOPHEN 1000 MG: 500 TABLET ORAL at 06:19

## 2023-05-16 RX ADMIN — DEXMEDETOMIDINE HCL 4 MCG: 100 INJECTION INTRAVENOUS at 08:03

## 2023-05-16 RX ADMIN — BUPIVACAINE HYDROCHLORIDE 10 ML: 5 INJECTION, SOLUTION EPIDURAL; INTRACAUDAL; PERINEURAL at 06:55

## 2023-05-16 RX ADMIN — DEXMEDETOMIDINE HCL 4 MCG: 100 INJECTION INTRAVENOUS at 07:35

## 2023-05-16 RX ADMIN — LIDOCAINE HYDROCHLORIDE 40 MG: 10 INJECTION, SOLUTION INFILTRATION; PERINEURAL at 07:05

## 2023-05-16 RX ADMIN — Medication 5 MG: at 08:24

## 2023-05-16 RX ADMIN — TRANEXAMIC ACID 1 G: 10 INJECTION, SOLUTION INTRAVENOUS at 07:24

## 2023-05-16 RX ADMIN — DEXAMETHASONE SODIUM PHOSPHATE 10 MG: 10 INJECTION INTRAMUSCULAR; INTRAVENOUS at 07:24

## 2023-05-16 RX ADMIN — MIDAZOLAM HYDROCHLORIDE 2 MG: 1 INJECTION, SOLUTION INTRAMUSCULAR; INTRAVENOUS at 06:51

## 2023-05-16 RX ADMIN — FENTANYL CITRATE 100 MCG: 50 INJECTION, SOLUTION INTRAMUSCULAR; INTRAVENOUS at 06:51

## 2023-05-16 RX ADMIN — KETOROLAC TROMETHAMINE 15 MG: 30 INJECTION, SOLUTION INTRAMUSCULAR; INTRAVENOUS at 09:55

## 2023-05-16 RX ADMIN — BUPIVACAINE 10 ML: 13.3 INJECTION, SUSPENSION, LIPOSOMAL INFILTRATION at 06:55

## 2023-05-16 RX ADMIN — ONDANSETRON 4 MG: 2 INJECTION INTRAMUSCULAR; INTRAVENOUS at 09:54

## 2023-05-16 RX ADMIN — ROCURONIUM BROMIDE 50 MG: 10 INJECTION, SOLUTION INTRAVENOUS at 07:05

## 2023-05-16 RX ADMIN — DEXMEDETOMIDINE HCL 4 MCG: 100 INJECTION INTRAVENOUS at 07:45

## 2023-05-16 RX ADMIN — DEXMEDETOMIDINE HCL 4 MCG: 100 INJECTION INTRAVENOUS at 07:52

## 2023-05-16 RX ADMIN — Medication 150 MCG: at 07:50

## 2023-05-16 RX ADMIN — Medication 100 MCG: at 07:35

## 2023-05-16 RX ADMIN — PROPOFOL 170 MG: 10 INJECTION, EMULSION INTRAVENOUS at 07:05

## 2023-05-16 RX ADMIN — ROCURONIUM BROMIDE 10 MG: 10 INJECTION, SOLUTION INTRAVENOUS at 08:43

## 2023-05-16 ASSESSMENT — PAIN DESCRIPTION - ORIENTATION
ORIENTATION: LEFT
ORIENTATION: LEFT

## 2023-05-16 ASSESSMENT — PAIN DESCRIPTION - DESCRIPTORS: DESCRIPTORS: ACHING

## 2023-05-16 ASSESSMENT — PAIN DESCRIPTION - LOCATION
LOCATION: SHOULDER
LOCATION: SHOULDER

## 2023-05-16 ASSESSMENT — PAIN SCALES - GENERAL: PAINLEVEL_OUTOF10: 4

## 2023-05-16 NOTE — ANESTHESIA PROCEDURE NOTES
Peripheral Block    Patient location during procedure: pre-op  Reason for block: post-op pain management and at surgeon's request  Start time: 5/16/2023 6:53 AM  End time: 5/16/2023 6:55 AM  Staffing  Performed: anesthesiologist   Anesthesiologist: Esteban Delong MD  Preanesthetic Checklist  Completed: patient identified, IV checked, site marked, risks and benefits discussed, surgical/procedural consents, equipment checked, pre-op evaluation, timeout performed, anesthesia consent given, oxygen available, monitors applied/VS acknowledged, fire risk safety assessment completed and verbalized and blood product R/B/A discussed and consented  Peripheral Block   Patient position: sitting  Prep: ChloraPrep  Provider prep: mask and sterile gloves  Patient monitoring: cardiac monitor, continuous pulse ox and frequent blood pressure checks  Block type: Brachial plexus  Interscalene  Laterality: left  Injection technique: single-shot  Guidance: nerve stimulator and ultrasound guided    Needle   Needle type: insulated echogenic nerve stimulator needle   Needle gauge: 22 G  Needle localization: anatomical landmarks, nerve stimulator and ultrasound guidance  Needle length: 2 IN.   Assessment   Injection assessment: no paresthesia on injection, local visualized surrounding nerve on ultrasound, negative aspiration for heme and no intravascular symptoms  Paresthesia pain: none  Slow fractionated injection: yes  Hemodynamics: stableno  Outcomes: patient tolerated procedure well    Additional Notes  (+) LEFT DELTOID TWITCH FROM 1.5MA DOWN TO 0.7MA  Medications Administered  bupivacaine (MARCAINE) PF injection 0.5% - Perineural   10 mL - 5/16/2023 6:55:00 AM  bupivacaine liposome (EXPAREL) injection 1.3% - Perineural   10 mL - 5/16/2023 6:55:00 AM

## 2023-05-16 NOTE — H&P
Update History & Physical    The patient's History and Physical of May 12, 2023 was reviewed with the patient and I examined the patient. There was no change. The surgical site was confirmed by the patient and me. Heart: RRR  Lungs: CTA bilaterally    Plan: The risks, benefits, expected outcome, and alternative to the recommended procedure have been discussed with the patient. Patient understands and wants to proceed with the procedure.      Electronically signed by Jayesh Naranjo MD on 5/16/2023 at 6:37 AM

## 2023-05-16 NOTE — ANESTHESIA POSTPROCEDURE EVALUATION
Department of Anesthesiology  Postprocedure Note    Patient: Luis A Thompson  MRN: [id number]  YOB: 1950  Date of evaluation: 5/16/2023      Procedure Summary     Date: 05/16/23 Room / Location: [address]    Anesthesia Start: 0700 Anesthesia Stop: 1011    Procedure: LEFT REVERSE TOTAL SHOULDER ARTHROPLASTY WITH DJO AND PRE-OPERATIVE INTERSCALENE BLOCK (Left: Shoulder) Diagnosis:       Osteoarthritis of glenohumeral joint, left      (Osteoarthritis of glenohumeral joint, left [M19.012])    Surgeons: Amelia Greenberg MD Responsible Provider: Marta Grissom MD    Anesthesia Type: general, regional ASA Status: 2          Anesthesia Type: No value filed.     Hina Phase I: Hina Score: 8    Hina Phase II:        Anesthesia Post Evaluation    Patient location during evaluation: PACU  Patient participation: complete - patient participated  Level of consciousness: awake and alert  Airway patency: patent  Nausea & Vomiting: no nausea and no vomiting  Complications: no  Cardiovascular status: hemodynamically stable  Respiratory status: room air and spontaneous ventilation  Hydration status: euvolemic  Multimodal analgesia pain management approach

## 2023-05-16 NOTE — OP NOTE
to be intact and move freely without tension. Copious amounts of irrigation was used to irrigate the shoulder after being irrigated with Irrisept. The deltopectoral interval was closed loosely with 0 vicryl. Subcutaneous closure with 3-0 Vicryl. The skin was closed with a 3-0 prolene in a running subcuticular fashion followed by steri-strips. Sterile dressings were applied using 4 x 4 gauze and Tegaderm. The patient's arm was placed in a shoulder immobilizer. he was awoken, transferred to his bed and taken to recovery room in stable condition.        Complications: None    Condition: Stable

## 2023-05-16 NOTE — BRIEF OP NOTE
Brief Postoperative Note      Patient: Luis A Zelaya  YOB: 1950  MRN: [id number]    Date of Procedure: 5/16/2023    Pre-Op Diagnosis Codes:     * Osteoarthritis of glenohumeral joint, left [M19.012]    Post-Op Diagnosis: Same       Procedure(s):  LEFT REVERSE TOTAL SHOULDER ARTHROPLASTY WITH DJO AND PRE-OPERATIVE INTERSCALENE BLOCK    Surgeon(s):  Julio César Jackson MD    Assistant:  First Assistant: Robinson Crisostomo RN  Resident: Jose Brody DO    Anesthesia: General    Estimated Blood Loss (mL): 072    Complications: None    Specimens:   ID Type Source Tests Collected by Time Destination   1 : LEFT SUB DELTOID SPACE Swab Tissue CULTURE, ANAEROBIC AND AEROBIC Julio César Jackson, MD 5/16/2023 0747    2 : LEFT GLENOHUMERAL JOINT Swab Tissue CULTURE, ANAEROBIC AND AEROBIC Julio César Jackson, MD 5/16/2023 5971    3 : LFET POSTERIOR CAPSULE Swab Tissue CULTURE, ANAEROBIC AND AEROBIC Julio César Jackson, MD 5/16/2023 0817    4 : LEFT GLENOID VAULT Swab Tissue CULTURE, ANAEROBIC AND AEROBIC Julio César Jackson, MD 5/16/2023 0825    A : LEFT ANTERIOR CAPSULE Tissue Tissue SURGICAL PATHOLOGY Julio César Jackson, MD 5/16/2023 6444        Implants:  Implant Name Type Inv.  Item Serial No.  Lot No. LRB No. Used Action   BASEPLATE NETTA 30 MM P2 COAT [facility] - [id number]  BASEPLATE NETTA 30 MM P2 COAT [facility] SURGICAL- [id number] Left 1 Implanted   SCREW BNE L14MM DIA5MM TI NONLOCKING FOR NETTA BASEPLT FIX - [id number]  SCREW BNE L14MM DIA5MM TI NONLOCKING FOR NETTA BASEPLT FIX  [facility] MEDICAL - O SURGICAL- [id number] Left 1 Implanted   SCREW BNE L22MM DIA5MM TI NONLOCKING FOR NETTA BASEPLT FIX - [id number]  SCREW BNE L22MM DIA5MM TI NONLOCKING FOR NETTA BASEPLT FIX  [facility]- [id number] Left 1 Implanted   SCREW BNE L26MM DIA5MM TI NONLOCKING FOR NETTA BASEPLT FIX - [id number]  SCREW BNE L26MM DIA5MM TI NONLOCKING FOR [facility] - DJO SURGICAL- [id number] Left 1 Implanted   SCREW - - -

## 2023-05-16 NOTE — DISCHARGE INSTRUCTIONS
Fouzia Mcpherson MD  [address] in Shoulder and Elbow Surgery      POSTOPERATIVE INSTRUCTIONS    Surgery: Left Shoulder Replacement    DIET  Begin with clear liquids and light foods (jellos, soups, etc.). Progress to your normal diet if you are not nauseated. WOUND CARE  Maintain your operative dressing, may loosen bandage if swelling occurs. It is normal for joints to bleed and swell following surgery - if blood soaks onto the bandage, do not become alarmed - reinforce with additional dressing. Surgical dressing is changed on the second post-operative day, and daily after that with clean gauze and tape. If it gets wet, it should be changed right away. To avoid infection, keep surgical incisions clean and dry - you may shower by covering the surgical site with Seran wrap or Press and seal before showering, and afterwards take everything off and apply clean gauze dressings - NO immersion of operative site (i.e. bath, pool). MEDICATIONS  Pain medication may be injected into the extremity or surgical wound during surgery - this will typically wear off within 24-36 hours. Start taking your prescription pain medication before it does. Most patients will require some narcotic pain medication for a short period of time - Start it before numbing medicine wears off, and use as directed on the bottle. Common side effects of the pain medication are nausea, drowsiness, and constipation - to decrease the side effects, take medication with food - if constipation occurs, consider taking an over-the-counter laxative. If you are having problems with nausea and vomiting, take your anti-emetic as prescribed, otherwise, contact the office to possibly have your medication changed ([phone]). Do not drive a car or operate machinery while taking the narcotic medication. Please resume all home medications, unless instructed otherwise.     ACTIVITY  Keep the limb

## 2023-05-16 NOTE — ANESTHESIA PRE PROCEDURE
Department of Anesthesiology  Preprocedure Note       Name:  Luis A Thompson   Age:  68 y.o.  :  1950                                          MRN:  [id number]         Date:  2023      Surgeon: Tiffanie Chowdhury):  Amelia Greenberg MD    Procedure: Procedure(s):  LEFT SHOULDER TOTAL ARTHROPLASTY REVERSE WITH DJO AND PRE-OP INTERSCALENE BLOCK WITH EXPAREL possible ANTIBIOTIC SPACER INSERTION REQ PATHOLOGY    Medications prior to admission:   Prior to Admission medications    Medication Sig Start Date End Date Taking? Authorizing Provider   HYDROcodone-acetaminophen (NORCO) 5-325 MG per tablet Take 1 tablet by mouth every 4 hours as needed for Pain for up to 7 days.  Max Daily Amount: 6 tablets 23  Esperanza Sahni MD   ondansetron (ZOFRAN) 4 MG tablet Take 1 tablet by mouth daily as needed for Nausea or Vomiting 23   Amelia Greenberg MD   doxycycline hyclate (VIBRA-TABS) 100 MG tablet Take 1 tablet by mouth 2 times daily for 7 days 23  Amelia Greenberg MD   Insulin Pen Needle 31G X 5 MM MISC 1 each by Does not apply route 3 times daily 23  Nidia Coil, APRN - NP   metFORMIN (GLUCOPHAGE) 1000 MG tablet Take 1 tablet by mouth 2 times daily (with meals) 23  Nidia Coil, APRN - NP   lisinopril (PRINIVIL;ZESTRIL) 10 MG tablet Take 1 tablet by mouth daily 23  Nidia Coil, APRN - NP   chlorthalidone (HYGROTON) 25 MG tablet Take 1 tablet by mouth daily 23  Nidia Coil, APRN - NP   pioglitazone (ACTOS) 30 MG tablet Take 1 tablet by mouth daily 23  Nidia Coil, APRN - NP   DULoxetine (CYMBALTA) 30 MG extended release capsule Take 1 capsule by mouth 2 times daily 23  Nidia Coil, APRN - NP   insulin detemir (LEVEMIR FLEXTOUCH) 100 UNIT/ML injection pen Inject 63 Units into the skin nightly 23  BREANNA Mckay - NP   celecoxib (CELEBREX) 200 MG capsule Take 1 capsule by mouth daily 23
Enteral Nutrition

## 2023-05-17 LAB — SURGICAL PATHOLOGY REPORT: NORMAL

## 2023-05-21 LAB
MICROORGANISM SPEC CULT: ABNORMAL
MICROORGANISM SPEC CULT: NORMAL
MICROORGANISM/AGENT SPEC: ABNORMAL
MICROORGANISM/AGENT SPEC: NORMAL
MICROORGANISM/AGENT SPEC: NORMAL
SPECIMEN DESCRIPTION: ABNORMAL
SPECIMEN DESCRIPTION: NORMAL

## 2023-05-31 ENCOUNTER — OFFICE VISIT (OUTPATIENT)
Dept: ORTHOPEDIC SURGERY | Age: 73
End: 2023-05-31

## 2023-05-31 VITALS — HEIGHT: 74 IN | WEIGHT: 200 LBS | RESPIRATION RATE: 14 BRPM | BODY MASS INDEX: 25.67 KG/M2

## 2023-05-31 DIAGNOSIS — M19.012 OSTEOARTHRITIS OF LEFT GLENOHUMERAL JOINT: Primary | ICD-10-CM

## 2023-05-31 PROCEDURE — 99024 POSTOP FOLLOW-UP VISIT: CPT | Performed by: ORTHOPAEDIC SURGERY

## 2023-05-31 NOTE — PROGRESS NOTES
Procedure: Left Reverse Shoulder Arthroplasty  Date of Procedure: 5/16/2023    HPI: Luis A Basilio is approximately 2 weeks status post the aforementioned procedure. he is doing relatively well clinically. his pain is appropriately controlled. he has been compliant with his sling/abduction pillow and pendulum exercises. he denies having any fevers, chills, sweats, or constitutional symptoms. Physical examination:  Resp 14   Ht 6' 2\" (1.88 m)   Wt 200 lb (90.7 kg)   BMI 25.68 kg/m²   General Appearance: alert, well appearing, and in no distress  Mental Status: alert, oriented to person, place, and time  Evaluation of the left shoulder and upper extremity demonstrates his shoulder incision to be clean, dry, and intact. No wound dehiscence or active drainage is appreciated. There is mild to moderate amount of swelling present. Sensation is grossly intact light touch in all dermatomes and he has a 2+ radial pulse with brisk capillary refill in all his fingers. he can actively flex and extend the wrist and flex, extend, abduct, and adduct all of his fingers. Imaging Studies: 4 x-ray views of the left shoulder completed on 5/31/2023 were independently reviewed demonstrating a reverse shoulder implant to be in acceptable alignment and well fixed without any obvious fracture, dislocation, or subluxation. Impression and plan: Luis A Basilio is approximately 2 weeks status post a left reverse shoulder arthroplasty. he is doing relatively well clinically at this time. Intraoperative cultures of all been negative for growth. Sutures were taken out today and Steri-Strips and clean dressings applied. he is to continue with daily dressing changes. he was switched from the sling/abduction pillow to a shoulder immobilizer. he will continue with immobilization and pendulum exercises for the next 4 weeks.  I'll have him follow up in 4 weeks for re-evaluation, but he was instructed to return or call earlier with

## 2023-06-15 DIAGNOSIS — E11.42 TYPE 2 DIABETES MELLITUS WITH DIABETIC POLYNEUROPATHY, WITH LONG-TERM CURRENT USE OF INSULIN (HCC): ICD-10-CM

## 2023-06-15 DIAGNOSIS — Z79.4 TYPE 2 DIABETES MELLITUS WITH DIABETIC POLYNEUROPATHY, WITH LONG-TERM CURRENT USE OF INSULIN (HCC): ICD-10-CM

## 2023-06-16 RX ORDER — PEN NEEDLE, DIABETIC 31 GX3/16"
1 NEEDLE, DISPOSABLE MISCELLANEOUS 3 TIMES DAILY
Qty: 100 EACH | Refills: 3 | Status: SHIPPED | OUTPATIENT
Start: 2023-06-16

## 2023-06-16 NOTE — TELEPHONE ENCOUNTER
LOV 5/10/2023     RTO 7/5/2023  LRF 4/20/2023          Controlled Substance Monitoring:    Acute and Chronic Pain Monitoring:   RX Monitoring 2/14/2019   Attestation The Prescription Monitoring Report for this patient was reviewed today. Periodic Controlled Substance Monitoring No signs of potential drug abuse or diversion identified. Chronic Pain > 80 MEDD Dose reduction has been attempted. ;Reviewed the patient's functional status and documentation.

## 2023-07-05 ENCOUNTER — OFFICE VISIT (OUTPATIENT)
Dept: ORTHOPEDIC SURGERY | Age: 73
End: 2023-07-05

## 2023-07-05 VITALS — WEIGHT: 200 LBS | HEIGHT: 74 IN | RESPIRATION RATE: 14 BRPM | BODY MASS INDEX: 25.67 KG/M2

## 2023-07-05 DIAGNOSIS — M19.012 OSTEOARTHRITIS OF LEFT GLENOHUMERAL JOINT: Primary | ICD-10-CM

## 2023-07-05 PROCEDURE — 99024 POSTOP FOLLOW-UP VISIT: CPT | Performed by: ORTHOPAEDIC SURGERY

## 2023-07-05 NOTE — PROGRESS NOTES
sling and may start using the arm for light activities of daily living limiting any lifting pushing or pulling to 1 to 2 pounds at the most.  He was provided a home stretching program today. I will see him back for reevaluation in 6 weeks but he may return or call earlier with questions or concerns.

## 2023-07-10 ENCOUNTER — OFFICE VISIT (OUTPATIENT)
Dept: FAMILY MEDICINE CLINIC | Age: 73
End: 2023-07-10
Payer: MEDICARE

## 2023-07-10 VITALS
BODY MASS INDEX: 25.58 KG/M2 | HEART RATE: 86 BPM | OXYGEN SATURATION: 96 % | WEIGHT: 199.2 LBS | SYSTOLIC BLOOD PRESSURE: 118 MMHG | DIASTOLIC BLOOD PRESSURE: 66 MMHG | RESPIRATION RATE: 18 BRPM | TEMPERATURE: 97.3 F

## 2023-07-10 DIAGNOSIS — E11.42 TYPE 2 DIABETES MELLITUS WITH DIABETIC POLYNEUROPATHY, WITH LONG-TERM CURRENT USE OF INSULIN (HCC): Primary | ICD-10-CM

## 2023-07-10 DIAGNOSIS — N40.1 BENIGN PROSTATIC HYPERPLASIA WITH URINARY OBSTRUCTION: ICD-10-CM

## 2023-07-10 DIAGNOSIS — I10 ESSENTIAL HYPERTENSION: Chronic | ICD-10-CM

## 2023-07-10 DIAGNOSIS — N13.8 BENIGN PROSTATIC HYPERPLASIA WITH URINARY OBSTRUCTION: ICD-10-CM

## 2023-07-10 DIAGNOSIS — J43.2 CENTRILOBULAR EMPHYSEMA (HCC): ICD-10-CM

## 2023-07-10 DIAGNOSIS — G62.9 NEUROPATHY: ICD-10-CM

## 2023-07-10 DIAGNOSIS — Z79.4 TYPE 2 DIABETES MELLITUS WITH DIABETIC POLYNEUROPATHY, WITH LONG-TERM CURRENT USE OF INSULIN (HCC): Primary | ICD-10-CM

## 2023-07-10 LAB — HBA1C MFR BLD: 6.7 %

## 2023-07-10 PROCEDURE — G8417 CALC BMI ABV UP PARAM F/U: HCPCS | Performed by: NURSE PRACTITIONER

## 2023-07-10 PROCEDURE — 3078F DIAST BP <80 MM HG: CPT | Performed by: NURSE PRACTITIONER

## 2023-07-10 PROCEDURE — 1036F TOBACCO NON-USER: CPT | Performed by: NURSE PRACTITIONER

## 2023-07-10 PROCEDURE — 2022F DILAT RTA XM EVC RTNOPTHY: CPT | Performed by: NURSE PRACTITIONER

## 2023-07-10 PROCEDURE — G8427 DOCREV CUR MEDS BY ELIG CLIN: HCPCS | Performed by: NURSE PRACTITIONER

## 2023-07-10 PROCEDURE — 3074F SYST BP LT 130 MM HG: CPT | Performed by: NURSE PRACTITIONER

## 2023-07-10 PROCEDURE — 3017F COLORECTAL CA SCREEN DOC REV: CPT | Performed by: NURSE PRACTITIONER

## 2023-07-10 PROCEDURE — 3044F HG A1C LEVEL LT 7.0%: CPT | Performed by: NURSE PRACTITIONER

## 2023-07-10 PROCEDURE — 3023F SPIROM DOC REV: CPT | Performed by: NURSE PRACTITIONER

## 2023-07-10 PROCEDURE — 83037 HB GLYCOSYLATED A1C HOME DEV: CPT | Performed by: NURSE PRACTITIONER

## 2023-07-10 PROCEDURE — 1123F ACP DISCUSS/DSCN MKR DOCD: CPT | Performed by: NURSE PRACTITIONER

## 2023-07-10 PROCEDURE — 99204 OFFICE O/P NEW MOD 45 MIN: CPT | Performed by: NURSE PRACTITIONER

## 2023-07-10 SDOH — HEALTH STABILITY: PHYSICAL HEALTH: ON AVERAGE, HOW MANY DAYS PER WEEK DO YOU ENGAGE IN MODERATE TO STRENUOUS EXERCISE (LIKE A BRISK WALK)?: 0 DAYS

## 2023-07-10 ASSESSMENT — ENCOUNTER SYMPTOMS
CHEST TIGHTNESS: 0
TROUBLE SWALLOWING: 0
WHEEZING: 0
ROS SKIN COMMENTS: FOLLOWS WITH DERMATOLOGY
CONSTIPATION: 0
BLOOD IN STOOL: 0
ABDOMINAL PAIN: 0
DIARRHEA: 1
RHINORRHEA: 0
BACK PAIN: 1
SORE THROAT: 0
COUGH: 0
SHORTNESS OF BREATH: 0
NAUSEA: 0
SINUS PRESSURE: 0

## 2023-07-10 ASSESSMENT — SOCIAL DETERMINANTS OF HEALTH (SDOH)

## 2023-07-10 NOTE — PROGRESS NOTES
Luis A Abad is approximately 6 weeks status post a left reverse shoulder arthroplasty. he is doing relatively well clinically at this time. We did have a discussion about his pain at this time. This is something we are going to continue to monitor. No obvious signs of infection and the implant appears to be stable and well aligned. Today he was instructed to discontinue his sling and may start using the arm for light activities of daily living limiting any lifting pushing or pulling to 1 to 2 pounds at the most.  He was provided a home stretching program today. I will see him back for reevaluation in 6 weeks but he may return or call earlier with questions or concerns. Review of Systems  Review of Systems   Constitutional:  Negative for activity change, appetite change, chills, fatigue and fever. HENT:  Negative for congestion, ear pain, postnasal drip, rhinorrhea, sinus pressure, sneezing, sore throat and trouble swallowing. Eyes:         Glasses. Current eye exams   Left eye - past surgery? Respiratory:  Negative for cough, chest tightness, shortness of breath and wheezing. Cardiovascular:  Negative for chest pain, palpitations and leg swelling. Gastrointestinal:  Positive for diarrhea. Negative for abdominal pain, blood in stool, constipation and nausea. Endocrine:        Has CGM - higher past few months    Genitourinary:  Negative for difficulty urinating, dysuria, frequency, hematuria and urgency. Follows wit urology yearly   Chronic stones    Musculoskeletal:  Positive for arthralgias and back pain. Negative for gait problem, joint swelling and myalgias. Follows with orthopedic   Pain stimulator in place    Skin:  Negative for rash and wound. Follows with dermatology    Allergic/Immunologic: Negative for environmental allergies. Neurological:  Negative for dizziness, syncope, light-headedness, numbness and headaches.    Hematological:  Does not bruise/bleed

## 2023-08-09 ENCOUNTER — OFFICE VISIT (OUTPATIENT)
Dept: ORTHOPEDIC SURGERY | Age: 73
End: 2023-08-09

## 2023-08-09 VITALS — WEIGHT: 199 LBS | HEIGHT: 74 IN | RESPIRATION RATE: 14 BRPM | BODY MASS INDEX: 25.54 KG/M2

## 2023-08-09 DIAGNOSIS — M19.012 OSTEOARTHRITIS OF LEFT GLENOHUMERAL JOINT: Primary | ICD-10-CM

## 2023-08-09 PROCEDURE — 99024 POSTOP FOLLOW-UP VISIT: CPT | Performed by: ORTHOPAEDIC SURGERY

## 2023-08-10 DIAGNOSIS — N20.0 KIDNEY STONE ON LEFT SIDE: Primary | ICD-10-CM

## 2023-08-16 ENCOUNTER — PATIENT MESSAGE (OUTPATIENT)
Dept: ORTHOPEDIC SURGERY | Age: 73
End: 2023-08-16

## 2023-08-17 NOTE — TELEPHONE ENCOUNTER
Has there been any injury or changes since our recent visit and is there pain with the \"crunching\"? If yes, I would recommend getting him in to be evaluated. If no, I would recommend forging ahead with his exercises and monitoring.

## 2023-08-25 ENCOUNTER — HOSPITAL ENCOUNTER (OUTPATIENT)
Dept: ULTRASOUND IMAGING | Age: 73
End: 2023-08-25
Attending: SPECIALIST
Payer: MEDICARE

## 2023-08-25 DIAGNOSIS — N20.0 KIDNEY STONE ON LEFT SIDE: ICD-10-CM

## 2023-08-25 PROCEDURE — 76775 US EXAM ABDO BACK WALL LIM: CPT

## 2023-09-04 DIAGNOSIS — E78.2 MIXED HYPERLIPIDEMIA: ICD-10-CM

## 2023-09-04 DIAGNOSIS — Z79.4 TYPE 2 DIABETES MELLITUS WITH DIABETIC POLYNEUROPATHY, WITH LONG-TERM CURRENT USE OF INSULIN (HCC): ICD-10-CM

## 2023-09-04 DIAGNOSIS — G89.21 CHRONIC PAIN DUE TO TRAUMA: ICD-10-CM

## 2023-09-04 DIAGNOSIS — F32.A DEPRESSION, UNSPECIFIED DEPRESSION TYPE: ICD-10-CM

## 2023-09-04 DIAGNOSIS — E11.42 TYPE 2 DIABETES MELLITUS WITH DIABETIC POLYNEUROPATHY, WITH LONG-TERM CURRENT USE OF INSULIN (HCC): ICD-10-CM

## 2023-09-04 DIAGNOSIS — I10 ESSENTIAL HYPERTENSION: Chronic | ICD-10-CM

## 2023-09-04 DIAGNOSIS — M15.9 OSTEOARTHRITIS OF MULTIPLE JOINTS, UNSPECIFIED OSTEOARTHRITIS TYPE: ICD-10-CM

## 2023-09-04 DIAGNOSIS — G62.9 NEUROPATHY: ICD-10-CM

## 2023-09-05 NOTE — TELEPHONE ENCOUNTER
LOV 7/10/2023     RTO 11/15/2023  LRF 7/10/2023          Controlled Substance Monitoring:    Acute and Chronic Pain Monitoring:   RX Monitoring 2/14/2019   Attestation The Prescription Monitoring Report for this patient was reviewed today. Periodic Controlled Substance Monitoring No signs of potential drug abuse or diversion identified. Chronic Pain > 80 MEDD Dose reduction has been attempted. ;Reviewed the patient's functional status and documentation.

## 2023-09-05 NOTE — TELEPHONE ENCOUNTER
LOV 7/10/2023     RTO 11/15/2023  LRF atorvastatin, celecoxib, chlorthalidone, DULoxetine, gabapentin, lisinopril, metFORMIN, pioglitazone 4/20/2023     Insulin Pen Needle  6/16/2023   insulin aspart  10/25/2022     Controlled Substance Monitoring:    Acute and Chronic Pain Monitoring:   RX Monitoring 2/14/2019   Attestation The Prescription Monitoring Report for this patient was reviewed today. Periodic Controlled Substance Monitoring No signs of potential drug abuse or diversion identified. Chronic Pain > 80 MEDD Dose reduction has been attempted. ;Reviewed the patient's functional status and documentation.

## 2023-09-06 RX ORDER — CHLORTHALIDONE 25 MG/1
25 TABLET ORAL DAILY
Qty: 90 TABLET | Refills: 1 | Status: SHIPPED | OUTPATIENT
Start: 2023-09-06 | End: 2024-09-05

## 2023-09-06 RX ORDER — PIOGLITAZONEHYDROCHLORIDE 30 MG/1
30 TABLET ORAL DAILY
Qty: 90 TABLET | Refills: 1 | Status: SHIPPED | OUTPATIENT
Start: 2023-09-06 | End: 2024-08-31

## 2023-09-06 RX ORDER — CELECOXIB 200 MG/1
200 CAPSULE ORAL DAILY
Qty: 90 CAPSULE | Refills: 1 | Status: SHIPPED | OUTPATIENT
Start: 2023-09-06 | End: 2024-08-31

## 2023-09-06 RX ORDER — LISINOPRIL 10 MG/1
10 TABLET ORAL DAILY
Qty: 90 TABLET | Refills: 1 | Status: SHIPPED | OUTPATIENT
Start: 2023-09-06 | End: 2024-09-05

## 2023-09-06 RX ORDER — PEN NEEDLE, DIABETIC 31 GX3/16"
1 NEEDLE, DISPOSABLE MISCELLANEOUS 3 TIMES DAILY
Qty: 100 EACH | Refills: 3 | Status: SHIPPED | OUTPATIENT
Start: 2023-09-06

## 2023-09-06 RX ORDER — GABAPENTIN 300 MG/1
300 CAPSULE ORAL 2 TIMES DAILY
Qty: 180 CAPSULE | Refills: 1 | Status: SHIPPED | OUTPATIENT
Start: 2023-09-06 | End: 2024-03-04

## 2023-09-06 RX ORDER — DULOXETIN HYDROCHLORIDE 30 MG/1
30 CAPSULE, DELAYED RELEASE ORAL 2 TIMES DAILY
Qty: 180 CAPSULE | Refills: 1 | Status: SHIPPED | OUTPATIENT
Start: 2023-09-06 | End: 2024-09-05

## 2023-09-06 RX ORDER — ATORVASTATIN CALCIUM 20 MG/1
20 TABLET, FILM COATED ORAL DAILY
Qty: 90 TABLET | Refills: 1 | Status: SHIPPED | OUTPATIENT
Start: 2023-09-06

## 2023-09-11 ENCOUNTER — OFFICE VISIT (OUTPATIENT)
Age: 73
End: 2023-09-11
Payer: MEDICARE

## 2023-09-11 VITALS — WEIGHT: 199 LBS | BODY MASS INDEX: 25.54 KG/M2 | HEIGHT: 74 IN

## 2023-09-11 DIAGNOSIS — R82.994 HYPERCALCIURIA: ICD-10-CM

## 2023-09-11 DIAGNOSIS — N40.1 BENIGN PROSTATIC HYPERPLASIA WITH URINARY OBSTRUCTION: ICD-10-CM

## 2023-09-11 DIAGNOSIS — N20.0 KIDNEY STONE ON LEFT SIDE: Primary | ICD-10-CM

## 2023-09-11 DIAGNOSIS — R35.1 NOCTURIA: ICD-10-CM

## 2023-09-11 DIAGNOSIS — N13.8 BENIGN PROSTATIC HYPERPLASIA WITH URINARY OBSTRUCTION: ICD-10-CM

## 2023-09-11 PROCEDURE — 1036F TOBACCO NON-USER: CPT | Performed by: SPECIALIST

## 2023-09-11 PROCEDURE — 1123F ACP DISCUSS/DSCN MKR DOCD: CPT | Performed by: SPECIALIST

## 2023-09-11 PROCEDURE — G8417 CALC BMI ABV UP PARAM F/U: HCPCS | Performed by: SPECIALIST

## 2023-09-11 PROCEDURE — G8427 DOCREV CUR MEDS BY ELIG CLIN: HCPCS | Performed by: SPECIALIST

## 2023-09-11 PROCEDURE — 99214 OFFICE O/P EST MOD 30 MIN: CPT | Performed by: SPECIALIST

## 2023-09-11 PROCEDURE — 3017F COLORECTAL CA SCREEN DOC REV: CPT | Performed by: SPECIALIST

## 2023-11-08 ENCOUNTER — OFFICE VISIT (OUTPATIENT)
Dept: ORTHOPEDIC SURGERY | Age: 73
End: 2023-11-08
Payer: MEDICARE

## 2023-11-08 VITALS — WEIGHT: 199 LBS | BODY MASS INDEX: 25.54 KG/M2 | HEIGHT: 74 IN | RESPIRATION RATE: 14 BRPM

## 2023-11-08 DIAGNOSIS — M19.012 OSTEOARTHRITIS OF LEFT GLENOHUMERAL JOINT: Primary | ICD-10-CM

## 2023-11-08 PROCEDURE — G8427 DOCREV CUR MEDS BY ELIG CLIN: HCPCS | Performed by: ORTHOPAEDIC SURGERY

## 2023-11-08 PROCEDURE — 99212 OFFICE O/P EST SF 10 MIN: CPT | Performed by: ORTHOPAEDIC SURGERY

## 2023-11-08 PROCEDURE — 3017F COLORECTAL CA SCREEN DOC REV: CPT | Performed by: ORTHOPAEDIC SURGERY

## 2023-11-08 PROCEDURE — 1123F ACP DISCUSS/DSCN MKR DOCD: CPT | Performed by: ORTHOPAEDIC SURGERY

## 2023-11-08 PROCEDURE — G8484 FLU IMMUNIZE NO ADMIN: HCPCS | Performed by: ORTHOPAEDIC SURGERY

## 2023-11-08 PROCEDURE — G8417 CALC BMI ABV UP PARAM F/U: HCPCS | Performed by: ORTHOPAEDIC SURGERY

## 2023-11-08 PROCEDURE — 1036F TOBACCO NON-USER: CPT | Performed by: ORTHOPAEDIC SURGERY

## 2023-11-15 ENCOUNTER — OFFICE VISIT (OUTPATIENT)
Dept: FAMILY MEDICINE CLINIC | Age: 73
End: 2023-11-15

## 2023-11-15 ENCOUNTER — TELEPHONE (OUTPATIENT)
Dept: FAMILY MEDICINE CLINIC | Age: 73
End: 2023-11-15

## 2023-11-15 VITALS
HEART RATE: 98 BPM | RESPIRATION RATE: 18 BRPM | OXYGEN SATURATION: 96 % | WEIGHT: 203.2 LBS | BODY MASS INDEX: 26.08 KG/M2 | DIASTOLIC BLOOD PRESSURE: 62 MMHG | TEMPERATURE: 97.1 F | SYSTOLIC BLOOD PRESSURE: 92 MMHG | HEIGHT: 74 IN

## 2023-11-15 VITALS
SYSTOLIC BLOOD PRESSURE: 92 MMHG | RESPIRATION RATE: 18 BRPM | BODY MASS INDEX: 26.08 KG/M2 | OXYGEN SATURATION: 96 % | WEIGHT: 203.2 LBS | TEMPERATURE: 97.1 F | HEART RATE: 98 BPM | DIASTOLIC BLOOD PRESSURE: 62 MMHG

## 2023-11-15 DIAGNOSIS — Z79.4 TYPE 2 DIABETES MELLITUS WITH DIABETIC POLYNEUROPATHY, WITH LONG-TERM CURRENT USE OF INSULIN (HCC): Primary | ICD-10-CM

## 2023-11-15 DIAGNOSIS — G62.9 NEUROPATHY: ICD-10-CM

## 2023-11-15 DIAGNOSIS — J43.2 CENTRILOBULAR EMPHYSEMA (HCC): ICD-10-CM

## 2023-11-15 DIAGNOSIS — Z79.4 TYPE 2 DIABETES MELLITUS WITH DIABETIC POLYNEUROPATHY, WITH LONG-TERM CURRENT USE OF INSULIN (HCC): ICD-10-CM

## 2023-11-15 DIAGNOSIS — Z23 NEED FOR INFLUENZA VACCINATION: ICD-10-CM

## 2023-11-15 DIAGNOSIS — E11.42 TYPE 2 DIABETES MELLITUS WITH DIABETIC POLYNEUROPATHY, WITH LONG-TERM CURRENT USE OF INSULIN (HCC): Primary | ICD-10-CM

## 2023-11-15 DIAGNOSIS — I10 ESSENTIAL HYPERTENSION: Chronic | ICD-10-CM

## 2023-11-15 DIAGNOSIS — Z91.81 AT HIGH RISK FOR FALLS: ICD-10-CM

## 2023-11-15 DIAGNOSIS — E11.42 TYPE 2 DIABETES MELLITUS WITH DIABETIC POLYNEUROPATHY, WITH LONG-TERM CURRENT USE OF INSULIN (HCC): ICD-10-CM

## 2023-11-15 DIAGNOSIS — Z00.00 ENCOUNTER FOR SUBSEQUENT ANNUAL WELLNESS VISIT (AWV) IN MEDICARE PATIENT: Primary | ICD-10-CM

## 2023-11-15 DIAGNOSIS — F33.1 MODERATE EPISODE OF RECURRENT MAJOR DEPRESSIVE DISORDER (HCC): ICD-10-CM

## 2023-11-15 DIAGNOSIS — Z00.00 MEDICARE ANNUAL WELLNESS VISIT, SUBSEQUENT: Primary | ICD-10-CM

## 2023-11-15 DIAGNOSIS — M15.9 OSTEOARTHRITIS OF MULTIPLE JOINTS, UNSPECIFIED OSTEOARTHRITIS TYPE: ICD-10-CM

## 2023-11-15 RX ORDER — LISINOPRIL 5 MG/1
5 TABLET ORAL DAILY
Qty: 90 TABLET | Refills: 1 | Status: SHIPPED | OUTPATIENT
Start: 2023-11-15 | End: 2024-11-14

## 2023-11-15 RX ORDER — LISINOPRIL 5 MG/1
5 TABLET ORAL DAILY
Qty: 90 TABLET | Refills: 1
Start: 2023-11-15 | End: 2023-11-15

## 2023-11-15 ASSESSMENT — ENCOUNTER SYMPTOMS
BLOOD IN STOOL: 0
DIARRHEA: 0
COUGH: 0
ABDOMINAL PAIN: 0
SINUS PRESSURE: 0
ABDOMINAL DISTENTION: 0
CONSTIPATION: 0
TROUBLE SWALLOWING: 0
BACK PAIN: 1
WHEEZING: 0
NAUSEA: 0
CHEST TIGHTNESS: 0
SHORTNESS OF BREATH: 0
RHINORRHEA: 0
SORE THROAT: 0

## 2023-11-15 ASSESSMENT — PATIENT HEALTH QUESTIONNAIRE - PHQ9
SUM OF ALL RESPONSES TO PHQ9 QUESTIONS 1 & 2: 3
SUM OF ALL RESPONSES TO PHQ QUESTIONS 1-9: 3
1. LITTLE INTEREST OR PLEASURE IN DOING THINGS: 2
SUM OF ALL RESPONSES TO PHQ QUESTIONS 1-9: 3
SUM OF ALL RESPONSES TO PHQ QUESTIONS 1-9: 3
2. FEELING DOWN, DEPRESSED OR HOPELESS: 1
SUM OF ALL RESPONSES TO PHQ QUESTIONS 1-9: 3

## 2023-11-15 ASSESSMENT — LIFESTYLE VARIABLES
HOW MANY STANDARD DRINKS CONTAINING ALCOHOL DO YOU HAVE ON A TYPICAL DAY: 1 OR 2
HOW OFTEN DO YOU HAVE A DRINK CONTAINING ALCOHOL: MONTHLY OR LESS

## 2023-11-15 NOTE — PROGRESS NOTES
place, and time. Motor: Motor function is intact. Gait: Gait abnormal.      Comments: Use of cane    Psychiatric:         Attention and Perception: Attention and perception normal.         Mood and Affect: Mood and affect normal.         Speech: Speech normal.         Behavior: Behavior normal. Behavior is cooperative. Thought Content: Thought content normal. Thought content does not include suicidal ideation. Thought content does not include suicidal plan. Cognition and Memory: Cognition and memory normal.         Judgment: Judgment normal.           Electronically signed by BREANNA Snyder CNP, APRN-CNP on 11/20/2023 at 6:18 PM    Please note that this chart was generated using voice recognition Dragon dictation software. Although every effort was made to ensure the accuracy of this automated transcription, some errors in transcription may have occurred.

## 2023-11-15 NOTE — PROGRESS NOTES
[address]   [address]  [phone]    11/15/23     Patient ID  Luis A Davis is a 68 y.o. male  Established patient    Chief Complaint  Luis A Davis presents today for Diabetes, Hypertension, Depression, Dizziness (Onset 1 month- using cane), and Neuropathy (Worsening)    Have you seen any other physician or provider since your last visit? Yes - Records Obtained Ortho, Urology   Have you had any other diagnostic tests since your last visit? Yes - Records Obtained  Have you been seen in the emergency room and/or had an admission to a hospital since we last saw you? No     ASSESSMENT/PLAN  {There are no diagnoses linked to this encounter. (Refresh or delete this SmartLink)}         No follow-ups on file. {Time Documentation Optional:[id number]}    Patient Care Team:  BREANNA Moreira CNP as PCP - General (Nurse Practitioner Family)  BREANNA Moreira CNP as PCP - Empaneled Provider  Sathish Fleming MD as Consulting Physician (Urology)  Fanny Skinner MD as Consulting Physician (Orthopedic Surgery)  Sathish Fleming MD as Consulting Physician (Urology)    SUBJECTIVE/OBJECTIVE  History of Present illness / Visit Summary   HPI    Review of Systems  Review of Systems    Physical exam   Physical Exam      Electronically signed by Giancarlo Bazan MA, APRN-CNP on 11/15/2023 at 11:05 AM    Please note that this chart was generated using voice recognition Dragon dictation software. Although every effort was made to ensure the accuracy of this automated transcription, some errors in transcription may have occurred.

## 2023-11-20 PROBLEM — L02.414 ABSCESS OF LEFT SHOULDER: Status: RESOLVED | Noted: 2023-01-17 | Resolved: 2023-11-20

## 2023-11-20 PROBLEM — M00.9 SEPTIC ARTHRITIS (HCC): Status: RESOLVED | Noted: 2023-01-17 | Resolved: 2023-11-20

## 2023-11-20 PROBLEM — N20.0 KIDNEY STONE ON LEFT SIDE: Status: RESOLVED | Noted: 2017-09-13 | Resolved: 2023-11-20

## 2023-11-20 PROBLEM — R52 INTRACTABLE PAIN: Status: RESOLVED | Noted: 2022-08-29 | Resolved: 2023-11-20

## 2023-11-20 PROBLEM — A49.01 MSSA (METHICILLIN SUSCEPTIBLE STAPHYLOCOCCUS AUREUS) INFECTION: Status: RESOLVED | Noted: 2023-01-20 | Resolved: 2023-11-20

## 2023-11-20 NOTE — TELEPHONE ENCOUNTER
Per patient he will call back after the first of the year for alternative ophthalmologist.     Sandra Silverman did not fax to [facility] eye clinic as it is closed.

## 2024-02-15 DIAGNOSIS — E11.42 TYPE 2 DIABETES MELLITUS WITH DIABETIC POLYNEUROPATHY, WITH LONG-TERM CURRENT USE OF INSULIN (HCC): ICD-10-CM

## 2024-02-15 DIAGNOSIS — Z79.4 TYPE 2 DIABETES MELLITUS WITH DIABETIC POLYNEUROPATHY, WITH LONG-TERM CURRENT USE OF INSULIN (HCC): ICD-10-CM

## 2024-02-15 DIAGNOSIS — I10 ESSENTIAL HYPERTENSION: Chronic | ICD-10-CM

## 2024-02-15 DIAGNOSIS — M15.9 OSTEOARTHRITIS OF MULTIPLE JOINTS, UNSPECIFIED OSTEOARTHRITIS TYPE: ICD-10-CM

## 2024-02-15 DIAGNOSIS — F32.A DEPRESSION, UNSPECIFIED DEPRESSION TYPE: ICD-10-CM

## 2024-02-15 DIAGNOSIS — G62.9 NEUROPATHY: ICD-10-CM

## 2024-02-15 DIAGNOSIS — G89.21 CHRONIC PAIN DUE TO TRAUMA: ICD-10-CM

## 2024-02-15 DIAGNOSIS — E78.2 MIXED HYPERLIPIDEMIA: ICD-10-CM

## 2024-02-16 RX ORDER — ATORVASTATIN CALCIUM 20 MG/1
20 TABLET, FILM COATED ORAL DAILY
Qty: 90 TABLET | Refills: 1 | Status: SHIPPED | OUTPATIENT
Start: 2024-02-16 | End: 2024-08-14

## 2024-02-16 RX ORDER — CHLORTHALIDONE 25 MG/1
25 TABLET ORAL DAILY
Qty: 90 TABLET | Refills: 1 | Status: SHIPPED | OUTPATIENT
Start: 2024-02-16 | End: 2024-08-14

## 2024-02-16 RX ORDER — PIOGLITAZONEHYDROCHLORIDE 30 MG/1
30 TABLET ORAL DAILY
Qty: 90 TABLET | Refills: 1 | Status: SHIPPED | OUTPATIENT
Start: 2024-02-16 | End: 2024-08-14

## 2024-02-16 RX ORDER — CELECOXIB 200 MG/1
200 CAPSULE ORAL DAILY
Qty: 90 CAPSULE | Refills: 1 | Status: SHIPPED | OUTPATIENT
Start: 2024-02-16 | End: 2024-08-14

## 2024-02-16 RX ORDER — LISINOPRIL 5 MG/1
5 TABLET ORAL DAILY
Qty: 90 TABLET | Refills: 1 | Status: SHIPPED | OUTPATIENT
Start: 2024-02-16 | End: 2024-08-14

## 2024-02-16 RX ORDER — DULOXETIN HYDROCHLORIDE 30 MG/1
30 CAPSULE, DELAYED RELEASE ORAL 2 TIMES DAILY
Qty: 180 CAPSULE | Refills: 1 | Status: SHIPPED | OUTPATIENT
Start: 2024-02-16 | End: 2024-08-14

## 2024-02-16 RX ORDER — GABAPENTIN 300 MG/1
300 CAPSULE ORAL 2 TIMES DAILY
Qty: 180 CAPSULE | Refills: 1 | Status: SHIPPED | OUTPATIENT
Start: 2024-02-16 | End: 2024-08-14

## 2024-02-16 NOTE — TELEPHONE ENCOUNTER
LOV 11/15/23  RTO F/U scheduled  LRF 9/6/23, 11/15/23    Health Maintenance   Topic Date Due    Respiratory Syncytial Virus (RSV) Pregnant or age 60 yrs+ (1 - 1-dose 60+ series) Never done    Shingles vaccine (2 of 3) 04/07/2014    DTaP/Tdap/Td vaccine (1 - Tdap) 09/07/2016    COVID-19 Vaccine (4 - 2023-24 season) 09/01/2023    Diabetic Alb to Cr ratio (uACR) test  02/15/2024    Lipids  02/15/2024    Diabetic foot exam  03/09/2024    GFR test (Diabetes, CKD 3-4, OR last GFR 15-59)  04/27/2024    Diabetic retinal exam  06/02/2024    A1C test (Diabetic or Prediabetic)  07/10/2024    Depression Monitoring  11/15/2024    Annual Wellness Visit (Medicare)  11/15/2024    Colorectal Cancer Screen  08/05/2030    Flu vaccine  Completed    Pneumococcal 65+ years Vaccine  Completed    AAA screen  Completed    Hepatitis C screen  Completed    Hepatitis A vaccine  Aged Out    Hepatitis B vaccine  Aged Out    Hib vaccine  Aged Out    Polio vaccine  Aged Out    Meningococcal (ACWY) vaccine  Aged Out    Low dose CT lung screening &/or counseling  Discontinued             (applicable per patient's age: Cancer Screenings, Depression Screening, Fall Risk Screening, Immunizations)    Hemoglobin A1C (%)   Date Value   07/10/2023 6.7 (A)   02/08/2023 5.9   10/18/2022 6.6 (H)     LDL Cholesterol (mg/dL)   Date Value   02/15/2023 146 (H)     AST (U/L)   Date Value   09/15/2022 15     ALT (U/L)   Date Value   09/15/2022 18     BUN (mg/dL)   Date Value   04/27/2023 38 (H)      (goal A1C is < 7)   (goal LDL is <100) need 30-50% reduction from baseline     BP Readings from Last 3 Encounters:   11/15/23 92/62   11/15/23 92/62   07/10/23 118/66    (goal /80)      All Future Testing planned in [facility]:  Lab Frequency Next Occurrence   CT LUNG SCREENING Once 01/27/2024   US RENAL LIMITED Once 10/11/2023       Next Visit Date:  Future Appointments   Date Time Provider Department Center   3/5/2024  [facility] CT [facility]

## 2024-02-27 ENCOUNTER — TELEPHONE (OUTPATIENT)
Dept: ONCOLOGY | Age: 74
End: 2024-02-27

## 2024-03-04 ENCOUNTER — HOSPITAL ENCOUNTER (EMERGENCY)
Age: 74
Discharge: HOME | End: 2024-03-04
Attending: EMERGENCY MEDICINE
Payer: MEDICARE

## 2024-03-04 ENCOUNTER — APPOINTMENT (OUTPATIENT)
Dept: GENERAL RADIOLOGY | Age: 74
End: 2024-03-04
Payer: MEDICARE

## 2024-03-04 VITALS
WEIGHT: 200 LBS | HEART RATE: 79 BPM | SYSTOLIC BLOOD PRESSURE: 123 MMHG | TEMPERATURE: 97.2 F | RESPIRATION RATE: 15 BRPM | HEIGHT: 74 IN | DIASTOLIC BLOOD PRESSURE: 70 MMHG | BODY MASS INDEX: 25.67 KG/M2 | OXYGEN SATURATION: 97 %

## 2024-03-04 DIAGNOSIS — S61.215D LACERATION OF LEFT RING FINGER WITHOUT FOREIGN BODY, NAIL DAMAGE STATUS UNSPECIFIED, SUBSEQUENT ENCOUNTER: ICD-10-CM

## 2024-03-04 DIAGNOSIS — S62.603B: ICD-10-CM

## 2024-03-04 DIAGNOSIS — S61.213A LACERATION OF LEFT MIDDLE FINGER WITHOUT FOREIGN BODY, NAIL DAMAGE STATUS UNSPECIFIED, INITIAL ENCOUNTER: Primary | ICD-10-CM

## 2024-03-04 PROCEDURE — 2580000003 HC RX 258: Performed by: EMERGENCY MEDICINE

## 2024-03-04 PROCEDURE — 12002 RPR S/N/AX/GEN/TRNK2.6-7.5CM: CPT

## 2024-03-04 PROCEDURE — 99284 EMERGENCY DEPT VISIT MOD MDM: CPT

## 2024-03-04 PROCEDURE — 6370000000 HC RX 637 (ALT 250 FOR IP)

## 2024-03-04 PROCEDURE — 2500000003 HC RX 250 WO HCPCS

## 2024-03-04 PROCEDURE — 96365 THER/PROPH/DIAG IV INF INIT: CPT

## 2024-03-04 PROCEDURE — 90471 IMMUNIZATION ADMIN: CPT | Performed by: NURSE PRACTITIONER

## 2024-03-04 PROCEDURE — 6360000002 HC RX W HCPCS: Performed by: EMERGENCY MEDICINE

## 2024-03-04 PROCEDURE — 6360000002 HC RX W HCPCS: Performed by: NURSE PRACTITIONER

## 2024-03-04 PROCEDURE — 90715 TDAP VACCINE 7 YRS/> IM: CPT | Performed by: NURSE PRACTITIONER

## 2024-03-04 PROCEDURE — 73130 X-RAY EXAM OF HAND: CPT

## 2024-03-04 RX ORDER — OXYCODONE HYDROCHLORIDE AND ACETAMINOPHEN 5; 325 MG/1; MG/1
1 TABLET ORAL ONCE
Status: COMPLETED | OUTPATIENT
Start: 2024-03-04 | End: 2024-03-04

## 2024-03-04 RX ORDER — LIDOCAINE HYDROCHLORIDE 10 MG/ML
5 INJECTION, SOLUTION INFILTRATION; PERINEURAL ONCE
Status: COMPLETED | OUTPATIENT
Start: 2024-03-04 | End: 2024-03-04

## 2024-03-04 RX ORDER — GINSENG 100 MG
CAPSULE ORAL
Status: COMPLETED
Start: 2024-03-04 | End: 2024-03-04

## 2024-03-04 RX ORDER — LIDOCAINE HYDROCHLORIDE 10 MG/ML
INJECTION, SOLUTION EPIDURAL; INFILTRATION; INTRACAUDAL; PERINEURAL
Status: COMPLETED
Start: 2024-03-04 | End: 2024-03-04

## 2024-03-04 RX ORDER — CEPHALEXIN 250 MG/1
500 CAPSULE ORAL 2 TIMES DAILY
Qty: 40 CAPSULE | Refills: 0 | Status: SHIPPED | OUTPATIENT
Start: 2024-03-04 | End: 2024-03-14

## 2024-03-04 RX ORDER — GINSENG 100 MG
CAPSULE ORAL 3 TIMES DAILY
Status: DISCONTINUED | OUTPATIENT
Start: 2024-03-04 | End: 2024-03-05 | Stop reason: HOSPADM

## 2024-03-04 RX ADMIN — OXYCODONE AND ACETAMINOPHEN 1 TABLET: 5; 325 TABLET ORAL at 21:31

## 2024-03-04 RX ADMIN — BACITRACIN: 500 OINTMENT TOPICAL at 20:20

## 2024-03-04 RX ADMIN — Medication: at 20:20

## 2024-03-04 RX ADMIN — LIDOCAINE HYDROCHLORIDE 5 ML: 10 INJECTION, SOLUTION EPIDURAL; INFILTRATION; INTRACAUDAL; PERINEURAL at 20:20

## 2024-03-04 RX ADMIN — CEFAZOLIN 2000 MG: 1 INJECTION, POWDER, FOR SOLUTION INTRAMUSCULAR; INTRAVENOUS at 20:19

## 2024-03-04 RX ADMIN — LIDOCAINE HYDROCHLORIDE 5 ML: 10 INJECTION, SOLUTION INFILTRATION; PERINEURAL at 20:20

## 2024-03-04 RX ADMIN — TETANUS TOXOID, REDUCED DIPHTHERIA TOXOID AND ACELLULAR PERTUSSIS VACCINE, ADSORBED 0.5 ML: 5; 2.5; 8; 8; 2.5 SUSPENSION INTRAMUSCULAR at 18:26

## 2024-03-04 ASSESSMENT — PAIN - FUNCTIONAL ASSESSMENT: PAIN_FUNCTIONAL_ASSESSMENT: 0-10

## 2024-03-04 ASSESSMENT — PAIN SCALES - GENERAL
PAINLEVEL_OUTOF10: 5
PAINLEVEL_OUTOF10: 6

## 2024-03-05 ENCOUNTER — HOSPITAL ENCOUNTER (OUTPATIENT)
Dept: CT IMAGING | Age: 74
Discharge: HOME | End: 2024-03-07
Attending: INTERNAL MEDICINE
Payer: MEDICARE

## 2024-03-05 ENCOUNTER — OFFICE VISIT (OUTPATIENT)
Age: 74
End: 2024-03-05
Payer: MEDICARE

## 2024-03-05 ENCOUNTER — TELEPHONE (OUTPATIENT)
Dept: PULMONOLOGY | Age: 74
End: 2024-03-05

## 2024-03-05 VITALS — BODY MASS INDEX: 25.67 KG/M2 | WEIGHT: 200 LBS | HEIGHT: 74 IN

## 2024-03-05 DIAGNOSIS — Z87.891 PERSONAL HISTORY OF TOBACCO USE: ICD-10-CM

## 2024-03-05 DIAGNOSIS — Z87.891 PERSONAL HISTORY OF TOBACCO USE: Primary | ICD-10-CM

## 2024-03-05 DIAGNOSIS — M79.642 HAND PAIN, LEFT: Primary | ICD-10-CM

## 2024-03-05 PROCEDURE — 71271 CT THORAX LUNG CANCER SCR C-: CPT

## 2024-03-05 PROCEDURE — 1123F ACP DISCUSS/DSCN MKR DOCD: CPT | Performed by: ORTHOPAEDIC SURGERY

## 2024-03-05 PROCEDURE — 1036F TOBACCO NON-USER: CPT | Performed by: ORTHOPAEDIC SURGERY

## 2024-03-05 PROCEDURE — G8427 DOCREV CUR MEDS BY ELIG CLIN: HCPCS | Performed by: ORTHOPAEDIC SURGERY

## 2024-03-05 PROCEDURE — 99204 OFFICE O/P NEW MOD 45 MIN: CPT | Performed by: ORTHOPAEDIC SURGERY

## 2024-03-05 PROCEDURE — G8417 CALC BMI ABV UP PARAM F/U: HCPCS | Performed by: ORTHOPAEDIC SURGERY

## 2024-03-05 PROCEDURE — 3017F COLORECTAL CA SCREEN DOC REV: CPT | Performed by: ORTHOPAEDIC SURGERY

## 2024-03-05 PROCEDURE — G8484 FLU IMMUNIZE NO ADMIN: HCPCS | Performed by: ORTHOPAEDIC SURGERY

## 2024-03-05 NOTE — ED PROVIDER NOTES
[facility] Emergency Department  [address]  [facility] [address]  Phone: [phone]  Fax: [phone]      Attending Physician Attestation    I performed a history and physical examination of the patient and discussed management with the mid level provider. I reviewed the mid level provider's note and agree with the documented findings and plan of care. Any areas of disagreement are noted on the chart. I was personally present for the key portions of any procedures. I have documented in the chart those procedures where I was not present during the key portions. I have reviewed the emergency nurses triage note. I agree with the chief complaint, past medical history, past surgical history, allergies, medications, social and family history as documented unless otherwise noted below. Documentation of the HPI, Physical Exam and Medical Decision Making performed by mid level providers is based on my personal performance of the HPI, PE and MDM. For Physician Assistant/ Nurse Practitioner cases/documentation I have personally evaluated this patient and have completed at least one if not all key elements of the E/M (history, physical exam, and MDM). Additional findings are as noted.      CHIEF COMPLAINT       Chief Complaint   Patient presents with    Laceration        PAST MEDICAL HISTORY     Past Medical History:   Diagnosis Date    Abscess of left shoulder 1/17/2023    Bladder stone     BPH with obstruction/lower urinary tract symptoms     Caffeine use     4 coffee/2 soda per day    Cancer (HCC) 2023    skin    Cataracts, bilateral     Chronic back pain     COPD (chronic obstructive pulmonary disease) (HCC)     Emphysema of lung (HCC)     Headache     History of blood transfusion 2010    s/p 10 hour back surgery, pt unsure but thinks he had transfusion?    Hypertension     Irritable bowel syndrome     Kidney stone     MSSA (methicillin susceptible Staphylococcus aureus) infection 
size:  5-0    Suture material:  Nylon    Suture technique:  Simple interrupted    Number of sutures:  4  Approximation:     Approximation:  Close  Repair type:     Repair type:  Intermediate  Post-procedure details:     Dressing:  Antibiotic ointment, non-adherent dressing and bulky dressing    Procedure completion:  Tolerated well, no immediate complications  Lac Repair    Date/Time: 3/5/2024 1:06 AM    Performed by: Horacio Graff APRN - CNP  Authorized by: Vidal Hill DO    Consent:     Consent obtained:  Verbal    Consent given by:  Patient    Risks, benefits, and alternatives were discussed: yes      Risks discussed:  Infection, need for additional repair, nerve damage, poor wound healing, poor cosmetic result, pain, retained foreign body, tendon damage and vascular damage    Alternatives discussed:  Delayed treatment, observation, referral and no treatment  Universal protocol:     Procedure explained and questions answered to patient or proxy's satisfaction: yes      Relevant documents present and verified: yes      Test results available: yes      Imaging studies available: yes      Required blood products, implants, devices, and special equipment available: no      Site/side marked: yes      Immediately prior to procedure, a time out was called: yes      Patient identity confirmed:  Verbally with patient and arm band  Anesthesia:     Anesthesia method:  Local infiltration    Local anesthetic:  Lidocaine 1% w/o epi  Laceration details:     Location:  Finger    Finger location:  L ring finger    Length (cm):  2 (2cm of the dorsal aspect 1cm of the pad of the tip of the finger)  Exploration:     Limited defect created (wound extended): no      Hemostasis achieved with:  Direct pressure    Imaging obtained: x-ray      Imaging outcome: foreign body not noted      Wound exploration: wound explored through full range of motion and entire depth of wound visualized      Wound extent: tendon damage      Tendon damage

## 2024-03-05 NOTE — TELEPHONE ENCOUNTER
Radiology called and stated patient needed a new order due to the one in chart being .  Spoke with La  and got the ok to send another order over.

## 2024-03-05 NOTE — PROGRESS NOTES
[facility] Orthopedics & Sports Medicine      [facility]  [facility] ORTHOPAEDICS AND SPORTS MEDICINE  [address]  [place] [address]  Dept: [phone]  Dept Fax: [phone]    Chief Compliant:  Chief Complaint   Patient presents with    Hand Injury     ED follow up - L hand        History of Present Illness:  This is a 74 y.o. male who presents to the clinic today for evaluation / follow up of left hand lacerations.  He did this yesterday while he was doing some woodwork.  He did go to the emergency room where they did clean these up and approximated the lacerations.  There was some concern by the emergency room for extensor tendon laceration over the ring finger.  He has had previous trauma to the this hand.       Physical Exam:    On examination the left hand has lacerations over the ring long and index finger.  The index finger is least affected there is some ecchymosis over the distal phalanx tuft but the nailbed is well intact.  The long finger has the majority of the nail traumatically removed.  There is lacerations here.  The ring finger has extensor lag at the DIP joint.  There is a partial laceration to the nailbed as well.    Nursing note and vitals reviewed.     Labs and Imaging:   He has a fracture of the distal phalanx nondisplaced of the index finger also the ring finger at the middle phalanx distal articular surface looks like it could have a minimally 10 almost nondisplaced fracture.  Previous trauma to the long finger  XR taken today:  XR HAND LEFT (MIN 3 VIEWS)    Result Date: 3/4/2024  EXAMINATION: THREE XRAY VIEWS OF THE LEFT HAND 3/4/2024 3:44 pm COMPARISON: None. HISTORY: ORDERING SYSTEM PROVIDED HISTORY: circular saw injury TECHNOLOGIST PROVIDED HISTORY: circular saw injury Reason for Exam: Patient states cut 2nd , 3rd, & 4th fingers on left hand on table saw today FINDINGS: Bones: Fracture deformity and soft tissue

## 2024-03-05 NOTE — DISCHARGE INSTRUCTIONS
Go to Dr. Kumar office tomorrow, please call in the morning to arrange for follow up as instructed. Keep the dressing in place until your follow up appointment.     For pain use acetaminophen (Tylenol) or ibuprofen (Motrin / Advil), unless prescribed medications that have acetaminophen or ibuprofen (or similar medications) in it.  You can take over the counter acetaminophen tablets (1 - 2 tablets of the 500-mg strength every 6 hours) or ibuprofen tablets (2 tablets every 4 hours).    You can shower with the laceration, would avoid baths or swimming in lakes / rivers.  Apply bacitracin / triple antibiotic ointment / Neosporin / Vaseline to the wound twice a day.    When you go outside, place sunscreen on the healing wound after the sutures have been removed for the next year to help with scarring.    PLEASE RETURN TO THE EMERGENCY DEPARTMENT IMMEDIATELY for worsening symptoms, redness around the wound or redness streaking up the body part, white drainage from the wound, or if you develop any concerning symptoms such as: high fever not relieved by acetaminophen (Tylenol) and/or ibuprofen (Motrin / Advil), chills, shortness of breath, chest pain, feeling of your heart fluttering or racing, persistent nausea and/or vomiting, vomiting up blood, blood in your stool, numbness, loss of consciousness, weakness or tingling in the arms or legs or change in color of the extremities, changes in mental status, persistent headache, blurry vision, loss of bladder / bowel control, unable to follow up with your physician, or other any other care or concern.

## 2024-03-07 ENCOUNTER — HOSPITAL ENCOUNTER (OUTPATIENT)
Age: 74
Setting detail: OUTPATIENT SURGERY
Discharge: HOME | End: 2024-03-07
Attending: ORTHOPAEDIC SURGERY | Admitting: ORTHOPAEDIC SURGERY
Payer: MEDICARE

## 2024-03-07 ENCOUNTER — APPOINTMENT (OUTPATIENT)
Dept: GENERAL RADIOLOGY | Age: 74
End: 2024-03-07
Attending: ORTHOPAEDIC SURGERY
Payer: MEDICARE

## 2024-03-07 VITALS
HEIGHT: 74 IN | DIASTOLIC BLOOD PRESSURE: 65 MMHG | TEMPERATURE: 97 F | RESPIRATION RATE: 12 BRPM | OXYGEN SATURATION: 99 % | SYSTOLIC BLOOD PRESSURE: 125 MMHG | HEART RATE: 77 BPM | WEIGHT: 211 LBS | BODY MASS INDEX: 27.08 KG/M2

## 2024-03-07 DIAGNOSIS — G89.18 POST-OP PAIN: Primary | ICD-10-CM

## 2024-03-07 PROBLEM — S61.412A LACERATION WITHOUT FOREIGN BODY OF LEFT HAND, INITIAL ENCOUNTER: Status: ACTIVE | Noted: 2024-03-07

## 2024-03-07 LAB — GLUCOSE BLD-MCNC: 162 MG/DL (ref 75–110)

## 2024-03-07 PROCEDURE — 2709999900 HC NON-CHARGEABLE SUPPLY: Performed by: ORTHOPAEDIC SURGERY

## 2024-03-07 PROCEDURE — 82947 ASSAY GLUCOSE BLOOD QUANT: CPT

## 2024-03-07 PROCEDURE — 7100000010 HC PHASE II RECOVERY - FIRST 15 MIN: Performed by: ORTHOPAEDIC SURGERY

## 2024-03-07 PROCEDURE — 7100000011 HC PHASE II RECOVERY - ADDTL 15 MIN: Performed by: ORTHOPAEDIC SURGERY

## 2024-03-07 PROCEDURE — 3600000012 HC SURGERY LEVEL 2 ADDTL 15MIN: Performed by: ORTHOPAEDIC SURGERY

## 2024-03-07 PROCEDURE — 6360000002 HC RX W HCPCS: Performed by: ORTHOPAEDIC SURGERY

## 2024-03-07 PROCEDURE — 3600000002 HC SURGERY LEVEL 2 BASE: Performed by: ORTHOPAEDIC SURGERY

## 2024-03-07 RX ORDER — HYDROCODONE BITARTRATE AND ACETAMINOPHEN 5; 325 MG/1; MG/1
1-2 TABLET ORAL EVERY 6 HOURS PRN
Qty: 20 TABLET | Refills: 0 | Status: SHIPPED | OUTPATIENT
Start: 2024-03-07 | End: 2024-04-06

## 2024-03-07 RX ORDER — IBUPROFEN 800 MG/1
800 TABLET ORAL EVERY 8 HOURS PRN
Qty: 60 TABLET | Refills: 0 | Status: SHIPPED | OUTPATIENT
Start: 2024-03-07

## 2024-03-07 RX ORDER — BUPIVACAINE HYDROCHLORIDE 5 MG/ML
INJECTION, SOLUTION PERINEURAL PRN
Status: DISCONTINUED | OUTPATIENT
Start: 2024-03-07 | End: 2024-03-07 | Stop reason: ALTCHOICE

## 2024-03-07 ASSESSMENT — PAIN - FUNCTIONAL ASSESSMENT: PAIN_FUNCTIONAL_ASSESSMENT: 0-10

## 2024-03-07 ASSESSMENT — PULMONARY FUNCTION TESTS: PIF_VALUE: 0

## 2024-03-07 NOTE — H&P
ORTHOPEDIC PATIENT EVALUATION      HPI / Chief Complaint  Luis A Salinas is a 74 y.o. male who presents for left index, long, and ring finger fractures with possible extensor tendon laceration to the ring finger.    Past Medical History  Luis A  has a past medical history of Abscess of left shoulder, Bladder stone, BPH with obstruction/lower urinary tract symptoms, Caffeine use, Cancer (HCC), Cataracts, bilateral, Chronic back pain, CKD (chronic kidney disease), COPD (chronic obstructive pulmonary disease) ([facility]), Emphysema of lung (HCC), Headache, History of blood transfusion, Hypertension, Irritable bowel syndrome, Kidney stone, MSSA (methicillin susceptible Staphylococcus aureus) infection, Neuropathy, Osteoarthritis, Pancreatitis, Sepsis (HCC), Septic arthritis ([facility]), and Type II or unspecified type diabetes mellitus without mention of complication, not stated as uncontrolled.    Past Surgical History  Luis A  has a past surgical history that includes back surgery; Cholecystectomy; Vasectomy; Spine surgery (05/18/2010); Eye surgery (2015); other surgical history; eye surgery; Bladder surgery; Lithotripsy; Ureteroscopy; Cystocopy (04/06/2022); Cystoscopy (Left, 04/06/2022); Cystocopy (04/20/2022); Cystoscopy (Left, 04/20/2022); Colonoscopy; Leg Tendon Surgery (1962); Tonsillectomy; Shoulder arthroscopy (Left, 10/25/2022); Shoulder arthroscopy (Left, 10/25/2022); Shoulder arthroscopy (01/17/2023); Shoulder arthroscopy (Left, 01/17/2023); Arm Surgery (Left, 01/17/2023); skin biopsy (Right); Reverse total shoulder arthroplasty (Left, 05/16/2023); and shoulder surgery (Left, 5/16/2023).    Current Medications  No current facility-administered medications for this encounter.     Current Outpatient Medications   Medication Sig Dispense Refill    cephALEXin (KEFLEX) 250 MG capsule Take 2 capsules by mouth 2 times daily for 10 days 40 capsule 0    metFORMIN (GLUCOPHAGE) 1000 MG tablet Take 1 tablet by mouth 2 times daily

## 2024-03-07 NOTE — DISCHARGE INSTRUCTIONS
Activity-no weightbearing left ring and long finger.    Dressing-leave dressing for 3 days then okay to remove.  Okay to shower and get wet at that time.  Let it air dry and can recover with Band-Aids.    Call the office for any signs of infection including increasing pain, drainage, malodor or fevers

## 2024-03-07 NOTE — OP NOTE
Operative Note      Patient: Luis A Salinas  YOB: 1950  MRN: [id number]    Date of Procedure: 3/7/2024    Pre-Op Diagnosis Codes:     * Laceration of left middle finger, foreign body presence unspecified, nail damage status unspecified, initial encounter [S61.213A]     * Laceration of left ring finger, foreign body presence unspecified, nail damage status unspecified, initial encounter [S61.215A]    Post-Op Diagnosis: Same and left ring finger extensor tendon laceration, nailbed laceration with open fracture, left long finger laceration through the nailbed.       Procedure(s):  LEFT MIDDLE AND RING FINGER INCISION AND DRAINAGE, WITH EXTENSOR TENDON AND NAIL BED REPAIR LEFT RING FINGER, closure of long finger lacerations    Surgeon(s):  Talha Kumar MD    Assistant:   * No surgical staff found *    Anesthesia: Local    Estimated Blood Loss (mL): Minimal    Complications: None    Specimens:   * No specimens in log *    Implants:  * No implants in log *      Drains: * No LDAs found *    Findings: We took the cut off all the little finger tourniquet during with warm saline        Detailed Description of Procedure:   This is a 74-year-old male with left long finger and ring finger lacerations.  We discussed with him the risks and benefits of this procedure today and he has looked good with this.    Patient was brought to the operating room placed in supine position.  We performed timeout assuring correct patient correct extremity and correct procedure.  We then cleaned at the base of the ring and long finger and administered digital nerve blocks.  We then prepped and draped the left upper extremity sterile fashion.  Timeout was performed ensuring correct patient correct extremity and correct procedure.    At this point I removed 2 sutures from the emergency room.  I first started off with the ring finger dorsal laceration over the middle phalanx DIP joint junction.  I irrigated this out.  Here I found  available to assist.  He was then transferred to recovery in stable condition.  Follow-up in the office 10 to 14 days    Electronically signed by Talha Kumar MD on 3/7/2024 at 2:30 PM

## 2024-03-12 ENCOUNTER — OFFICE VISIT (OUTPATIENT)
Dept: PULMONOLOGY | Age: 74
End: 2024-03-12
Payer: COMMERCIAL

## 2024-03-12 VITALS
RESPIRATION RATE: 16 BRPM | OXYGEN SATURATION: 95 % | DIASTOLIC BLOOD PRESSURE: 65 MMHG | TEMPERATURE: 97.9 F | SYSTOLIC BLOOD PRESSURE: 104 MMHG | WEIGHT: 210 LBS | BODY MASS INDEX: 26.95 KG/M2 | HEART RATE: 92 BPM | HEIGHT: 74 IN

## 2024-03-12 DIAGNOSIS — J44.9 STAGE 1 MILD COPD BY GOLD CLASSIFICATION (HCC): Primary | ICD-10-CM

## 2024-03-12 DIAGNOSIS — J43.2 CENTRILOBULAR EMPHYSEMA (HCC): ICD-10-CM

## 2024-03-12 DIAGNOSIS — Z86.16 HISTORY OF 2019 NOVEL CORONAVIRUS DISEASE (COVID-19): ICD-10-CM

## 2024-03-12 DIAGNOSIS — Z87.891 PERSONAL HISTORY OF TOBACCO USE: ICD-10-CM

## 2024-03-12 DIAGNOSIS — R91.8 PULMONARY INFILTRATE PRESENT ON COMPUTED TOMOGRAPHY: ICD-10-CM

## 2024-03-12 DIAGNOSIS — R91.8 MULTIPLE LUNG NODULES ON CT: ICD-10-CM

## 2024-03-12 PROCEDURE — 3074F SYST BP LT 130 MM HG: CPT | Performed by: INTERNAL MEDICINE

## 2024-03-12 PROCEDURE — 99214 OFFICE O/P EST MOD 30 MIN: CPT | Performed by: INTERNAL MEDICINE

## 2024-03-12 PROCEDURE — 1123F ACP DISCUSS/DSCN MKR DOCD: CPT | Performed by: INTERNAL MEDICINE

## 2024-03-12 PROCEDURE — 3078F DIAST BP <80 MM HG: CPT | Performed by: INTERNAL MEDICINE

## 2024-03-12 RX ORDER — AMOXICILLIN 500 MG/1
CAPSULE ORAL
COMMUNITY
Start: 2023-12-04

## 2024-03-12 NOTE — PROGRESS NOTES
REASON FOR THE CONSULTATION:  COPD  HISTORY OF PRESENT ILLNESS:    Luis A Salinas is a 74 y.o. year old male who shortness of breath is stable with exertion it is mild he is only using albuterol as needed.  He is not coughing purulent sputum no hemoptysis does complain of postnasal drainage occasionally.  He did get COVID-19 infection in January when he was visiting his daughter.  He was sick for 15 days.  Did not have to go to the hospital or need oxygen.  He was last vaccinated in 2022.    He has stopped smoking    Last visit  Denies worsening shortness of breath.  He is still smoking 3 cigarettes on and off.  No coughing no hemoptysis no purulent sputum and no wheezing.      Last visit  Had ct chest and here to discuss results . He smokes 3 cig a day and was smoking 1 ppd till 2 months ago . Worked as  for 25 years ,  .  Also was in [employer] and [employer] in [place] .   He has extensive spine and hip surgery and pain which limits his activity , so unable to obtain true measure of dyspnea . Daughter has noticed sob with few steps , also due to pain .   He has chronic sputum moderate but no hemoptysis or purulence .   No wheeze ing   Never used inhalers .  Father had lung cancer and emphysema .            Immunization   Immunization History   Administered Date(s) Administered    COVID-19, MODERNA BLUE border, Primary or Immunocompromised, (age 12y+), IM, 100 mcg/0.5mL 04/09/2021, 05/07/2021, 01/14/2022    Influenza 11/28/2012, 10/09/2013    Influenza Vaccine, unspecified formulation 11/28/2012, 10/09/2013    Influenza Virus Vaccine 11/28/2012, 10/09/2013, 10/09/2014, 09/16/2015, 10/07/2016, 11/20/2017, 10/29/2018    Influenza, AFLURIA (age 3 yrs+), FLUZONE, (age 6 mo+), MDV, 0.5mL 10/07/2016, 11/20/2017, 10/29/2018    Influenza, FLUAD, (age 65 y+), Adjuvanted, 0.5mL 11/13/2020, 01/06/2022, 10/04/2022, 11/15/2023    Influenza, FLUARIX, FLULAVAL, FLUZONE (age 6 mo+) AND

## 2024-03-20 ENCOUNTER — OFFICE VISIT (OUTPATIENT)
Age: 74
End: 2024-03-20

## 2024-03-20 VITALS — WEIGHT: 210 LBS | HEIGHT: 74 IN | BODY MASS INDEX: 26.95 KG/M2

## 2024-03-20 DIAGNOSIS — Z48.89 POSTOPERATIVE VISIT: Primary | ICD-10-CM

## 2024-03-20 PROCEDURE — 99024 POSTOP FOLLOW-UP VISIT: CPT | Performed by: ORTHOPAEDIC SURGERY

## 2024-03-26 ENCOUNTER — OFFICE VISIT (OUTPATIENT)
Dept: FAMILY MEDICINE CLINIC | Age: 74
End: 2024-03-26

## 2024-03-26 VITALS
BODY MASS INDEX: 27.01 KG/M2 | TEMPERATURE: 97.3 F | WEIGHT: 210.4 LBS | SYSTOLIC BLOOD PRESSURE: 100 MMHG | OXYGEN SATURATION: 96 % | DIASTOLIC BLOOD PRESSURE: 60 MMHG | HEART RATE: 94 BPM | RESPIRATION RATE: 18 BRPM

## 2024-03-26 DIAGNOSIS — F33.1 MODERATE EPISODE OF RECURRENT MAJOR DEPRESSIVE DISORDER (HCC): ICD-10-CM

## 2024-03-26 DIAGNOSIS — Z12.5 SCREENING FOR PROSTATE CANCER: ICD-10-CM

## 2024-03-26 DIAGNOSIS — I10 ESSENTIAL HYPERTENSION: Chronic | ICD-10-CM

## 2024-03-26 DIAGNOSIS — Z79.4 TYPE 2 DIABETES MELLITUS WITH DIABETIC POLYNEUROPATHY, WITH LONG-TERM CURRENT USE OF INSULIN (HCC): Primary | ICD-10-CM

## 2024-03-26 DIAGNOSIS — M15.9 OSTEOARTHRITIS OF MULTIPLE JOINTS, UNSPECIFIED OSTEOARTHRITIS TYPE: ICD-10-CM

## 2024-03-26 DIAGNOSIS — E78.2 MIXED HYPERLIPIDEMIA: ICD-10-CM

## 2024-03-26 DIAGNOSIS — E11.42 TYPE 2 DIABETES MELLITUS WITH DIABETIC POLYNEUROPATHY, WITH LONG-TERM CURRENT USE OF INSULIN (HCC): Primary | ICD-10-CM

## 2024-03-26 DIAGNOSIS — G62.9 NEUROPATHY: ICD-10-CM

## 2024-03-26 LAB — HBA1C MFR BLD: 7.8 %

## 2024-03-26 ASSESSMENT — ENCOUNTER SYMPTOMS
RHINORRHEA: 0
DIARRHEA: 0
WHEEZING: 0
BLOOD IN STOOL: 0
COUGH: 0
NAUSEA: 0
ABDOMINAL DISTENTION: 0
SHORTNESS OF BREATH: 0
ABDOMINAL PAIN: 0
SORE THROAT: 0
CHEST TIGHTNESS: 0
SINUS PRESSURE: 0
CONSTIPATION: 0
BACK PAIN: 1
TROUBLE SWALLOWING: 0

## 2024-03-26 NOTE — PROGRESS NOTES
[facility]   [address] [address]  [phone]    3/26/24     Patient ID  Luis A Salinas is a 74 y.o. male  Established patient    Chief Complaint  Luis A Salinas presents today for Diabetes, Hypertension, and Depression    Have you seen any other physician or provider since your last visit? Yes - Records Obtained Ortho- Dr. Kumar, [place] ED 3/4/24, Pulmonology, Cataract and Laser [place]-  Eye cataract removal and lens placement   Have you had any other diagnostic tests since your last visit? Yes - Records Obtained  Have you been seen in the emergency room and/or had an admission to a hospital since we last saw you? Yes - Records Obtained Scheduled Surgery 3/7/24    ASSESSMENT/PLAN  1. Type 2 diabetes mellitus with diabetic polyneuropathy, with long-term current use of insulin (HCC)  -     POCT glycosylated hemoglobin (Hb A1C)  -     CBC; Future  -     Comprehensive Metabolic Panel; Future  2. Essential hypertension  -     CBC; Future  -     Magnesium; Future  3. Mixed hyperlipidemia  -     Lipid Panel; Future  4. Neuropathy  -     CBC; Future  -     Vascular ankle brachial index (SHAYLEE); Future  -     Vitamin B12 & Folate; Future  5. Moderate episode of recurrent major depressive disorder (HCC)  Assessment & Plan:   Well-controlled, continue current medications and continue current treatment plan  6. Osteoarthritis of multiple joints, unspecified osteoarthritis type  7. Screening for prostate cancer  -     PSA Screening; Future     Needs repeat colonoscopy   Encouraged compression socks   Routine labs ordered   HM current / ordered     Refusing any cardiac testing - states to discuss next OV as he will be out of town with family     The 10-year ASCVD risk score (Hernan LEVIN, et al., 2019) is: 31.5%    Values used to calculate the score:      Age: 74 years      Sex: Male      Is Non- : No      Diabetic: Yes      Tobacco smoker: No

## 2024-03-31 PROBLEM — E78.2 MIXED HYPERLIPIDEMIA: Status: ACTIVE | Noted: 2024-03-31

## 2024-04-01 ENCOUNTER — HOSPITAL ENCOUNTER (OUTPATIENT)
Age: 74
Setting detail: SPECIMEN
Discharge: HOME | End: 2024-04-01

## 2024-04-01 DIAGNOSIS — I10 ESSENTIAL HYPERTENSION: Chronic | ICD-10-CM

## 2024-04-01 DIAGNOSIS — Z12.5 SCREENING FOR PROSTATE CANCER: ICD-10-CM

## 2024-04-01 DIAGNOSIS — E11.42 TYPE 2 DIABETES MELLITUS WITH DIABETIC POLYNEUROPATHY, WITH LONG-TERM CURRENT USE OF INSULIN (HCC): ICD-10-CM

## 2024-04-01 DIAGNOSIS — Z79.4 TYPE 2 DIABETES MELLITUS WITH DIABETIC POLYNEUROPATHY, WITH LONG-TERM CURRENT USE OF INSULIN (HCC): ICD-10-CM

## 2024-04-01 DIAGNOSIS — E78.2 MIXED HYPERLIPIDEMIA: ICD-10-CM

## 2024-04-01 DIAGNOSIS — G62.9 NEUROPATHY: ICD-10-CM

## 2024-04-01 LAB
ALBUMIN SERPL-MCNC: 4.3 G/DL (ref 3.5–5.2)
ALBUMIN/GLOB SERPL: 2 {RATIO} (ref 1–2.5)
ALP SERPL-CCNC: 53 U/L (ref 40–129)
ALT SERPL-CCNC: 21 U/L (ref 10–50)
ANION GAP SERPL CALCULATED.3IONS-SCNC: 11 MMOL/L (ref 9–16)
AST SERPL-CCNC: 25 U/L (ref 10–50)
BILIRUB SERPL-MCNC: 0.6 MG/DL (ref 0–1.2)
BUN SERPL-MCNC: 25 MG/DL (ref 8–23)
CALCIUM SERPL-MCNC: 9.5 MG/DL (ref 8.6–10.4)
CHLORIDE SERPL-SCNC: 100 MMOL/L (ref 98–107)
CHOLEST SERPL-MCNC: 117 MG/DL (ref 0–199)
CHOLESTEROL/HDL RATIO: 2
CO2 SERPL-SCNC: 27 MMOL/L (ref 20–31)
CREAT SERPL-MCNC: 1.1 MG/DL (ref 0.7–1.2)
ERYTHROCYTE [DISTWIDTH] IN BLOOD BY AUTOMATED COUNT: 14.1 % (ref 11.8–14.4)
FOLATE SERPL-MCNC: >20 NG/ML (ref 4.8–24.2)
GFR SERPL CREATININE-BSD FRML MDRD: 74 ML/MIN/1.73M2
GLUCOSE SERPL-MCNC: 160 MG/DL (ref 74–99)
HCT VFR BLD AUTO: 42.5 % (ref 40.7–50.3)
HDLC SERPL-MCNC: 55 MG/DL
HGB BLD-MCNC: 13.5 G/DL (ref 13–17)
LDLC SERPL CALC-MCNC: 41 MG/DL (ref 0–100)
MAGNESIUM SERPL-MCNC: 1.8 MG/DL (ref 1.6–2.4)
MCH RBC QN AUTO: 29.9 PG (ref 25.2–33.5)
MCHC RBC AUTO-ENTMCNC: 31.8 G/DL (ref 28.4–34.8)
MCV RBC AUTO: 94.2 FL (ref 82.6–102.9)
NRBC BLD-RTO: 0 PER 100 WBC
PLATELET # BLD AUTO: 218 K/UL (ref 138–453)
PMV BLD AUTO: 10.9 FL (ref 8.1–13.5)
POTASSIUM SERPL-SCNC: 4.1 MMOL/L (ref 3.7–5.3)
PROT SERPL-MCNC: 7 G/DL (ref 6.6–8.7)
PSA SERPL-MCNC: 0.6 NG/ML (ref 0–4)
RBC # BLD AUTO: 4.51 M/UL (ref 4.21–5.77)
SODIUM SERPL-SCNC: 138 MMOL/L (ref 136–145)
TRIGL SERPL-MCNC: 108 MG/DL
VIT B12 SERPL-MCNC: 586 PG/ML (ref 232–1245)
VLDLC SERPL CALC-MCNC: 22 MG/DL
WBC OTHER # BLD: 9 K/UL (ref 3.5–11.3)

## 2024-04-16 ENCOUNTER — HOSPITAL ENCOUNTER (OUTPATIENT)
Dept: VASCULAR LAB | Age: 74
Discharge: HOME | End: 2024-04-18
Payer: MEDICARE

## 2024-04-16 DIAGNOSIS — G62.9 NEUROPATHY: ICD-10-CM

## 2024-04-16 LAB
VAS LEFT ABI: 1.2
VAS LEFT ANKLE BP: 136 MMHG
VAS LEFT ARM BP: 112 MMHG
VAS LEFT DORSALIS PEDIS BP: 132 MMHG
VAS LEFT PTA BP: 136 MMHG
VAS LEFT TBI: 1.21
VAS LEFT TOE PRESSURE: 137 MMHG
VAS RIGHT ABI: 1.16
VAS RIGHT ANKLE BP: 131 MMHG
VAS RIGHT ARM BP: 113 MMHG
VAS RIGHT DORSALIS PEDIS BP: 131 MMHG
VAS RIGHT PTA BP: 131 MMHG
VAS RIGHT TBI: 1.16
VAS RIGHT TOE PRESSURE: 131 MMHG

## 2024-04-16 PROCEDURE — 93922 UPR/L XTREMITY ART 2 LEVELS: CPT | Performed by: SURGERY

## 2024-04-16 PROCEDURE — 93922 UPR/L XTREMITY ART 2 LEVELS: CPT

## 2024-04-30 DIAGNOSIS — I10 ESSENTIAL HYPERTENSION: Chronic | ICD-10-CM

## 2024-04-30 DIAGNOSIS — G89.21 CHRONIC PAIN DUE TO TRAUMA: ICD-10-CM

## 2024-04-30 DIAGNOSIS — F32.A DEPRESSION, UNSPECIFIED DEPRESSION TYPE: ICD-10-CM

## 2024-04-30 DIAGNOSIS — E11.42 TYPE 2 DIABETES MELLITUS WITH DIABETIC POLYNEUROPATHY, WITH LONG-TERM CURRENT USE OF INSULIN (HCC): ICD-10-CM

## 2024-04-30 DIAGNOSIS — Z79.4 TYPE 2 DIABETES MELLITUS WITH DIABETIC POLYNEUROPATHY, WITH LONG-TERM CURRENT USE OF INSULIN (HCC): ICD-10-CM

## 2024-04-30 DIAGNOSIS — G62.9 NEUROPATHY: ICD-10-CM

## 2024-04-30 DIAGNOSIS — E78.2 MIXED HYPERLIPIDEMIA: ICD-10-CM

## 2024-04-30 DIAGNOSIS — M15.9 OSTEOARTHRITIS OF MULTIPLE JOINTS, UNSPECIFIED OSTEOARTHRITIS TYPE: ICD-10-CM

## 2024-05-01 DIAGNOSIS — E11.42 TYPE 2 DIABETES MELLITUS WITH DIABETIC POLYNEUROPATHY, WITH LONG-TERM CURRENT USE OF INSULIN (HCC): Primary | ICD-10-CM

## 2024-05-01 DIAGNOSIS — Z79.4 TYPE 2 DIABETES MELLITUS WITH DIABETIC POLYNEUROPATHY, WITH LONG-TERM CURRENT USE OF INSULIN (HCC): Primary | ICD-10-CM

## 2024-05-01 RX ORDER — GABAPENTIN 300 MG/1
300 CAPSULE ORAL 2 TIMES DAILY
Qty: 180 CAPSULE | Refills: 1 | OUTPATIENT
Start: 2024-05-01 | End: 2024-10-28

## 2024-05-01 RX ORDER — CELECOXIB 200 MG/1
200 CAPSULE ORAL DAILY
Qty: 90 CAPSULE | Refills: 1 | OUTPATIENT
Start: 2024-05-01 | End: 2024-10-28

## 2024-05-01 RX ORDER — LISINOPRIL 5 MG/1
5 TABLET ORAL DAILY
Qty: 90 TABLET | Refills: 1 | OUTPATIENT
Start: 2024-05-01 | End: 2024-10-28

## 2024-05-01 RX ORDER — ATORVASTATIN CALCIUM 20 MG/1
20 TABLET, FILM COATED ORAL DAILY
Qty: 90 TABLET | Refills: 1 | OUTPATIENT
Start: 2024-05-01 | End: 2024-10-28

## 2024-05-01 RX ORDER — PIOGLITAZONEHYDROCHLORIDE 30 MG/1
30 TABLET ORAL DAILY
Qty: 90 TABLET | Refills: 1 | OUTPATIENT
Start: 2024-05-01 | End: 2024-10-28

## 2024-05-01 RX ORDER — INSULIN GLARGINE 100 [IU]/ML
65 INJECTION, SOLUTION SUBCUTANEOUS NIGHTLY
Qty: 20 ADJUSTABLE DOSE PRE-FILLED PEN SYRINGE | Refills: 1 | Status: SHIPPED | OUTPATIENT
Start: 2024-05-01 | End: 2025-05-01

## 2024-05-01 RX ORDER — CHLORTHALIDONE 25 MG/1
25 TABLET ORAL DAILY
Qty: 90 TABLET | Refills: 1 | OUTPATIENT
Start: 2024-05-01 | End: 2024-10-28

## 2024-05-01 RX ORDER — DULOXETIN HYDROCHLORIDE 30 MG/1
30 CAPSULE, DELAYED RELEASE ORAL 2 TIMES DAILY
Qty: 180 CAPSULE | Refills: 1 | OUTPATIENT
Start: 2024-05-01 | End: 2024-10-28

## 2024-05-01 NOTE — TELEPHONE ENCOUNTER
[facility]:  Lab Frequency Next Occurrence   US RENAL LIMITED Once 10/11/2023   CT CHEST LOW DOSE (LDCT) Once 06/12/2024       Next Visit Date:  Future Appointments   Date Time Provider Department Center   5/29/2024 11:15 AM Kathy Sahni MD [facility]T SP [facility]   6/12/2024  1:30 PM [place] CT RM 2 [facility] PB CT [facility]   7/29/2024 11:00 AM Lauren Briceño, APRN - CNP [place] [facility]   8/13/2024 11:15 AM Danny Gaines MD Resp [place] [facility]            Patient Active Problem List:     Essential hypertension     Osteoarthritis     Vitamin D deficiency     Testosterone deficiency     Difficulty sleeping     Sacro-iliac pain     Neuropathy     Nocturia     Benign prostatic hyperplasia with urinary obstruction     Type 2 diabetes mellitus with diabetic polyneuropathy, with long-term current use of insulin (HCC)     Hypercalciuria     Personal history of kidney stones     Moderate episode of recurrent major depressive disorder (HCC)     Epiretinal membrane     Renal cyst, left     Hypokalemia     Centrilobular emphysema (HCC)     Laceration without foreign body of left hand, initial encounter     Mixed hyperlipidemia

## 2024-05-01 NOTE — TELEPHONE ENCOUNTER
[place] CT RM 2 MHPB PB CT [facility] [place]   7/29/2024 11:00 AM Lauren Briceño, APRN - [facility]   8/13/2024 11:15 AM Danny Gaines MD Resp [place] [facility]            Patient Active Problem List:     Essential hypertension     Osteoarthritis     Vitamin D deficiency     Testosterone deficiency     Difficulty sleeping     Sacro-iliac pain     Neuropathy     Nocturia     Benign prostatic hyperplasia with urinary obstruction     Type 2 diabetes mellitus with diabetic polyneuropathy, with long-term current use of insulin (HCC)     Hypercalciuria     Personal history of kidney stones     Moderate episode of recurrent major depressive disorder (HCC)     Epiretinal membrane     Renal cyst, left     Hypokalemia     Centrilobular emphysema (HCC)     Laceration without foreign body of left hand, initial encounter     Mixed hyperlipidemia

## 2024-05-02 ENCOUNTER — PATIENT MESSAGE (OUTPATIENT)
Dept: FAMILY MEDICINE CLINIC | Age: 74
End: 2024-05-02

## 2024-05-02 NOTE — TELEPHONE ENCOUNTER
From: Luis A Salinas  To: Lauren Briceño  Sent: 5/2/2024 11:40 AM EDT  Subject: Insulin injections    Howdy. For the past few weeks after I eat breakfast in the morning, my sugar level often pops above 300.    Unless I have an objection from you, I am going to give myself 35 units of insulin in the morning and 35 units of insulin at night and see if this helps. My normal dosage has been 60 units at night.    I’m hoping that this will alleviate these spikes.    Your thoughts would be appreciated.    Thanks much, from Luis A cervantes me Miguel.

## 2024-05-29 ENCOUNTER — OFFICE VISIT (OUTPATIENT)
Dept: ORTHOPEDIC SURGERY | Age: 74
End: 2024-05-29
Payer: MEDICARE

## 2024-05-29 VITALS — RESPIRATION RATE: 14 BRPM | BODY MASS INDEX: 26.95 KG/M2 | WEIGHT: 210 LBS | HEIGHT: 74 IN

## 2024-05-29 DIAGNOSIS — M19.012 OSTEOARTHRITIS OF LEFT GLENOHUMERAL JOINT: Primary | ICD-10-CM

## 2024-05-29 PROCEDURE — G8427 DOCREV CUR MEDS BY ELIG CLIN: HCPCS | Performed by: ORTHOPAEDIC SURGERY

## 2024-05-29 PROCEDURE — 99212 OFFICE O/P EST SF 10 MIN: CPT | Performed by: ORTHOPAEDIC SURGERY

## 2024-05-29 PROCEDURE — G8417 CALC BMI ABV UP PARAM F/U: HCPCS | Performed by: ORTHOPAEDIC SURGERY

## 2024-05-29 PROCEDURE — 1036F TOBACCO NON-USER: CPT | Performed by: ORTHOPAEDIC SURGERY

## 2024-05-29 PROCEDURE — 1123F ACP DISCUSS/DSCN MKR DOCD: CPT | Performed by: ORTHOPAEDIC SURGERY

## 2024-05-29 PROCEDURE — 3017F COLORECTAL CA SCREEN DOC REV: CPT | Performed by: ORTHOPAEDIC SURGERY

## 2024-06-12 ENCOUNTER — HOSPITAL ENCOUNTER (OUTPATIENT)
Dept: CT IMAGING | Age: 74
Discharge: HOME | End: 2024-06-14
Attending: INTERNAL MEDICINE
Payer: MEDICARE

## 2024-06-12 DIAGNOSIS — J43.2 CENTRILOBULAR EMPHYSEMA (HCC): ICD-10-CM

## 2024-06-12 DIAGNOSIS — R91.8 MULTIPLE LUNG NODULES ON CT: ICD-10-CM

## 2024-06-12 DIAGNOSIS — Z86.16 HISTORY OF 2019 NOVEL CORONAVIRUS DISEASE (COVID-19): ICD-10-CM

## 2024-06-12 DIAGNOSIS — R91.8 PULMONARY INFILTRATE PRESENT ON COMPUTED TOMOGRAPHY: ICD-10-CM

## 2024-06-12 PROCEDURE — 71250 CT THORAX DX C-: CPT

## 2024-06-28 NOTE — PROGRESS NOTES
[facility]  Office: [address], DO, Jose Valencia, DO, Marige Alvaradoo, DO, Sushma Wilhelm, DO, Robyn Haney MD, Geremias Plascencia MD, Temo Lewis MD, Chetna Leon MD,  Joni Burnham MD, Chantel Saeed MD, Ray Knowles, DO, Mayra Farr MD,  Abe Mohs, MD, Makenzie Fraga MD, Fausto Parrish, DO, Deborah Conteh MD, Barry Lobato MD, Flory Vu, DO, Maycol Lee MD, Cassidy Ramos MD, Valdez Muir MD, Maggy García MD, McCrory Officer, DO, Cat Ware MD, Kym Chavez MD, Rody Morelos, Allison Santoyo, CNP, Gearline Precise, CNP, Ginny Terry, CNP,  Varinder Marie, [facility], Brandi Celis, CNP, Justina Willis, CNP, Aaliyah Pena, CNP, Jaylon Schwartz, CNP, Josefa No, CNP, Griffin Ramos PAEmelinaC, Abdulkadir Rios, CNS, Robin Greer, CNP, Morena Moses, CNP         [facility]    Progress Note    1/19/2023    3:28 PM    Name:   Claude De La Paz  MRN:     [id number]     [place]:      [de-identified]   Room:   [address] [place] [address] Day:  2  Admit Date:  1/17/2023 11:32 AM    PCP:   BREANNA Keen NP  Code Status:  Full Code    Subjective:     C/C: \"My shoulder is sore, but I am okay\"    Interval History Status: improved. Patient states that his pain is under reasonable control. Culture data has revealed Staph aureus. Sensitivity pattern is pending. Infectious disease has been consulted and evaluation is pending. Antibiotics and discharge obviously will be dependent upon sensitivity pattern. Patient is presently maintained on vancomycin as well as Ancef. Further decision making regarding type of line in addition to antibiotics and discharge will be made once final culture data is available. This is all been explained to the patient and he denies any questions or concerns at this point in time. Brief History:      This very pleasant 20-year-old male underwent elective incision and drainage of the left shoulder. He has a septic joint and cultures reveal Staph aureus. Final sensitivity is pending. He has been admitted for IV antibiotics and eventual line placement pending culture data. Review of Systems:     Constitutional:  negative for chills, fevers, sweats  Respiratory:  negative for cough, dyspnea on exertion, shortness of breath, wheezing  Cardiovascular:  negative for chest pain, chest pressure/discomfort, lower extremity edema, palpitations  Gastrointestinal:  negative for abdominal pain, constipation, diarrhea, nausea, vomiting  Neurological:  negative for dizziness, headache    Medications: Allergies:     Allergies   Allergen Reactions    Iodine Other (See Comments)     Possible reaction to arthrogram dye pt suspected- severe pain and swelling to shoulder-arm       Current Meds:   Scheduled Meds:    lidocaine 1 % injection  5 mL IntraDERmal Once    sodium chloride flush  5-40 mL IntraVENous 2 times per day    celecoxib  200 mg Oral Daily    lisinopril  10 mg Oral Daily    metFORMIN  1,000 mg Oral BID WC    multivitamin  1 tablet Oral Daily    insulin lispro  0-16 Units SubCUTAneous TID WC    insulin lispro  0-4 Units SubCUTAneous Nightly    vitamin D3  800 Units Oral Daily    DULoxetine  30 mg Oral BID    gabapentin  300 mg Oral BID    insulin lispro  2 Units SubCUTAneous TID AC    insulin glargine  63 Units SubCUTAneous Nightly    pioglitazone  30 mg Oral Daily    sodium chloride flush  5-40 mL IntraVENous 2 times per day    enoxaparin  40 mg SubCUTAneous Daily    acetaminophen  1,000 mg Oral Q6H    ceFAZolin  2,000 mg IntraVENous Q8H    vancomycin (VANCOCIN) intermittent dosing (placeholder)   Other RX Placeholder    vancomycin  1,000 mg IntraVENous Q12H     Continuous Infusions:    sodium chloride      sodium chloride Stopped (01/18/23 0652)    sodium chloride 75 mL/hr at 01/19/23 1350    dextrose       PRN Meds: potassium chloride **OR** potassium alternative oral Detail Level: Detailed replacement **OR** potassium chloride, magnesium sulfate, sodium chloride flush, sodium chloride, albuterol sulfate HFA, sodium chloride flush, sodium chloride, polyethylene glycol, oxyCODONE, morphine, ondansetron **OR** ondansetron, glucose, dextrose bolus **OR** dextrose bolus, glucagon (rDNA), dextrose    Data:     Past Medical History:   has a past medical history of Bladder stone, BPH with obstruction/lower urinary tract symptoms, Caffeine use, Cataracts, bilateral, Chronic back pain, COPD (chronic obstructive pulmonary disease) ([facility] [address] History of blood transfusion, Hypertension, Irritable bowel syndrome, Kidney stone, Neuropathy, Osteoarthritis, Pancreatitis, and Type II or unspecified type diabetes mellitus without mention of complication, not stated as uncontrolled. Social History:   reports that he quit smoking about 4 months ago. His smoking use included cigarettes. He started smoking about 42 years ago. He has a 40.00 pack-year smoking history. He has quit using smokeless tobacco. He reports that he does not currently use alcohol. He reports that he does not use drugs. Family History:   Family History   Problem Relation Age of Onset    Arthritis Mother     Cancer Father        Vitals:  BP (!) 144/74   Pulse 85   Temp 98.2 °F (36.8 °C)   Resp 20   Ht 6' 2\" (1.88 m)   Wt 194 lb 10.7 oz (88.3 kg)   SpO2 96%   BMI 24.99 kg/m²   Temp (24hrs), Av °F (36.7 °C), Min:97.6 °F (36.4 °C), Max:98.2 °F (36.8 °C)    Recent Labs     23  1626 23  0830 23  1205   POCGLU 153* 164* 78 134*       I/O (24Hr):     Intake/Output Summary (Last 24 hours) at 2023 1528  Last data filed at 2023 0644  Gross per 24 hour   Intake --   Output 2375 ml   Net -2375 ml       Labs:  Hematology:  Recent Labs     23  0557   WBC 13.4*   RBC 3.31*   HGB 8.8*   HCT 28.5*   MCV 86.1   MCH 26.6   MCHC 30.9*   RDW 15.5*      MPV 9.5     Chemistry:  Recent Labs     23  0578 01/19/23  0509   * 141   K 3.9 3.6*   CL 97* 104   CO2 27 27   GLUCOSE 253* 124*   BUN 20 17   CREATININE 0.70 0.69*   ANIONGAP 8* 10   LABGLOM >60 >60   CALCIUM 8.1* 8.4*     Recent Labs     01/18/23  0815 01/18/23  1159 01/18/23  1626 01/18/23 2011 01/19/23  0830 01/19/23  1205   POCGLU 233* 215* 153* 164* 78 134*     ABG:No results found for: POCPH, PHART, PH, POCPCO2, POE4XXD, PCO2, POCPO2, PO2ART, PO2, POCHCO3, KJX8KKG, HCO3, NBEA, PBEA, BEART, BE, THGBART, THB, CTA7ZVK, ZPST9QAF, R0UAOUVK, O2SAT, FIO2  Lab Results   Component Value Date/Time    SPECIAL 20ML RIGHT HAND 04/22/2022 09:52 PM    SPECIAL 20ML RIGHT ARM 04/22/2022 09:52 PM     Lab Results   Component Value Date/Time    CULTURE STAPHYLOCOCCUS AUREUS MODERATE GROWTH (A) 01/17/2023 10:29 PM    CULTURE No anaerobic organisms isolated at 2 days. 01/17/2023 10:29 PM       Radiology:  MRI SHOULDER LEFT WO CONTRAST    Result Date: 1/12/2023  1. Extensive severe articular surface erosions of the glenohumeral joint with a moderate joint effusion with synovitis. Significant marrow edema in throughout the humeral head extending into the humeral neck as well as the glenoid. Findings likely represent septic arthritis and osteomyelitis. Inflammatory arthritis or neuropathic arthropathy are alternative considerations. 2. Large partially visualized heterogeneous fluid collection inferior to and likely communicating with the glenohumeral joint measuring at least 8.4 x 5.8 x 5 cm likely representing an abscess. Extensive subcutaneous and intramuscular edema compatible with cellulitis and myositis. 3. Severe diffuse supraspinatus tendon thinning and probable full-thickness tearing. 4. Large high-grade partial-thickness articular surface tear of the subscapularis tendon. 5. Mild glenohumeral degenerative changes.        Physical Examination:       General appearance:  alert, cooperative and no distress  Mental Status:  oriented to person, place and time and Quality 226: Preventive Care And Screening: Tobacco Use: Screening And Cessation Intervention: Patient screened for tobacco use and is an ex/non-smoker normal affect  Lungs:  clear to auscultation bilaterally, normal effort  Heart:  regular rate and rhythm, no murmur  Abdomen:  soft, nontender, nondistended, normal bowel sounds, no masses, hepatomegaly, splenomegaly  Extremities:  no edema, redness, tenderness in the calves  Skin:  no gross lesions, rashes, induration    Assessment:     Hospital Problems             Last Modified POA    * (Principal) Septic arthritis ([address] 1/17/2023 Yes    Abscess of left shoulder 1/17/2023 Yes       Plan:     Left septic shoulder arthritis  Initial cultures reveal Staph aureus, final sensitivity pending  Infectious disease consulted  Await final culture data to determine antibiotics on discharge  Diabetes mellitus  Blood sugars under good control, no changes  Glycemic trend reviewed  Hyponatremia  Resolved, thiazide discontinued  Essential hypertension  Continue home medications with exception of thiazide diuretic  Blood pressure stable  Anxiety/depression  Continue Cymbalta    Await culture data    James Lehman DO  1/19/2023  3:28 PM

## 2024-06-30 ENCOUNTER — HOSPITAL ENCOUNTER (INPATIENT)
Age: 74
LOS: 5 days | Discharge: HOME | End: 2024-07-05
Attending: EMERGENCY MEDICINE | Admitting: FAMILY MEDICINE
Payer: MEDICARE

## 2024-06-30 ENCOUNTER — APPOINTMENT (OUTPATIENT)
Dept: GENERAL RADIOLOGY | Age: 74
End: 2024-06-30
Payer: MEDICARE

## 2024-06-30 DIAGNOSIS — L03.114 CELLULITIS OF LEFT UPPER EXTREMITY: ICD-10-CM
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
## 2024-06-30 PROBLEM — Z87.891 FORMER SMOKER: Status: ACTIVE | Noted: 2024-06-30

## 2024-06-30 PROBLEM — R74.8 ELEVATED LIPASE: Status: ACTIVE | Noted: 2024-06-30

## 2024-06-30 LAB
ALBUMIN SERPL-MCNC: 4.1 G/DL (ref 3.5–5.2)
ALBUMIN/GLOB SERPL: 1.3 {RATIO} (ref 1–2.5)
ALP SERPL-CCNC: 97 U/L (ref 40–129)
ALT SERPL-CCNC: 12 U/L (ref 5–41)
ANION GAP SERPL CALCULATED.3IONS-SCNC: 11 MMOL/L (ref 9–17)
AST SERPL-CCNC: 13 U/L
BASOPHILS # BLD: 0 K/UL (ref 0–0.2)
BASOPHILS NFR BLD: 0 % (ref 0–2)
BILIRUB SERPL-MCNC: 0.7 MG/DL (ref 0.3–1.2)
BUN SERPL-MCNC: 24 MG/DL (ref 8–23)
CALCIUM SERPL-MCNC: 9.3 MG/DL (ref 8.6–10.4)
CHLORIDE SERPL-SCNC: 100 MMOL/L (ref 98–107)
CO2 SERPL-SCNC: 25 MMOL/L (ref 20–31)
CREAT SERPL-MCNC: 1 MG/DL (ref 0.7–1.2)
EOSINOPHIL # BLD: 0.12 K/UL (ref 0–0.4)
EOSINOPHILS RELATIVE PERCENT: 1 % (ref 1–4)
ERYTHROCYTE [DISTWIDTH] IN BLOOD BY AUTOMATED COUNT: 14.6 % (ref 12.5–15.4)
GFR, ESTIMATED: 79 ML/MIN/1.73M2
GLUCOSE BLD-MCNC: 186 MG/DL (ref 75–110)
GLUCOSE BLD-MCNC: 208 MG/DL (ref 75–110)
GLUCOSE BLD-MCNC: 224 MG/DL (ref 75–110)
GLUCOSE SERPL-MCNC: 308 MG/DL (ref 70–99)
HCT VFR BLD AUTO: 38.5 % (ref 41–53)
HGB BLD-MCNC: 13 G/DL (ref 13.5–17.5)
LACTATE BLDV-SCNC: 1.4 MMOL/L (ref 0.5–2.2)
LACTATE BLDV-SCNC: 2.3 MMOL/L (ref 0.5–1.9)
LACTATE BLDV-SCNC: 3.4 MMOL/L (ref 0.5–1.9)
LIPASE SERPL-CCNC: 133 U/L (ref 13–60)
LYMPHOCYTES NFR BLD: 1.31 K/UL (ref 1–4.8)
LYMPHOCYTES RELATIVE PERCENT: 11 % (ref 24–44)
MAGNESIUM SERPL-MCNC: 1.9 MG/DL (ref 1.6–2.6)
MCH RBC QN AUTO: 30.4 PG (ref 26–34)
MCHC RBC AUTO-ENTMCNC: 33.8 G/DL (ref 31–37)
MCV RBC AUTO: 90.1 FL (ref 80–100)
MONOCYTES NFR BLD: 1.55 K/UL (ref 0.1–1.2)
MONOCYTES NFR BLD: 13 % (ref 2–11)
MORPHOLOGY: NORMAL
NEUTROPHILS NFR BLD: 75 % (ref 36–66)
NEUTS SEG NFR BLD: 8.92 K/UL (ref 1.8–7.7)
PLATELET # BLD AUTO: 181 K/UL (ref 140–450)
PMV BLD AUTO: 8.6 FL (ref 6–12)
POTASSIUM SERPL-SCNC: 3.9 MMOL/L (ref 3.7–5.3)
PROT SERPL-MCNC: 7.2 G/DL (ref 6.4–8.3)
RBC # BLD AUTO: 4.27 M/UL (ref 4.5–5.9)
SODIUM SERPL-SCNC: 136 MMOL/L (ref 135–144)
WBC OTHER # BLD: 11.9 K/UL (ref 3.5–11)

## 2024-06-30 PROCEDURE — 6360000002 HC RX W HCPCS

## 2024-06-30 PROCEDURE — 6370000000 HC RX 637 (ALT 250 FOR IP)

## 2024-06-30 PROCEDURE — 99285 EMERGENCY DEPT VISIT HI MDM: CPT

## 2024-06-30 PROCEDURE — 36415 COLL VENOUS BLD VENIPUNCTURE: CPT

## 2024-06-30 PROCEDURE — 2580000003 HC RX 258: Performed by: EMERGENCY MEDICINE

## 2024-06-30 PROCEDURE — 2580000003 HC RX 258

## 2024-06-30 PROCEDURE — 85025 COMPLETE CBC W/AUTO DIFF WBC: CPT

## 2024-06-30 PROCEDURE — 83735 ASSAY OF MAGNESIUM: CPT

## 2024-06-30 PROCEDURE — 80053 COMPREHEN METABOLIC PANEL: CPT

## 2024-06-30 PROCEDURE — 82947 ASSAY GLUCOSE BLOOD QUANT: CPT

## 2024-06-30 PROCEDURE — 6360000002 HC RX W HCPCS: Performed by: EMERGENCY MEDICINE

## 2024-06-30 PROCEDURE — 83605 ASSAY OF LACTIC ACID: CPT

## 2024-06-30 PROCEDURE — 1200000000 HC SEMI PRIVATE

## 2024-06-30 PROCEDURE — 83690 ASSAY OF LIPASE: CPT

## 2024-06-30 PROCEDURE — 87040 BLOOD CULTURE FOR BACTERIA: CPT

## 2024-06-30 PROCEDURE — 96374 THER/PROPH/DIAG INJ IV PUSH: CPT

## 2024-06-30 PROCEDURE — 99222 1ST HOSP IP/OBS MODERATE 55: CPT | Performed by: FAMILY MEDICINE

## 2024-06-30 PROCEDURE — 73110 X-RAY EXAM OF WRIST: CPT

## 2024-06-30 RX ORDER — ALBUTEROL SULFATE 90 UG/1
2 AEROSOL, METERED RESPIRATORY (INHALATION) EVERY 6 HOURS PRN
Status: DISCONTINUED | OUTPATIENT
Start: 2024-06-30 | End: 2024-07-05 | Stop reason: HOSPADM

## 2024-06-30 RX ORDER — SODIUM CHLORIDE 9 MG/ML
INJECTION, SOLUTION INTRAVENOUS PRN
Status: DISCONTINUED | OUTPATIENT
Start: 2024-06-30 | End: 2024-07-05 | Stop reason: HOSPADM

## 2024-06-30 RX ORDER — ENOXAPARIN SODIUM 100 MG/ML
40 INJECTION SUBCUTANEOUS DAILY
Status: DISCONTINUED | OUTPATIENT
Start: 2024-06-30 | End: 2024-07-05 | Stop reason: HOSPADM

## 2024-06-30 RX ORDER — ACETAMINOPHEN 650 MG/1
650 SUPPOSITORY RECTAL EVERY 6 HOURS PRN
Status: DISCONTINUED | OUTPATIENT
Start: 2024-06-30 | End: 2024-07-05 | Stop reason: HOSPADM

## 2024-06-30 RX ORDER — GABAPENTIN 300 MG/1
300 CAPSULE ORAL 2 TIMES DAILY
Status: DISCONTINUED | OUTPATIENT
Start: 2024-06-30 | End: 2024-07-05 | Stop reason: HOSPADM

## 2024-06-30 RX ORDER — LISINOPRIL 5 MG/1
5 TABLET ORAL DAILY
Status: DISCONTINUED | OUTPATIENT
Start: 2024-07-01 | End: 2024-07-05 | Stop reason: HOSPADM

## 2024-06-30 RX ORDER — ONDANSETRON 4 MG/1
4 TABLET, ORALLY DISINTEGRATING ORAL EVERY 8 HOURS PRN
Status: DISCONTINUED | OUTPATIENT
Start: 2024-06-30 | End: 2024-07-05 | Stop reason: HOSPADM

## 2024-06-30 RX ORDER — ONDANSETRON 2 MG/ML
4 INJECTION INTRAMUSCULAR; INTRAVENOUS EVERY 6 HOURS PRN
Status: DISCONTINUED | OUTPATIENT
Start: 2024-06-30 | End: 2024-07-05 | Stop reason: HOSPADM

## 2024-06-30 RX ORDER — CELECOXIB 100 MG/1
200 CAPSULE ORAL DAILY
Status: DISCONTINUED | OUTPATIENT
Start: 2024-07-01 | End: 2024-07-05 | Stop reason: HOSPADM

## 2024-06-30 RX ORDER — INSULIN LISPRO 100 [IU]/ML
0-4 INJECTION, SOLUTION INTRAVENOUS; SUBCUTANEOUS NIGHTLY
Status: DISCONTINUED | OUTPATIENT
Start: 2024-06-30 | End: 2024-07-05 | Stop reason: HOSPADM

## 2024-06-30 RX ORDER — INSULIN LISPRO 100 [IU]/ML
0-8 INJECTION, SOLUTION INTRAVENOUS; SUBCUTANEOUS
Status: DISCONTINUED | OUTPATIENT
Start: 2024-06-30 | End: 2024-07-05 | Stop reason: HOSPADM

## 2024-06-30 RX ORDER — DOXYCYCLINE HYCLATE 100 MG
100 TABLET ORAL EVERY 12 HOURS SCHEDULED
Status: DISCONTINUED | OUTPATIENT
Start: 2024-06-30 | End: 2024-07-01

## 2024-06-30 RX ORDER — MAGNESIUM SULFATE IN WATER 40 MG/ML
2000 INJECTION, SOLUTION INTRAVENOUS PRN
Status: DISCONTINUED | OUTPATIENT
Start: 2024-06-30 | End: 2024-07-05 | Stop reason: HOSPADM

## 2024-06-30 RX ORDER — POTASSIUM CHLORIDE 20 MEQ/1
40 TABLET, EXTENDED RELEASE ORAL PRN
Status: DISCONTINUED | OUTPATIENT
Start: 2024-06-30 | End: 2024-07-05 | Stop reason: HOSPADM

## 2024-06-30 RX ORDER — POLYETHYLENE GLYCOL 3350 17 G/17G
17 POWDER, FOR SOLUTION ORAL DAILY PRN
Status: DISCONTINUED | OUTPATIENT
Start: 2024-06-30 | End: 2024-07-05 | Stop reason: HOSPADM

## 2024-06-30 RX ORDER — DULOXETIN HYDROCHLORIDE 30 MG/1
30 CAPSULE, DELAYED RELEASE ORAL 2 TIMES DAILY
Status: DISCONTINUED | OUTPATIENT
Start: 2024-06-30 | End: 2024-07-05 | Stop reason: HOSPADM

## 2024-06-30 RX ORDER — MULTIVITAMIN WITH IRON
1 TABLET ORAL DAILY
Status: DISCONTINUED | OUTPATIENT
Start: 2024-07-01 | End: 2024-07-05 | Stop reason: HOSPADM

## 2024-06-30 RX ORDER — POTASSIUM CHLORIDE 7.45 MG/ML
10 INJECTION INTRAVENOUS PRN
Status: DISCONTINUED | OUTPATIENT
Start: 2024-06-30 | End: 2024-07-05 | Stop reason: HOSPADM

## 2024-06-30 RX ORDER — CHLORTHALIDONE 25 MG/1
25 TABLET ORAL DAILY
Status: DISCONTINUED | OUTPATIENT
Start: 2024-07-01 | End: 2024-07-05 | Stop reason: HOSPADM

## 2024-06-30 RX ORDER — GLUCAGON 1 MG/ML
1 KIT INJECTION PRN
Status: DISCONTINUED | OUTPATIENT
Start: 2024-06-30 | End: 2024-07-05 | Stop reason: HOSPADM

## 2024-06-30 RX ORDER — DEXTROSE MONOHYDRATE 100 MG/ML
INJECTION, SOLUTION INTRAVENOUS CONTINUOUS PRN
Status: DISCONTINUED | OUTPATIENT
Start: 2024-06-30 | End: 2024-07-05 | Stop reason: HOSPADM

## 2024-06-30 RX ORDER — INSULIN GLARGINE 100 [IU]/ML
35 INJECTION, SOLUTION SUBCUTANEOUS 2 TIMES DAILY
Status: DISCONTINUED | OUTPATIENT
Start: 2024-06-30 | End: 2024-07-05 | Stop reason: HOSPADM

## 2024-06-30 RX ORDER — ATORVASTATIN CALCIUM 10 MG/1
20 TABLET, FILM COATED ORAL DAILY
Status: DISCONTINUED | OUTPATIENT
Start: 2024-07-01 | End: 2024-07-05 | Stop reason: HOSPADM

## 2024-06-30 RX ORDER — ACETAMINOPHEN 325 MG/1
650 TABLET ORAL EVERY 6 HOURS PRN
Status: DISCONTINUED | OUTPATIENT
Start: 2024-06-30 | End: 2024-07-05 | Stop reason: HOSPADM

## 2024-06-30 RX ORDER — SODIUM CHLORIDE 9 MG/ML
INJECTION, SOLUTION INTRAVENOUS CONTINUOUS
Status: DISCONTINUED | OUTPATIENT
Start: 2024-06-30 | End: 2024-07-02

## 2024-06-30 RX ORDER — HYDROCODONE BITARTRATE AND ACETAMINOPHEN 5; 325 MG/1; MG/1
1 TABLET ORAL EVERY 6 HOURS PRN
Status: DISCONTINUED | OUTPATIENT
Start: 2024-06-30 | End: 2024-07-05 | Stop reason: HOSPADM

## 2024-06-30 RX ORDER — SODIUM CHLORIDE 0.9 % (FLUSH) 0.9 %
5-40 SYRINGE (ML) INJECTION EVERY 12 HOURS SCHEDULED
Status: DISCONTINUED | OUTPATIENT
Start: 2024-06-30 | End: 2024-07-05 | Stop reason: HOSPADM

## 2024-06-30 RX ORDER — HYDROCODONE BITARTRATE AND ACETAMINOPHEN 5; 325 MG/1; MG/1
2 TABLET ORAL EVERY 6 HOURS PRN
Status: DISCONTINUED | OUTPATIENT
Start: 2024-06-30 | End: 2024-07-05 | Stop reason: HOSPADM

## 2024-06-30 RX ORDER — SODIUM CHLORIDE 0.9 % (FLUSH) 0.9 %
5-40 SYRINGE (ML) INJECTION PRN
Status: DISCONTINUED | OUTPATIENT
Start: 2024-06-30 | End: 2024-07-05 | Stop reason: HOSPADM

## 2024-06-30 RX ADMIN — DOXYCYCLINE HYCLATE 100 MG: 100 TABLET, COATED ORAL at 19:57

## 2024-06-30 RX ADMIN — INSULIN LISPRO 2 UNITS: 100 INJECTION, SOLUTION INTRAVENOUS; SUBCUTANEOUS at 13:26

## 2024-06-30 RX ADMIN — SODIUM CHLORIDE 3000 MG: 9 INJECTION, SOLUTION INTRAVENOUS at 22:52

## 2024-06-30 RX ADMIN — POLYETHYLENE GLYCOL 3350 17 G: 17 POWDER, FOR SOLUTION ORAL at 23:02

## 2024-06-30 RX ADMIN — HYDROCODONE BITARTRATE AND ACETAMINOPHEN 1 TABLET: 5; 325 TABLET ORAL at 13:26

## 2024-06-30 RX ADMIN — SODIUM CHLORIDE 3000 MG: 9 INJECTION, SOLUTION INTRAVENOUS at 16:26

## 2024-06-30 RX ADMIN — GABAPENTIN 300 MG: 300 CAPSULE ORAL at 19:57

## 2024-06-30 RX ADMIN — SODIUM CHLORIDE 3000 MG: 9 INJECTION, SOLUTION INTRAVENOUS at 10:29

## 2024-06-30 RX ADMIN — HYDROCODONE BITARTRATE AND ACETAMINOPHEN 2 TABLET: 5; 325 TABLET ORAL at 19:57

## 2024-06-30 RX ADMIN — ACETAMINOPHEN 650 MG: 325 TABLET ORAL at 11:09

## 2024-06-30 RX ADMIN — INSULIN GLARGINE 35 UNITS: 100 INJECTION, SOLUTION SUBCUTANEOUS at 22:56

## 2024-06-30 RX ADMIN — DULOXETINE HYDROCHLORIDE 30 MG: 30 CAPSULE, DELAYED RELEASE ORAL at 19:57

## 2024-06-30 RX ADMIN — SODIUM CHLORIDE: 9 INJECTION, SOLUTION INTRAVENOUS at 13:31

## 2024-06-30 RX ADMIN — INSULIN LISPRO 2 UNITS: 100 INJECTION, SOLUTION INTRAVENOUS; SUBCUTANEOUS at 17:17

## 2024-06-30 ASSESSMENT — PAIN DESCRIPTION - ORIENTATION
ORIENTATION: LEFT

## 2024-06-30 ASSESSMENT — PAIN DESCRIPTION - FREQUENCY: FREQUENCY: INTERMITTENT

## 2024-06-30 ASSESSMENT — PAIN DESCRIPTION - DIRECTION: RADIATING_TOWARDS: LEFT FOREARM

## 2024-06-30 ASSESSMENT — PAIN SCALES - GENERAL
PAINLEVEL_OUTOF10: 8
PAINLEVEL_OUTOF10: 7
PAINLEVEL_OUTOF10: 6
PAINLEVEL_OUTOF10: 6
PAINLEVEL_OUTOF10: 0
PAINLEVEL_OUTOF10: 8
PAINLEVEL_OUTOF10: 1

## 2024-06-30 ASSESSMENT — PAIN DESCRIPTION - LOCATION
LOCATION: HAND
LOCATION: ARM;HAND
LOCATION: HAND

## 2024-06-30 ASSESSMENT — PAIN DESCRIPTION - ONSET: ONSET: ON-GOING

## 2024-06-30 ASSESSMENT — PAIN DESCRIPTION - DESCRIPTORS
DESCRIPTORS: DISCOMFORT
DESCRIPTORS: ACHING;SORE;STABBING
DESCRIPTORS: ACHING;SORE

## 2024-06-30 ASSESSMENT — PAIN - FUNCTIONAL ASSESSMENT
PAIN_FUNCTIONAL_ASSESSMENT: ACTIVITIES ARE NOT PREVENTED
PAIN_FUNCTIONAL_ASSESSMENT: ACTIVITIES ARE NOT PREVENTED
PAIN_FUNCTIONAL_ASSESSMENT: 0-10

## 2024-06-30 ASSESSMENT — PAIN DESCRIPTION - PAIN TYPE: TYPE: ACUTE PAIN

## 2024-06-30 NOTE — PROGRESS NOTES
Physical Therapy        Physical Therapy Cancel Note      DATE: 2024    NAME: Luis A Salinas  MRN: [id number]   : 1950      Patient not seen this date for Physical Therapy due to:    Patient independent with functional mobility. Will defer PT evaluation at this time. Please reorder PT if future needs arise.       Electronically signed by TORREY GUEVARA, PT on 2024 at 2:47 PM

## 2024-06-30 NOTE — H&P
[facility] Residency  Inpatient Service     HISTORY AND PHYSICAL EXAMINATION            Date:   6/30/2024  Patient name:  Luis A Salinas  Date of admission:  6/30/2024  9:47 AM  MRN:   [id number]  Account:  [id number]  YOB: 1950  PCP:    Lauren Briceño APRN - CNP  Room:   [address] [place] [address]  Code Status:    Full Code  Emergency Contact: Christo Salinas [phone]    History Obtained From:     patient    History of Present Illness:     Luis A Salinas is a 74 y.o. male with past medical history of T2DM with neuropathy, hypertension, hyperlipidemia, COPD, osteoarthritis, and MDD who presents complaining of left hand, wrist, forearm pain secondary to cat bite admitted for cellulitis requiring IV antibiotics.    Patient states his cat got out of the house Wednesday evening/Thursday morning.  He chased after it and managed to catch it by its tail.  This prompted the cat to bite the dorsum of his left hand.  He endorsed pain, swelling, and some pulsatile bleeding from one of the puncture wounds on the back of his hand immediately after the bite.  He was able to stop the bleeding by applying pressure to the wound.  He also cleaned the wound with rubbing alcohol, applied a bandage and iced it.  The swelling and pain did improve on the following days, but worsened last night. The redness and swelling was starting to spread up his arm, and there was decreased mobility of the wrist and increased pain with movement. Patient denies any fevers, chills, chest pain, shortness of breath, abdominal pain, nausea, vomiting. He is up to date with his tetanus vaccine. The house cat is up to date with its vaccines as well.    On presentation to the ED, he was afebrile and normotensive.  Examination of his arm shows significant erythema, edema, warmth and mild tenderness extending from the dorsum of the left hand to the distal forearm.  Labs were significant for a white count  Surgical History:     Past Surgical History:   Procedure Laterality Date    ARM SURGERY Left 01/17/2023    LEFT SHOULDER ABSCESS  INCISION AND DRAINAGE performed by Kathy Sahni MD at [facility] OR    BACK SURGERY      BLADDER SURGERY      CATARACT EXTRACTION W/ INTRAOCULAR LENS IMPLANT Right 03/05/2024    Cataract and Laser [place]    CHOLECYSTECTOMY      COLONOSCOPY      6-7 polyps    CYSTOSCOPY  04/06/2022    CYSTOSCOPY URETEROSCOPY HOLMIUM LASER LEFT URETERAL STENT INSERTION - Left    CYSTOSCOPY Left 04/06/2022    CYSTOSCOPY URETEROSCOPY HOLMIUM LASER LEFT URETERAL STENT INSERTION performed by Piyush Bee MD at [facility] OR    CYSTOSCOPY  04/20/2022    CYSTOSCOPY URETEROSCOPY LEFT STENT PLACEMENT WITH STONE BASKETING AND HOLMIUM LASER (    CYSTOSCOPY Left 04/20/2022    CYSTOSCOPY URETEROSCOPY LEFT STENT PLACEMENT WITH STONE BASKETING AND HOLMIUM LASER performed by Piyush Bee MD at [facility] OR    EYE SURGERY  2015    macular scrape    EYE SURGERY      HAND TENDON SURGERY Left 03/07/2024    LEFT MIDDLE AND RING FINGER INCISION AND DRAINAGE, WITH EXTENSOR TENDON AND NAIL BED REPAIR LEFT RING FINGER performed by Talha Kumar MD at [facility] OR    LEG TENDON SURGERY  1962    ankle    LITHOTRIPSY      9-21-17    OTHER SURGICAL HISTORY      implanted nerve stimulater in lumbar back    REVERSE TOTAL SHOULDER ARTHROPLASTY Left 05/16/2023    LEFT SHOULDER TOTAL ARTHROPLASTY REVERSE WITH DJO AND PRE-OP INTERSCALENE BLOCK WITH EXPAREL    SHOULDER ARTHROSCOPY Left 10/25/2022    LEFT SHOULDER ARTHROSCOPY WITH EXTENSIVE DEBRIDMENT AND PRE-OPERATIVE INTERSCALENE BLOCK WITH EXPAREL (Left: Shoulder)    SHOULDER ARTHROSCOPY Left 10/25/2022    LEFT SHOULDER ARTHROSCOPY WITH EXTENSIVE DEBRIDMENT AND PRE-OPERATIVE INTERSCALENE BLOCK WITH EXPAREL performed by Kathy Sahni MD at [facility] OR    SHOULDER ARTHROSCOPY  01/17/2023    LEFT SHOULDER ABSCESS  INCISION AND

## 2024-06-30 NOTE — ED PROVIDER NOTES
[facility] Emergency Department  [address]  [facility] [address]  Phone: [phone]  Fax: [phone]    EMERGENCY DEPARTMENT ENCOUNTER          Pt Name: Luis A Salinas  MRN: [id number]  Birthdate 1950  Date of evaluation: 6/30/2024      CHIEF COMPLAINT       Chief Complaint   Patient presents with    Animal Bite     Cat bite on few days ago, left hand now red and swollen       HISTORY OF PRESENT ILLNESS       Luis A Salinas is a 74 y.o. male who presents with what seems to be worsening cellulitis infection of his left hand, wrist and spreading up his forearm from a cat bite that occurred Thursday.  States it was his own cat however the cat got out and when the patient tried to recover the cat it bit him in the dorsum of the hand.  Sounds like there was some pulsatile bleeding out of one of the puncture wounds that he was able to stop.  He reports worsening pain and swelling and erythema spreading up his arm.      REVIEW OF SYSTEMS       Review of Systems   Constitutional:  Negative for chills and fever.   HENT:  Negative for sore throat.    Respiratory:  Negative for shortness of breath.    Cardiovascular:  Negative for chest pain.   Gastrointestinal:  Negative for abdominal pain, diarrhea, nausea and vomiting.   Musculoskeletal:  Negative for neck pain.   Skin:  Positive for rash and wound.   Neurological:  Negative for weakness and numbness.        PAST MEDICAL HISTORY    has a past medical history of Abscess of left shoulder, Bladder stone, BPH with obstruction/lower urinary tract symptoms, Caffeine use, Cancer ([facility]), Cataracts, bilateral, Chronic back pain, CKD (chronic kidney disease), COPD (chronic obstructive pulmonary disease) ([facility]), Emphysema of lung ([facility]), Headache, History of blood transfusion, Hypertension, Irritable bowel syndrome, Kidney stone, MSSA (methicillin susceptible Staphylococcus aureus) infection, Neuropathy, Osteoarthritis, Pancreatitis,  studies, EKG): ECG interpreted by myself if completed.    Discussion of x-ray results with radiology: See below    Consults: See below    Consideration for admission/observation (even if discharged): Considered    Prescription considerations: Not applicable    Sepsis considered: Considered but unlikely    Critical Care note written: See below    DIAGNOSTIC RESULTS     EKG: All EKG's are interpreted by the Emergency Department Physician who either signs or Co-signs this chart in the absence of a cardiologist.        RADIOLOGY:   Interpretation per the Radiologist below, if available at the time of this note:  No orders to display       CT CHEST LOW DOSE (LDCT)    Result Date: 6/12/2024  EXAMINATION: LOW DOSE OF THE CT  CHEST WITHOUT CONTRAST 6/12/2024 1:19 pm TECHNIQUE: Low Dose of the CT Chest without the administration of intravenous contrast (MA=40).  Multiplanar reformatted images are provided for review. Automated exposure control, iterative reconstruction, and/or weight based adjustment of the mA/kV was utilized to reduce the radiation dose to as low as reasonably achievable. COMPARISON: March 5, 2024 HISTORY: ORDERING SYSTEM PROVIDED HISTORY: Centrilobular emphysema (HCC) TECHNOLOGIST PROVIDED HISTORY: Without contrast lung nodule follow up Reason for Exam: lung nodule follow up, Centrilobular emphysema (HCC), Multiple lung nodules on CT, History of 2019 novel coronavirus disease (COVID-19), Pulmonary infiltrate present on computed tomography FINDINGS: Mediastinum:  Thoracic aorta normal in course and caliber.  No enlarged mediastinal or hilar lymph nodes by imaging size criteria.  Heart size within normal limits.  Coronary artery calcification present.  Thyroid and esophagus unremarkable as visualized. Lungs/Pleura:  Stable emphysema.  No consolidation, pneumothorax or pleural effusion.  Central airways are patent. Scattered pulmonary nodules unchanged largest 4 mm.  No new or suspicious nodule. Upper

## 2024-06-30 NOTE — PROGRESS NOTES
Occupational Therapy      [facility]  Occupational Therapy Not Seen Note    DATE: 2024    NAME: Luis A Salinas  MRN: [id number]   : 1950      Patient not seen this date for Occupational Therapy due to:      Pt states performing functional mobility and ADL tasks independently this date. Pt demo very limited AROM to L wrist/hand however declines any therapy intervention as pt reports being familiar with one handed compensatory techniques following a L TSA ~1yr ago. Pt reports no safety concerns for returning home at discharge with supportive wife able to assist. Will defer OT evaluation at this time based on pt wishes. Please reorder OT if pt with a change in wishes for acute care therapy services.       Electronically signed by Candice Amaya OT on 2024 at 2:48 PM

## 2024-07-01 ENCOUNTER — APPOINTMENT (OUTPATIENT)
Dept: CT IMAGING | Age: 74
End: 2024-07-01
Payer: MEDICARE

## 2024-07-01 PROBLEM — D72.829 LEUKOCYTOSIS: Status: ACTIVE | Noted: 2024-07-01

## 2024-07-01 PROBLEM — R79.82 ELEVATED C-REACTIVE PROTEIN (CRP): Status: ACTIVE | Noted: 2024-07-01

## 2024-07-01 LAB
ANION GAP SERPL CALCULATED.3IONS-SCNC: 9 MMOL/L (ref 9–17)
BASOPHILS # BLD: 0 K/UL (ref 0–0.2)
BASOPHILS NFR BLD: 0 % (ref 0–2)
BUN SERPL-MCNC: 17 MG/DL (ref 8–23)
CALCIUM SERPL-MCNC: 8.9 MG/DL (ref 8.6–10.4)
CHLORIDE SERPL-SCNC: 101 MMOL/L (ref 98–107)
CO2 SERPL-SCNC: 24 MMOL/L (ref 20–31)
CREAT SERPL-MCNC: 0.7 MG/DL (ref 0.7–1.2)
CRP SERPL HS-MCNC: 50.1 MG/L (ref 0–5)
EOSINOPHIL # BLD: 0.14 K/UL (ref 0–0.4)
EOSINOPHILS RELATIVE PERCENT: 1 % (ref 1–4)
ERYTHROCYTE [DISTWIDTH] IN BLOOD BY AUTOMATED COUNT: 14.6 % (ref 12.5–15.4)
GFR, ESTIMATED: >90 ML/MIN/1.73M2
GLUCOSE BLD-MCNC: 151 MG/DL (ref 75–110)
GLUCOSE BLD-MCNC: 176 MG/DL (ref 75–110)
GLUCOSE BLD-MCNC: 207 MG/DL (ref 75–110)
GLUCOSE BLD-MCNC: 212 MG/DL (ref 75–110)
GLUCOSE SERPL-MCNC: 156 MG/DL (ref 70–99)
HCT VFR BLD AUTO: 37.2 % (ref 41–53)
HGB BLD-MCNC: 12.7 G/DL (ref 13.5–17.5)
LACTATE BLDV-SCNC: 1.4 MMOL/L (ref 0.5–2.2)
LYMPHOCYTES NFR BLD: 1.52 K/UL (ref 1–4.8)
LYMPHOCYTES RELATIVE PERCENT: 11 % (ref 24–44)
MCH RBC QN AUTO: 30.3 PG (ref 26–34)
MCHC RBC AUTO-ENTMCNC: 34.1 G/DL (ref 31–37)
MCV RBC AUTO: 88.9 FL (ref 80–100)
MONOCYTES NFR BLD: 0.97 K/UL (ref 0.1–0.8)
MONOCYTES NFR BLD: 7 % (ref 1–7)
MORPHOLOGY: NORMAL
NEUTROPHILS NFR BLD: 81 % (ref 36–66)
NEUTS SEG NFR BLD: 11.17 K/UL (ref 1.8–7.7)
PLATELET # BLD AUTO: 177 K/UL (ref 140–450)
PMV BLD AUTO: 8.4 FL (ref 6–12)
POTASSIUM SERPL-SCNC: 4 MMOL/L (ref 3.7–5.3)
RBC # BLD AUTO: 4.18 M/UL (ref 4.5–5.9)
SODIUM SERPL-SCNC: 134 MMOL/L (ref 135–144)
WBC OTHER # BLD: 13.8 K/UL (ref 3.5–11)

## 2024-07-01 PROCEDURE — 73200 CT UPPER EXTREMITY W/O DYE: CPT

## 2024-07-01 PROCEDURE — 99232 SBSQ HOSP IP/OBS MODERATE 35: CPT | Performed by: FAMILY MEDICINE

## 2024-07-01 PROCEDURE — 2580000003 HC RX 258

## 2024-07-01 PROCEDURE — 86140 C-REACTIVE PROTEIN: CPT

## 2024-07-01 PROCEDURE — 82947 ASSAY GLUCOSE BLOOD QUANT: CPT

## 2024-07-01 PROCEDURE — 6360000002 HC RX W HCPCS

## 2024-07-01 PROCEDURE — 2580000003 HC RX 258: Performed by: NURSE PRACTITIONER

## 2024-07-01 PROCEDURE — 36415 COLL VENOUS BLD VENIPUNCTURE: CPT

## 2024-07-01 PROCEDURE — 80048 BASIC METABOLIC PNL TOTAL CA: CPT

## 2024-07-01 PROCEDURE — 1200000000 HC SEMI PRIVATE

## 2024-07-01 PROCEDURE — 6370000000 HC RX 637 (ALT 250 FOR IP)

## 2024-07-01 PROCEDURE — 83605 ASSAY OF LACTIC ACID: CPT

## 2024-07-01 PROCEDURE — 85025 COMPLETE CBC W/AUTO DIFF WBC: CPT

## 2024-07-01 PROCEDURE — 6370000000 HC RX 637 (ALT 250 FOR IP): Performed by: NURSE PRACTITIONER

## 2024-07-01 PROCEDURE — 6360000002 HC RX W HCPCS: Performed by: NURSE PRACTITIONER

## 2024-07-01 PROCEDURE — 99222 1ST HOSP IP/OBS MODERATE 55: CPT | Performed by: NURSE PRACTITIONER

## 2024-07-01 RX ORDER — CLINDAMYCIN HYDROCHLORIDE 150 MG/1
300 CAPSULE ORAL EVERY 6 HOURS SCHEDULED
Status: DISCONTINUED | OUTPATIENT
Start: 2024-07-01 | End: 2024-07-01

## 2024-07-01 RX ORDER — CLINDAMYCIN PHOSPHATE 600 MG/50ML
600 INJECTION, SOLUTION INTRAVENOUS EVERY 8 HOURS
Status: DISCONTINUED | OUTPATIENT
Start: 2024-07-01 | End: 2024-07-05 | Stop reason: HOSPADM

## 2024-07-01 RX ORDER — SODIUM CHLORIDE 0.9 % (FLUSH) 0.9 %
5-40 SYRINGE (ML) INJECTION PRN
Status: DISCONTINUED | OUTPATIENT
Start: 2024-07-01 | End: 2024-07-05 | Stop reason: HOSPADM

## 2024-07-01 RX ORDER — SODIUM CHLORIDE 0.9 % (FLUSH) 0.9 %
5-40 SYRINGE (ML) INJECTION EVERY 12 HOURS SCHEDULED
Status: DISCONTINUED | OUTPATIENT
Start: 2024-07-01 | End: 2024-07-05 | Stop reason: HOSPADM

## 2024-07-01 RX ORDER — SODIUM CHLORIDE 9 MG/ML
INJECTION, SOLUTION INTRAVENOUS PRN
Status: DISCONTINUED | OUTPATIENT
Start: 2024-07-01 | End: 2024-07-05 | Stop reason: HOSPADM

## 2024-07-01 RX ADMIN — THERA TABS 1 TABLET: TAB at 09:00

## 2024-07-01 RX ADMIN — CLINDAMYCIN PHOSPHATE 600 MG: 600 INJECTION, SOLUTION INTRAVENOUS at 23:07

## 2024-07-01 RX ADMIN — SODIUM CHLORIDE: 9 INJECTION, SOLUTION INTRAVENOUS at 00:53

## 2024-07-01 RX ADMIN — HYDROCODONE BITARTRATE AND ACETAMINOPHEN 1 TABLET: 5; 325 TABLET ORAL at 22:16

## 2024-07-01 RX ADMIN — SODIUM CHLORIDE: 9 INJECTION, SOLUTION INTRAVENOUS at 14:42

## 2024-07-01 RX ADMIN — SODIUM CHLORIDE 3000 MG: 9 INJECTION, SOLUTION INTRAVENOUS at 10:47

## 2024-07-01 RX ADMIN — GABAPENTIN 300 MG: 300 CAPSULE ORAL at 22:16

## 2024-07-01 RX ADMIN — HYDROCODONE BITARTRATE AND ACETAMINOPHEN 2 TABLET: 5; 325 TABLET ORAL at 04:19

## 2024-07-01 RX ADMIN — CHLORTHALIDONE 25 MG: 25 TABLET ORAL at 09:00

## 2024-07-01 RX ADMIN — DOXYCYCLINE HYCLATE 100 MG: 100 TABLET, COATED ORAL at 09:02

## 2024-07-01 RX ADMIN — GABAPENTIN 300 MG: 300 CAPSULE ORAL at 09:00

## 2024-07-01 RX ADMIN — CLINDAMYCIN HYDROCHLORIDE 300 MG: 150 CAPSULE ORAL at 12:25

## 2024-07-01 RX ADMIN — LISINOPRIL 5 MG: 5 TABLET ORAL at 09:01

## 2024-07-01 RX ADMIN — INSULIN GLARGINE 35 UNITS: 100 INJECTION, SOLUTION SUBCUTANEOUS at 23:09

## 2024-07-01 RX ADMIN — SODIUM CHLORIDE, PRESERVATIVE FREE 10 ML: 5 INJECTION INTRAVENOUS at 22:19

## 2024-07-01 RX ADMIN — DULOXETINE HYDROCHLORIDE 30 MG: 30 CAPSULE, DELAYED RELEASE ORAL at 22:17

## 2024-07-01 RX ADMIN — INSULIN GLARGINE 35 UNITS: 100 INJECTION, SOLUTION SUBCUTANEOUS at 09:00

## 2024-07-01 RX ADMIN — CELECOXIB 200 MG: 100 CAPSULE ORAL at 09:00

## 2024-07-01 RX ADMIN — INSULIN LISPRO 2 UNITS: 100 INJECTION, SOLUTION INTRAVENOUS; SUBCUTANEOUS at 12:24

## 2024-07-01 RX ADMIN — SODIUM CHLORIDE 3000 MG: 9 INJECTION, SOLUTION INTRAVENOUS at 04:21

## 2024-07-01 RX ADMIN — PIPERACILLIN AND TAZOBACTAM 3375 MG: 3; .375 INJECTION, POWDER, LYOPHILIZED, FOR SOLUTION INTRAVENOUS at 23:57

## 2024-07-01 RX ADMIN — DULOXETINE HYDROCHLORIDE 30 MG: 30 CAPSULE, DELAYED RELEASE ORAL at 09:02

## 2024-07-01 RX ADMIN — ATORVASTATIN CALCIUM 20 MG: 10 TABLET, FILM COATED ORAL at 09:00

## 2024-07-01 RX ADMIN — HYDROCODONE BITARTRATE AND ACETAMINOPHEN 1 TABLET: 5; 325 TABLET ORAL at 09:02

## 2024-07-01 ASSESSMENT — PAIN SCALES - GENERAL
PAINLEVEL_OUTOF10: 4
PAINLEVEL_OUTOF10: 4
PAINLEVEL_OUTOF10: 6
PAINLEVEL_OUTOF10: 8

## 2024-07-01 ASSESSMENT — PAIN DESCRIPTION - ORIENTATION
ORIENTATION: LEFT

## 2024-07-01 ASSESSMENT — PAIN DESCRIPTION - LOCATION
LOCATION: ARM
LOCATION: ARM
LOCATION: HAND

## 2024-07-01 ASSESSMENT — PAIN DESCRIPTION - DESCRIPTORS: DESCRIPTORS: DISCOMFORT

## 2024-07-01 ASSESSMENT — ENCOUNTER SYMPTOMS: COLOR CHANGE: 1

## 2024-07-01 ASSESSMENT — PAIN - FUNCTIONAL ASSESSMENT: PAIN_FUNCTIONAL_ASSESSMENT: ACTIVITIES ARE NOT PREVENTED

## 2024-07-01 NOTE — CONSULTS
[facility] [place] OR    SKIN BIOPSY Right     skin cancer    SPINE SURGERY  2010    DR. Bob Carrasquillo (NS) fusion and implantation of hardware    TONSILLECTOMY      URETEROSCOPY      with string stent 17    VASECTOMY         Medications:      clindamycin  300 mg Oral 4 times per day    sodium chloride flush  5-40 mL IntraVENous 2 times per day    enoxaparin  40 mg SubCUTAneous Daily    atorvastatin  20 mg Oral Daily    chlorthalidone  25 mg Oral Daily    celecoxib  200 mg Oral Daily    DULoxetine  30 mg Oral BID    gabapentin  300 mg Oral BID    insulin glargine  35 Units SubCUTAneous BID    multivitamin  1 tablet Oral Daily    lisinopril  5 mg Oral Daily    ampicillin-sulbactam  3,000 mg IntraVENous Q6H    insulin lispro  0-8 Units SubCUTAneous TID WC    insulin lispro  0-4 Units SubCUTAneous Nightly       Social History:     Social History     Socioeconomic History    Marital status:      Spouse name: Not on file    Number of children: Not on file    Years of education: Not on file    Highest education level: Not on file   Occupational History    Not on file   Tobacco Use    Smoking status: Former     Current packs/day: 0.00     Average packs/day: 1 pack/day for 54.5 years (54.5 ttl pk-yrs)     Types: Cigarettes     Start date:      Quit date: 7/15/2022     Years since quittin.9    Smokeless tobacco: Former    Tobacco comments:     From  to date; I was a non smoker for 7 to 10 years   Vaping Use    Vaping Use: Former    Quit date: 3/17/2019   Substance and Sexual Activity    Alcohol use: Yes     Alcohol/week: 1.0 standard drink of alcohol     Types: 1 Cans of beer per week     Comment: Maybe one bottle per month    Drug use: No    Sexual activity: Not Currently     Partners: Female   Other Topics Concern    Not on file   Social History Narrative    Not on file     Social Determinants of Health     Financial Resource Strain: Low Risk  (2023)    Overall Financial Resource Strain     177   LYMPHOPCT 11* 11*   MONOPCT 13* 7   EOSPCT 1 1     BMP:  Recent Labs     06/30/24  1013 07/01/24  0656    134*   K 3.9 4.0    101   CO2 25 24   BUN 24* 17   CREATININE 1.0 0.7   MG 1.9  --      Hepatic Function Panel:   Recent Labs     06/30/24  1013   BILITOT 0.7   ALKPHOS 97   ALT 12   AST 13     No results for input(s): \"RPR\" in the last 72 hours.  No results for input(s): \"HIV\" in the last 72 hours.  No results for input(s): \"BC\" in the last 72 hours.  Lab Results   Component Value Date/Time    CREATININE 0.7 07/01/2024 06:56 AM    GLUCOSE 156 07/01/2024 06:56 AM       Detailed results:        Thank you for allowing us to participate in the care of this patient.Please call with questions.    This note is created with the assistance of a speech recognition program.  While intending to generate adocument that actually reflects the content of the visit, the document can still have some errors including those of syntax and sound a like substitutions which may escape proof reading.  It such instances, actual meaningcan be extrapolated by contextual diversion.    BREANNA Colby - CNP  Office: [phone]  Perfect serve / office [phone]

## 2024-07-01 NOTE — PLAN OF CARE
Problem: Discharge Planning  Goal: Discharge to home or other facility with appropriate resources  7/1/2024 0506 by Shira Long RN  Outcome: Progressing  Flowsheets (Taken 6/30/2024 1318 by Gabby Watts RN)  Discharge to home or other facility with appropriate resources: Identify barriers to discharge with patient and caregiver  6/30/2024 1809 by Gabby Watts RN  Outcome: Progressing  Flowsheets  Taken 6/30/2024 1318  Discharge to home or other facility with appropriate resources: Identify barriers to discharge with patient and caregiver  Taken 6/30/2024 1202  Discharge to home or other facility with appropriate resources: Identify barriers to discharge with patient and caregiver     Problem: Pain  Goal: Verbalizes/displays adequate comfort level or baseline comfort level  7/1/2024 0506 by Shira Long RN  Outcome: Progressing  Flowsheets (Taken 6/30/2024 1308 by Gabby Watts, RN)  Verbalizes/displays adequate comfort level or baseline comfort level: Encourage patient to monitor pain and request assistance  6/30/2024 1809 by Gabby Watts RN  Outcome: Progressing  Flowsheets (Taken 6/30/2024 1308)  Verbalizes/displays adequate comfort level or baseline comfort level: Encourage patient to monitor pain and request assistance     Problem: Safety - Adult  Goal: Free from fall injury  7/1/2024 0506 by Shira Long RN  Outcome: Progressing  Flowsheets (Taken 6/30/2024 1810 by Gabby Watts, RN)  Free From Fall Injury: Instruct family/caregiver on patient safety  6/30/2024 1809 by Gabby Watts RN  Outcome: Progressing     Problem: ABCDS Injury Assessment  Goal: Absence of physical injury  7/1/2024 0506 by Shira Long RN  Outcome: Progressing  Flowsheets (Taken 6/30/2024 1810 by Gabby Watts, RN)  Absence of Physical Injury: Implement safety measures based on patient assessment  6/30/2024 1809 by Gabby Watts RN  Outcome: Progressing     Problem: Neurosensory - Adult  Goal:  Progressing  Flowsheets (Taken 7/1/2024 0506)  Return Mobility to Safest Level of Function:   Assess patient stability and activity tolerance for standing, transferring and ambulating with or without assistive devices   Assist with transfers and ambulation using safe patient handling equipment as needed  6/30/2024 1809 by Gabby Watts RN  Outcome: Progressing     Problem: Gastrointestinal - Adult  Goal: Minimal or absence of nausea and vomiting  7/1/2024 0506 by Shira Long RN  Outcome: Progressing  Flowsheets (Taken 7/1/2024 0506)  Minimal or absence of nausea and vomiting: Administer IV fluids as ordered to ensure adequate hydration  6/30/2024 1809 by Gabby Watts RN  Outcome: Progressing     Problem: Genitourinary - Adult  Goal: Absence of urinary retention  7/1/2024 0506 by Shira Long RN  Outcome: Progressing  Flowsheets (Taken 7/1/2024 0506)  Absence of urinary retention: Assess patient’s ability to void and empty bladder  6/30/2024 1809 by Gabby Watts RN  Outcome: Progressing     Problem: Infection - Adult  Goal: Absence of infection at discharge  7/1/2024 0506 by Shira Long RN  Outcome: Progressing  Flowsheets (Taken 7/1/2024 0506)  Absence of infection at discharge: Assess and monitor for signs and symptoms of infection  6/30/2024 1809 by Gabby Watts RN  Outcome: Progressing     Problem: Metabolic/Fluid and Electrolytes - Adult  Goal: Electrolytes maintained within normal limits  7/1/2024 0506 by Shira Long RN  Outcome: Progressing  Flowsheets (Taken 7/1/2024 0506)  Electrolytes maintained within normal limits: Monitor labs and assess patient for signs and symptoms of electrolyte imbalances  6/30/2024 1809 by Gabby Watts RN  Outcome: Progressing     Problem: Hematologic - Adult  Goal: Maintains hematologic stability  7/1/2024 0506 by Shira Long RN  Outcome: Progressing  Flowsheets (Taken 7/1/2024 0506)  Maintains hematologic stability: Assess for

## 2024-07-01 NOTE — PROGRESS NOTES
Loss of IV access several times today. Numerous attempts made by multiple nurses. IV antibiotics unable to be given on time.

## 2024-07-01 NOTE — PROGRESS NOTES
6/30/2024 Yes    Cat bite 6/30/2024 Yes     Luis A Salinas is a 74 y.o. male with past medical history of T2DM with neuropathy, hypertension, hyperlipidemia, COPD, osteoarthritis, and MDD who presents complaining of left hand, wrist, forearm pain secondary to cat bite admitted for cellulitis requiring IV antibiotics.     Patient states his cat got out of the house on 6/26-27.This prompted the cat to bite the dorsum of his left hand.  He endorsed pain, swelling, and some pulsatile bleeding from one of the puncture wounds on the back of his hand immediately after the bite.  He was able to stop the bleeding by applying pressure to the wound.  He also cleaned the wound with rubbing alcohol, applied a bandage and iced it.  The swelling and pain did improve on the following days, but worsened last night. The redness and swelling was starting to spread up his arm, and there was decreased mobility of the wrist and increased pain with movement. Patient denies any fevers, chills, chest pain, shortness of breath, abdominal pain, nausea, vomiting. He is up to date with his tetanus vaccine. The house cat is up to date with its vaccines as well.     On presentation to the ED, he was afebrile and normotensive.  Examination of his arm shows significant erythema, edema, warmth and mild tenderness extending from the dorsum of the left hand to the distal forearm.  Labs were significant for a white count of 11.9, neutrophils 75, lactic acid elevated at 3.4, repeat 2.3.  Blood glucose elevated at 308.  BUN 24.  Lipase elevated at 133.  Patient was given a dose of Unasyn and Tylenol before being admitted to the family medicine inpatient team for further evaluation and management of left upper extremity cellulitis secondary to cat bite.      Plan:       Cellulitis of hand, left 2/2 cat bite  IV day 2 Unasyn 3 g every 6 hours   Received PO Doxycycline for 2 days 100 mg BID.  Because of increasing WBC from 11.9 to 13.8, ID had switched  his doxycycline to clindamycin 300 mg every 6 hours for 10 days.  Maintenance IV fluids at 100 mL/h  PT/OT on board  Routine CBC, BMP  CRP increased at 50.1  Procalcitonin ordered     Essential hypertension  Continue lisinopril 5 mg daily     Type 2 diabetes with diabetic polyneuropathy and long-term insulin use  Hold home metformin, pioglitazone, NovoLog  Continue home insulin Lantus 35 units twice daily  Medium dose insulin sliding scale  Continue home gabapentin 300 mg twice daily  Continue home Cymbalta 30 mg twice daily     Mixed hyperlipidemia  Continue home atorvastatin 20 mg daily     Osteoarthritis  Continue home Celebrex 200 mg daily     MDD  Continue home Cymbalta 30 mg twice daily     Elevated lipase  Monitor  Held actos    Telemetry: none  Anticoagulation: enoxaparin 40mg SQ  Dispo:  home  Diet: ADULT DIET; Regular; 3 carb choices (45 gm/meal)    Patient was seen, evaluated and discussed with Gabby Cristina DO Yevgeniy Rubin, MD  Family Medicine Resident, PGY-1   7/1/2024  11:12 AM     Senior Resident Attestation:    I have independently seen Luis A Salinas and discussed the assessment and plan with the intern listed above and the attending Gabby Cristina DO. I have reviewed the note and have included any edits or additional information above.     Andrés Velez DO  Family Medicine Resident, PGY-2   7/1/2024  1:30 PM     Attending Physician Statement  I have seen and discussed the care of Luis A Salinas including pertinent history and exam findings, with the resident. I have reviewed the key elements of all parts of the encounter with the resident at the time of the encounter. I agree with the assessment, plan and orders as documented by the resident.  Antibiotics changed to unasyn and clindamycin.  Erythema does appear to be improving.  Discussed plan with the patient and he was in agreement with the plan.  All questions answered.    Gabby Cristina DO, MPH  7/1/2024

## 2024-07-01 NOTE — PLAN OF CARE
Problem: Discharge Planning  Goal: Discharge to home or other facility with appropriate resources  7/1/2024 1800 by Macrina Lira LPN  Outcome: Progressing  Flowsheets (Taken 7/1/2024 0900)  Discharge to home or other facility with appropriate resources:   Identify barriers to discharge with patient and caregiver   Arrange for needed discharge resources and transportation as appropriate  7/1/2024 0506 by Shira Long RN  Outcome: Progressing  Flowsheets (Taken 6/30/2024 1318 by Gabby Watts, RN)  Discharge to home or other facility with appropriate resources: Identify barriers to discharge with patient and caregiver     Problem: Pain  Goal: Verbalizes/displays adequate comfort level or baseline comfort level  7/1/2024 1800 by Macrina Lira LPN  Outcome: Progressing  7/1/2024 0506 by Shira Long RN  Outcome: Progressing  Flowsheets (Taken 6/30/2024 1308 by Gabby Watts, RN)  Verbalizes/displays adequate comfort level or baseline comfort level: Encourage patient to monitor pain and request assistance     Problem: Safety - Adult  Goal: Free from fall injury  7/1/2024 1800 by Macrina Lira LPN  Outcome: Progressing  7/1/2024 0506 by Shira Long RN  Outcome: Progressing  Flowsheets (Taken 6/30/2024 1810 by Gabby Watts, RN)  Free From Fall Injury: Instruct family/caregiver on patient safety     Problem: ABCDS Injury Assessment  Goal: Absence of physical injury  7/1/2024 1800 by Macrina Lira LPN  Outcome: Progressing  7/1/2024 0506 by Shira Long RN  Outcome: Progressing  Flowsheets (Taken 6/30/2024 1810 by Gabby Watts, RN)  Absence of Physical Injury: Implement safety measures based on patient assessment     Problem: Neurosensory - Adult  Goal: Achieves stable or improved neurological status  7/1/2024 1800 by Macrina Lira LPN  Outcome: Progressing  Flowsheets (Taken 7/1/2024 0900)  Achieves stable or improved neurological status: Assess for and report  RN  Outcome: Progressing  Flowsheets (Taken 7/1/2024 0506)  Maintains hematologic stability: Assess for signs and symptoms of bleeding or hemorrhage     Problem: Chronic Conditions and Co-morbidities  Goal: Patient's chronic conditions and co-morbidity symptoms are monitored and maintained or improved  Outcome: Progressing  Flowsheets (Taken 7/1/2024 0900)  Care Plan - Patient's Chronic Conditions and Co-Morbidity Symptoms are Monitored and Maintained or Improved: Monitor and assess patient's chronic conditions and comorbid symptoms for stability, deterioration, or improvement

## 2024-07-02 LAB
ANION GAP SERPL CALCULATED.3IONS-SCNC: 9 MMOL/L (ref 9–17)
BASOPHILS # BLD: 0.1 K/UL (ref 0–0.2)
BASOPHILS NFR BLD: 1 % (ref 0–2)
BUN SERPL-MCNC: 16 MG/DL (ref 8–23)
CALCIUM SERPL-MCNC: 8.9 MG/DL (ref 8.6–10.4)
CHLORIDE SERPL-SCNC: 103 MMOL/L (ref 98–107)
CO2 SERPL-SCNC: 27 MMOL/L (ref 20–31)
CREAT SERPL-MCNC: 0.7 MG/DL (ref 0.7–1.2)
CRP SERPL HS-MCNC: 69.4 MG/L (ref 0–5)
EOSINOPHIL # BLD: 0.1 K/UL (ref 0–0.4)
EOSINOPHILS RELATIVE PERCENT: 1 % (ref 1–4)
ERYTHROCYTE [DISTWIDTH] IN BLOOD BY AUTOMATED COUNT: 14.4 % (ref 12.5–15.4)
GFR, ESTIMATED: >90 ML/MIN/1.73M2
GLUCOSE BLD-MCNC: 111 MG/DL (ref 75–110)
GLUCOSE BLD-MCNC: 116 MG/DL (ref 75–110)
GLUCOSE BLD-MCNC: 194 MG/DL (ref 75–110)
GLUCOSE BLD-MCNC: 242 MG/DL (ref 75–110)
GLUCOSE BLD-MCNC: 278 MG/DL (ref 75–110)
GLUCOSE SERPL-MCNC: 116 MG/DL (ref 70–99)
HCT VFR BLD AUTO: 34 % (ref 41–53)
HGB BLD-MCNC: 11.7 G/DL (ref 13.5–17.5)
LYMPHOCYTES NFR BLD: 1.8 K/UL (ref 1–4.8)
LYMPHOCYTES RELATIVE PERCENT: 16 % (ref 24–44)
MAGNESIUM SERPL-MCNC: 2 MG/DL (ref 1.6–2.6)
MCH RBC QN AUTO: 30.6 PG (ref 26–34)
MCHC RBC AUTO-ENTMCNC: 34.4 G/DL (ref 31–37)
MCV RBC AUTO: 88.8 FL (ref 80–100)
MONOCYTES NFR BLD: 1.4 K/UL (ref 0.1–1.2)
MONOCYTES NFR BLD: 13 % (ref 2–11)
NEUTROPHILS NFR BLD: 69 % (ref 36–66)
NEUTS SEG NFR BLD: 7.6 K/UL (ref 1.8–7.7)
PLATELET # BLD AUTO: 164 K/UL (ref 140–450)
PMV BLD AUTO: 8.5 FL (ref 6–12)
POTASSIUM SERPL-SCNC: 3.5 MMOL/L (ref 3.7–5.3)
PROCALCITONIN SERPL-MCNC: 0.08 NG/ML (ref 0–0.09)
RBC # BLD AUTO: 3.83 M/UL (ref 4.5–5.9)
SODIUM SERPL-SCNC: 139 MMOL/L (ref 135–144)
WBC OTHER # BLD: 10.9 K/UL (ref 3.5–11)

## 2024-07-02 PROCEDURE — 99232 SBSQ HOSP IP/OBS MODERATE 35: CPT | Performed by: FAMILY MEDICINE

## 2024-07-02 PROCEDURE — 6370000000 HC RX 637 (ALT 250 FOR IP)

## 2024-07-02 PROCEDURE — 1200000000 HC SEMI PRIVATE

## 2024-07-02 PROCEDURE — 6360000002 HC RX W HCPCS: Performed by: NURSE PRACTITIONER

## 2024-07-02 PROCEDURE — 6360000002 HC RX W HCPCS

## 2024-07-02 PROCEDURE — 83735 ASSAY OF MAGNESIUM: CPT

## 2024-07-02 PROCEDURE — 2580000003 HC RX 258

## 2024-07-02 PROCEDURE — 99232 SBSQ HOSP IP/OBS MODERATE 35: CPT | Performed by: NURSE PRACTITIONER

## 2024-07-02 PROCEDURE — 2580000003 HC RX 258: Performed by: NURSE PRACTITIONER

## 2024-07-02 PROCEDURE — 84145 PROCALCITONIN (PCT): CPT

## 2024-07-02 PROCEDURE — 82947 ASSAY GLUCOSE BLOOD QUANT: CPT

## 2024-07-02 PROCEDURE — 85025 COMPLETE CBC W/AUTO DIFF WBC: CPT

## 2024-07-02 PROCEDURE — 36415 COLL VENOUS BLD VENIPUNCTURE: CPT

## 2024-07-02 PROCEDURE — 86140 C-REACTIVE PROTEIN: CPT

## 2024-07-02 PROCEDURE — 80048 BASIC METABOLIC PNL TOTAL CA: CPT

## 2024-07-02 RX ORDER — AMOXICILLIN AND CLAVULANATE POTASSIUM 875; 125 MG/1; MG/1
1 TABLET, FILM COATED ORAL 2 TIMES DAILY
Qty: 14 TABLET | Refills: 0 | Status: SHIPPED | OUTPATIENT
Start: 2024-07-03 | End: 2024-07-10

## 2024-07-02 RX ORDER — CLINDAMYCIN HYDROCHLORIDE 300 MG/1
300 CAPSULE ORAL 4 TIMES DAILY
Qty: 28 CAPSULE | Refills: 0 | Status: SHIPPED | OUTPATIENT
Start: 2024-07-03 | End: 2024-07-10

## 2024-07-02 RX ADMIN — DULOXETINE HYDROCHLORIDE 30 MG: 30 CAPSULE, DELAYED RELEASE ORAL at 10:16

## 2024-07-02 RX ADMIN — GABAPENTIN 300 MG: 300 CAPSULE ORAL at 21:07

## 2024-07-02 RX ADMIN — DULOXETINE HYDROCHLORIDE 30 MG: 30 CAPSULE, DELAYED RELEASE ORAL at 21:07

## 2024-07-02 RX ADMIN — CLINDAMYCIN PHOSPHATE 600 MG: 600 INJECTION, SOLUTION INTRAVENOUS at 14:57

## 2024-07-02 RX ADMIN — CLINDAMYCIN PHOSPHATE 600 MG: 600 INJECTION, SOLUTION INTRAVENOUS at 23:09

## 2024-07-02 RX ADMIN — HYDROCODONE BITARTRATE AND ACETAMINOPHEN 1 TABLET: 5; 325 TABLET ORAL at 21:10

## 2024-07-02 RX ADMIN — ENOXAPARIN SODIUM 40 MG: 100 INJECTION SUBCUTANEOUS at 10:16

## 2024-07-02 RX ADMIN — INSULIN LISPRO 2 UNITS: 100 INJECTION, SOLUTION INTRAVENOUS; SUBCUTANEOUS at 13:14

## 2024-07-02 RX ADMIN — PIPERACILLIN AND TAZOBACTAM 3375 MG: 3; .375 INJECTION, POWDER, LYOPHILIZED, FOR SOLUTION INTRAVENOUS at 08:01

## 2024-07-02 RX ADMIN — CHLORTHALIDONE 25 MG: 25 TABLET ORAL at 10:16

## 2024-07-02 RX ADMIN — CELECOXIB 200 MG: 100 CAPSULE ORAL at 10:16

## 2024-07-02 RX ADMIN — INSULIN GLARGINE 35 UNITS: 100 INJECTION, SOLUTION SUBCUTANEOUS at 10:20

## 2024-07-02 RX ADMIN — GABAPENTIN 300 MG: 300 CAPSULE ORAL at 10:16

## 2024-07-02 RX ADMIN — SODIUM CHLORIDE, PRESERVATIVE FREE 10 ML: 5 INJECTION INTRAVENOUS at 21:07

## 2024-07-02 RX ADMIN — THERA TABS 1 TABLET: TAB at 10:16

## 2024-07-02 RX ADMIN — CLINDAMYCIN PHOSPHATE 600 MG: 600 INJECTION, SOLUTION INTRAVENOUS at 06:47

## 2024-07-02 RX ADMIN — ATORVASTATIN CALCIUM 20 MG: 10 TABLET, FILM COATED ORAL at 10:16

## 2024-07-02 RX ADMIN — INSULIN GLARGINE 35 UNITS: 100 INJECTION, SOLUTION SUBCUTANEOUS at 21:07

## 2024-07-02 RX ADMIN — LISINOPRIL 5 MG: 5 TABLET ORAL at 10:16

## 2024-07-02 RX ADMIN — PIPERACILLIN AND TAZOBACTAM 3375 MG: 3; .375 INJECTION, POWDER, LYOPHILIZED, FOR SOLUTION INTRAVENOUS at 16:30

## 2024-07-02 ASSESSMENT — PAIN DESCRIPTION - ORIENTATION
ORIENTATION: LEFT
ORIENTATION: LEFT

## 2024-07-02 ASSESSMENT — ENCOUNTER SYMPTOMS: COLOR CHANGE: 1

## 2024-07-02 ASSESSMENT — PAIN SCALES - GENERAL
PAINLEVEL_OUTOF10: 3
PAINLEVEL_OUTOF10: 3
PAINLEVEL_OUTOF10: 6

## 2024-07-02 ASSESSMENT — PAIN DESCRIPTION - LOCATION
LOCATION: ARM;HAND
LOCATION: ARM;HAND;WRIST

## 2024-07-02 ASSESSMENT — PAIN DESCRIPTION - DESCRIPTORS: DESCRIPTORS: ACHING;DISCOMFORT;BURNING

## 2024-07-02 ASSESSMENT — PAIN - FUNCTIONAL ASSESSMENT: PAIN_FUNCTIONAL_ASSESSMENT: ACTIVITIES ARE NOT PREVENTED

## 2024-07-02 NOTE — PROGRESS NOTES
Midline placement note:   Dynamic Access RN    Prescribed IV Therapy = Poor Vascular access, multiple failed PIVs.  Peripheral ultrasound assessment done. Plan for right brachial vein insertion.   CVR measurement = 10 % (Linear CVR is preferred to be less than 45%).  Product type: Bard 4 fr midline  History/Labs/Allergies Reviewed  Placed By: HOMERO Tena - RN (Dynamic Access)  Time out Performed using Two Identifiers  Lot # [id number]  Expiration date = 05-  Trimmed at 13 cm  External catheter length 0 cm  Number of attempts 1  Special equipment used - Ultrasound and an MST insertion technique  Catheter securement = 3M securement device  Dressing applied= Tegaderm CHG  Lidocaine administered intradermally conc.1%, approx 1ml. (Lot# [id number] and Exp date= 08/30/2026)  Device should have blood return, if no blood return assess for infiltration and/or call VAT for consult.  RN aware midline placed and is immediately released for use. No cxr required due to placement of a midline.     Midline education:   [ X ] Post care line insertion was discussed with patient/Family or POA prior to procedure.  Risks, benefits, alternatives, and reason for procedure were discussed and teaching was reinforced. An educational handout on post insertion line care and maintenance was left at bedside with patient or in chart. Patient (Family or POA) acknowledged understanding of information relayed.

## 2024-07-02 NOTE — PLAN OF CARE
status   Assess for changes in mentation and behavior   Position to facilitate oxygenation and minimize respiratory effort   Oxygen supplementation based on oxygen saturation or arterial blood gases   Respiratory therapy support as indicated  7/1/2024 1800 by Macrina Lira LPN  Outcome: Progressing  Flowsheets (Taken 7/1/2024 0900)  Achieves optimal ventilation and oxygenation:   Assess for changes in respiratory status   Assess for changes in mentation and behavior   Position to facilitate oxygenation and minimize respiratory effort     Problem: Cardiovascular - Adult  Goal: Maintains optimal cardiac output and hemodynamic stability  7/2/2024 0536 by Bill Martel RN  Outcome: Progressing  Flowsheets (Taken 7/1/2024 2000)  Maintains optimal cardiac output and hemodynamic stability: Monitor blood pressure and heart rate  7/1/2024 1800 by Macrina Lira LPN  Outcome: Progressing  Flowsheets (Taken 7/1/2024 0900)  Maintains optimal cardiac output and hemodynamic stability: Monitor blood pressure and heart rate     Problem: Skin/Tissue Integrity - Adult  Goal: Skin integrity remains intact  7/2/2024 0536 by Bill Martel RN  Outcome: Progressing  Flowsheets  Taken 7/2/2024 0535  Skin Integrity Remains Intact:   Monitor for areas of redness and/or skin breakdown   Assess vascular access sites hourly  Taken 7/1/2024 2000  Skin Integrity Remains Intact:   Monitor for areas of redness and/or skin breakdown   Assess vascular access sites hourly  7/1/2024 1800 by Macrina Lira LPN  Outcome: Progressing  Flowsheets  Taken 7/1/2024 1744  Skin Integrity Remains Intact:   Assess vascular access sites hourly   Monitor for areas of redness and/or skin breakdown  Taken 7/1/2024 0900  Skin Integrity Remains Intact: Monitor for areas of redness and/or skin breakdown  Goal: Incisions, wounds, or drain sites healing without S/S of infection  7/2/2024 0536 by Bill Martel, RN  Outcome: Progressing  Flowsheets  Taken  symptoms of bleeding or hemorrhage     Problem: Chronic Conditions and Co-morbidities  Goal: Patient's chronic conditions and co-morbidity symptoms are monitored and maintained or improved  7/2/2024 0536 by Bill Martel, RN  Outcome: Progressing  Flowsheets (Taken 7/1/2024 2000)  Care Plan - Patient's Chronic Conditions and Co-Morbidity Symptoms are Monitored and Maintained or Improved: Monitor and assess patient's chronic conditions and comorbid symptoms for stability, deterioration, or improvement  7/1/2024 1800 by Macrina Lira LPN  Outcome: Progressing  Flowsheets (Taken 7/1/2024 0900)  Care Plan - Patient's Chronic Conditions and Co-Morbidity Symptoms are Monitored and Maintained or Improved: Monitor and assess patient's chronic conditions and comorbid symptoms for stability, deterioration, or improvement

## 2024-07-02 NOTE — PROGRESS NOTES
[facility]  Inpatient Service     Progress Note  7/2/2024    11:36 AM    Name:   Luis A Salinas  MRN:     [id number]     Acct:      [id number]   Room:   [facility]/[address] [place] [address] Day:  2  Admit Date:  6/30/2024  9:47 AM    PCP:   Lauren Briceño APRN - CNP  Code Status:  Full Code    Subjective:     Overnight events: none    Pt was examined at bedside this morning where he is resting comfortably no acute distress in his bed.  He is afebrile with stable vitals saturating 98% on room air.  The swelling in his hand is well as his  strength remains the same.  There is no spread of erythema past the marked line.  Because of his darker complexion of the skin that looks similar to erythema we asked him to carefully look to see it during the day if there was any spread.  The supplies specifically to be area just above his distal arm on the medial surface of his forearm.  He had discharge from his puncture sites yesterday which was initially milky white but then had turned yellow clear this morning.  He covered this with a gauze pad.  He had slightly increased stools which are normal for him.  Continues to have slight left-sided shoulder pain but this is normal for his left reverse shoulder procedure.  His left eye was slightly more close today than his right eye.  I asked him about this but he said this was his normal baseline as he had history of 2 macular scrapes and cataract surgery.    He denies current: Fever, lightheadedness, dizziness, presyncope, chest pain, trouble breathing, increased pain in the shoulder or increased redness that is noticed.  He is also not had any changes in his neurological exam.    Patient is doing well otherwise without any further complaints or concerns.    Medications:     Allergies:  No Known Allergies    Current Meds:   Scheduled Meds:    piperacillin-tazobactam  3,375 mg IntraVENous Q8H    clindamycin  600 mg IntraVENous Q8H     alert and oriented to person, place, and time. Mental status is at baseline.      Comments: Ambulates with a cane/holding IV pole  Psychiatric:         Mood and Affect: Mood normal.         Behavior: Behavior normal.          Assessment:     Hospital Problems             Last Modified POA    * (Principal) Cellulitis of hand, left 7/1/2024 Yes    Essential hypertension (Chronic) 6/30/2024 Yes    Osteoarthritis 6/30/2024 Yes    Type 2 diabetes mellitus with diabetic polyneuropathy, with long-term current use of insulin (HCC) 6/30/2024 Yes    Moderate episode of recurrent major depressive disorder (HCC) 6/30/2024 Yes    Mixed hyperlipidemia 6/30/2024 Yes    Former smoker 6/30/2024 Yes    Elevated lipase 6/30/2024 Yes    Cat bite 6/30/2024 Yes    Leukocytosis 7/1/2024 Yes    Elevated C-reactive protein (CRP) 7/1/2024 Yes   Luis A Salinas is a 74 y.o. male with past medical history of T2DM with neuropathy, hypertension, hyperlipidemia, COPD, osteoarthritis, and MDD who presents complaining of left hand, wrist, forearm pain secondary to cat bite admitted for cellulitis requiring IV antibiotics.     Patient states his cat got out of the house on 6/26-27.This prompted the cat to bite the dorsum of his left hand.  He endorsed pain, swelling, and some pulsatile bleeding from one of the puncture wounds on the back of his hand immediately after the bite.  He was able to stop the bleeding by applying pressure to the wound.  He also cleaned the wound with rubbing alcohol, applied a bandage and iced it.  The swelling and pain did improve on the following days, but worsened last night. The redness and swelling was starting to spread up his arm, and there was decreased mobility of the wrist and increased pain with movement. Patient denies any fevers, chills, chest pain, shortness of breath, abdominal pain, nausea, vomiting. He is up to date with his tetanus vaccine. The house cat is up to date with its vaccines as well.     On

## 2024-07-02 NOTE — PROGRESS NOTES
[facility] -   Infectious diseases evaluation  admission date 6/30/2024    reason for consultation:   Worsening Cellulitis / Cat bite    Impression :   Current:  Cat bite to the left hand with associated cellulitis  Leukocytosis  Elevated CRP  DM II  with associated neuropathy    Other:    Discussion / summary of stay / plan of care/ Recommendations:     HENCE:   Escalate antibiotics to Zosyn 3.375 gm IV every 8 hours and Clindamycin 600 mg IV every 8 hours for another day.  Augmentin 875/125 mg po twice daily, Clindamycin 300 mg po 4 x daily 7/3-7/10/24.  Reconciled.  Follow CBC and renal function closely.  Follow-up with Dr. Gallego in 2-weeks.  Follow blood cx.  Supportive care.  Discussed with the patient, RN.    Infection Control Recommendations   [place] Precautions    Antimicrobial Stewardship Recommendations   Simplification of therapy  Targeted therapy      History of Present Illness:   Initial history:  Luis A Salinas is a 74 y.o.-year-old male with history of DM II with neuropathy who presented to the ED after being bit to the dorsum of his left hand by his house cat on Wednesday evening Thursday morning.  Resulted in 2 puncture wounds which produced pain, swelling and pilsatile bleeding.  He was able to stop bu the bleeding by applying pressure and proceeded to clean the wound with rubbing alcohol, applied a band-aid and ice.  The pain and swelling improved for about 1 day then worsened.  Patient reports the redness and swelling started to spread up his arm with associated decreased ROM of his wrist and increased pain with movement.  No associated fever, chills, shortness of breath, chest pain, abdominal pain, n/v/d.  He is UTD on tetanus.  Initial labs: Cre: 1.0, Lactic Acid: 2.3, CRP: 50.1, WBC: 11.9                      Interval changes  7/2/2024   Left wrist and dorsum of left hand less red, swollen.  Would benefit from 1 more day of IV  of syntax and sound a like substitutions which may escape proof reading.  It such instances, actual meaningcan be extrapolated by contextual diversion.    Marisol Watson, BREANNA - CNP  Office: [phone]  Perfect serve / office [phone]

## 2024-07-02 NOTE — PLAN OF CARE
Problem: Discharge Planning  Goal: Discharge to home or other facility with appropriate resources  7/2/2024 1112 by Shruti Beavers RN  Outcome: Progressing  7/2/2024 0536 by Bill Martel RN  Outcome: Progressing  Flowsheets (Taken 7/1/2024 2000)  Discharge to home or other facility with appropriate resources: Identify barriers to discharge with patient and caregiver     Problem: Pain  Goal: Verbalizes/displays adequate comfort level or baseline comfort level  7/2/2024 1112 by Shruti Beavers RN  Outcome: Progressing  7/2/2024 0536 by Bill Martel RN  Outcome: Progressing     Problem: Safety - Adult  Goal: Free from fall injury  7/2/2024 1112 by Shruti Beavers RN  Outcome: Progressing  7/2/2024 0536 by Bill Martel RN  Outcome: Progressing     Problem: ABCDS Injury Assessment  Goal: Absence of physical injury  7/2/2024 1112 by Shruti Beavers RN  Outcome: Progressing  7/2/2024 0536 by Bill Martel RN  Outcome: Progressing     Problem: Neurosensory - Adult  Goal: Achieves stable or improved neurological status  7/2/2024 1112 by Shruti Beavers RN  Outcome: Progressing  7/2/2024 0536 by Bill Martel RN  Outcome: Progressing  Flowsheets (Taken 7/1/2024 2000)  Achieves stable or improved neurological status: Assess for and report changes in neurological status     Problem: Respiratory - Adult  Goal: Achieves optimal ventilation and oxygenation  7/2/2024 1112 by Shruti eBavers RN  Outcome: Progressing  7/2/2024 0536 by Bill Martel RN  Outcome: Progressing  Flowsheets (Taken 7/1/2024 2000)  Achieves optimal ventilation and oxygenation:   Assess for changes in respiratory status   Assess for changes in mentation and behavior   Position to facilitate oxygenation and minimize respiratory effort   Oxygen supplementation based on oxygen saturation or arterial blood gases   Respiratory therapy support as indicated     Problem: Cardiovascular - Adult  Goal: Maintains optimal cardiac output and hemodynamic

## 2024-07-03 PROBLEM — R19.7 DIARRHEA: Status: ACTIVE | Noted: 2024-07-03

## 2024-07-03 LAB
ANION GAP SERPL CALCULATED.3IONS-SCNC: 12 MMOL/L (ref 9–17)
BASOPHILS # BLD: 0.1 K/UL (ref 0–0.2)
BASOPHILS NFR BLD: 1 % (ref 0–2)
BUN SERPL-MCNC: 20 MG/DL (ref 8–23)
CALCIUM SERPL-MCNC: 9.2 MG/DL (ref 8.6–10.4)
CHLORIDE SERPL-SCNC: 102 MMOL/L (ref 98–107)
CO2 SERPL-SCNC: 25 MMOL/L (ref 20–31)
CREAT SERPL-MCNC: 0.9 MG/DL (ref 0.7–1.2)
CRP SERPL HS-MCNC: 53.1 MG/L (ref 0–5)
EOSINOPHIL # BLD: 0.2 K/UL (ref 0–0.4)
EOSINOPHILS RELATIVE PERCENT: 1 % (ref 1–4)
ERYTHROCYTE [DISTWIDTH] IN BLOOD BY AUTOMATED COUNT: 14.4 % (ref 12.5–15.4)
GFR, ESTIMATED: 90 ML/MIN/1.73M2
GLUCOSE BLD-MCNC: 113 MG/DL (ref 75–110)
GLUCOSE BLD-MCNC: 181 MG/DL (ref 75–110)
GLUCOSE BLD-MCNC: 186 MG/DL (ref 75–110)
GLUCOSE BLD-MCNC: 197 MG/DL (ref 75–110)
GLUCOSE SERPL-MCNC: 118 MG/DL (ref 70–99)
HCT VFR BLD AUTO: 36 % (ref 41–53)
HGB BLD-MCNC: 12.7 G/DL (ref 13.5–17.5)
LYMPHOCYTES NFR BLD: 2 K/UL (ref 1–4.8)
LYMPHOCYTES RELATIVE PERCENT: 17 % (ref 24–44)
MCH RBC QN AUTO: 31.2 PG (ref 26–34)
MCHC RBC AUTO-ENTMCNC: 35.2 G/DL (ref 31–37)
MCV RBC AUTO: 88.5 FL (ref 80–100)
MONOCYTES NFR BLD: 1.4 K/UL (ref 0.1–1.2)
MONOCYTES NFR BLD: 12 % (ref 2–11)
NEUTROPHILS NFR BLD: 69 % (ref 36–66)
NEUTS SEG NFR BLD: 8.2 K/UL (ref 1.8–7.7)
PLATELET # BLD AUTO: 204 K/UL (ref 140–450)
PMV BLD AUTO: 8.6 FL (ref 6–12)
POTASSIUM SERPL-SCNC: 3.6 MMOL/L (ref 3.7–5.3)
RBC # BLD AUTO: 4.07 M/UL (ref 4.5–5.9)
SODIUM SERPL-SCNC: 139 MMOL/L (ref 135–144)
WBC OTHER # BLD: 11.8 K/UL (ref 3.5–11)

## 2024-07-03 PROCEDURE — 99232 SBSQ HOSP IP/OBS MODERATE 35: CPT | Performed by: NURSE PRACTITIONER

## 2024-07-03 PROCEDURE — 82947 ASSAY GLUCOSE BLOOD QUANT: CPT

## 2024-07-03 PROCEDURE — 6370000000 HC RX 637 (ALT 250 FOR IP)

## 2024-07-03 PROCEDURE — 2580000003 HC RX 258: Performed by: NURSE PRACTITIONER

## 2024-07-03 PROCEDURE — 80048 BASIC METABOLIC PNL TOTAL CA: CPT

## 2024-07-03 PROCEDURE — 36415 COLL VENOUS BLD VENIPUNCTURE: CPT

## 2024-07-03 PROCEDURE — 85025 COMPLETE CBC W/AUTO DIFF WBC: CPT

## 2024-07-03 PROCEDURE — 6360000002 HC RX W HCPCS

## 2024-07-03 PROCEDURE — 2580000003 HC RX 258

## 2024-07-03 PROCEDURE — 6370000000 HC RX 637 (ALT 250 FOR IP): Performed by: NURSE PRACTITIONER

## 2024-07-03 PROCEDURE — 86140 C-REACTIVE PROTEIN: CPT

## 2024-07-03 PROCEDURE — 6360000002 HC RX W HCPCS: Performed by: NURSE PRACTITIONER

## 2024-07-03 PROCEDURE — 1200000000 HC SEMI PRIVATE

## 2024-07-03 RX ORDER — LACTOBACILLUS RHAMNOSUS GG 10B CELL
1 CAPSULE ORAL 2 TIMES DAILY
Status: DISCONTINUED | OUTPATIENT
Start: 2024-07-03 | End: 2024-07-05 | Stop reason: HOSPADM

## 2024-07-03 RX ADMIN — CLINDAMYCIN PHOSPHATE 600 MG: 600 INJECTION, SOLUTION INTRAVENOUS at 23:12

## 2024-07-03 RX ADMIN — ENOXAPARIN SODIUM 40 MG: 100 INJECTION SUBCUTANEOUS at 07:56

## 2024-07-03 RX ADMIN — THERA TABS 1 TABLET: TAB at 07:56

## 2024-07-03 RX ADMIN — CLINDAMYCIN PHOSPHATE 600 MG: 600 INJECTION, SOLUTION INTRAVENOUS at 06:32

## 2024-07-03 RX ADMIN — INSULIN GLARGINE 35 UNITS: 100 INJECTION, SOLUTION SUBCUTANEOUS at 08:12

## 2024-07-03 RX ADMIN — Medication 1 CAPSULE: at 21:01

## 2024-07-03 RX ADMIN — SODIUM CHLORIDE, PRESERVATIVE FREE 10 ML: 5 INJECTION INTRAVENOUS at 21:01

## 2024-07-03 RX ADMIN — HYDROCODONE BITARTRATE AND ACETAMINOPHEN 2 TABLET: 5; 325 TABLET ORAL at 06:35

## 2024-07-03 RX ADMIN — DULOXETINE HYDROCHLORIDE 30 MG: 30 CAPSULE, DELAYED RELEASE ORAL at 21:01

## 2024-07-03 RX ADMIN — GABAPENTIN 300 MG: 300 CAPSULE ORAL at 07:55

## 2024-07-03 RX ADMIN — INSULIN GLARGINE 35 UNITS: 100 INJECTION, SOLUTION SUBCUTANEOUS at 21:01

## 2024-07-03 RX ADMIN — PIPERACILLIN AND TAZOBACTAM 3375 MG: 3; .375 INJECTION, POWDER, LYOPHILIZED, FOR SOLUTION INTRAVENOUS at 07:51

## 2024-07-03 RX ADMIN — CLINDAMYCIN PHOSPHATE 600 MG: 600 INJECTION, SOLUTION INTRAVENOUS at 14:45

## 2024-07-03 RX ADMIN — HYDROCODONE BITARTRATE AND ACETAMINOPHEN 2 TABLET: 5; 325 TABLET ORAL at 18:47

## 2024-07-03 RX ADMIN — CELECOXIB 200 MG: 100 CAPSULE ORAL at 07:55

## 2024-07-03 RX ADMIN — LISINOPRIL 5 MG: 5 TABLET ORAL at 07:56

## 2024-07-03 RX ADMIN — PIPERACILLIN AND TAZOBACTAM 3375 MG: 3; .375 INJECTION, POWDER, LYOPHILIZED, FOR SOLUTION INTRAVENOUS at 15:33

## 2024-07-03 RX ADMIN — CHLORTHALIDONE 25 MG: 25 TABLET ORAL at 07:55

## 2024-07-03 RX ADMIN — DULOXETINE HYDROCHLORIDE 30 MG: 30 CAPSULE, DELAYED RELEASE ORAL at 07:55

## 2024-07-03 RX ADMIN — PIPERACILLIN AND TAZOBACTAM 3375 MG: 3; .375 INJECTION, POWDER, LYOPHILIZED, FOR SOLUTION INTRAVENOUS at 00:26

## 2024-07-03 RX ADMIN — ATORVASTATIN CALCIUM 20 MG: 10 TABLET, FILM COATED ORAL at 07:56

## 2024-07-03 RX ADMIN — GABAPENTIN 300 MG: 300 CAPSULE ORAL at 21:01

## 2024-07-03 ASSESSMENT — PAIN DESCRIPTION - DESCRIPTORS
DESCRIPTORS: ACHING

## 2024-07-03 ASSESSMENT — PAIN SCALES - GENERAL
PAINLEVEL_OUTOF10: 5
PAINLEVEL_OUTOF10: 4
PAINLEVEL_OUTOF10: 5
PAINLEVEL_OUTOF10: 8
PAINLEVEL_OUTOF10: 2
PAINLEVEL_OUTOF10: 4
PAINLEVEL_OUTOF10: 5
PAINLEVEL_OUTOF10: 7

## 2024-07-03 ASSESSMENT — PAIN SCALES - WONG BAKER
WONGBAKER_NUMERICALRESPONSE: NO HURT

## 2024-07-03 ASSESSMENT — PAIN DESCRIPTION - LOCATION
LOCATION: HAND
LOCATION: HAND
LOCATION: ARM

## 2024-07-03 ASSESSMENT — ENCOUNTER SYMPTOMS
DIARRHEA: 1
COLOR CHANGE: 1

## 2024-07-03 ASSESSMENT — PAIN DESCRIPTION - ORIENTATION
ORIENTATION: LEFT

## 2024-07-03 ASSESSMENT — PAIN DESCRIPTION - PAIN TYPE: TYPE: ACUTE PAIN

## 2024-07-03 NOTE — CARE COORDINATION
Case Management Assessment  Initial Evaluation    Date/Time of Evaluation: 7/3/2024 5:08 PM  Assessment Completed by: Jessica Ramos RN    If patient is discharged prior to next notation, then this note serves as note for discharge by case management.    Patient Name: Luis A Salinas                   YOB: 1950  Diagnosis: Cellulitis of left upper extremity [L03.114]  Cat bite, initial encounter [W55.01XA]  Cellulitis of left upper arm [L03.114]                   Date / Time: 6/30/2024  9:47 AM    Patient Admission Status: Inpatient   Readmission Risk (Low < 19, Mod (19-27), High > 27): Readmission Risk Score: 9.3    Current PCP: Lauren Briceño APRN - CNP  PCP verified by CM? Yes    Chart Reviewed: Yes      History Provided by: Patient  Patient Orientation: Alert and Oriented, Person, Place, Situation    Patient Cognition: Alert    Hospitalization in the last 30 days (Readmission):  No    If yes, Readmission Assessment in CM Navigator will be completed.    Advance Directives:      Code Status: Full Code   Patient's Primary Decision Maker is: Legal Next of Kin      Discharge Planning:    Patient lives with: Spouse/Significant Other Type of Home: House  Primary Care Giver: Self  Patient Support Systems include: Spouse/Significant Other, Family Members   Current Financial resources:    Current community resources: None  Current services prior to admission: Durable Medical Equipment            Current DME: Walker, Cane, Glucometer, Shower Chair (Grab bars in shower)            Type of Home Care services:  None    ADLS  Prior functional level: Independent in ADLs/IADLs  Current functional level: Independent in ADLs/IADLs    PT AM-PAC:   /24  OT AM-PAC:   /24    Family can provide assistance at DC: Yes  Would you like Case Management to discuss the discharge plan with any other family members/significant others, and if so, who? No  Plans to Return to Present Housing: Yes  Other Identified  with basic dialogue that supports the patient's individualized plan of care/goals and shares the quality data associated with the providers was provided to:     Patient Representative Name:       The Patient and/or Patient Representative Agree with the Discharge Plan? Y       Jessica Ramos RN  Case Management Department  Ph:  Fax:

## 2024-07-03 NOTE — PLAN OF CARE
Problem: Discharge Planning  Goal: Discharge to home or other facility with appropriate resources  Outcome: Progressing  Flowsheets (Taken 7/2/2024 2000)  Discharge to home or other facility with appropriate resources: Identify barriers to discharge with patient and caregiver     Problem: Pain  Goal: Verbalizes/displays adequate comfort level or baseline comfort level  Outcome: Progressing     Problem: Safety - Adult  Goal: Free from fall injury  Outcome: Progressing     Problem: ABCDS Injury Assessment  Goal: Absence of physical injury  Outcome: Progressing     Problem: Neurosensory - Adult  Goal: Achieves stable or improved neurological status  Outcome: Progressing     Problem: Respiratory - Adult  Goal: Achieves optimal ventilation and oxygenation  Outcome: Progressing  Flowsheets (Taken 7/2/2024 2000)  Achieves optimal ventilation and oxygenation:   Assess for changes in respiratory status   Assess for changes in mentation and behavior   Position to facilitate oxygenation and minimize respiratory effort   Oxygen supplementation based on oxygen saturation or arterial blood gases   Encourage broncho-pulmonary hygiene including cough, deep breathe, incentive spirometry   Respiratory therapy support as indicated     Problem: Cardiovascular - Adult  Goal: Maintains optimal cardiac output and hemodynamic stability  Outcome: Progressing  Flowsheets (Taken 7/2/2024 2000)  Maintains optimal cardiac output and hemodynamic stability: Monitor blood pressure and heart rate     Problem: Skin/Tissue Integrity - Adult  Goal: Skin integrity remains intact  Outcome: Progressing  Flowsheets  Taken 7/3/2024 0559  Skin Integrity Remains Intact:   Monitor for areas of redness and/or skin breakdown   Assess vascular access sites hourly  Taken 7/2/2024 2000  Skin Integrity Remains Intact:   Monitor for areas of redness and/or skin breakdown   Assess vascular access sites hourly  Goal: Incisions, wounds, or drain sites healing without

## 2024-07-03 NOTE — PROGRESS NOTES
[facility] Residency  Inpatient Service     Progress Note  7/3/2024    7:00 PM    Name:   Luis A Salinas  MRN:     [id number]     Acct:      [id number]   Room:   [address] [place] [address] Day:  3  Admit Date:  6/30/2024  9:47 AM    PCP:   Lauren Briceño APRN - CNP  Code Status:  Full Code    Subjective:     Overnight events: none    Since 7 PM yesterday patient noticed redness in his arm moving further than the line of demarcation.  The new demarcation line was marked.  His pain had slightly increased from 5/10 yesterday to 7/10 today and his stiffness had increased.  His hands still remains puffy and is still producing yellow clear discharge from puncture sites.    He denied: Fever, chills, nausea, vomiting, malaise, increased shoulder pain, headache, dizziness or small red lines tracking up his arm.      Medications:     Allergies:  No Known Allergies    Current Meds:   Scheduled Meds:    lactobacillus  1 capsule Oral BID    piperacillin-tazobactam  3,375 mg IntraVENous Q8H    clindamycin  600 mg IntraVENous Q8H    sodium chloride flush  5-40 mL IntraVENous 2 times per day    sodium chloride flush  5-40 mL IntraVENous 2 times per day    enoxaparin  40 mg SubCUTAneous Daily    atorvastatin  20 mg Oral Daily    chlorthalidone  25 mg Oral Daily    celecoxib  200 mg Oral Daily    DULoxetine  30 mg Oral BID    gabapentin  300 mg Oral BID    insulin glargine  35 Units SubCUTAneous BID    multivitamin  1 tablet Oral Daily    lisinopril  5 mg Oral Daily    insulin lispro  0-8 Units SubCUTAneous TID WC    insulin lispro  0-4 Units SubCUTAneous Nightly     Continuous Infusions:    sodium chloride      sodium chloride      dextrose       PRN Meds: sodium chloride flush, sodium chloride, sodium chloride flush, sodium chloride, potassium chloride **OR** potassium alternative oral replacement **OR** potassium chloride, magnesium sulfate, ondansetron **OR** ondansetron, polyethylene  Behavior normal.          Assessment:     Hospital Problems             Last Modified POA    * (Principal) Cellulitis of left upper extremity 7/3/2024 Yes    Essential hypertension (Chronic) 6/30/2024 Yes    Osteoarthritis 6/30/2024 Yes    Type 2 diabetes mellitus with diabetic polyneuropathy, with long-term current use of insulin (HCC) 6/30/2024 Yes    Moderate episode of recurrent major depressive disorder (HCC) 6/30/2024 Yes    Mixed hyperlipidemia 6/30/2024 Yes    Former smoker 6/30/2024 Yes    Elevated lipase 6/30/2024 Yes    Cat bite 6/30/2024 Yes    Leukocytosis 7/1/2024 Yes    Elevated C-reactive protein (CRP) 7/1/2024 Yes    Diarrhea 7/3/2024 Yes   Gene Gigi Salinas is a 74 y.o. male with past medical history of T2DM with neuropathy, hypertension, hyperlipidemia, COPD, osteoarthritis, and MDD who presents complaining of left hand, wrist, forearm pain secondary to cat bite admitted for cellulitis requiring IV antibiotics.     Patient states his cat got out of the house on 6/26-27.This prompted the cat to bite the dorsum of his left hand.  He endorsed pain, swelling, and some pulsatile bleeding from one of the puncture wounds on the back of his hand immediately after the bite.  He was able to stop the bleeding by applying pressure to the wound.  He also cleaned the wound with rubbing alcohol, applied a bandage and iced it.  The swelling and pain did improve on the following days, but worsened last night. The redness and swelling was starting to spread up his arm, and there was decreased mobility of the wrist and increased pain with movement. Patient denies any fevers, chills, chest pain, shortness of breath, abdominal pain, nausea, vomiting. He is up to date with his tetanus vaccine. The house cat is up to date with its vaccines as well.     On presentation to the ED, he was afebrile and normotensive.  Examination of his arm shows significant erythema, edema, warmth and mild tenderness extending from the

## 2024-07-03 NOTE — PROGRESS NOTES
[facility] -   Infectious diseases evaluation  admission date 6/30/2024    reason for consultation:   Worsening Cellulitis / Cat bite    Impression :   Current:  Cat bite to the left hand with associated cellulitis  Leukocytosis  Elevated CRP  DM II  with associated neuropathy  Diarrhea.  Likely antibiotic related.    Other:    Discussion / summary of stay / plan of care/ Recommendations:     HENCE:   Escalate antibiotics to Zosyn 3.375 gm IV every 8 hours and Clindamycin 600 mg IV every 8 hours for few days based on increased swelling, redness. Augmentin 875/125 mg po twice daily, Clindamycin 300 mg po 4 x daily 7/3-7/10/24.  Reconciled.  Follow CBC and renal function closely.  Bacid 2 capsules po twice daily.  Elevate the the hand, forearm as much as possible.   Follow-up with Dr. Gallego in 2-weeks.  Follow blood cx.  Supportive care.  Discussed with the patient, RN, Dr. Her    Infection Control Recommendations   [place] Precautions    Antimicrobial Stewardship Recommendations   Simplification of therapy  Targeted therapy      History of Present Illness:   Initial history:  Luis A Salinas is a 74 y.o.-year-old male with history of DM II with neuropathy who presented to the ED after being bit to the dorsum of his left hand by his house cat on Wednesday evening Thursday morning.  Resulted in 2 puncture wounds which produced pain, swelling and pilsatile bleeding.  He was able to stop bu the bleeding by applying pressure and proceeded to clean the wound with rubbing alcohol, applied a band-aid and ice.  The pain and swelling improved for about 1 day then worsened.  Patient reports the redness and swelling started to spread up his arm with associated decreased ROM of his wrist and increased pain with movement.  No associated fever, chills, shortness of breath, chest pain, abdominal pain, n/v/d.  He is UTD on tetanus.  Initial labs: Cre: 1.0, Lactic Acid: 2.3,  enoxaparin  40 mg SubCUTAneous Daily    atorvastatin  20 mg Oral Daily    chlorthalidone  25 mg Oral Daily    celecoxib  200 mg Oral Daily    DULoxetine  30 mg Oral BID    gabapentin  300 mg Oral BID    insulin glargine  35 Units SubCUTAneous BID    multivitamin  1 tablet Oral Daily    lisinopril  5 mg Oral Daily    insulin lispro  0-8 Units SubCUTAneous TID WC    insulin lispro  0-4 Units SubCUTAneous Nightly       Social History:     Social History     Socioeconomic History    Marital status:      Spouse name: Not on file    Number of children: Not on file    Years of education: Not on file    Highest education level: Not on file   Occupational History    Not on file   Tobacco Use    Smoking status: Former     Current packs/day: 0.00     Average packs/day: 1 pack/day for 54.5 years (54.5 ttl pk-yrs)     Types: Cigarettes     Start date:      Quit date: 7/15/2022     Years since quittin.9    Smokeless tobacco: Former    Tobacco comments:     From  to date; I was a non smoker for 7 to 10 years   Vaping Use    Vaping Use: Former    Quit date: 3/17/2019   Substance and Sexual Activity    Alcohol use: Yes     Alcohol/week: 1.0 standard drink of alcohol     Types: 1 Cans of beer per week     Comment: Maybe one bottle per month    Drug use: No    Sexual activity: Not Currently     Partners: Female   Other Topics Concern    Not on file   Social History Narrative    Not on file     Social Determinants of Health     Financial Resource Strain: Low Risk  (2023)    Overall Financial Resource Strain (CARDIA)     Difficulty of Paying Living Expenses: Not hard at all   Food Insecurity: No Food Insecurity (2024)    Hunger Vital Sign     Worried About Running Out of Food in the Last Year: Never true     Ran Out of Food in the Last Year: Never true   Transportation Needs: Unmet Transportation Needs (2024)    PRAPARE - Transportation     Lack of Transportation (Medical): Yes     Lack of Transportation

## 2024-07-03 NOTE — PLAN OF CARE
Problem: Discharge Planning  Goal: Discharge to home or other facility with appropriate resources  7/3/2024 1541 by Shruti Beavers RN  Outcome: Progressing  7/3/2024 0559 by Bill Martel RN  Outcome: Progressing  Flowsheets (Taken 7/2/2024 2000)  Discharge to home or other facility with appropriate resources: Identify barriers to discharge with patient and caregiver     Problem: Pain  Goal: Verbalizes/displays adequate comfort level or baseline comfort level  7/3/2024 1541 by Shruti Beavers RN  Outcome: Progressing  7/3/2024 0559 by Bill Martel RN  Outcome: Progressing     Problem: Safety - Adult  Goal: Free from fall injury  7/3/2024 1541 by Shruti Beavers RN  Outcome: Progressing  7/3/2024 0559 by Bill Martel RN  Outcome: Progressing     Problem: ABCDS Injury Assessment  Goal: Absence of physical injury  7/3/2024 1541 by Shruti Beavers RN  Outcome: Progressing  7/3/2024 0559 by Bill Martel RN  Outcome: Progressing     Problem: Neurosensory - Adult  Goal: Achieves stable or improved neurological status  7/3/2024 1541 by Shruti Beavers RN  Outcome: Progressing  7/3/2024 0559 by Bill Martel RN  Outcome: Progressing     Problem: Respiratory - Adult  Goal: Achieves optimal ventilation and oxygenation  7/3/2024 1541 by Shruti Beavers RN  Outcome: Progressing  7/3/2024 0559 by Bill Martel RN  Outcome: Progressing  Flowsheets (Taken 7/2/2024 2000)  Achieves optimal ventilation and oxygenation:   Assess for changes in respiratory status   Assess for changes in mentation and behavior   Position to facilitate oxygenation and minimize respiratory effort   Oxygen supplementation based on oxygen saturation or arterial blood gases   Encourage broncho-pulmonary hygiene including cough, deep breathe, incentive spirometry   Respiratory therapy support as indicated     Problem: Cardiovascular - Adult  Goal: Maintains optimal cardiac output and hemodynamic stability  7/3/2024 1541 by Shruti Beavers RN  Outcome:

## 2024-07-04 LAB
ANION GAP SERPL CALCULATED.3IONS-SCNC: 18 MMOL/L (ref 9–17)
BASOPHILS # BLD: 0.04 K/UL (ref 0–0.2)
BASOPHILS NFR BLD: 0 % (ref 0–2)
BUN SERPL-MCNC: 19 MG/DL (ref 8–23)
CALCIUM SERPL-MCNC: 9.1 MG/DL (ref 8.6–10.4)
CHLORIDE SERPL-SCNC: 102 MMOL/L (ref 98–107)
CO2 SERPL-SCNC: 19 MMOL/L (ref 20–31)
CREAT SERPL-MCNC: 0.9 MG/DL (ref 0.7–1.2)
CRP SERPL HS-MCNC: 38.7 MG/L (ref 0–5)
EOSINOPHIL # BLD: 0.19 K/UL (ref 0–0.4)
EOSINOPHILS RELATIVE PERCENT: 2 % (ref 1–4)
ERYTHROCYTE [DISTWIDTH] IN BLOOD BY AUTOMATED COUNT: 14.3 % (ref 12.5–15.4)
GFR, ESTIMATED: 90 ML/MIN/1.73M2
GLUCOSE BLD-MCNC: 105 MG/DL (ref 75–110)
GLUCOSE BLD-MCNC: 193 MG/DL (ref 75–110)
GLUCOSE BLD-MCNC: 197 MG/DL (ref 75–110)
GLUCOSE BLD-MCNC: 200 MG/DL (ref 75–110)
GLUCOSE SERPL-MCNC: 185 MG/DL (ref 70–99)
HCT VFR BLD AUTO: 39.8 % (ref 41–53)
HGB BLD-MCNC: 13.3 G/DL (ref 13.5–17.5)
LYMPHOCYTES NFR BLD: 1.43 K/UL (ref 1–4.8)
LYMPHOCYTES RELATIVE PERCENT: 13 % (ref 24–44)
MCH RBC QN AUTO: 31.1 PG (ref 26–34)
MCHC RBC AUTO-ENTMCNC: 33.4 G/DL (ref 31–37)
MCV RBC AUTO: 93 FL (ref 80–100)
MONOCYTES NFR BLD: 1.49 K/UL (ref 0.1–1.2)
MONOCYTES NFR BLD: 13 % (ref 2–11)
NEUTROPHILS NFR BLD: 72 % (ref 36–66)
NEUTS SEG NFR BLD: 7.94 K/UL (ref 1.8–7.7)
PLATELET # BLD AUTO: 194 K/UL (ref 140–450)
PMV BLD AUTO: 10.6 FL (ref 8–14)
POTASSIUM SERPL-SCNC: 4.2 MMOL/L (ref 3.7–5.3)
RBC # BLD AUTO: 4.28 M/UL (ref 4.5–5.9)
SODIUM SERPL-SCNC: 139 MMOL/L (ref 135–144)
WBC OTHER # BLD: 11.1 K/UL (ref 3.5–11)

## 2024-07-04 PROCEDURE — 6370000000 HC RX 637 (ALT 250 FOR IP): Performed by: NURSE PRACTITIONER

## 2024-07-04 PROCEDURE — 6370000000 HC RX 637 (ALT 250 FOR IP)

## 2024-07-04 PROCEDURE — 1200000000 HC SEMI PRIVATE

## 2024-07-04 PROCEDURE — 86140 C-REACTIVE PROTEIN: CPT

## 2024-07-04 PROCEDURE — 36415 COLL VENOUS BLD VENIPUNCTURE: CPT

## 2024-07-04 PROCEDURE — 80048 BASIC METABOLIC PNL TOTAL CA: CPT

## 2024-07-04 PROCEDURE — 99232 SBSQ HOSP IP/OBS MODERATE 35: CPT | Performed by: NURSE PRACTITIONER

## 2024-07-04 PROCEDURE — 99232 SBSQ HOSP IP/OBS MODERATE 35: CPT | Performed by: FAMILY MEDICINE

## 2024-07-04 PROCEDURE — 2580000003 HC RX 258: Performed by: NURSE PRACTITIONER

## 2024-07-04 PROCEDURE — 6360000002 HC RX W HCPCS: Performed by: NURSE PRACTITIONER

## 2024-07-04 PROCEDURE — 85025 COMPLETE CBC W/AUTO DIFF WBC: CPT

## 2024-07-04 PROCEDURE — 82947 ASSAY GLUCOSE BLOOD QUANT: CPT

## 2024-07-04 RX ORDER — LACTOBACILLUS RHAMNOSUS GG 10B CELL
1 CAPSULE ORAL 2 TIMES DAILY
Qty: 14 CAPSULE | Refills: 0 | Status: SHIPPED | OUTPATIENT
Start: 2024-07-04 | End: 2024-07-11

## 2024-07-04 RX ADMIN — HYDROCODONE BITARTRATE AND ACETAMINOPHEN 2 TABLET: 5; 325 TABLET ORAL at 00:42

## 2024-07-04 RX ADMIN — PIPERACILLIN AND TAZOBACTAM 3375 MG: 3; .375 INJECTION, POWDER, LYOPHILIZED, FOR SOLUTION INTRAVENOUS at 00:02

## 2024-07-04 RX ADMIN — CLINDAMYCIN PHOSPHATE 600 MG: 600 INJECTION, SOLUTION INTRAVENOUS at 15:03

## 2024-07-04 RX ADMIN — HYDROCODONE BITARTRATE AND ACETAMINOPHEN 2 TABLET: 5; 325 TABLET ORAL at 09:16

## 2024-07-04 RX ADMIN — CHLORTHALIDONE 25 MG: 25 TABLET ORAL at 09:17

## 2024-07-04 RX ADMIN — CLINDAMYCIN PHOSPHATE 600 MG: 600 INJECTION, SOLUTION INTRAVENOUS at 07:00

## 2024-07-04 RX ADMIN — CLINDAMYCIN PHOSPHATE 600 MG: 600 INJECTION, SOLUTION INTRAVENOUS at 22:44

## 2024-07-04 RX ADMIN — INSULIN GLARGINE 35 UNITS: 100 INJECTION, SOLUTION SUBCUTANEOUS at 09:14

## 2024-07-04 RX ADMIN — ATORVASTATIN CALCIUM 20 MG: 10 TABLET, FILM COATED ORAL at 09:17

## 2024-07-04 RX ADMIN — PIPERACILLIN AND TAZOBACTAM 3375 MG: 3; .375 INJECTION, POWDER, LYOPHILIZED, FOR SOLUTION INTRAVENOUS at 07:55

## 2024-07-04 RX ADMIN — THERA TABS 1 TABLET: TAB at 09:17

## 2024-07-04 RX ADMIN — DULOXETINE HYDROCHLORIDE 30 MG: 30 CAPSULE, DELAYED RELEASE ORAL at 22:39

## 2024-07-04 RX ADMIN — HYDROCODONE BITARTRATE AND ACETAMINOPHEN 2 TABLET: 5; 325 TABLET ORAL at 22:38

## 2024-07-04 RX ADMIN — CELECOXIB 200 MG: 100 CAPSULE ORAL at 09:15

## 2024-07-04 RX ADMIN — LISINOPRIL 5 MG: 5 TABLET ORAL at 09:17

## 2024-07-04 RX ADMIN — Medication 1 CAPSULE: at 09:17

## 2024-07-04 RX ADMIN — INSULIN GLARGINE 35 UNITS: 100 INJECTION, SOLUTION SUBCUTANEOUS at 22:40

## 2024-07-04 RX ADMIN — Medication 1 CAPSULE: at 22:39

## 2024-07-04 RX ADMIN — GABAPENTIN 300 MG: 300 CAPSULE ORAL at 09:18

## 2024-07-04 RX ADMIN — DULOXETINE HYDROCHLORIDE 30 MG: 30 CAPSULE, DELAYED RELEASE ORAL at 09:17

## 2024-07-04 RX ADMIN — INSULIN LISPRO 2 UNITS: 100 INJECTION, SOLUTION INTRAVENOUS; SUBCUTANEOUS at 13:59

## 2024-07-04 RX ADMIN — PIPERACILLIN AND TAZOBACTAM 3375 MG: 3; .375 INJECTION, POWDER, LYOPHILIZED, FOR SOLUTION INTRAVENOUS at 15:51

## 2024-07-04 RX ADMIN — GABAPENTIN 300 MG: 300 CAPSULE ORAL at 22:39

## 2024-07-04 ASSESSMENT — PAIN SCALES - GENERAL
PAINLEVEL_OUTOF10: 7
PAINLEVEL_OUTOF10: 1
PAINLEVEL_OUTOF10: 7
PAINLEVEL_OUTOF10: 4
PAINLEVEL_OUTOF10: 1
PAINLEVEL_OUTOF10: 8
PAINLEVEL_OUTOF10: 7

## 2024-07-04 ASSESSMENT — PAIN DESCRIPTION - LOCATION
LOCATION: HAND
LOCATION: HAND;ARM
LOCATION: HAND;ARM

## 2024-07-04 ASSESSMENT — PAIN DESCRIPTION - ORIENTATION
ORIENTATION: LEFT

## 2024-07-04 ASSESSMENT — ENCOUNTER SYMPTOMS: COLOR CHANGE: 1

## 2024-07-04 ASSESSMENT — PAIN DESCRIPTION - DESCRIPTORS
DESCRIPTORS: ACHING;DISCOMFORT
DESCRIPTORS: ACHING
DESCRIPTORS: ACHING

## 2024-07-04 ASSESSMENT — PAIN - FUNCTIONAL ASSESSMENT: PAIN_FUNCTIONAL_ASSESSMENT: ACTIVITIES ARE NOT PREVENTED

## 2024-07-04 NOTE — PLAN OF CARE
Problem: Discharge Planning  Goal: Discharge to home or other facility with appropriate resources  Outcome: Progressing     Problem: Pain  Goal: Verbalizes/displays adequate comfort level or baseline comfort level  Outcome: Progressing     Problem: Safety - Adult  Goal: Free from fall injury  Outcome: Progressing     Problem: ABCDS Injury Assessment  Goal: Absence of physical injury  Outcome: Progressing     Problem: Neurosensory - Adult  Goal: Achieves stable or improved neurological status  Outcome: Progressing     Problem: Respiratory - Adult  Goal: Achieves optimal ventilation and oxygenation  Outcome: Progressing     Problem: Cardiovascular - Adult  Goal: Maintains optimal cardiac output and hemodynamic stability  Outcome: Progressing     Problem: Skin/Tissue Integrity - Adult  Goal: Skin integrity remains intact  Outcome: Progressing     Problem: Skin/Tissue Integrity - Adult  Goal: Incisions, wounds, or drain sites healing without S/S of infection  Outcome: Progressing     Problem: Musculoskeletal - Adult  Goal: Return mobility to safest level of function  Outcome: Progressing     Problem: Gastrointestinal - Adult  Goal: Minimal or absence of nausea and vomiting  Outcome: Progressing     Problem: Metabolic/Fluid and Electrolytes - Adult  Goal: Electrolytes maintained within normal limits  Outcome: Progressing

## 2024-07-04 NOTE — PROGRESS NOTES
History of blood transfusion 2010    s/p 10 hour back surgery, pt unsure but thinks he had transfusion?    Hypertension     Irritable bowel syndrome     Kidney stone     MSSA (methicillin susceptible Staphylococcus aureus) infection 01/20/2023    Neuropathy     feet    Osteoarthritis     Pancreatitis 1988    Sepsis (Former[facility]) 01/2023    shoulder- septic arthritis    Septic arthritis (Formerly [facility]) 01/17/2023    Type II or unspecified type diabetes mellitus without mention of complication, not stated as uncontrolled        Past Surgical  History:     Past Surgical History:   Procedure Laterality Date    ARM SURGERY Left 01/17/2023    LEFT SHOULDER ABSCESS  INCISION AND DRAINAGE performed by Kathy Sahni MD at [facility] OR    BACK SURGERY      BLADDER SURGERY      CATARACT EXTRACTION W/ INTRAOCULAR LENS IMPLANT Right 03/05/2024    Cataract and Laser [place]      COLONOSCOPY      6-7 polyps    CYSTOSCOPY  04/06/2022    CYSTOSCOPY URETEROSCOPY HOLMIUM LASER LEFT URETERAL STENT INSERTION - Left    CYSTOSCOPY Left 04/06/2022    CYSTOSCOPY URETEROSCOPY HOLMIUM LASER LEFT URETERAL STENT INSERTION performed by Piyush Bee MD at [facility] OR    CYSTOSCOPY  04/20/2022    CYSTOSCOPY URETEROSCOPY LEFT STENT PLACEMENT WITH STONE BASKETING AND HOLMIUM LASER (    CYSTOSCOPY Left 04/20/2022    CYSTOSCOPY URETEROSCOPY LEFT STENT PLACEMENT WITH STONE BASKETING AND HOLMIUM LASER performed by Piyush Bee MD at [facility] OR    EYE SURGERY  2015    macular scrape    EYE SURGERY      HAND TENDON SURGERY Left 03/07/2024    LEFT MIDDLE AND RING FINGER INCISION AND DRAINAGE, WITH EXTENSOR TENDON AND NAIL BED REPAIR LEFT RING FINGER performed by Talha Kumar MD at [facility] OR    LEG TENDON SURGERY  1962    ankle    LITHOTRIPSY      9-21-17    OTHER SURGICAL HISTORY      implanted nerve stimulater in lumbar back    REVERSE TOTAL SHOULDER ARTHROPLASTY Left 05/16/2023    LEFT  have some errors including those of syntax and sound a like substitutions which may escape proof reading.  It such instances, actual meaningcan be extrapolated by contextual diversion.    Marisol Watson, BREANNA - CNP  Office: [phone]  Perfect serve / office [phone]

## 2024-07-04 NOTE — DISCHARGE INSTRUCTIONS
Date of admission: 6/30/2024  Date of discharge: 07/04/24    Your care was provided by the attending and resident physicians of the [facility]. The primary encounter diagnosis was Cat bite, initial encounter. A diagnosis of Cellulitis of left upper extremity was also pertinent to this visit.    FOLLOW-UP  - Follow up with your primary care nurse practitioner Lauren Briceño in 3 days.  - Follow up with your infectious disease in 2  weeks.    MEDICATIONS  -  your Augmentin and clindamycin from the pharmacy and take as directed.  - Please take the full course of antibiotics even if you feel completely back to normal  - Continue taking your other home medications as directed.    ADDITIONAL INSTRUCTIONS  - Please return immediately to the [facility] Emergency Department if worsening swelling, fever, pain, or any other concerning symptoms.

## 2024-07-04 NOTE — PROGRESS NOTES
[facility] Residency  Inpatient Service     Progress Note  7/4/2024    10:00 AM    Name:   Luis A Salinas  MRN:     [id number]     Acct:      [id number]   Room:   [address] [place] [address] Day:  4  Admit Date:  6/30/2024  9:47 AM    PCP:   Lauren Briceño APRN - CNP  Code Status:  Full Code    Subjective:     Overnight events: none    Patient was examined this morning where he is sitting comfortably in his chair watching television in no acute distress.  He was afebrile with stable vitals saturating 96% on room air.  His cellulitis redness had decreased from yesterday and had not spread past the lines of demarcation.  His pain and stiffness in his arm is about the same.  His swelling in his dorsal wrist has much improved and he is no longer having drainage of yellow clear fluid.  His puncture wounds have mainly scabbed over.    He describes clear watery loose stools this morning that did not have an abnormal foul odor and he has been having some eye loose stools for 2 days.  No blood in urine or stool.    He denies current: Fever, chills, malaise, pain in his shoulder greater than baseline, red lines going up his shoulder, nausea, vomiting, dizziness, lightheadedness or any other concerns.    Medications:     Allergies:  No Known Allergies    Current Meds:   Scheduled Meds:    lactobacillus  1 capsule Oral BID    piperacillin-tazobactam  3,375 mg IntraVENous Q8H    clindamycin  600 mg IntraVENous Q8H    sodium chloride flush  5-40 mL IntraVENous 2 times per day    sodium chloride flush  5-40 mL IntraVENous 2 times per day    enoxaparin  40 mg SubCUTAneous Daily    atorvastatin  20 mg Oral Daily    chlorthalidone  25 mg Oral Daily    celecoxib  200 mg Oral Daily    DULoxetine  30 mg Oral BID    gabapentin  300 mg Oral BID    insulin glargine  35 Units SubCUTAneous BID    multivitamin  1 tablet Oral Daily    lisinopril  5 mg Oral Daily    insulin lispro  0-8 Units  scale  Continue gabapentin 300 mg twice daily  Continue Cymbalta 30 mg twice daily     Mixed hyperlipidemia  Continue home atorvastatin 20 mg daily     Osteoarthritis  Continue home Celebrex 200 mg daily     MDD  Continue home Cymbalta 30 mg twice daily     Elevated lipase  Held actos    Anticoagulation: enoxaparin 40mg SQ  Dispo:  home  Diet: ADULT DIET; Regular; 3 carb choices (45 gm/meal)    Patient was seen, evaluated and discussed with Bob Anderson MD Yevgeniy Rubin, MD  Family Medicine Resident, PGY-1   7/4/2024  10:00 AM     Senior Resident Attestation:    I have independently seen Luis A Salinas and discussed the assessment and plan with the intern listed above and the attending Bob Anderson MD. I have reviewed the note and have included any edits or additional information above.     Andrés Velez DO  Family Medicine Resident, PGY-2   7/4/2024  2:26 PM

## 2024-07-04 NOTE — PLAN OF CARE
Problem: Discharge Planning  Goal: Discharge to home or other facility with appropriate resources  7/4/2024 0218 by Kate Gallardo RN  Outcome: Progressing  Flowsheets (Taken 7/3/2024 1940)  Discharge to home or other facility with appropriate resources: Identify barriers to discharge with patient and caregiver     Problem: Pain  Goal: Verbalizes/displays adequate comfort level or baseline comfort level  7/4/2024 0218 by Kate Gallardo RN  Outcome: Progressing  Flowsheets (Taken 7/4/2024 0112)  Verbalizes/displays adequate comfort level or baseline comfort level:   Encourage patient to monitor pain and request assistance   Assess pain using appropriate pain scale   Administer analgesics based on type and severity of pain and evaluate response   Implement non-pharmacological measures as appropriate and evaluate response     Problem: Safety - Adult  Goal: Free from fall injury  7/4/2024 0218 by Kate Gallardo RN  Outcome: Progressing   Free from falls throughout shift. Pt is compliant with safety measures in place. Pt seen by care team every hour with purposefully hourly rounding, call light and personal belongings within reach. Pt calls out appropriately.       Problem: Neurosensory - Adult  Goal: Achieves stable or improved neurological status  7/4/2024 0218 by Kate Gallardo RN  Outcome: Progressing     Problem: Respiratory - Adult  Goal: Achieves optimal ventilation and oxygenation  7/4/2024 0218 by Kate Gallardo RN  Outcome: Progressing     Problem: Skin/Tissue Integrity - Adult  Goal: Skin integrity remains intact  7/4/2024 0218 by Kate Gallardo RN  Outcome: Progressing  Flowsheets (Taken 7/3/2024 1940)  Skin Integrity Remains Intact: Monitor for areas of redness and/or skin breakdown     Problem: Musculoskeletal - Adult  Goal: Return mobility to safest level of function  7/3/2024 1541 by Shruti Beavers RN  Outcome: Progressing     Problem: Skin/Tissue Integrity - Adult  Goal: Incisions, wounds, or drain sites

## 2024-07-05 VITALS
HEIGHT: 74 IN | HEART RATE: 92 BPM | BODY MASS INDEX: 26.31 KG/M2 | WEIGHT: 205 LBS | DIASTOLIC BLOOD PRESSURE: 65 MMHG | SYSTOLIC BLOOD PRESSURE: 100 MMHG | RESPIRATION RATE: 18 BRPM | TEMPERATURE: 98.2 F | OXYGEN SATURATION: 94 %

## 2024-07-05 LAB
ANION GAP SERPL CALCULATED.3IONS-SCNC: 9 MMOL/L (ref 9–17)
BASOPHILS # BLD: 0 K/UL (ref 0–0.2)
BASOPHILS NFR BLD: 1 % (ref 0–2)
BUN SERPL-MCNC: 18 MG/DL (ref 8–23)
CALCIUM SERPL-MCNC: 9.1 MG/DL (ref 8.6–10.4)
CHLORIDE SERPL-SCNC: 102 MMOL/L (ref 98–107)
CO2 SERPL-SCNC: 28 MMOL/L (ref 20–31)
CREAT SERPL-MCNC: 1 MG/DL (ref 0.7–1.2)
CRP SERPL HS-MCNC: 24.9 MG/L (ref 0–5)
EOSINOPHIL # BLD: 0.2 K/UL (ref 0–0.4)
EOSINOPHILS RELATIVE PERCENT: 3 % (ref 1–4)
ERYTHROCYTE [DISTWIDTH] IN BLOOD BY AUTOMATED COUNT: 14.2 % (ref 12.5–15.4)
GFR, ESTIMATED: 79 ML/MIN/1.73M2
GLUCOSE BLD-MCNC: 119 MG/DL (ref 75–110)
GLUCOSE BLD-MCNC: 196 MG/DL (ref 75–110)
GLUCOSE SERPL-MCNC: 89 MG/DL (ref 70–99)
HCT VFR BLD AUTO: 34.9 % (ref 41–53)
HGB BLD-MCNC: 11.9 G/DL (ref 13.5–17.5)
LYMPHOCYTES NFR BLD: 1.9 K/UL (ref 1–4.8)
LYMPHOCYTES RELATIVE PERCENT: 21 % (ref 24–44)
MAGNESIUM SERPL-MCNC: 2 MG/DL (ref 1.6–2.6)
MCH RBC QN AUTO: 30.5 PG (ref 26–34)
MCHC RBC AUTO-ENTMCNC: 34.2 G/DL (ref 31–37)
MCV RBC AUTO: 89.2 FL (ref 80–100)
MICROORGANISM SPEC CULT: NORMAL
MICROORGANISM SPEC CULT: NORMAL
MONOCYTES NFR BLD: 1.3 K/UL (ref 0.1–1.2)
MONOCYTES NFR BLD: 15 % (ref 2–11)
NEUTROPHILS NFR BLD: 60 % (ref 36–66)
NEUTS SEG NFR BLD: 5.4 K/UL (ref 1.8–7.7)
PLATELET # BLD AUTO: 212 K/UL (ref 140–450)
PMV BLD AUTO: 8 FL (ref 6–12)
POTASSIUM SERPL-SCNC: 3.4 MMOL/L (ref 3.7–5.3)
RBC # BLD AUTO: 3.92 M/UL (ref 4.5–5.9)
SERVICE CMNT-IMP: NORMAL
SERVICE CMNT-IMP: NORMAL
SODIUM SERPL-SCNC: 139 MMOL/L (ref 135–144)
SPECIMEN DESCRIPTION: NORMAL
SPECIMEN DESCRIPTION: NORMAL
WBC OTHER # BLD: 8.8 K/UL (ref 3.5–11)

## 2024-07-05 PROCEDURE — 36415 COLL VENOUS BLD VENIPUNCTURE: CPT

## 2024-07-05 PROCEDURE — 6370000000 HC RX 637 (ALT 250 FOR IP)

## 2024-07-05 PROCEDURE — 86140 C-REACTIVE PROTEIN: CPT

## 2024-07-05 PROCEDURE — 99238 HOSP IP/OBS DSCHRG MGMT 30/<: CPT | Performed by: FAMILY MEDICINE

## 2024-07-05 PROCEDURE — 6360000002 HC RX W HCPCS: Performed by: NURSE PRACTITIONER

## 2024-07-05 PROCEDURE — 85025 COMPLETE CBC W/AUTO DIFF WBC: CPT

## 2024-07-05 PROCEDURE — 2580000003 HC RX 258: Performed by: NURSE PRACTITIONER

## 2024-07-05 PROCEDURE — 82947 ASSAY GLUCOSE BLOOD QUANT: CPT

## 2024-07-05 PROCEDURE — 6370000000 HC RX 637 (ALT 250 FOR IP): Performed by: NURSE PRACTITIONER

## 2024-07-05 PROCEDURE — 80048 BASIC METABOLIC PNL TOTAL CA: CPT

## 2024-07-05 PROCEDURE — 83735 ASSAY OF MAGNESIUM: CPT

## 2024-07-05 RX ADMIN — INSULIN GLARGINE 35 UNITS: 100 INJECTION, SOLUTION SUBCUTANEOUS at 08:31

## 2024-07-05 RX ADMIN — CELECOXIB 200 MG: 100 CAPSULE ORAL at 08:32

## 2024-07-05 RX ADMIN — THERA TABS 1 TABLET: TAB at 08:39

## 2024-07-05 RX ADMIN — GABAPENTIN 300 MG: 300 CAPSULE ORAL at 08:38

## 2024-07-05 RX ADMIN — CLINDAMYCIN PHOSPHATE 600 MG: 600 INJECTION, SOLUTION INTRAVENOUS at 06:33

## 2024-07-05 RX ADMIN — PIPERACILLIN AND TAZOBACTAM 3375 MG: 3; .375 INJECTION, POWDER, LYOPHILIZED, FOR SOLUTION INTRAVENOUS at 08:00

## 2024-07-05 RX ADMIN — POTASSIUM CHLORIDE 40 MEQ: 1500 TABLET, EXTENDED RELEASE ORAL at 08:38

## 2024-07-05 RX ADMIN — DULOXETINE HYDROCHLORIDE 30 MG: 30 CAPSULE, DELAYED RELEASE ORAL at 08:38

## 2024-07-05 RX ADMIN — ATORVASTATIN CALCIUM 20 MG: 10 TABLET, FILM COATED ORAL at 08:39

## 2024-07-05 RX ADMIN — Medication 1 CAPSULE: at 08:38

## 2024-07-05 RX ADMIN — PIPERACILLIN AND TAZOBACTAM 3375 MG: 3; .375 INJECTION, POWDER, LYOPHILIZED, FOR SOLUTION INTRAVENOUS at 00:34

## 2024-07-05 ASSESSMENT — PAIN SCALES - GENERAL
PAINLEVEL_OUTOF10: 5
PAINLEVEL_OUTOF10: 5

## 2024-07-05 ASSESSMENT — PAIN DESCRIPTION - ORIENTATION
ORIENTATION: RIGHT
ORIENTATION: LEFT

## 2024-07-05 ASSESSMENT — PAIN DESCRIPTION - LOCATION
LOCATION: HAND
LOCATION: HAND

## 2024-07-05 NOTE — PROGRESS NOTES
[facility] Family Medicine Residency  Inpatient Service     Progress Note  7/5/2024    9:54 AM    Name:   Luis A Salinas  MRN:     [id number]     Acct:      [id number]   Room:   [address] [place] [address] Day:  5  Admit Date:  6/30/2024  9:47 AM    PCP:   Lauren Briceño APRN - CNP  Code Status:  Full Code    Subjective:     Overnight events: none    Patient was examined this morning at bedside was afebrile with stable vitals saturating at 94% room air.  His swelling in his wrist was slightly more but still much better than the previous day.  Mild discharge from puncture sites but overall very good.  Redness has decreased since yesterday.  No further spread.  Pain is the same.  He is ready to be discharged home today after he receives his morning dose of IV antibiotics around noon.  He will then go home on oral antibiotics to complete his course. Still having loose stools but improving.  Medications:     Allergies:  No Known Allergies    Current Meds:   Scheduled Meds:    lactobacillus  1 capsule Oral BID    piperacillin-tazobactam  3,375 mg IntraVENous Q8H    clindamycin  600 mg IntraVENous Q8H    sodium chloride flush  5-40 mL IntraVENous 2 times per day    sodium chloride flush  5-40 mL IntraVENous 2 times per day    enoxaparin  40 mg SubCUTAneous Daily    atorvastatin  20 mg Oral Daily    chlorthalidone  25 mg Oral Daily    celecoxib  200 mg Oral Daily    DULoxetine  30 mg Oral BID    gabapentin  300 mg Oral BID    insulin glargine  35 Units SubCUTAneous BID    multivitamin  1 tablet Oral Daily    lisinopril  5 mg Oral Daily    insulin lispro  0-8 Units SubCUTAneous TID WC    insulin lispro  0-4 Units SubCUTAneous Nightly     Continuous Infusions:    sodium chloride      sodium chloride      dextrose       PRN Meds: sodium chloride flush, sodium chloride, sodium chloride flush, sodium chloride, potassium chloride **OR** potassium alternative oral replacement **OR** potassium chloride,  presentation to the ED, he was afebrile and normotensive.  Examination of his arm shows significant erythema, edema, warmth and mild tenderness extending from the dorsum of the left hand to the distal forearm.  Labs were significant for a white count of 11.9, neutrophils 75, lactic acid elevated at 3.4, repeat 2.3.  Blood glucose elevated at 308.  BUN 24.  Lipase elevated at 133.  Patient was given a dose of Unasyn and Tylenol before being admitted to the family medicine inpatient team for further evaluation and management of left upper extremity cellulitis secondary to cat bite.    Cellulitis spread slightly past line of demarcation on 7/3 and IV antibiotics were extended.    After an additional day of IV antibiotics on 7/4 the spread of his cellulitis had stopped and the cellulitis had greatly improved.       Plan:     Cellulitis of hand, left 2/2 cat bite  Cellulitis improved since yesterday with area of redness decreasing, no spread past demarcated line.  Swelling remains the same which has improved over the last day. Minimal yellow clear discharge.  Patient is still having pain and stiffness that are similar to yesterday.  Cleared by ID and inpatient team to be able to go home on PO antibiotics.   WBC decreased from 10.9 ->11.8 ->11.1 -> 8.8  CRP decreased from 69.4 -> 53.1 -> 38.7-> 24.9  DC IV zosyn and clindamycin  Transition to oral Augmentin 875/125 mg twice a day as well as clindamycin 300 mg 4 times a day through 7/10/2024.  A 6-day supply.   Also given Lactobacillus outpatient as probiotic.  DC home today w/ follow up with KYAW Gallego in 2 weeks     Essential hypertension  Continue lisinopril 5 mg daily     Type 2 diabetes with diabetic polyneuropathy and long-term insulin use  Blood glucose 89  Hold home metformin, pioglitazone, NovoLog  Continue insulin Lantus 35 units twice daily  Medium dose insulin sliding scale  Continue gabapentin 300 mg twice daily  Continue Cymbalta 30 mg twice daily     Mixed

## 2024-07-05 NOTE — DISCHARGE SUMMARY
[facility] Family Medicine Residency  Inpatient Service    Discharge Summary     Patient ID: Luis A Salinas  :  1950   MRN: [id number]     ACCOUNT:  [id number]   Patient's PCP: Lauren Briceño APRN - CNP  Admit Date: 2024   Discharge Date: 2024  Length of Stay: 5  Code Status:  Full Code  Admitting Physician: Gabby Cristina DO  Discharge Physician: Bob Anderson MD     Active Discharge Diagnoses:     Hospital Problem Lists:  Principal Problem:    Cellulitis of left upper extremity  Active Problems:    Essential hypertension    Osteoarthritis    Type 2 diabetes mellitus with diabetic polyneuropathy, with long-term current use of insulin (HCC)    Moderate episode of recurrent major depressive disorder (HCC)    Mixed hyperlipidemia    Former smoker    Elevated lipase    Cat bite    Leukocytosis    Elevated C-reactive protein (CRP)    Diarrhea  Resolved Problems:    * No resolved hospital problems. *      Admission Condition:  good     Discharged Condition: good    Hospital Stay:     Hospital Course: Luis A Salians is a 74 y.o. male with past medical history of T2DM with neuropathy, hypertension, hyperlipidemia, COPD, osteoarthritis, and MDD who presented complaining of left hand, wrist, forearm pain secondary to cat bite admitted on  for cellulitis requiring IV antibiotics.     On presentation to the ED, he was afebrile and normotensive.  Examination of his arm shows significant erythema, edema, warmth and mild tenderness extending from the dorsum of the left hand to the distal forearm.  Labs were significant for a white count of 11.9, neutrophils 75, lactic acid elevated at 3.4, repeat 2.3.  Blood glucose elevated at 308.  BUN 24.  Lipase elevated at 133.  Patient was given a dose of Unasyn and Tylenol before being admitted to the family medicine inpatient team for further evaluation and management of left upper extremity cellulitis secondary to cat bite.

## 2024-07-05 NOTE — PLAN OF CARE
Problem: Discharge Planning  Goal: Discharge to home or other facility with appropriate resources  7/5/2024 0339 by Sameera Posadas RN  Outcome: Progressing  Flowsheets (Taken 7/5/2024 0339)  Discharge to home or other facility with appropriate resources:   Identify barriers to discharge with patient and caregiver   Identify discharge learning needs (meds, wound care, etc)   Arrange for interpreters to assist at discharge as needed   Arrange for needed discharge resources and transportation as appropriate  7/4/2024 1855 by Maura Torres RN  Outcome: Progressing     Problem: Pain  Goal: Verbalizes/displays adequate comfort level or baseline comfort level  7/5/2024 0339 by Sameera Posadas RN  Outcome: Progressing  Flowsheets (Taken 7/5/2024 0339)  Verbalizes/displays adequate comfort level or baseline comfort level:   Encourage patient to monitor pain and request assistance   Assess pain using appropriate pain scale   Administer analgesics based on type and severity of pain and evaluate response   Implement non-pharmacological measures as appropriate and evaluate response  7/4/2024 1855 by Maura Torres RN  Outcome: Progressing     Problem: Safety - Adult  Goal: Free from fall injury  7/5/2024 0339 by Sameera Posadas RN  Outcome: Progressing  Flowsheets (Taken 7/5/2024 0339)  Free From Fall Injury: Instruct family/caregiver on patient safety  7/4/2024 1855 by Maura Torres RN  Outcome: Progressing     Problem: ABCDS Injury Assessment  Goal: Absence of physical injury  7/5/2024 0339 by Sameera Posadas RN  Outcome: Progressing  Flowsheets (Taken 6/30/2024 1810 by Gabby Watts, RN)  Absence of Physical Injury: Implement safety measures based on patient assessment  7/4/2024 1855 by Maura Torres RN  Outcome: Progressing     Problem: Neurosensory - Adult  Goal: Achieves stable or improved neurological status  7/5/2024 0339 by Sameera Posadas RN  Outcome: Progressing  Flowsheets (Taken 7/5/2024 0339)  Achieves

## 2024-07-08 ENCOUNTER — CARE COORDINATION (OUTPATIENT)
Dept: CARE COORDINATION | Age: 74
End: 2024-07-08
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
## 2024-07-08 PROCEDURE — 1111F DSCHRG MED/CURRENT MED MERGE: CPT | Performed by: NURSE PRACTITIONER

## 2024-07-08 NOTE — CARE COORDINATION
resources available to patient including: PCP  Specialist  When to call 911  [employer]. The patient agrees to contact the primary care provider and/or specialist office for questions related to their healthcare.      Medication Reconciliation:  Medication reconciliation was performed with patient,1111F entered: yes.     Remote Patient Monitoring:  Offered patient enrollment in the Remote Patient Monitoring (RPM) program for in-home monitoring: Patient is not eligible for RPM program because: patient does not have qualifying diagnosis.    Assessments:  Care Transitions 24 Hour Call    Do you have a copy of your discharge instructions?: Yes  Do you have all of your prescriptions and are they filled?: No  Have you been contacted by a [facility] Pharmacist?: No  Have you scheduled your follow up appointment?: Yes  How are you going to get to your appointment?: Car - drive self  Post Acute Services: Home Health (Comment: [facility])  Do you feel like you have everything you need to keep you well at home?: Yes  Care Transitions Interventions          Follow Up Appointment:   Patient does not have a follow up appointment scheduled at time of call. Prefers to self-schedule SARAH appointment.   Future Appointments         Provider Specialty Dept Phone    7/29/2024 11:00 AM Lauren Briceño, APRN - CNP Family Medicine [phone]    8/5/2024 11:45 AM [facility] VASCULAR RM 1; [place] US RM 1 Radiology [phone]    8/12/2024 10:20 AM Piyush Bee MD Urology [phone]    8/13/2024 11:15 AM Danny Gaines MD Pulmonology [phone]    8/20/2024 2:00 PM Ranulfo Gallego MD Infectious Diseases [phone]            Care Transition Nurse provided contact information.  Plan for follow-up call in 6-10 days based on severity of symptoms and risk factors.  Plan for next call: symptom management-cellulitis improving? Pain?      Heavenly Heredia RN

## 2024-07-15 ENCOUNTER — CARE COORDINATION (OUTPATIENT)
Dept: CARE COORDINATION | Age: 74
End: 2024-07-15

## 2024-07-15 NOTE — CARE COORDINATION
Care Transitions Note    Follow Up Call     Attempted to reach patient for transitions of care follow up.  Unable to reach patient.      Outreach Attempts:   HIPAA compliant voicemail left for patient.     Care Summary Note: Left message for transitions follow up call. Noted pt has PCP HFU today.     Follow Up Appointment:   Future Appointments         Provider Specialty Dept Phone    7/15/2024 3:10 PM Lauren Briceño APRN - CNP Family Medicine [phone]    7/29/2024 11:00 AM Lauren Briceño APRN - CNP Family Medicine [phone]    8/5/2024 11:45 AM [facility] [phone]    8/12/2024 10:20 AM Piyush Bee MD Urology [phone]    8/13/2024 11:15 AM Danny Gaines MD Pulmonology [phone]    8/20/2024 2:00 PM Ranulfo Gallego MD Infectious Diseases [phone]            Plan for follow-up call in 2-5 days based on severity of symptoms and risk factors. Plan for next call: symptom management-review PCP notes, changes?    Heavenly Heredia RN

## 2024-07-16 ENCOUNTER — OFFICE VISIT (OUTPATIENT)
Dept: FAMILY MEDICINE CLINIC | Age: 74
End: 2024-07-16

## 2024-07-16 VITALS
SYSTOLIC BLOOD PRESSURE: 112 MMHG | HEART RATE: 96 BPM | RESPIRATION RATE: 18 BRPM | DIASTOLIC BLOOD PRESSURE: 64 MMHG | TEMPERATURE: 97.3 F | OXYGEN SATURATION: 96 % | BODY MASS INDEX: 26.5 KG/M2 | WEIGHT: 206.4 LBS

## 2024-07-16 DIAGNOSIS — L03.114 CELLULITIS OF LEFT UPPER EXTREMITY: ICD-10-CM

## 2024-07-16 DIAGNOSIS — E11.42 TYPE 2 DIABETES MELLITUS WITH DIABETIC POLYNEUROPATHY, WITH LONG-TERM CURRENT USE OF INSULIN (HCC): ICD-10-CM

## 2024-07-16 DIAGNOSIS — Z79.4 TYPE 2 DIABETES MELLITUS WITH DIABETIC POLYNEUROPATHY, WITH LONG-TERM CURRENT USE OF INSULIN (HCC): ICD-10-CM

## 2024-07-16 DIAGNOSIS — M15.9 PRIMARY OSTEOARTHRITIS INVOLVING MULTIPLE JOINTS: ICD-10-CM

## 2024-07-16 DIAGNOSIS — M25.552 BILATERAL HIP PAIN: ICD-10-CM

## 2024-07-16 DIAGNOSIS — M25.551 BILATERAL HIP PAIN: ICD-10-CM

## 2024-07-16 DIAGNOSIS — Z09 HOSPITAL DISCHARGE FOLLOW-UP: Primary | ICD-10-CM

## 2024-07-16 RX ORDER — INSULIN GLARGINE 100 [IU]/ML
35 INJECTION, SOLUTION SUBCUTANEOUS 2 TIMES DAILY
Qty: 1 ADJUSTABLE DOSE PRE-FILLED PEN SYRINGE | Refills: 0 | Status: SHIPPED
Start: 2024-07-16 | End: 2025-07-16

## 2024-07-16 ASSESSMENT — PATIENT HEALTH QUESTIONNAIRE - PHQ9
2. FEELING DOWN, DEPRESSED OR HOPELESS: SEVERAL DAYS
1. LITTLE INTEREST OR PLEASURE IN DOING THINGS: MORE THAN HALF THE DAYS
8. MOVING OR SPEAKING SO SLOWLY THAT OTHER PEOPLE COULD HAVE NOTICED. OR THE OPPOSITE, BEING SO FIGETY OR RESTLESS THAT YOU HAVE BEEN MOVING AROUND A LOT MORE THAN USUAL: NOT AT ALL
SUM OF ALL RESPONSES TO PHQ QUESTIONS 1-9: 7
10. IF YOU CHECKED OFF ANY PROBLEMS, HOW DIFFICULT HAVE THESE PROBLEMS MADE IT FOR YOU TO DO YOUR WORK, TAKE CARE OF THINGS AT HOME, OR GET ALONG WITH OTHER PEOPLE: NOT DIFFICULT AT ALL
SUM OF ALL RESPONSES TO PHQ QUESTIONS 1-9: 7
SUM OF ALL RESPONSES TO PHQ QUESTIONS 1-9: 7
9. THOUGHTS THAT YOU WOULD BE BETTER OFF DEAD, OR OF HURTING YOURSELF: NOT AT ALL
SUM OF ALL RESPONSES TO PHQ QUESTIONS 1-9: 7
SUM OF ALL RESPONSES TO PHQ9 QUESTIONS 1 & 2: 3
5. POOR APPETITE OR OVEREATING: NOT AT ALL
4. FEELING TIRED OR HAVING LITTLE ENERGY: MORE THAN HALF THE DAYS
3. TROUBLE FALLING OR STAYING ASLEEP: MORE THAN HALF THE DAYS
6. FEELING BAD ABOUT YOURSELF - OR THAT YOU ARE A FAILURE OR HAVE LET YOURSELF OR YOUR FAMILY DOWN: NOT AT ALL
7. TROUBLE CONCENTRATING ON THINGS, SUCH AS READING THE NEWSPAPER OR WATCHING TELEVISION: NOT AT ALL

## 2024-07-16 ASSESSMENT — ENCOUNTER SYMPTOMS: BACK PAIN: 1

## 2024-07-16 NOTE — PROGRESS NOTES
Post-Discharge Transitional Care Follow Up      Luis A Salinas   YOB: 1950    Date of Office Visit:  7/16/2024  Date of Hospital Admission: 6/30/24  Date of Hospital Discharge: 7/5/24  Readmission Risk Score (high >=14%. Medium >=10%):Readmission Risk Score: 8.8      Care management risk score Rising risk (score 2-5) and Complex Care (Scores >=6): No Risk Score On File     Non face to face  following discharge, date last encounter closed (first attempt may have been earlier): 07/08/2024     Call initiated 2 business days of discharge: Yes     Hospital discharge follow-up  -     UT DISCHARGE MEDS RECONCILED W/ CURRENT OUTPATIENT MED LIST  Cellulitis of left upper extremity  Type 2 diabetes mellitus with diabetic polyneuropathy, with long-term current use of insulin (HCC)  -     insulin glargine (LANTUS SOLOSTAR) 100 UNIT/ML injection pen; Inject 35 Units into the skin in the morning and at bedtime, Disp-1 Adjustable Dose Pre-filled Pen Syringe, R-0Adjust Sig  Primary osteoarthritis involving multiple joints  Bilateral hip pain  -     Talha Javier MD, Orthopaedic Surgery, [facility]    Medical Decision Making: moderate complexity  Return in about 4 months (around 11/16/2024).           Subjective:   Presents for hospital follow up   Has completed antibiotics prescribed at discharge  He is already scheduled for follow-up appointment with infectious disease.    Patient still refusing any type of cardiac workup at this time.  Denying any cardiac symptomology.    He would like to continue to follow with orthopedic surgery regarding ongoing bilateral hip pain.  Known lumbar back pain with pain stimulator in place        Admit Date: 6/30/2024   Discharge Date: 7/5/2024  Length of Stay: 5  Hospital Course: Luis A Salinas is a 74 y.o. male with past medical history of T2DM with neuropathy, hypertension, hyperlipidemia, COPD, osteoarthritis, and MDD who presented complaining of left

## 2024-07-18 ENCOUNTER — CARE COORDINATION (OUTPATIENT)
Dept: CARE COORDINATION | Age: 74
End: 2024-07-18

## 2024-07-18 NOTE — CARE COORDINATION
Care Transitions Note    Follow Up Call     Attempted to reach patient for transitions of care follow up.  Unable to reach patient.      Outreach Attempts: # 2   HIPAA compliant voicemail left for patient.     Care Summary Note: left HIPAA compliant VM requesting a return call. Will resolve if no return call. Noted pt had HFU.     Follow Up Appointment:   Future Appointments         Provider Specialty Dept Phone    7/23/2024 10:30 AM Talha Kumar MD Orthopedic Surgery [phone]    7/29/2024 11:00 AM Lauren Briceño, APRN - CNP Family Medicine [phone]    8/5/2024 11:45 AM [facility] VASCULAR RM 1; [place] US RM 1 Radiology [phone]    8/12/2024 10:20 AM Piyush Bee MD Urology [phone]    8/13/2024 11:15 AM Danny Gaines MD Pulmonology [phone]    8/20/2024 2:00 PM Ranulfo Gallego MD Infectious Diseases [phone]            No further follow-up call indicated based on severity of symptoms and risk factors.   Genesis Moreno LPN

## 2024-07-23 ENCOUNTER — OFFICE VISIT (OUTPATIENT)
Age: 74
End: 2024-07-23
Payer: MEDICARE

## 2024-07-23 DIAGNOSIS — M25.552 BILATERAL HIP PAIN: Primary | ICD-10-CM

## 2024-07-23 DIAGNOSIS — M25.551 BILATERAL HIP PAIN: Primary | ICD-10-CM

## 2024-07-23 PROCEDURE — 1123F ACP DISCUSS/DSCN MKR DOCD: CPT | Performed by: ORTHOPAEDIC SURGERY

## 2024-07-23 PROCEDURE — 1111F DSCHRG MED/CURRENT MED MERGE: CPT | Performed by: ORTHOPAEDIC SURGERY

## 2024-07-23 PROCEDURE — 99214 OFFICE O/P EST MOD 30 MIN: CPT | Performed by: ORTHOPAEDIC SURGERY

## 2024-07-23 PROCEDURE — G8428 CUR MEDS NOT DOCUMENT: HCPCS | Performed by: ORTHOPAEDIC SURGERY

## 2024-07-23 PROCEDURE — 1036F TOBACCO NON-USER: CPT | Performed by: ORTHOPAEDIC SURGERY

## 2024-07-23 PROCEDURE — G8417 CALC BMI ABV UP PARAM F/U: HCPCS | Performed by: ORTHOPAEDIC SURGERY

## 2024-07-23 PROCEDURE — 3017F COLORECTAL CA SCREEN DOC REV: CPT | Performed by: ORTHOPAEDIC SURGERY

## 2024-07-23 NOTE — PROGRESS NOTES
status: Former     Current packs/day: 0.00     Average packs/day: 1 pack/day for 54.5 years (54.5 ttl pk-yrs)     Types: Cigarettes     Start date:      Quit date: 7/15/2022     Years since quittin.0    Smokeless tobacco: Former    Tobacco comments:     From  to date; I was a non smoker for 7 to 10 years   Vaping Use    Vaping Use: Former    Quit date: 3/17/2019   Substance and Sexual Activity    Alcohol use: Yes     Alcohol/week: 1.0 standard drink of alcohol     Types: 1 Cans of beer per week     Comment: Maybe one bottle per month    Drug use: No    Sexual activity: Not Currently     Partners: Female   Other Topics Concern    Not on file   Social History Narrative    Not on file     Social Determinants of Health     Financial Resource Strain: Low Risk  (2023)    Overall Financial Resource Strain (CARDIA)     Difficulty of Paying Living Expenses: Not hard at all   Food Insecurity: Not on file (2024)   Transportation Needs: Unmet Transportation Needs (2024)    PRAPARE - Transportation     Lack of Transportation (Medical): Yes     Lack of Transportation (Non-Medical): No   Physical Activity: Insufficiently Active (11/15/2023)    Exercise Vital Sign     Days of Exercise per Week: 1 day     Minutes of Exercise per Session: 120 min   Stress: Stress Concern Present (3/17/2021)    [employer] [place] [employer] - Occupational Stress Questionnaire     Feeling of Stress : To some extent   Social Connections: Socially Isolated (3/17/2021)    Social Connection and Isolation Panel [NHANES]     Frequency of Communication with Friends and Family: Once a week     Frequency of Social Gatherings with Friends and Family: Once a week     Attends Jehovah's witness Services: Never     Active Member of Clubs or Organizations: No     Attends Club or Organization Meetings: Never     Marital Status:    Intimate Partner Violence: Unknown (7/10/2023)    Humiliation, Afraid, Rape, and Kick questionnaire

## 2024-07-24 DIAGNOSIS — F32.A DEPRESSION, UNSPECIFIED DEPRESSION TYPE: ICD-10-CM

## 2024-07-24 DIAGNOSIS — I10 ESSENTIAL HYPERTENSION: Chronic | ICD-10-CM

## 2024-07-24 DIAGNOSIS — E78.2 MIXED HYPERLIPIDEMIA: ICD-10-CM

## 2024-07-24 DIAGNOSIS — E11.42 TYPE 2 DIABETES MELLITUS WITH DIABETIC POLYNEUROPATHY, WITH LONG-TERM CURRENT USE OF INSULIN (HCC): ICD-10-CM

## 2024-07-24 DIAGNOSIS — G89.21 CHRONIC PAIN DUE TO TRAUMA: ICD-10-CM

## 2024-07-24 DIAGNOSIS — Z79.4 TYPE 2 DIABETES MELLITUS WITH DIABETIC POLYNEUROPATHY, WITH LONG-TERM CURRENT USE OF INSULIN (HCC): ICD-10-CM

## 2024-07-24 DIAGNOSIS — G62.9 NEUROPATHY: ICD-10-CM

## 2024-07-24 DIAGNOSIS — M15.9 OSTEOARTHRITIS OF MULTIPLE JOINTS, UNSPECIFIED OSTEOARTHRITIS TYPE: ICD-10-CM

## 2024-07-24 RX ORDER — INSULIN GLARGINE 100 [IU]/ML
35 INJECTION, SOLUTION SUBCUTANEOUS 2 TIMES DAILY
Qty: 1 ADJUSTABLE DOSE PRE-FILLED PEN SYRINGE | Refills: 0 | Status: CANCELLED | OUTPATIENT
Start: 2024-07-24 | End: 2025-07-24

## 2024-07-25 RX ORDER — CELECOXIB 200 MG/1
200 CAPSULE ORAL DAILY
Qty: 90 CAPSULE | Refills: 1 | Status: SHIPPED | OUTPATIENT
Start: 2024-07-25 | End: 2025-01-21

## 2024-07-25 RX ORDER — LISINOPRIL 5 MG/1
5 TABLET ORAL DAILY
Qty: 90 TABLET | Refills: 1 | Status: SHIPPED | OUTPATIENT
Start: 2024-07-25 | End: 2025-01-21

## 2024-07-25 RX ORDER — PIOGLITAZONEHYDROCHLORIDE 30 MG/1
30 TABLET ORAL DAILY
Qty: 90 TABLET | Refills: 1 | Status: SHIPPED | OUTPATIENT
Start: 2024-07-25 | End: 2025-01-21

## 2024-07-25 RX ORDER — DULOXETIN HYDROCHLORIDE 30 MG/1
30 CAPSULE, DELAYED RELEASE ORAL 2 TIMES DAILY
Qty: 180 CAPSULE | Refills: 1 | Status: SHIPPED | OUTPATIENT
Start: 2024-07-25 | End: 2025-01-21

## 2024-07-25 RX ORDER — ATORVASTATIN CALCIUM 20 MG/1
20 TABLET, FILM COATED ORAL DAILY
Qty: 90 TABLET | Refills: 1 | Status: SHIPPED | OUTPATIENT
Start: 2024-07-25 | End: 2025-01-21

## 2024-07-25 RX ORDER — CHLORTHALIDONE 25 MG/1
25 TABLET ORAL DAILY
Qty: 90 TABLET | Refills: 1 | Status: SHIPPED | OUTPATIENT
Start: 2024-07-25 | End: 2025-01-21

## 2024-07-25 RX ORDER — GABAPENTIN 300 MG/1
300 CAPSULE ORAL 2 TIMES DAILY
Qty: 180 CAPSULE | Refills: 1 | Status: SHIPPED | OUTPATIENT
Start: 2024-07-25 | End: 2025-01-21

## 2024-08-06 ENCOUNTER — HOSPITAL ENCOUNTER (OUTPATIENT)
Dept: ULTRASOUND IMAGING | Age: 74
Discharge: HOME | End: 2024-08-08
Attending: SPECIALIST
Payer: MEDICARE

## 2024-08-06 DIAGNOSIS — N20.0 KIDNEY STONE ON LEFT SIDE: ICD-10-CM

## 2024-08-06 PROCEDURE — 76775 US EXAM ABDO BACK WALL LIM: CPT

## 2024-08-12 ENCOUNTER — OFFICE VISIT (OUTPATIENT)
Age: 74
End: 2024-08-12
Payer: MEDICARE

## 2024-08-12 DIAGNOSIS — N40.1 BENIGN PROSTATIC HYPERPLASIA WITH URINARY OBSTRUCTION: ICD-10-CM

## 2024-08-12 DIAGNOSIS — N28.1 ACQUIRED RENAL CYST OF LEFT KIDNEY: ICD-10-CM

## 2024-08-12 DIAGNOSIS — R82.994 HYPERCALCIURIA: Primary | ICD-10-CM

## 2024-08-12 DIAGNOSIS — N13.8 BENIGN PROSTATIC HYPERPLASIA WITH URINARY OBSTRUCTION: ICD-10-CM

## 2024-08-12 DIAGNOSIS — R35.1 NOCTURIA: ICD-10-CM

## 2024-08-12 DIAGNOSIS — I10 ESSENTIAL HYPERTENSION: Chronic | ICD-10-CM

## 2024-08-12 DIAGNOSIS — N20.0 KIDNEY STONE ON LEFT SIDE: ICD-10-CM

## 2024-08-12 LAB
BILIRUBIN, POC: NORMAL
BLOOD URINE, POC: NORMAL
CLARITY, POC: CLEAR
COLOR, POC: YELLOW
GLUCOSE URINE, POC: NORMAL
KETONES, POC: NORMAL
LEUKOCYTE EST, POC: NORMAL
NITRITE, POC: NORMAL
PH, POC: NORMAL
PROTEIN, POC: NORMAL
SPECIFIC GRAVITY, POC: NORMAL
UROBILINOGEN, POC: NORMAL

## 2024-08-12 PROCEDURE — 1036F TOBACCO NON-USER: CPT | Performed by: SPECIALIST

## 2024-08-12 PROCEDURE — G8417 CALC BMI ABV UP PARAM F/U: HCPCS | Performed by: SPECIALIST

## 2024-08-12 PROCEDURE — 99214 OFFICE O/P EST MOD 30 MIN: CPT | Performed by: SPECIALIST

## 2024-08-12 PROCEDURE — 1123F ACP DISCUSS/DSCN MKR DOCD: CPT | Performed by: SPECIALIST

## 2024-08-12 PROCEDURE — 3017F COLORECTAL CA SCREEN DOC REV: CPT | Performed by: SPECIALIST

## 2024-08-12 PROCEDURE — 81003 URINALYSIS AUTO W/O SCOPE: CPT | Performed by: SPECIALIST

## 2024-08-12 PROCEDURE — G8428 CUR MEDS NOT DOCUMENT: HCPCS | Performed by: SPECIALIST

## 2024-08-12 NOTE — PROGRESS NOTES
Piyush Bee MD FACS    [facility] Urology Office Progress Note    Patient:  Luis A Salinas  YOB: 1950  Date: 8/12/2024    HISTORY OF PRESENT ILLNESS:   The patient is a 74 y.o. male  No flank pain or gross hematuria to suggest recurrent kidney stones.   Patient's 8/6/24 renal US suggests the presence of a non-obstructing Left kidney stone.  Continue chlorthalidone 25 mg po qd to correct hypercalcuria and to prevent kidney stones.  Patient has a 28 mm left renal cyst of no clinical significance.  BPH symptoms are mild and not bothersome and thus medical therapy not indicated.   Lower urinary tract symptoms: urgency, frequency, hesitancy, decreased urinary stream, nocturia, 3 times per night, and feeling of ROCKY.   Last AUA Symptom Score (QOL): 14 (4)  Today's AUA Symptom Score (QOL): 14 (4)    Summary of old records:   Goes by \"Miguel\"  Proteus UTI 9/6/16 and 9/26/16 Proteus mirabilis; 9/26/16 PVR = 10 mL  BPH with terminal dribbling; 10/5/16 cysto-lateral BPH; uroflow: 193 mL, 17.9/9.5 mL/sec  Left renal calculi on 9/30/16 CT and 10/3/16 KUB; Left ureteroscopy and HLL 10/14/16; 1/16/17 KUB 7 mm left lower pole KS; 9/6/17 KUB: 9 mm, 9/21/17 HLL; 10/23/17 Litholink: vol=1.95, Eb=927, Hy=458; 5/30/18, 5/8/19, 5/27/20 KUB negative; Can't see left kidney on KUB due to a back stimulator in place; 8/6/24 renal US: L KS  Hypercalciuria with normal serum Ca, PTH; 11/15/17 chlorthalidone 25 mg qd; 4/20/22 Left HLL  Gross hematuria on 3/14/22, 3/21/22 CT urogram, 1.2 cm L lower pole KS, L renal cyst; 3/31/22 cysto neg    Additional History: none    Procedures Today: N/A    Urinalysis today:  Results for POC orders placed in visit on 08/12/24   POCT Urinalysis No Micro (Auto)   Result Value Ref Range    Color, UA yellow     Clarity, UA clear     Glucose, UA POC neg     Bilirubin, UA      Ketones, UA      Spec Grav, UA      Blood, UA POC neg     pH, UA      Protein, UA POC trace

## 2024-08-13 ENCOUNTER — OFFICE VISIT (OUTPATIENT)
Dept: PULMONOLOGY | Age: 74
End: 2024-08-13

## 2024-08-13 VITALS
TEMPERATURE: 97.6 F | WEIGHT: 207 LBS | SYSTOLIC BLOOD PRESSURE: 123 MMHG | BODY MASS INDEX: 26.56 KG/M2 | HEIGHT: 74 IN | HEART RATE: 77 BPM | DIASTOLIC BLOOD PRESSURE: 71 MMHG | RESPIRATION RATE: 14 BRPM | OXYGEN SATURATION: 98 %

## 2024-08-13 DIAGNOSIS — Z87.891 PERSONAL HISTORY OF TOBACCO USE: ICD-10-CM

## 2024-08-13 DIAGNOSIS — J43.2 CENTRILOBULAR EMPHYSEMA (HCC): ICD-10-CM

## 2024-08-13 DIAGNOSIS — R91.8 MULTIPLE LUNG NODULES ON CT: ICD-10-CM

## 2024-08-13 DIAGNOSIS — J44.9 STAGE 1 MILD COPD BY GOLD CLASSIFICATION (HCC): Primary | ICD-10-CM

## 2024-08-13 DIAGNOSIS — Z86.16 HISTORY OF 2019 NOVEL CORONAVIRUS DISEASE (COVID-19): ICD-10-CM

## 2024-08-13 NOTE — PROGRESS NOTES
health problem that limits life expectancy or the ability to have lung surgery.    The patient  reports that he quit smoking about 2 years ago. His smoking use included cigarettes. He started smoking about 62 years ago. He has a 54.5 pack-year smoking history. He has quit using smokeless tobacco.. Discussed with patient the risks and benefits of screening, including over-diagnosis, false positive rate, and total radiation exposure.  The patient currently exhibits no signs or symptoms suggestive of lung cancer.  Discussed with patient the importance of compliance with yearly annual lung cancer screenings and willingness to undergo diagnosis and treatment if screening scan is positive.  In addition, the patient was counseled regarding the importance of remaining smoke free and/or total smoking cessation.    Also reviewed the following if the patient has Medicare that as of February 10, 2022, Medicare only covers LDCT screening in patients aged 50-77 with at least a 20 pack-year smoking history who currently smoke or have quit in the last 15 years

## 2024-08-21 ENCOUNTER — PATIENT MESSAGE (OUTPATIENT)
Dept: FAMILY MEDICINE CLINIC | Age: 74
End: 2024-08-21

## 2024-08-21 DIAGNOSIS — Z79.4 TYPE 2 DIABETES MELLITUS WITH DIABETIC POLYNEUROPATHY, WITH LONG-TERM CURRENT USE OF INSULIN (HCC): ICD-10-CM

## 2024-08-21 DIAGNOSIS — E11.42 TYPE 2 DIABETES MELLITUS WITH DIABETIC POLYNEUROPATHY, WITH LONG-TERM CURRENT USE OF INSULIN (HCC): ICD-10-CM

## 2024-08-21 NOTE — TELEPHONE ENCOUNTER
Luis A Salinas is calling to request a refill on the following medication(s):    Last Visit Date (If Applicable):  7/16/2024    Next Visit Date:    Visit date not found    Medication Request:  Requested Prescriptions     Pending Prescriptions Disp Refills    insulin glargine (LANTUS SOLOSTAR) 100 UNIT/ML injection pen 1 Adjustable Dose Pre-filled Pen Syringe 0     Sig: Inject 35 Units into the skin in the morning and at bedtime

## 2024-08-22 RX ORDER — INSULIN GLARGINE 100 [IU]/ML
INJECTION, SOLUTION SUBCUTANEOUS
Qty: 24 ADJUSTABLE DOSE PRE-FILLED PEN SYRINGE | Refills: 1 | Status: SHIPPED | OUTPATIENT
Start: 2024-08-22 | End: 2025-08-22

## 2024-08-22 NOTE — TELEPHONE ENCOUNTER
Hi. I’ve asked for a refill. Currently the Rx is 60 units.   I’ve been doing 35 units twice a day. (70).   Could you send in a request for 80 units per day. This will allow me to catch up and then continue it at 35 or 40 units, twice per day.   Thanks.  Luis A (call me Miguel).     Please advise to dose increase? He is taking insulin aspart per sliding scale, metformin and actos in addition to Lantus.

## 2024-10-24 ENCOUNTER — PATIENT MESSAGE (OUTPATIENT)
Dept: FAMILY MEDICINE CLINIC | Age: 74
End: 2024-10-24

## 2024-10-24 DIAGNOSIS — Z79.4 TYPE 2 DIABETES MELLITUS WITH DIABETIC POLYNEUROPATHY, WITH LONG-TERM CURRENT USE OF INSULIN (HCC): ICD-10-CM

## 2024-10-24 DIAGNOSIS — E11.42 TYPE 2 DIABETES MELLITUS WITH DIABETIC POLYNEUROPATHY, WITH LONG-TERM CURRENT USE OF INSULIN (HCC): ICD-10-CM

## 2024-10-25 RX ORDER — INSULIN GLARGINE 100 [IU]/ML
INJECTION, SOLUTION SUBCUTANEOUS
Qty: 75 ML | Refills: 3 | Status: SHIPPED | OUTPATIENT
Start: 2024-10-25

## 2024-10-25 NOTE — TELEPHONE ENCOUNTER
LOV 7/16/24   RTO ---  LRF 8/22/24          Controlled Substance Monitoring:    Acute and Chronic Pain Monitoring:   RX Monitoring Attestation Periodic Controlled Substance Monitoring Chronic Pain > 80 MEDD   2/14/2019  11:43 AM The Prescription Monitoring Report for this patient was reviewed today. No signs of potential drug abuse or diversion identified. Dose reduction has been attempted.;Reviewed the patient's functional status and documentation.

## 2024-10-25 NOTE — TELEPHONE ENCOUNTER
LOV 7/16/24   RTO NA  LRF 8/22/24          Controlled Substance Monitoring:    Acute and Chronic Pain Monitoring:   RX Monitoring Attestation Periodic Controlled Substance Monitoring Chronic Pain > 80 MEDD   2/14/2019  11:43 AM The Prescription Monitoring Report for this patient was reviewed today. No signs of potential drug abuse or diversion identified. Dose reduction has been attempted.;Reviewed the patient's functional status and documentation.

## 2024-11-17 ENCOUNTER — PATIENT MESSAGE (OUTPATIENT)
Dept: FAMILY MEDICINE CLINIC | Age: 74
End: 2024-11-17

## 2024-12-23 SDOH — HEALTH STABILITY: PHYSICAL HEALTH: ON AVERAGE, HOW MANY DAYS PER WEEK DO YOU ENGAGE IN MODERATE TO STRENUOUS EXERCISE (LIKE A BRISK WALK)?: 2 DAYS

## 2024-12-23 SDOH — HEALTH STABILITY: PHYSICAL HEALTH: ON AVERAGE, HOW MANY MINUTES DO YOU ENGAGE IN EXERCISE AT THIS LEVEL?: 40 MIN

## 2024-12-24 ASSESSMENT — ENCOUNTER SYMPTOMS
NAUSEA: 0
BACK PAIN: 0
COLOR CHANGE: 0
ABDOMINAL DISTENTION: 0
CHEST TIGHTNESS: 0
VOMITING: 0
SINUS PAIN: 0

## 2024-12-24 NOTE — PROGRESS NOTES
normal.      Mouth/Throat:      Mouth: Mucous membranes are moist.      Pharynx: Posterior oropharyngeal erythema (slight) present.   Eyes:      Pupils: Pupils are equal, round, and reactive to light.   Cardiovascular:      Rate and Rhythm: Normal rate and regular rhythm.      Heart sounds: Normal heart sounds.   Pulmonary:      Effort: Pulmonary effort is normal. No respiratory distress.      Breath sounds: No stridor. No wheezing, rhonchi or rales.   Musculoskeletal:         General: Normal range of motion.      Cervical back: Normal range of motion.   Skin:     General: Skin is warm.   Neurological:      Mental Status: He is alert.   Psychiatric:         Mood and Affect: Mood normal.         Behavior: Behavior normal.         Thought Content: Thought content normal.         Judgment: Judgment normal.         Diagnoses / Plan:   1. Encounter to establish care  -     TSH reflex to FT4; Future  -     CBC; Future  -     Comprehensive Metabolic Panel, Fasting; Future  2. Centrilobular emphysema (HCC)  3. Hypercalciuria  4. Essential hypertension  5. Type 2 diabetes mellitus with diabetic polyneuropathy, with long-term current use of insulin (HCC)  -     Albumin/Creatinine Ratio, Urine; Future  -     POCT glycosylated hemoglobin (Hb A1C)  6. Osteoarthritis of multiple joints, unspecified osteoarthritis type  7. Post-nasal drip  -     doxycycline hyclate (VIBRA-TABS) 100 MG tablet; Take 1 tablet by mouth 2 times daily for 5 days, Disp-10 tablet, R-0Normal  8. Acute cough  -     doxycycline hyclate (VIBRA-TABS) 100 MG tablet; Take 1 tablet by mouth 2 times daily for 5 days, Disp-10 tablet, R-0Normal  9. Fatigue, unspecified type  -     Testosterone, free, total; Future  -     TSH reflex to FT4; Future  -     CBC; Future  -     Comprehensive Metabolic Panel, Fasting; Future  10. At high risk for falls  11. Influenza vaccine needed  -     Influenza, FLUAD Trivalent, (age 65 y+), IM, Preservative Free, 0.5mL       Obtain

## 2024-12-26 ENCOUNTER — OFFICE VISIT (OUTPATIENT)
Dept: FAMILY MEDICINE CLINIC | Age: 74
End: 2024-12-26

## 2024-12-26 ENCOUNTER — HOSPITAL ENCOUNTER (OUTPATIENT)
Age: 74
Setting detail: SPECIMEN
Discharge: HOME | End: 2024-12-26

## 2024-12-26 VITALS
TEMPERATURE: 97.4 F | SYSTOLIC BLOOD PRESSURE: 108 MMHG | BODY MASS INDEX: 28.49 KG/M2 | OXYGEN SATURATION: 97 % | WEIGHT: 222 LBS | DIASTOLIC BLOOD PRESSURE: 68 MMHG | HEART RATE: 88 BPM | HEIGHT: 74 IN

## 2024-12-26 DIAGNOSIS — J43.2 CENTRILOBULAR EMPHYSEMA (HCC): ICD-10-CM

## 2024-12-26 DIAGNOSIS — Z76.89 ENCOUNTER TO ESTABLISH CARE: ICD-10-CM

## 2024-12-26 DIAGNOSIS — Z91.81 AT HIGH RISK FOR FALLS: ICD-10-CM

## 2024-12-26 DIAGNOSIS — R09.82 POST-NASAL DRIP: ICD-10-CM

## 2024-12-26 DIAGNOSIS — Z23 INFLUENZA VACCINE NEEDED: ICD-10-CM

## 2024-12-26 DIAGNOSIS — Z79.4 TYPE 2 DIABETES MELLITUS WITH DIABETIC POLYNEUROPATHY, WITH LONG-TERM CURRENT USE OF INSULIN (HCC): ICD-10-CM

## 2024-12-26 DIAGNOSIS — I10 ESSENTIAL HYPERTENSION: ICD-10-CM

## 2024-12-26 DIAGNOSIS — E11.42 TYPE 2 DIABETES MELLITUS WITH DIABETIC POLYNEUROPATHY, WITH LONG-TERM CURRENT USE OF INSULIN (HCC): ICD-10-CM

## 2024-12-26 DIAGNOSIS — Z76.89 ENCOUNTER TO ESTABLISH CARE: Primary | ICD-10-CM

## 2024-12-26 DIAGNOSIS — R53.83 FATIGUE, UNSPECIFIED TYPE: ICD-10-CM

## 2024-12-26 DIAGNOSIS — R82.994 HYPERCALCIURIA: ICD-10-CM

## 2024-12-26 DIAGNOSIS — R05.1 ACUTE COUGH: ICD-10-CM

## 2024-12-26 DIAGNOSIS — M15.9 OSTEOARTHRITIS OF MULTIPLE JOINTS, UNSPECIFIED OSTEOARTHRITIS TYPE: ICD-10-CM

## 2024-12-26 LAB
ALBUMIN SERPL-MCNC: 4.2 G/DL (ref 3.5–5.2)
ALBUMIN/GLOB SERPL: 1.4 {RATIO} (ref 1–2.5)
ALP SERPL-CCNC: 72 U/L (ref 40–129)
ALT SERPL-CCNC: 25 U/L (ref 10–50)
ANION GAP SERPL CALCULATED.3IONS-SCNC: 10 MMOL/L (ref 9–16)
AST SERPL-CCNC: 21 U/L (ref 10–50)
BILIRUB SERPL-MCNC: 0.5 MG/DL (ref 0–1.2)
BUN SERPL-MCNC: 20 MG/DL (ref 8–23)
CALCIUM SERPL-MCNC: 9.7 MG/DL (ref 8.6–10.4)
CHLORIDE SERPL-SCNC: 100 MMOL/L (ref 98–107)
CO2 SERPL-SCNC: 29 MMOL/L (ref 20–31)
CREAT SERPL-MCNC: 1 MG/DL (ref 0.7–1.2)
CREAT UR-MCNC: 165 MG/DL (ref 39–259)
ERYTHROCYTE [DISTWIDTH] IN BLOOD BY AUTOMATED COUNT: 13.9 % (ref 11.8–14.4)
GFR, ESTIMATED: 79 ML/MIN/1.73M2
GLUCOSE P FAST SERPL-MCNC: 139 MG/DL (ref 74–99)
HBA1C MFR BLD: 7 %
HCT VFR BLD AUTO: 43.1 % (ref 40.7–50.3)
HGB BLD-MCNC: 14.3 G/DL (ref 13–17)
MCH RBC QN AUTO: 30.7 PG (ref 25.2–33.5)
MCHC RBC AUTO-ENTMCNC: 33.2 G/DL (ref 28.4–34.8)
MCV RBC AUTO: 92.5 FL (ref 82.6–102.9)
MICROALBUMIN UR-MCNC: 16 MG/L (ref 0–20)
MICROALBUMIN/CREAT UR-RTO: 10 MCG/MG CREAT (ref 0–17)
NRBC BLD-RTO: 0 PER 100 WBC
PLATELET # BLD AUTO: 230 K/UL (ref 138–453)
PMV BLD AUTO: 10.7 FL (ref 8.1–13.5)
POTASSIUM SERPL-SCNC: 4.4 MMOL/L (ref 3.7–5.3)
PROT SERPL-MCNC: 7.2 G/DL (ref 6.6–8.7)
RBC # BLD AUTO: 4.66 M/UL (ref 4.21–5.77)
SHBG SERPL-SCNC: 28 NMOL/L (ref 19–76)
SODIUM SERPL-SCNC: 139 MMOL/L (ref 136–145)
TESTOST FREE MFR SERPL: 42 PG/ML (ref 47–244)
TESTOST SERPL-MCNC: 201 NG/DL (ref 193–740)
TSH SERPL DL<=0.05 MIU/L-ACNC: 1.04 UIU/ML (ref 0.27–4.2)
WBC OTHER # BLD: 9.8 K/UL (ref 3.5–11.3)

## 2024-12-26 RX ORDER — DOXYCYCLINE HYCLATE 100 MG
100 TABLET ORAL 2 TIMES DAILY
Qty: 10 TABLET | Refills: 0 | Status: SHIPPED | OUTPATIENT
Start: 2024-12-26 | End: 2024-12-31

## 2024-12-26 ASSESSMENT — PATIENT HEALTH QUESTIONNAIRE - PHQ9
SUM OF ALL RESPONSES TO PHQ QUESTIONS 1-9: 7
SUM OF ALL RESPONSES TO PHQ QUESTIONS 1-9: 7
3. TROUBLE FALLING OR STAYING ASLEEP: NEARLY EVERY DAY
6. FEELING BAD ABOUT YOURSELF - OR THAT YOU ARE A FAILURE OR HAVE LET YOURSELF OR YOUR FAMILY DOWN: NOT AT ALL
5. POOR APPETITE OR OVEREATING: NOT AT ALL
7. TROUBLE CONCENTRATING ON THINGS, SUCH AS READING THE NEWSPAPER OR WATCHING TELEVISION: NOT AT ALL
1. LITTLE INTEREST OR PLEASURE IN DOING THINGS: SEVERAL DAYS
4. FEELING TIRED OR HAVING LITTLE ENERGY: NEARLY EVERY DAY
9. THOUGHTS THAT YOU WOULD BE BETTER OFF DEAD, OR OF HURTING YOURSELF: NOT AT ALL
SUM OF ALL RESPONSES TO PHQ9 QUESTIONS 1 & 2: 1
SUM OF ALL RESPONSES TO PHQ QUESTIONS 1-9: 7
2. FEELING DOWN, DEPRESSED OR HOPELESS: NOT AT ALL
8. MOVING OR SPEAKING SO SLOWLY THAT OTHER PEOPLE COULD HAVE NOTICED. OR THE OPPOSITE, BEING SO FIGETY OR RESTLESS THAT YOU HAVE BEEN MOVING AROUND A LOT MORE THAN USUAL: NOT AT ALL
10. IF YOU CHECKED OFF ANY PROBLEMS, HOW DIFFICULT HAVE THESE PROBLEMS MADE IT FOR YOU TO DO YOUR WORK, TAKE CARE OF THINGS AT HOME, OR GET ALONG WITH OTHER PEOPLE: NOT DIFFICULT AT ALL
SUM OF ALL RESPONSES TO PHQ QUESTIONS 1-9: 7

## 2024-12-26 ASSESSMENT — ENCOUNTER SYMPTOMS
ROS SKIN COMMENTS: HISTORY OF MELANOMA
SHORTNESS OF BREATH: 1
COUGH: 1
DIARRHEA: 1
RHINORRHEA: 1

## 2025-01-24 ENCOUNTER — PATIENT MESSAGE (OUTPATIENT)
Dept: FAMILY MEDICINE CLINIC | Age: 75
End: 2025-01-24

## 2025-01-24 DIAGNOSIS — F32.A DEPRESSION, UNSPECIFIED DEPRESSION TYPE: ICD-10-CM

## 2025-01-24 DIAGNOSIS — I10 ESSENTIAL HYPERTENSION: Chronic | ICD-10-CM

## 2025-01-24 DIAGNOSIS — G62.9 NEUROPATHY: ICD-10-CM

## 2025-01-24 DIAGNOSIS — M15.9 OSTEOARTHRITIS OF MULTIPLE JOINTS, UNSPECIFIED OSTEOARTHRITIS TYPE: ICD-10-CM

## 2025-01-24 DIAGNOSIS — E78.2 MIXED HYPERLIPIDEMIA: ICD-10-CM

## 2025-01-24 DIAGNOSIS — G89.21 CHRONIC PAIN DUE TO TRAUMA: ICD-10-CM

## 2025-01-24 DIAGNOSIS — E11.42 TYPE 2 DIABETES MELLITUS WITH DIABETIC POLYNEUROPATHY, WITH LONG-TERM CURRENT USE OF INSULIN (HCC): ICD-10-CM

## 2025-01-24 DIAGNOSIS — Z79.4 TYPE 2 DIABETES MELLITUS WITH DIABETIC POLYNEUROPATHY, WITH LONG-TERM CURRENT USE OF INSULIN (HCC): ICD-10-CM

## 2025-01-24 RX ORDER — CELECOXIB 200 MG/1
200 CAPSULE ORAL DAILY
Qty: 90 CAPSULE | Refills: 3 | Status: SHIPPED | OUTPATIENT
Start: 2025-01-24 | End: 2026-01-19

## 2025-01-24 RX ORDER — ATORVASTATIN CALCIUM 20 MG/1
20 TABLET, FILM COATED ORAL DAILY
Qty: 90 TABLET | Refills: 3 | Status: SHIPPED | OUTPATIENT
Start: 2025-01-24 | End: 2026-01-19

## 2025-01-24 RX ORDER — LISINOPRIL 5 MG/1
5 TABLET ORAL DAILY
Qty: 90 TABLET | Refills: 3 | Status: SHIPPED | OUTPATIENT
Start: 2025-01-24 | End: 2026-01-19

## 2025-01-24 RX ORDER — INSULIN GLARGINE 100 [IU]/ML
INJECTION, SOLUTION SUBCUTANEOUS
Qty: 75 ML | Refills: 3 | Status: SHIPPED | OUTPATIENT
Start: 2025-01-24 | End: 2026-01-24

## 2025-01-24 RX ORDER — PIOGLITAZONE 30 MG/1
30 TABLET ORAL DAILY
Qty: 90 TABLET | Refills: 3 | Status: SHIPPED | OUTPATIENT
Start: 2025-01-24 | End: 2026-01-19

## 2025-01-24 RX ORDER — GABAPENTIN 300 MG/1
300 CAPSULE ORAL 2 TIMES DAILY
Qty: 180 CAPSULE | Refills: 3 | Status: SHIPPED | OUTPATIENT
Start: 2025-01-24 | End: 2026-01-19

## 2025-01-24 RX ORDER — CHLORTHALIDONE 25 MG/1
25 TABLET ORAL DAILY
Qty: 90 TABLET | Refills: 3 | Status: SHIPPED | OUTPATIENT
Start: 2025-01-24 | End: 2026-01-19

## 2025-01-24 RX ORDER — DULOXETIN HYDROCHLORIDE 30 MG/1
30 CAPSULE, DELAYED RELEASE ORAL 2 TIMES DAILY
Qty: 180 CAPSULE | Refills: 3 | Status: SHIPPED | OUTPATIENT
Start: 2025-01-24 | End: 2026-01-19

## 2025-01-24 NOTE — TELEPHONE ENCOUNTER
Patient requesting 90 days with 3 RF's    LOV 12/26/24  RTO 3 months  LRF 7/25/24    Health Maintenance   Topic Date Due    Diabetic foot exam  03/09/2024    Diabetic retinal exam  06/02/2024    Annual Wellness Visit (Medicare)  11/15/2024    COVID-19 Vaccine (4 - 2023-24 season) 12/26/2025 (Originally 9/1/2024)    Respiratory Syncytial Virus (RSV) Pregnant or age 60 yrs+ (1 - Risk 60-74 years 1-dose series) 12/26/2025 (Originally 3/1/2010)    Lipids  04/01/2025    Lung Cancer Screening &/or Counseling  06/12/2025    A1C test (Diabetic or Prediabetic)  12/26/2025    Diabetic Alb to Cr ratio (uACR) test  12/26/2025    Depression Monitoring  12/26/2025    GFR test (Diabetes, CKD 3-4, OR last GFR 15-59)  12/26/2025    Colorectal Cancer Screen  08/05/2030    DTaP/Tdap/Td vaccine (2 - Td or Tdap) 03/04/2034    Flu vaccine  Completed    Shingles vaccine  Completed    Pneumococcal 65+ years Vaccine  Completed    AAA screen  Completed    Hepatitis C screen  Completed    Hepatitis A vaccine  Aged Out    Hepatitis B vaccine  Aged Out    Hib vaccine  Aged Out    Polio vaccine  Aged Out    Meningococcal (ACWY) vaccine  Aged Out             (applicable per patient's age: Cancer Screenings, Depression Screening, Fall Risk Screening, Immunizations)    Hemoglobin A1C (%)   Date Value   12/26/2024 7.0   03/26/2024 7.8 (H)   07/10/2023 6.7 (A)     AST (U/L)   Date Value   12/26/2024 21     ALT (U/L)   Date Value   12/26/2024 25     BUN (mg/dL)   Date Value   12/26/2024 20      (goal A1C is < 7)   (goal LDL is <100) need 30-50% reduction from baseline     BP Readings from Last 3 Encounters:   12/26/24 108/68   08/13/24 123/71   07/16/24 112/64    (goal /80)      All Future Testing planned in [facility]:  Lab Frequency Next Occurrence   US RENAL LIMITED Once 09/11/2024   CT Lung Screen (Initial/Annual/Baseline) Once 06/13/2025       Next Visit Date:  Future Appointments   Date Time Provider Department Center   1/27/2025  2:30 PM

## 2025-01-27 ENCOUNTER — OFFICE VISIT (OUTPATIENT)
Age: 75
End: 2025-01-27
Payer: MEDICARE

## 2025-01-27 VITALS — HEIGHT: 74 IN | WEIGHT: 222 LBS | BODY MASS INDEX: 28.49 KG/M2

## 2025-01-27 DIAGNOSIS — R35.1 NOCTURIA: ICD-10-CM

## 2025-01-27 DIAGNOSIS — N40.1 BENIGN PROSTATIC HYPERPLASIA WITH URINARY OBSTRUCTION: ICD-10-CM

## 2025-01-27 DIAGNOSIS — R82.994 HYPERCALCIURIA: ICD-10-CM

## 2025-01-27 DIAGNOSIS — N13.8 BENIGN PROSTATIC HYPERPLASIA WITH URINARY OBSTRUCTION: ICD-10-CM

## 2025-01-27 DIAGNOSIS — E29.1 MALE HYPOGONADISM: Primary | ICD-10-CM

## 2025-01-27 LAB
BILIRUBIN, POC: NORMAL
BLOOD URINE, POC: NORMAL
CLARITY, POC: CLEAR
COLOR, POC: YELLOW
GLUCOSE URINE, POC: NORMAL MG/DL
KETONES, POC: NORMAL
LEUKOCYTE EST, POC: NORMAL
NITRITE, POC: NORMAL
PH, POC: NORMAL
PROTEIN, POC: NORMAL MG/DL
SPECIFIC GRAVITY, POC: NORMAL
UROBILINOGEN, POC: NORMAL

## 2025-01-27 PROCEDURE — 3017F COLORECTAL CA SCREEN DOC REV: CPT | Performed by: SPECIALIST

## 2025-01-27 PROCEDURE — 1036F TOBACCO NON-USER: CPT | Performed by: SPECIALIST

## 2025-01-27 PROCEDURE — G8427 DOCREV CUR MEDS BY ELIG CLIN: HCPCS | Performed by: SPECIALIST

## 2025-01-27 PROCEDURE — 99214 OFFICE O/P EST MOD 30 MIN: CPT | Performed by: SPECIALIST

## 2025-01-27 PROCEDURE — G8417 CALC BMI ABV UP PARAM F/U: HCPCS | Performed by: SPECIALIST

## 2025-01-27 PROCEDURE — 81003 URINALYSIS AUTO W/O SCOPE: CPT | Performed by: SPECIALIST

## 2025-01-27 PROCEDURE — 1123F ACP DISCUSS/DSCN MKR DOCD: CPT | Performed by: SPECIALIST

## 2025-01-27 PROCEDURE — 1159F MED LIST DOCD IN RCRD: CPT | Performed by: SPECIALIST

## 2025-01-27 NOTE — PROGRESS NOTES
Piyush Bee MD [facility] Urology Office Progress Note    Patient:  Luis A Salinas  YOB: 1950  Date: 1/27/2025    HISTORY OF PRESENT ILLNESS:   The patient is a 74 y.o. male  The patient presents with symptoms of male hypogonadism including decreased libido, ED, loss of muscle mass, fatigue, obesity and depression.  His serum free and total testosterone levels on 12/2/6/24 were only 42 and 201 but were done in the afternoon.  Will repeat serum free and total testosterone (8-10 AM only) as well as LH, FSH, Prolactin, and estradiol levels.   Discussed the various Rx options and he would like to proceed with injection Rx.    Discussed risk of testosterone replacement therapy including acne, hair growth, aggressive personality changes, polycythemia, testicular atrophy, worsening sleep apnea, breast enlargement, infertility/reduced sperm production, prostate enlargement, accelerated growth of undiagnosed prostate cancer, etc.   Lower urinary tract symptoms: urgency, frequency, decreased urinary stream, nocturia, 3 times per night, and incomplete emptying.   Last AUA Symptom Score (QOL): 14 (4)  Today's AUA Symptom Score (QOL): 9 (3)    Summary of old records:   Goes by \"Miguel\"  Proteus UTI 9/6/16 and 9/26/16 Proteus mirabilis; 9/26/16 PVR = 10 mL  BPH with terminal dribbling; 10/5/16 cysto-lateral BPH; uroflow: 193 mL, 17.9/9.5 mL/sec  Left renal calculi on 9/30/16 CT and 10/3/16 KUB; Left ureteroscopy and HLL 10/14/16; 1/16/17 KUB 7 mm left lower pole KS; 9/6/17 KUB: 9 mm, 9/21/17 HLL; 10/23/17 Litholink: vol=1.95, Xk=303, Vq=484; 5/30/18, 5/8/19, 5/27/20 KUB negative; Can't see left kidney on KUB due to a back stimulator in place; 8/6/24 renal US: L KS  Hypercalciuria with normal serum Ca, PTH; 11/15/17 chlorthalidone 25 mg qd; 4/20/22 Left HLL  Gross hematuria on 3/14/22, 3/21/22 CT urogram, 1.2 cm L lower pole KS, L renal cyst; 3/31/22 cysto neg  Male

## 2025-01-28 ENCOUNTER — HOSPITAL ENCOUNTER (OUTPATIENT)
Age: 75
Setting detail: SPECIMEN
Discharge: HOME | End: 2025-01-28

## 2025-01-28 DIAGNOSIS — E29.1 MALE HYPOGONADISM: ICD-10-CM

## 2025-01-28 LAB
ESTRADIOL LEVEL: 18.2 PG/ML (ref 27–52)
FSH SERPL-ACNC: 9.9 MIU/ML (ref 1.5–12.4)
LH SERPL-ACNC: 8.7 MIU/ML (ref 1.7–8.6)
PROLACTIN SERPL-MCNC: 11.3 NG/ML (ref 4.04–15.2)
SHBG SERPL-SCNC: 29 NMOL/L (ref 19–76)
TESTOST FREE MFR SERPL: 38.1 PG/ML (ref 47–244)
TESTOST SERPL-MCNC: 187 NG/DL (ref 193–740)

## 2025-01-29 DIAGNOSIS — E29.1 MALE HYPOGONADISM: Primary | ICD-10-CM

## 2025-01-29 RX ORDER — TESTOSTERONE CYPIONATE 1000 MG/10ML
100 INJECTION, SOLUTION INTRAMUSCULAR WEEKLY
Qty: 4 ML | Refills: 5 | Status: SHIPPED | OUTPATIENT
Start: 2025-01-29 | End: 2025-07-28

## 2025-01-30 DIAGNOSIS — E29.1 MALE HYPOGONADISM: Primary | ICD-10-CM

## 2025-02-19 ENCOUNTER — HOSPITAL ENCOUNTER (OUTPATIENT)
Age: 75
Setting detail: SPECIMEN
Discharge: HOME | End: 2025-02-19

## 2025-02-19 DIAGNOSIS — E29.1 MALE HYPOGONADISM: ICD-10-CM

## 2025-02-19 LAB
SHBG SERPL-SCNC: 23 NMOL/L (ref 19–76)
TESTOST FREE MFR SERPL: 142.7 PG/ML (ref 47–244)
TESTOST SERPL-MCNC: 554 NG/DL (ref 193–740)

## 2025-02-20 DIAGNOSIS — E29.1 MALE HYPOGONADISM: ICD-10-CM

## 2025-02-20 RX ORDER — TESTOSTERONE CYPIONATE 1000 MG/10ML
100 INJECTION, SOLUTION INTRAMUSCULAR WEEKLY
Qty: 4 ML | Refills: 3 | Status: SHIPPED | OUTPATIENT
Start: 2025-02-20 | End: 2025-08-19

## 2025-02-20 NOTE — TELEPHONE ENCOUNTER
L/S 1/27/25. Due in 4 months for Hb/hct, states he has one dose left but wants this sent to [facility]

## 2025-02-21 ENCOUNTER — PATIENT MESSAGE (OUTPATIENT)
Dept: FAMILY MEDICINE CLINIC | Age: 75
End: 2025-02-21

## 2025-02-24 DIAGNOSIS — E29.1 MALE HYPOGONADISM: ICD-10-CM

## 2025-02-24 RX ORDER — TESTOSTERONE CYPIONATE 1000 MG/10ML
100 INJECTION, SOLUTION INTRAMUSCULAR WEEKLY
Qty: 10 ML | Refills: 1 | Status: SHIPPED | OUTPATIENT
Start: 2025-02-24 | End: 2025-07-08

## 2025-02-24 NOTE — TELEPHONE ENCOUNTER
[facility] pharmacy only has 10 ml vials.   February 20, 2025  Alexandria Arcos LPN JG    2/20/25 11:52 AM  Note     L/S 1/27/25. Due in 4 months for Hb/hct, states he has one dose left but wants this sent to [facility]

## 2025-02-26 ENCOUNTER — PATIENT MESSAGE (OUTPATIENT)
Dept: FAMILY MEDICINE CLINIC | Age: 75
End: 2025-02-26

## 2025-02-27 DIAGNOSIS — Z79.890 LONG-TERM CURRENT USE OF TESTOSTERONE REPLACEMENT THERAPY: ICD-10-CM

## 2025-02-27 DIAGNOSIS — E29.1 MALE HYPOGONADISM: Primary | ICD-10-CM

## 2025-02-27 NOTE — TELEPHONE ENCOUNTER
Verified with pharmacy they received refill from 2/20/25. They will send syringes with needles.     Pharmacy is needing clarification on directions of use for Testerone injections.     Please clarify with [facility].

## 2025-02-27 NOTE — PROGRESS NOTES
Issue with getting needles from mail order pharmacy, needs needles approx a month supply called in to [facility] in [place]. L/S 1/27/25

## 2025-02-27 NOTE — TELEPHONE ENCOUNTER
[facility] pharmacy contated, no issue with the order and clarification needed flag removed. Issue is that needles are on backorder from the . It was recommended they be sent locally for a 1 month supply, patient updated and requested order be sent to [facility] in [place]

## 2025-03-03 ENCOUNTER — TELEPHONE (OUTPATIENT)
Age: 75
End: 2025-03-03

## 2025-05-19 DIAGNOSIS — N40.1 BENIGN PROSTATIC HYPERPLASIA WITH URINARY OBSTRUCTION: ICD-10-CM

## 2025-05-19 DIAGNOSIS — N13.8 BENIGN PROSTATIC HYPERPLASIA WITH URINARY OBSTRUCTION: ICD-10-CM

## 2025-05-19 DIAGNOSIS — E29.1 MALE HYPOGONADISM: Primary | ICD-10-CM

## 2025-05-29 SDOH — HEALTH STABILITY: PHYSICAL HEALTH: ON AVERAGE, HOW MANY DAYS PER WEEK DO YOU ENGAGE IN MODERATE TO STRENUOUS EXERCISE (LIKE A BRISK WALK)?: 0 DAYS

## 2025-05-29 SDOH — ECONOMIC STABILITY: INCOME INSECURITY: IN THE LAST 12 MONTHS, WAS THERE A TIME WHEN YOU WERE NOT ABLE TO PAY THE MORTGAGE OR RENT ON TIME?: NO

## 2025-05-29 SDOH — ECONOMIC STABILITY: FOOD INSECURITY: WITHIN THE PAST 12 MONTHS, THE FOOD YOU BOUGHT JUST DIDN'T LAST AND YOU DIDN'T HAVE MONEY TO GET MORE.: NEVER TRUE

## 2025-05-29 SDOH — ECONOMIC STABILITY: FOOD INSECURITY: WITHIN THE PAST 12 MONTHS, YOU WORRIED THAT YOUR FOOD WOULD RUN OUT BEFORE YOU GOT MONEY TO BUY MORE.: NEVER TRUE

## 2025-05-29 SDOH — HEALTH STABILITY: PHYSICAL HEALTH: ON AVERAGE, HOW MANY MINUTES DO YOU ENGAGE IN EXERCISE AT THIS LEVEL?: 0 MIN

## 2025-05-29 ASSESSMENT — PATIENT HEALTH QUESTIONNAIRE - PHQ9
SUM OF ALL RESPONSES TO PHQ QUESTIONS 1-9: 2
1. LITTLE INTEREST OR PLEASURE IN DOING THINGS: SEVERAL DAYS
2. FEELING DOWN, DEPRESSED OR HOPELESS: SEVERAL DAYS
SUM OF ALL RESPONSES TO PHQ QUESTIONS 1-9: 2

## 2025-05-29 ASSESSMENT — LIFESTYLE VARIABLES
HOW MANY STANDARD DRINKS CONTAINING ALCOHOL DO YOU HAVE ON A TYPICAL DAY: 1
HOW OFTEN DO YOU HAVE A DRINK CONTAINING ALCOHOL: MONTHLY OR LESS
HOW OFTEN DO YOU HAVE A DRINK CONTAINING ALCOHOL: 2
HOW OFTEN DO YOU HAVE SIX OR MORE DRINKS ON ONE OCCASION: 1
HOW MANY STANDARD DRINKS CONTAINING ALCOHOL DO YOU HAVE ON A TYPICAL DAY: 1 OR 2

## 2025-05-30 ENCOUNTER — HOSPITAL ENCOUNTER (OUTPATIENT)
Age: 75
Setting detail: SPECIMEN
Discharge: HOME | End: 2025-05-30

## 2025-05-30 ENCOUNTER — OFFICE VISIT (OUTPATIENT)
Dept: FAMILY MEDICINE CLINIC | Age: 75
End: 2025-05-30

## 2025-05-30 VITALS
WEIGHT: 231 LBS | HEART RATE: 90 BPM | HEIGHT: 73 IN | BODY MASS INDEX: 30.62 KG/M2 | OXYGEN SATURATION: 95 % | TEMPERATURE: 97.7 F | DIASTOLIC BLOOD PRESSURE: 78 MMHG | SYSTOLIC BLOOD PRESSURE: 126 MMHG

## 2025-05-30 DIAGNOSIS — J43.2 CENTRILOBULAR EMPHYSEMA (HCC): ICD-10-CM

## 2025-05-30 DIAGNOSIS — F33.1 MAJOR DEPRESSIVE DISORDER, RECURRENT, MODERATE (HCC): ICD-10-CM

## 2025-05-30 DIAGNOSIS — Z00.00 MEDICARE ANNUAL WELLNESS VISIT, SUBSEQUENT: Primary | ICD-10-CM

## 2025-05-30 DIAGNOSIS — Z79.4 TYPE 2 DIABETES MELLITUS WITH DIABETIC POLYNEUROPATHY, WITH LONG-TERM CURRENT USE OF INSULIN (HCC): ICD-10-CM

## 2025-05-30 DIAGNOSIS — E11.42 TYPE 2 DIABETES MELLITUS WITH DIABETIC POLYNEUROPATHY, WITH LONG-TERM CURRENT USE OF INSULIN (HCC): ICD-10-CM

## 2025-05-30 DIAGNOSIS — E34.9 TESTOSTERONE DEFICIENCY: Chronic | ICD-10-CM

## 2025-05-30 DIAGNOSIS — L98.9 SKIN LESION OF SCALP: ICD-10-CM

## 2025-05-30 DIAGNOSIS — M15.9 OSTEOARTHRITIS OF MULTIPLE JOINTS, UNSPECIFIED OSTEOARTHRITIS TYPE: ICD-10-CM

## 2025-05-30 DIAGNOSIS — G62.9 NEUROPATHY: ICD-10-CM

## 2025-05-30 LAB
CHOLEST SERPL-MCNC: 118 MG/DL (ref 0–199)
CHOLESTEROL/HDL RATIO: 2.3
EST. AVERAGE GLUCOSE BLD GHB EST-MCNC: 171 MG/DL
HBA1C MFR BLD: 7.6 % (ref 4–6)
HDLC SERPL-MCNC: 52 MG/DL
LDLC SERPL CALC-MCNC: 52 MG/DL (ref 0–100)
TRIGL SERPL-MCNC: 68 MG/DL
VLDLC SERPL CALC-MCNC: 14 MG/DL (ref 1–30)

## 2025-05-30 RX ORDER — GABAPENTIN 300 MG/1
300 CAPSULE ORAL 3 TIMES DAILY
Qty: 270 CAPSULE | Refills: 1 | Status: SHIPPED | OUTPATIENT
Start: 2025-07-19 | End: 2026-07-14

## 2025-05-30 NOTE — PROGRESS NOTES
me he has gained weight from last year, would like weight to be lower, unsure of how he is going to do this. Feels he should be around 200 pounds. Agreeable to GLP-1.     Lung Cancer Screening:    * Follows with pulmonology- next appointment is in July          Objective   Vitals:    05/30/25 1256   BP: 126/78   BP Site: Left Upper Arm   Patient Position: Sitting   BP Cuff Size: Medium Adult   Pulse: 90   Temp: 97.7 °F (36.5 °C)   TempSrc: Tympanic   SpO2: 95%   Weight: 104.8 kg (231 lb)   Height: 1.842 m (6' 0.5\")      Body mass index is 30.9 kg/m².          No Known Allergies  Prior to Visit Medications    Medication Sig Taking? Authorizing Provider   gabapentin (NEURONTIN) 300 MG capsule Take 1 capsule by mouth 3 times daily for 360 days. Intended supply: 90 days Yes Monica Kelly APRN - CNP   testosterone cypionate (DEPOTESTOTERONE CYPIONATE) 100 MG/ML injection Inject 1 mL into the muscle once a week for 20 doses. Max Daily Amount: 100 mg Yes Piyush Bee MD   metFORMIN (GLUCOPHAGE) 1000 MG tablet Take 1 tablet by mouth 2 times daily (with meals) Yes Monica Kelly APRN - CNP   chlorthalidone (HYGROTON) 25 MG tablet Take 1 tablet by mouth daily Yes Monica Kelly APRN - CNP   pioglitazone (ACTOS) 30 MG tablet Take 1 tablet by mouth daily Yes Monica Kelly APRN - CNP   DULoxetine (CYMBALTA) 30 MG extended release capsule Take 1 capsule by mouth 2 times daily Yes Monica Kelly APRN - CNP   celecoxib (CELEBREX) 200 MG capsule Take 1 capsule by mouth daily Yes Monica Kelly APRN - CNP   atorvastatin (LIPITOR) 20 MG tablet Take 1 tablet by mouth daily Yes Monica Kelly APRN - CNP   lisinopril (PRINIVIL;ZESTRIL) 5 MG tablet Take 1 tablet by mouth daily Yes Monica Kelly APRN - CNP   insulin glargine (LANTUS SOLOSTAR) 100 UNIT/ML injection pen INJECT 40 UNITS INTO THE SKIN IN THE MORNING AND AT BEDTIME. MAX DOSE: 80 UNITS Yes Monica Kelly APRN - CNP   insulin aspart

## 2025-06-02 ENCOUNTER — TELEPHONE (OUTPATIENT)
Dept: FAMILY MEDICINE CLINIC | Age: 75
End: 2025-06-02

## 2025-06-02 ENCOUNTER — RESULTS FOLLOW-UP (OUTPATIENT)
Dept: FAMILY MEDICINE CLINIC | Age: 75
End: 2025-06-02

## 2025-06-02 DIAGNOSIS — Z79.4 TYPE 2 DIABETES MELLITUS WITH DIABETIC POLYNEUROPATHY, WITH LONG-TERM CURRENT USE OF INSULIN (HCC): Primary | ICD-10-CM

## 2025-06-02 DIAGNOSIS — E11.42 TYPE 2 DIABETES MELLITUS WITH DIABETIC POLYNEUROPATHY, WITH LONG-TERM CURRENT USE OF INSULIN (HCC): Primary | ICD-10-CM

## 2025-06-02 NOTE — TELEPHONE ENCOUNTER
Patient called and stated that he called his insurance carrier and found out Wegovy is not covered but Ozempic is. He stated he would like to change that prescription to Ozempic and would like to know what pharmacy is ends up being sent to.

## 2025-06-09 ENCOUNTER — TELEPHONE (OUTPATIENT)
Age: 75
End: 2025-06-09

## 2025-06-09 NOTE — TELEPHONE ENCOUNTER
PT CALLED TO CANCEL AN APPT WITH YOU THAT HE HAD SCHEDULED FOR 6-30-25 BECAUSE HE IS GOING TO [place] FOR AN EXTENDED STAY- HE IS GOING TO GO AHEAD AND GET HIS H&H DONE AND WHEN WE CALL HIM WITH THOSE RESULTS HE WILL LET YOU KNOW ABOUT REFILLING HIS RX- HE WOULD LIKE TO SWITCH FROM THE TESTOSTERONE INJECTIONS TO THE PILLS BECAUSE HE HASN'T SEEN MUCH OF AN IMPROVEMENT IN HIS ENERGY LEVEL TO MAKE IT WORTH THE TROUBLE OF GETTING THE INJECTIONS-PLEASE ADVISE-NATALIA

## 2025-06-11 ENCOUNTER — HOSPITAL ENCOUNTER (OUTPATIENT)
Age: 75
Setting detail: SPECIMEN
Discharge: HOME | End: 2025-06-11

## 2025-06-11 DIAGNOSIS — N40.1 BENIGN PROSTATIC HYPERPLASIA WITH URINARY OBSTRUCTION: ICD-10-CM

## 2025-06-11 DIAGNOSIS — E29.1 MALE HYPOGONADISM: ICD-10-CM

## 2025-06-11 DIAGNOSIS — N13.8 BENIGN PROSTATIC HYPERPLASIA WITH URINARY OBSTRUCTION: ICD-10-CM

## 2025-06-11 LAB
HCT VFR BLD AUTO: 47.4 % (ref 40.7–50.3)
HGB BLD-MCNC: 14.3 G/DL (ref 13–17)
PSA SERPL-MCNC: 0.78 NG/ML (ref 0–4)

## 2025-06-12 ENCOUNTER — TELEPHONE (OUTPATIENT)
Age: 75
End: 2025-06-12

## 2025-06-12 NOTE — TELEPHONE ENCOUNTER
PT NOTIFIED AND VOICED UNDERSTANDING- HE WOULD LIKE TO DC THE TESTOSTERONE ALTOGETHER FOR NOW AND WILL SCHEDULE AN APPT WITH YOU WHEN HE RETURNS TO KEEP TRACK OF HIS OTHER CONCERNS-NATALIA

## 2025-06-25 ENCOUNTER — PATIENT MESSAGE (OUTPATIENT)
Dept: FAMILY MEDICINE CLINIC | Age: 75
End: 2025-06-25

## 2025-06-25 DIAGNOSIS — E11.42 TYPE 2 DIABETES MELLITUS WITH DIABETIC POLYNEUROPATHY, WITH LONG-TERM CURRENT USE OF INSULIN (HCC): Primary | ICD-10-CM

## 2025-06-25 DIAGNOSIS — Z79.4 TYPE 2 DIABETES MELLITUS WITH DIABETIC POLYNEUROPATHY, WITH LONG-TERM CURRENT USE OF INSULIN (HCC): Primary | ICD-10-CM

## 2025-07-06 DIAGNOSIS — E11.42 TYPE 2 DIABETES MELLITUS WITH DIABETIC POLYNEUROPATHY, WITH LONG-TERM CURRENT USE OF INSULIN (HCC): ICD-10-CM

## 2025-07-06 DIAGNOSIS — G89.21 CHRONIC PAIN DUE TO TRAUMA: ICD-10-CM

## 2025-07-06 DIAGNOSIS — F32.A DEPRESSION, UNSPECIFIED DEPRESSION TYPE: ICD-10-CM

## 2025-07-06 DIAGNOSIS — E78.2 MIXED HYPERLIPIDEMIA: ICD-10-CM

## 2025-07-06 DIAGNOSIS — I10 ESSENTIAL HYPERTENSION: Chronic | ICD-10-CM

## 2025-07-06 DIAGNOSIS — Z79.4 TYPE 2 DIABETES MELLITUS WITH DIABETIC POLYNEUROPATHY, WITH LONG-TERM CURRENT USE OF INSULIN (HCC): ICD-10-CM

## 2025-07-06 DIAGNOSIS — M15.9 OSTEOARTHRITIS OF MULTIPLE JOINTS, UNSPECIFIED OSTEOARTHRITIS TYPE: ICD-10-CM

## 2025-07-11 NOTE — TELEPHONE ENCOUNTER
LOV 5/30/25   RTO ---  LRF 1/24/2025          Controlled Substance Monitoring:    Acute and Chronic Pain Monitoring:   RX Monitoring Attestation Periodic Controlled Substance Monitoring Chronic Pain > 80 MEDD   2/14/2019  11:43 AM The Prescription Monitoring Report for this patient was reviewed today. No signs of potential drug abuse or diversion identified. Dose reduction has been attempted.;Reviewed the patient's functional status and documentation.

## 2025-07-14 RX ORDER — PIOGLITAZONE 30 MG/1
30 TABLET ORAL DAILY
Qty: 90 TABLET | Refills: 1 | Status: SHIPPED | OUTPATIENT
Start: 2025-07-14 | End: 2026-07-09

## 2025-07-14 RX ORDER — CELECOXIB 200 MG/1
200 CAPSULE ORAL DAILY
Qty: 90 CAPSULE | Refills: 3 | Status: SHIPPED | OUTPATIENT
Start: 2025-07-14 | End: 2026-07-09

## 2025-07-14 RX ORDER — CHLORTHALIDONE 25 MG/1
25 TABLET ORAL DAILY
Qty: 90 TABLET | Refills: 3 | Status: SHIPPED | OUTPATIENT
Start: 2025-07-14 | End: 2026-07-09

## 2025-07-14 RX ORDER — LISINOPRIL 5 MG/1
5 TABLET ORAL DAILY
Qty: 90 TABLET | Refills: 3 | Status: SHIPPED | OUTPATIENT
Start: 2025-07-14 | End: 2026-07-09

## 2025-07-14 RX ORDER — ATORVASTATIN CALCIUM 20 MG/1
20 TABLET, FILM COATED ORAL DAILY
Qty: 90 TABLET | Refills: 3 | Status: SHIPPED | OUTPATIENT
Start: 2025-07-14 | End: 2026-07-09

## 2025-07-14 RX ORDER — DULOXETIN HYDROCHLORIDE 30 MG/1
30 CAPSULE, DELAYED RELEASE ORAL 2 TIMES DAILY
Qty: 180 CAPSULE | Refills: 1 | Status: SHIPPED | OUTPATIENT
Start: 2025-07-14 | End: 2026-07-09

## (undated) DEVICE — MHPB ARTHROSCOPY PACK: Brand: MEDLINE INDUSTRIES, INC.

## (undated) DEVICE — STOCKINETTE,IMPERVIOUS,12X48,STERILE: Brand: MEDLINE

## (undated) DEVICE — SUTURE ETHBND EXCEL SZ 1 L30IN NONABSORBABLE GRN L36MM CT-1 X425H

## (undated) DEVICE — SOLUTION IRRIG 3000ML 0.9% SOD CHL USP UROMATIC PLAS CONT

## (undated) DEVICE — SUTURE VCRL + SZ 3-0 L36IN ABSRB UD L36MM CT-1 1/2 CIR VCP944H

## (undated) DEVICE — MHPB CYSTO PACK-LF: Brand: MEDLINE INDUSTRIES, INC.

## (undated) DEVICE — DRAPE,U/ SHT,SPLIT,PLAS,STERIL: Brand: MEDLINE

## (undated) DEVICE — POUCH INSTR W6.75XL11.5IN FRST 2 PKT ADH FOR ORTH AND

## (undated) DEVICE — TOWEL,OR,DSP,ST,NATURAL,DLX,4/PK,20PK/CS: Brand: MEDLINE

## (undated) DEVICE — SOLUTION IRRIG 1000ML 0.9% SOD CHL USP POUR PLAS BTL

## (undated) DEVICE — DUAL LUMEN URETERAL CATHETER

## (undated) DEVICE — DRAIN SURG 15FR SIL RND CHN W/ TRCR FULL FLUT DBL WRP TRAD

## (undated) DEVICE — 1016 S-DRAPE IRRIG POUCH 10/BOX: Brand: STERI-DRAPE™

## (undated) DEVICE — DISC ABSRB YEL FLR POLYETH MGMT SUCT QUICKSUITE

## (undated) DEVICE — GUIDEWIRE URO L150CM DIA0.035IN STIFF NIT HYDRPHLC STR TIP

## (undated) DEVICE — SUTURE FIBERWIRE SZ 2 L38IN NONABSORBABLE BLU L36.6MM 1/2 AR7202

## (undated) DEVICE — PROBE ABLAT XL 90DEG ASPIR BPLR RF 1 PC ELECTRD ERGO HNDL

## (undated) DEVICE — TUBING PMP L16FT MAIN DISP FOR AR-6400 AR-6475

## (undated) DEVICE — SUTURE PROL SZ 4-0 L18IN NONABSORBABLE BLU L16MM PC-3 3/8 8634G

## (undated) DEVICE — STERILE PVP: Brand: MEDLINE INDUSTRIES, INC.

## (undated) DEVICE — Device

## (undated) DEVICE — MHPB GEN MINOR PACK: Brand: MEDLINE INDUSTRIES, INC.

## (undated) DEVICE — 1010 S-DRAPE TOWEL DRAPE 10/BX: Brand: STERI-DRAPE™

## (undated) DEVICE — DRESSING TRNSPAR W5XL4.5IN FLM SHT SEMIPERMEABLE WIND

## (undated) DEVICE — DRESSING,GAUZE,XEROFORM,CURAD,1"X8",ST: Brand: CURAD

## (undated) DEVICE — 3M™ STERI-DRAPE™ U-DRAPE 1015: Brand: STERI-DRAPE™

## (undated) DEVICE — SYSTEM FLD COLL W/ FLX DRP SUPP AND DISP BG

## (undated) DEVICE — PROTECTOR ULN NRV PUR FOAM HK LOOP STRP ANATOMICALLY

## (undated) DEVICE — SYRINGE ONLY,20ML LUER LOCK: Brand: MEDLINE INDUSTRIES, INC.

## (undated) DEVICE — CLOTH PRE OP W9XL10.5IN 2% CHG FRAGRANCE RNS FREE READYPREP

## (undated) DEVICE — DRAPE,REIN 53X77,STERILE: Brand: MEDLINE

## (undated) DEVICE — GAUZE,SPONGE,FLUFF,6"X6.75",STRL,5/TRAY: Brand: MEDLINE

## (undated) DEVICE — SUTURE VCRL + SZ 3-0 L27IN ABSRB UD L26MM SH 1/2 CIR VCP416H

## (undated) DEVICE — APPLICATOR MEDICATED 26 CC SOLUTION HI LT ORNG CHLORAPREP

## (undated) DEVICE — GLOVE SURG SZ 8 L12IN THK75MIL DK GRN LTX FREE

## (undated) DEVICE — TUBING, SUCTION, 9/32" X 20', STRAIGHT: Brand: MEDLINE INDUSTRIES, INC.

## (undated) DEVICE — SOLUTION IV IRRIG POUR BRL 0.9% SODIUM CHL 2F7124

## (undated) DEVICE — GOWN,AURORA,NONRNF,XL,30/CS: Brand: MEDLINE

## (undated) DEVICE — SOLUTION IRRIG 3000ML LAC RINGERS ARTHROMTC PLAS CONT

## (undated) DEVICE — SHEET, ORTHO, SPLIT, STERILE: Brand: MEDLINE

## (undated) DEVICE — GOWN,SIRUS,NONRNF,SETINSLV,XL,20/CS: Brand: MEDLINE

## (undated) DEVICE — STRAP,POSITIONING,KNEE/BODY,FOAM,4X60": Brand: MEDLINE

## (undated) DEVICE — RENTAL LASER HOLMIUM LOW WATTAGE CASE

## (undated) DEVICE — GLOVE ORANGE PI 7   MSG9070

## (undated) DEVICE — KIT CHAIR TRIMANO FOAM W/ SUPP ARM DRP ERGONOMICALLY DESIGNED

## (undated) DEVICE — STRIP,CLOSURE,WOUND,MEDI-STRIP,1/2X4: Brand: MEDLINE

## (undated) DEVICE — ELECTRODE PT RET AD L9FT HI MOIST COND ADH HYDRGEL CORDED

## (undated) DEVICE — PADDING,UNDERCAST,COTTON, 4"X4YD STERILE: Brand: MEDLINE

## (undated) DEVICE — GLOVE ORANGE PI 7 1/2   MSG9075

## (undated) DEVICE — GLOVE ORTHO 7 1/2   MSG9475

## (undated) DEVICE — ADAPTER URO SCP UROLOK LL

## (undated) DEVICE — GLOVE ORANGE PI 8   MSG9080

## (undated) DEVICE — COVER,TABLE,HEAVY DUTY,50"X90",STRL: Brand: MEDLINE

## (undated) DEVICE — SUTURE ETHBND EXCEL SZ 2 L30IN NONABSORBABLE GRN L40MM V-37 MX69G

## (undated) DEVICE — SUTURE MCRYL SZ 3 0 L18IN ABSRB UD PS 2 3 8 CIR REV CUT NDL MCP497G

## (undated) DEVICE — SUTURE PROL SZ 3-0 L18IN NONABSORBABLE BLU L24MM FS-1 3/8 8684G

## (undated) DEVICE — POUCH FLD 36X29IN SHLDR HVY LO GLARE MATTE FINISH FLM 2 SUCT

## (undated) DEVICE — SOLUTION IRRIG 1000ML STRL H2O USP PLAS POUR BTL

## (undated) DEVICE — SYRINGE MED 50ML LUERLOCK TIP

## (undated) DEVICE — ELECTRODE ES L3IN S STL BLDE INSUL DISP VALLEYLAB EDGE

## (undated) DEVICE — TUBING, SUCTION, 3/16" X 10', STRAIGHT: Brand: MEDLINE

## (undated) DEVICE — BANDAGE GZ W2XL75IN ST RAYON POLY CNFRM STRTCH LTWT

## (undated) DEVICE — CONTAINER,SPECIMEN,4OZ,OR STRL: Brand: MEDLINE

## (undated) DEVICE — ELECTROSURGICAL PENCIL BUTTON SWITCH E-Z CLEAN COATED BLADE ELECTRODE 10 FT (3 M) CORD HOLSTER: Brand: MEGADYNE

## (undated) DEVICE — IMMOBILIZER ORTH CONTACT CLOSURE STRP LG 18X9 IN PROCARE

## (undated) DEVICE — BANDAGE,SELF ADHRNT,COFLEX,4"X5YD,STRL: Brand: COLABEL

## (undated) DEVICE — SPLINT ORTH W4XL15IN LAYERED FBRGLS FOAM PD BRTH BK MOLD

## (undated) DEVICE — SINGLE ACTION PUMPING SYSTEM

## (undated) DEVICE — GOWN,SIRUS,POLYRNF,BRTHSLV,XL,30/CS: Brand: MEDLINE

## (undated) DEVICE — GOWN,AURORA,NONREINFORCED,LARGE: Brand: MEDLINE

## (undated) DEVICE — ROLLER BDG 10YDX2IN GAUZE 1 PLY ABSORB

## (undated) DEVICE — COVER,MAYO STAND,STERILE: Brand: MEDLINE

## (undated) DEVICE — SUTURE VCRL SZ 0 L36IN ABSRB UD L36MM CT-1 1/2 CIR J946H

## (undated) DEVICE — BLANKET WRM W40.2XL55.9IN IORT LO BODY + MISTRAL AIR

## (undated) DEVICE — STRAP,CATHETER,ELASTIC,HOOK&LOOP: Brand: MEDLINE

## (undated) DEVICE — BLADE SHV L13CM DIA4MM BNE CUT AGG DEB COOLCUT

## (undated) DEVICE — YANKAUER,SMOOTH HANDLE,HIGH CAPACITY: Brand: MEDLINE INDUSTRIES, INC.

## (undated) DEVICE — DRAPE,EXTREMITY,89X128,STERILE: Brand: MEDLINE

## (undated) DEVICE — KIT POS ULT SHLDR PT CARE REHAB SLNG

## (undated) DEVICE — MHPB HAND AND FOOT PACK: Brand: MEDLINE INDUSTRIES, INC.

## (undated) DEVICE — DRESSING PETRO W3XL3IN OIL EMUL N ADH GZ KNIT IMPREG CELOS

## (undated) DEVICE — 450 ML BOTTLE OF 0.05% CHLORHEXIDINE GLUCONATE IN 99.95% STERILE WATER FOR IRRIGATION, USP AND APPLICATOR.: Brand: IRRISEPT ANTIMICROBIAL WOUND LAVAGE

## (undated) DEVICE — SYRINGE IRRIG 60ML SFT PLIABLE BLB EZ TO GRP 1 HND USE W/

## (undated) DEVICE — 3M™ STERI-DRAPE™ INSTRUMENT POUCH 1018L: Brand: STERI-DRAPE™

## (undated) DEVICE — RESERVOIR,SUCTION,100CC,SILICONE: Brand: MEDLINE

## (undated) DEVICE — SINGLE PORT MANIFOLD: Brand: NEPTUNE 2

## (undated) DEVICE — DUAL CUT SAGITTAL BLADE

## (undated) DEVICE — SWAB CULT STR LIQ STUART ALUM SHFT RAYON SWAB BACTISWAB NPG

## (undated) DEVICE — TUBING SUCT 12FR MAL ALUM SHFT FN CAP VENT UNIV CONN W/ OBT

## (undated) DEVICE — GLOVE SURG SZ 65 THK91MIL LTX FREE SYN POLYISOPRENE

## (undated) DEVICE — CHLORAPREP 26ML ORANGE

## (undated) DEVICE — YANKAUER,OPEN TIP,W/O VENT,STERILE: Brand: MEDLINE INDUSTRIES, INC.

## (undated) DEVICE — SET HNDPC W COAX BNE CLN TIP SUCT TB BTTRY PWR DISPOSABLE

## (undated) DEVICE — 3M™ IOBAN™ 2 ANTIMICROBIAL INCISE DRAPE 6651EZ: Brand: IOBAN™ 2

## (undated) DEVICE — MARKER,SKIN,WI/RULER AND LABELS: Brand: MEDLINE

## (undated) DEVICE — SOLUTION IRRIG 3000ML STRL H2O USP UROMATIC PLAS CONT

## (undated) DEVICE — TOURNI COT MED ORNG

## (undated) DEVICE — SUTURE PROL SZ 5-0 L36IN NONABSORBABLE BLU L13MM C-1 3/8 8720H